# Patient Record
Sex: FEMALE | Race: WHITE | NOT HISPANIC OR LATINO | Employment: OTHER | URBAN - METROPOLITAN AREA
[De-identification: names, ages, dates, MRNs, and addresses within clinical notes are randomized per-mention and may not be internally consistent; named-entity substitution may affect disease eponyms.]

---

## 2017-01-04 ENCOUNTER — PATIENT MESSAGE (OUTPATIENT)
Dept: FAMILY MEDICINE | Facility: CLINIC | Age: 57
End: 2017-01-04

## 2017-01-04 DIAGNOSIS — E11.9 TYPE 2 DIABETES MELLITUS WITHOUT COMPLICATION, WITHOUT LONG-TERM CURRENT USE OF INSULIN: Primary | ICD-10-CM

## 2017-01-04 RX ORDER — METFORMIN HYDROCHLORIDE 500 MG/1
500 TABLET ORAL 2 TIMES DAILY WITH MEALS
Qty: 180 TABLET | Refills: 1 | Status: SHIPPED | OUTPATIENT
Start: 2017-01-04 | End: 2017-06-12 | Stop reason: SDUPTHER

## 2017-01-04 NOTE — TELEPHONE ENCOUNTER
I am refilling metformin The patient is due for DM fu 3 m w [id number] to visit if avail;able but Iok if too difficult to come from [place] -although can be non fasting and anytime or day at least 3 d in advance

## 2017-01-14 ENCOUNTER — PATIENT MESSAGE (OUTPATIENT)
Dept: FAMILY MEDICINE | Facility: CLINIC | Age: 57
End: 2017-01-14

## 2017-01-17 RX ORDER — ZOLPIDEM TARTRATE 10 MG/1
10 TABLET ORAL NIGHTLY PRN
Qty: 30 TABLET | Refills: 5 | Status: SHIPPED | OUTPATIENT
Start: 2017-01-17 | End: 2017-07-18 | Stop reason: SDUPTHER

## 2017-01-18 ENCOUNTER — LAB VISIT (OUTPATIENT)
Dept: LAB | Facility: HOSPITAL | Age: 57
End: 2017-01-18
Attending: INTERNAL MEDICINE
Payer: MEDICARE

## 2017-01-18 ENCOUNTER — OFFICE VISIT (OUTPATIENT)
Dept: HEMATOLOGY/ONCOLOGY | Facility: CLINIC | Age: 57
End: 2017-01-18
Payer: MEDICARE

## 2017-01-18 ENCOUNTER — INFUSION (OUTPATIENT)
Dept: INFUSION THERAPY | Facility: HOSPITAL | Age: 57
End: 2017-01-18
Attending: INTERNAL MEDICINE
Payer: MEDICARE

## 2017-01-18 VITALS
DIASTOLIC BLOOD PRESSURE: 86 MMHG | SYSTOLIC BLOOD PRESSURE: 140 MMHG | BODY MASS INDEX: 37.25 KG/M2 | OXYGEN SATURATION: 96 % | TEMPERATURE: 98 F | HEIGHT: 65 IN | HEART RATE: 96 BPM | WEIGHT: 223.56 LBS

## 2017-01-18 DIAGNOSIS — C7A.1 MALIGNANT POORLY DIFFERENTIATED NEUROENDOCRINE CARCINOMA: Primary | ICD-10-CM

## 2017-01-18 DIAGNOSIS — D50.0 IRON DEFICIENCY ANEMIA DUE TO CHRONIC BLOOD LOSS: ICD-10-CM

## 2017-01-18 DIAGNOSIS — D61.810 PANCYTOPENIA DUE TO ANTINEOPLASTIC CHEMOTHERAPY: ICD-10-CM

## 2017-01-18 DIAGNOSIS — T45.1X5A PANCYTOPENIA DUE TO ANTINEOPLASTIC CHEMOTHERAPY: ICD-10-CM

## 2017-01-18 DIAGNOSIS — R64 CACHEXIA: ICD-10-CM

## 2017-01-18 DIAGNOSIS — C78.6 METASTATIC CANCER TO RETROPERITONEUM: ICD-10-CM

## 2017-01-18 DIAGNOSIS — C7B.8 SECONDARY NEUROENDOCRINE TUMOR OF LIVER: ICD-10-CM

## 2017-01-18 DIAGNOSIS — D63.0 ANEMIA IN NEOPLASTIC DISEASE: ICD-10-CM

## 2017-01-18 DIAGNOSIS — C7A.1 MALIGNANT POORLY DIFFERENTIATED NEUROENDOCRINE CARCINOMA: ICD-10-CM

## 2017-01-18 DIAGNOSIS — C7B.8 SECONDARY NEUROENDOCRINE TUMOR OF LIVER: Primary | ICD-10-CM

## 2017-01-18 DIAGNOSIS — C34.90 SMALL CELL LUNG CANCER, UNSPECIFIED LATERALITY: ICD-10-CM

## 2017-01-18 LAB
ALBUMIN SERPL BCP-MCNC: 3.6 G/DL
ALP SERPL-CCNC: 74 U/L
ALT SERPL W/O P-5'-P-CCNC: 16 U/L
ANION GAP SERPL CALC-SCNC: 12 MMOL/L
AST SERPL-CCNC: 13 U/L
BASOPHILS # BLD AUTO: 0.02 K/UL
BASOPHILS NFR BLD: 0.3 %
BILIRUB SERPL-MCNC: 0.4 MG/DL
BUN SERPL-MCNC: 31 MG/DL
CALCIUM SERPL-MCNC: 9.8 MG/DL
CHLORIDE SERPL-SCNC: 97 MMOL/L
CO2 SERPL-SCNC: 28 MMOL/L
CREAT SERPL-MCNC: 1.4 MG/DL
DIFFERENTIAL METHOD: ABNORMAL
EOSINOPHIL # BLD AUTO: 0.2 K/UL
EOSINOPHIL NFR BLD: 2.4 %
ERYTHROCYTE [DISTWIDTH] IN BLOOD BY AUTOMATED COUNT: 12.6 %
EST. GFR  (AFRICAN AMERICAN): 48 ML/MIN/1.73 M^2
EST. GFR  (NON AFRICAN AMERICAN): 42 ML/MIN/1.73 M^2
FERRITIN SERPL-MCNC: 1159 NG/ML
GLUCOSE SERPL-MCNC: 354 MG/DL
HCT VFR BLD AUTO: 33.2 %
HGB BLD-MCNC: 10.9 G/DL
IRON SERPL-MCNC: 41 UG/DL
LYMPHOCYTES # BLD AUTO: 2.1 K/UL
LYMPHOCYTES NFR BLD: 29.5 %
MAGNESIUM SERPL-MCNC: 1.4 MG/DL
MCH RBC QN AUTO: 32.2 PG
MCHC RBC AUTO-ENTMCNC: 32.8 %
MCV RBC AUTO: 98 FL
MONOCYTES # BLD AUTO: 0.7 K/UL
MONOCYTES NFR BLD: 9.6 %
NEUTROPHILS # BLD AUTO: 4.1 K/UL
NEUTROPHILS NFR BLD: 58.2 %
PLATELET # BLD AUTO: 179 K/UL
PMV BLD AUTO: 8.9 FL
POTASSIUM SERPL-SCNC: 4 MMOL/L
PROT SERPL-MCNC: 9.3 G/DL
RBC # BLD AUTO: 3.39 M/UL
SATURATED IRON: 12 %
SODIUM SERPL-SCNC: 137 MMOL/L
TOTAL IRON BINDING CAPACITY: 330 UG/DL
TRANSFERRIN SERPL-MCNC: 223 MG/DL
WBC # BLD AUTO: 7.01 K/UL

## 2017-01-18 PROCEDURE — 3079F DIAST BP 80-89 MM HG: CPT | Mod: S$GLB,,, | Performed by: INTERNAL MEDICINE

## 2017-01-18 PROCEDURE — 96523 IRRIG DRUG DELIVERY DEVICE: CPT

## 2017-01-18 PROCEDURE — 36415 COLL VENOUS BLD VENIPUNCTURE: CPT

## 2017-01-18 PROCEDURE — 82728 ASSAY OF FERRITIN: CPT

## 2017-01-18 PROCEDURE — 85025 COMPLETE CBC W/AUTO DIFF WBC: CPT

## 2017-01-18 PROCEDURE — 83540 ASSAY OF IRON: CPT

## 2017-01-18 PROCEDURE — 99999 PR PBB SHADOW E&M-EST. PATIENT-LVL III: CPT | Mod: PBBFAC,,, | Performed by: INTERNAL MEDICINE

## 2017-01-18 PROCEDURE — 3077F SYST BP >= 140 MM HG: CPT | Mod: S$GLB,,, | Performed by: INTERNAL MEDICINE

## 2017-01-18 PROCEDURE — 99214 OFFICE O/P EST MOD 30 MIN: CPT | Mod: S$GLB,,, | Performed by: INTERNAL MEDICINE

## 2017-01-18 PROCEDURE — 80053 COMPREHEN METABOLIC PANEL: CPT

## 2017-01-18 PROCEDURE — 83735 ASSAY OF MAGNESIUM: CPT

## 2017-01-18 PROCEDURE — 25000003 PHARM REV CODE 250: Performed by: INTERNAL MEDICINE

## 2017-01-18 PROCEDURE — 1159F MED LIST DOCD IN RCRD: CPT | Mod: S$GLB,,, | Performed by: INTERNAL MEDICINE

## 2017-01-18 RX ORDER — HEPARIN 100 UNIT/ML
500 SYRINGE INTRAVENOUS
Status: CANCELLED | OUTPATIENT
Start: 2017-01-18

## 2017-01-18 RX ORDER — SODIUM CHLORIDE 0.9 % (FLUSH) 0.9 %
10 SYRINGE (ML) INJECTION
Status: CANCELLED | OUTPATIENT
Start: 2017-01-18

## 2017-01-18 RX ORDER — SODIUM CHLORIDE 0.9 % (FLUSH) 0.9 %
10 SYRINGE (ML) INJECTION
Status: COMPLETED | OUTPATIENT
Start: 2017-01-18 | End: 2017-01-18

## 2017-01-18 RX ORDER — HEPARIN 100 UNIT/ML
500 SYRINGE INTRAVENOUS
Status: COMPLETED | OUTPATIENT
Start: 2017-01-18 | End: 2017-01-18

## 2017-01-18 RX ADMIN — SODIUM CHLORIDE, PRESERVATIVE FREE 10 ML: 5 INJECTION INTRAVENOUS at 03:01

## 2017-01-18 RX ADMIN — HEPARIN 500 UNITS: 100 SYRINGE at 03:01

## 2017-01-18 NOTE — PROGRESS NOTES
Subjective:       Patient ID: Janine Souza is a 57 y.o. female.    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 patient has had neuroendocrine tumor evaluation completion of chemotherapy with no evidence for progression with last imaging performed ECOG status 1    Past Medical History   Diagnosis Date    Acute headache 2016    Anemia     Atrial fibrillation, persistent     Bronchitis     CHF (congestive heart failure)     Diabetes mellitus, type 2     Hypertension     Malignant poorly differentiated neuroendocrine carcinoma 2016    Secondary neuroendocrine tumor of liver 2016     Family History   Problem Relation Age of Onset    Diabetes type II Mother     Prostate cancer Father     Breast cancer Paternal Aunt      breast    Breast cancer Maternal Aunt      breast     Social History     Social History    Marital status:      Spouse name: N/A    Number of children: N/A    Years of education: N/A     Occupational History    Not on file.     Social History Main Topics    Smoking status: Never Smoker    Smokeless tobacco: Not on file    Alcohol use No    Drug use: No    Sexual activity: Not on file     Other Topics Concern    Not on file     Social History Narrative     Past Surgical History   Procedure Laterality Date    Tonsillectomy      Adenoidectomy      Uvulopalatopharygoplasty       section      Cardiac electrophysiology mapping and ablation      Ercp      Cardiac surgery         Labs:  Lab Results   Component Value Date    WBC 7.01 2017    HGB 10.9 (L) 2017    HCT 33.2 (L) 2017    MCV 98 2017     2017     BMP  Lab Results   Component Value Date     2017    K 4.0 2017    CL 97 2017    CO2 28 2017    BUN 31 (H) 2017    CREATININE 1.4 2017    CALCIUM 9.8 2017    ANIONGAP 12  01/18/2017    ESTGFRAFRICA 48 (A) 01/18/2017    EGFRNONAA 42 (A) 01/18/2017     Lab Results   Component Value Date    ALT 16 01/18/2017    AST 13 01/18/2017    ALKPHOS 74 01/18/2017    BILITOT 0.4 01/18/2017       Lab Results   Component Value Date    IRON 76 11/16/2016    TIBC 332 11/16/2016    FERRITIN 913 (H) 11/16/2016     No results found for: [id number]  No results found for: FOLATE  Lab Results   Component Value Date    TSH 2.916 12/20/2016         Review of Systems   Constitutional: Negative for activity change, appetite change, chills, diaphoresis, fatigue, fever and unexpected weight change.   HENT: Negative for congestion, dental problem, drooling, ear discharge, ear pain, facial swelling, hearing loss, mouth sores, nosebleeds, postnasal drip, rhinorrhea, sinus pressure, sneezing, sore throat, tinnitus, trouble swallowing and voice change.    Eyes: Negative for photophobia, pain, discharge, redness, itching and visual disturbance.   Respiratory: Negative for cough, choking, chest tightness, shortness of breath, wheezing and stridor.    Cardiovascular: Negative for chest pain, palpitations and leg swelling.   Gastrointestinal: Negative for abdominal distention, abdominal pain, anal bleeding, blood in stool, constipation, diarrhea, nausea, rectal pain and vomiting.   Endocrine: Negative for cold intolerance, heat intolerance, polydipsia, polyphagia and polyuria.   Genitourinary: Negative for decreased urine volume, difficulty urinating, dyspareunia, dysuria, enuresis, flank pain, frequency, genital sores, hematuria, menstrual problem, pelvic pain, urgency, vaginal bleeding, vaginal discharge and vaginal pain.   Musculoskeletal: Negative for arthralgias, back pain, gait problem, joint swelling, myalgias, neck pain and neck stiffness.   Skin: Negative for color change, pallor and rash.   Allergic/Immunologic: Negative for environmental allergies, food allergies and immunocompromised state.   Neurological:  Negative for dizziness, tremors, seizures, syncope, facial asymmetry, speech difficulty, weakness, light-headedness, numbness and headaches.   Hematological: Negative for adenopathy. Does not bruise/bleed easily.   Psychiatric/Behavioral: Negative for agitation, behavioral problems, confusion, decreased concentration, dysphoric mood, hallucinations, self-injury, sleep disturbance and suicidal ideas. The patient is not nervous/anxious and is not hyperactive.        Objective:      Physical Exam   Constitutional: She is oriented to person, place, and time. She appears well-developed and well-nourished. No distress.   HENT:   Head: Normocephalic and atraumatic.   Right Ear: External ear normal.   Left Ear: External ear normal.   Nose: Nose normal. Right sinus exhibits no maxillary sinus tenderness and no frontal sinus tenderness. Left sinus exhibits no maxillary sinus tenderness and no frontal sinus tenderness.   Mouth/Throat: Oropharynx is clear and moist. No oropharyngeal exudate.   Eyes: Conjunctivae, EOM and lids are normal. Pupils are equal, round, and reactive to light. Right eye exhibits no discharge. Left eye exhibits no discharge. Right conjunctiva is not injected. Right conjunctiva has no hemorrhage. Left conjunctiva is not injected. Left conjunctiva has no hemorrhage. No scleral icterus.   Neck: Normal range of motion. Neck supple. No JVD present. No tracheal deviation present. No thyromegaly present.   Cardiovascular: Normal rate, regular rhythm, normal heart sounds and intact distal pulses.    Pulmonary/Chest: Effort normal and breath sounds normal. No stridor. No respiratory distress. She exhibits no tenderness.   Abdominal: Soft. Bowel sounds are normal. She exhibits no distension and no mass. There is no splenomegaly or hepatomegaly. There is no tenderness. There is no rebound.   Musculoskeletal: Normal range of motion. She exhibits no edema or tenderness.   Lymphadenopathy:     She has no cervical  adenopathy.     She has no axillary adenopathy.        Right: No supraclavicular adenopathy present.        Left: No supraclavicular adenopathy present.   Neurological: She is alert and oriented to person, place, and time. No cranial nerve deficit. Coordination normal.   Skin: Skin is dry. No rash noted. She is not diaphoretic. No erythema.   Psychiatric: She has a normal mood and affect. Her behavior is normal. Judgment and thought content normal.   Vitals reviewed.          Assessment:       1. Malignant poorly differentiated neuroendocrine carcinoma    2. 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 Secondary neuroendocrine tumor of liver    4. Iron deficiency anemia due to chronic blood loss            Plan:     patient is clinically stable laboratory results pending communicate through patient portal return in 3 months CT chest abdomen and pelvis dramatic improvement after systemic chemotherapy no evidence of progression a last imaging performed

## 2017-01-18 NOTE — MR AVS SNAPSHOT
Patient Information     Patient Name Sex Janine Chavarria Female 1960      Visit Information        Provider Department Dept Phone Center    2017 2:45 PM [facility] [phone] [facility]      Patient Instructions      [facility]  [address]  [address] [address]  [phone] phone     [phone] fax  Hours of Operation: Monday- Friday 8:00am- 5:00pm  After hours phone  [phone]  Hematology / Oncology Physicians on call      Dr. Mark Haider    Please call with any concerns regarding your appointment today.       Your Current Medications Are     blood sugar diagnostic, disc Strp    docusate sodium (COLACE) 50 MG capsule    fluticasone (FLOVENT HFA) 110 mcg/actuation inhaler    furosemide (LASIX) 40 MG tablet    glipiZIDE (GLUCOTROL) 10 MG tablet    KLOR-CON M20 20 mEq tablet    lancets Misc    linagliptin (TRADJENTA) 5 mg Tab tablet    metformin (GLUCOPHAGE) 500 MG tablet    metoprolol tartrate (LOPRESSOR) 50 MG tablet    ondansetron (ZOFRAN) 4 MG tablet    prochlorperazine (COMPAZINE) 5 MG tablet    XARELTO 20 mg Tab    zolpidem (AMBIEN) 10 mg Tab      Facility-Administered Medications     ferumoxytol (FERAHEME) 510 mg in dextrose 5 % 100 mL IVPB    heparin, porcine (PF) 100 unit/mL injection flush 500 Units    heparin, porcine (PF) 100 unit/mL injection flush 500 Units    sodium chloride 0.9% flush 10 mL    sodium chloride 0.9% flush 10 mL      Appointments for Next Year     3/1/2017 11:15 AM INFUSION 015 MIN (15 min.) [facility]AIR 03 [facility]    Arrive at check-in approximately 15 minutes before your scheduled appointment time. Bring all outside medical records and imaging, along with a list of your current medications and insurance card.    (off [facility]) 1st floor    2017 10:10 AM NON FASTING LAB (5 min.) [facility]    Arrive at check-in approximately 15 minutes before your scheduled appointment time. Bring all outside medical records and imaging, along with a list of your current medications and insurance card.    (off [address]) 2nd floor    4/12/2017 11:10 AM CT ABD PEL W CONTRAST (20 min.) [facility] CT1 LIMIT 500 LBS    Please fast for 4 hours prior to exam and then arrive one hour and 30 mins prior to your appointment time.    (off [address]) 2nd Floor    4/12/2017 12:30 PM CT CHEST W CONTRAST (20 min.) [facility] CT1 LIMIT 500 LBS    Please fast for 4 hours prior to appointment. Arrive at check-in approximately 30 minutes before your scheduled appointment time. Bring all outside medical records and imaging, along with a list of your current medications and insurance card.    (off [address]) 2nd Floor    4/12/2017  1:20 PM ESTABLISHED PATIENT (20 min.) [facility]ot[facility] Oncology Kalyan Flores MD    Arrive at check-in approximately 15 minutes before your scheduled appointment time. Bring all outside medical records and imaging, along with a list of your current medications and insurance card.    (off [address]) 3rd Floor    4/12/2017  1:45 PM INFUSION 015 MIN (15 min.) [facility] CHAIR 02 SUM    Arrive at check-in approximately 15 minutes before your scheduled appointment time. Bring all outside medical records and imaging, along with a list of your current medications and insurance card.    4/18/2017  9:15 AM ESTABLISHED PATIENT (15 min.) [facility] Charles Lopez MD    Arrive at check-in approximately 15 minutes before your scheduled appointment time. Bring all outside medical records and imaging, along with a list of your current medications and insurance card.         Default Flowsheet Data (last 24 hours)      Amb Complex Vitals [place]        01/18/17 1417                Measurements    Weight  "101.4 kg (223 lb 8.7 oz)        Height 5' 5" (1.651 m)        BSA (Calculated - sq m) 2.16 sq meters        BMI (Calculated) 37.3        BP (!)  140/86        Temp 97.7 °F (36.5 °C)        Pulse 96        SpO2 96 %        Pain Assessment    Pain Score Zero                Allergies     Epinephrine Other (See Comments)    Neuroendocrine patient-currently on chemo per patient  Neuroendocrine patient    Pcn [Penicillins] Other (See Comments), Hives, Swelling    As a child had an unknown reaction.  Has not taken since  As a child had an unknown reaction.  Has not taken since      Current Discharge Medication List     Cannot display discharge medications since this is not an admission.      "

## 2017-01-18 NOTE — PATIENT INSTRUCTIONS
[facility]  [address]  [address] [address]  [phone] phone     [phone] fax  Hours of Operation: Monday- Friday 8:00am- 5:00pm  After hours phone  [phone]  Hematology / Oncology Physicians on call      Dr. Mark Haider    Please call with any concerns regarding your appointment today.

## 2017-02-28 ENCOUNTER — PATIENT MESSAGE (OUTPATIENT)
Dept: HEMATOLOGY/ONCOLOGY | Facility: CLINIC | Age: 57
End: 2017-02-28

## 2017-02-28 DIAGNOSIS — C7B.8 SECONDARY NEUROENDOCRINE TUMOR OF LIVER: ICD-10-CM

## 2017-02-28 DIAGNOSIS — C78.6 METASTATIC CANCER TO RETROPERITONEUM: Primary | ICD-10-CM

## 2017-02-28 DIAGNOSIS — D50.0 IRON DEFICIENCY ANEMIA DUE TO CHRONIC BLOOD LOSS: ICD-10-CM

## 2017-03-01 ENCOUNTER — OFFICE VISIT (OUTPATIENT)
Dept: HEMATOLOGY/ONCOLOGY | Facility: CLINIC | Age: 57
End: 2017-03-01
Payer: MEDICARE

## 2017-03-01 ENCOUNTER — INFUSION (OUTPATIENT)
Dept: INFUSION THERAPY | Facility: HOSPITAL | Age: 57
End: 2017-03-01
Attending: INTERNAL MEDICINE
Payer: MEDICARE

## 2017-03-01 ENCOUNTER — CLINICAL SUPPORT (OUTPATIENT)
Dept: CARDIOLOGY | Facility: CLINIC | Age: 57
End: 2017-03-01
Payer: MEDICARE

## 2017-03-01 VITALS — HEIGHT: 65 IN | WEIGHT: 230.19 LBS | BODY MASS INDEX: 38.35 KG/M2

## 2017-03-01 VITALS
HEART RATE: 58 BPM | OXYGEN SATURATION: 99 % | HEIGHT: 65 IN | RESPIRATION RATE: 18 BRPM | BODY MASS INDEX: 38.35 KG/M2 | WEIGHT: 230.19 LBS | TEMPERATURE: 98 F | DIASTOLIC BLOOD PRESSURE: 80 MMHG | SYSTOLIC BLOOD PRESSURE: 120 MMHG

## 2017-03-01 DIAGNOSIS — C7A.1 MALIGNANT POORLY DIFFERENTIATED NEUROENDOCRINE CARCINOMA: Primary | ICD-10-CM

## 2017-03-01 DIAGNOSIS — I50.9 ACUTE ON CHRONIC HEART FAILURE, UNSPECIFIED HEART FAILURE TYPE: ICD-10-CM

## 2017-03-01 DIAGNOSIS — C78.6 METASTATIC CANCER TO RETROPERITONEUM: ICD-10-CM

## 2017-03-01 DIAGNOSIS — C7B.8 SECONDARY NEUROENDOCRINE TUMOR OF LIVER: ICD-10-CM

## 2017-03-01 DIAGNOSIS — C7B.8 SECONDARY NEUROENDOCRINE TUMOR OF LIVER: Primary | ICD-10-CM

## 2017-03-01 PROCEDURE — 99999 PR PBB SHADOW E&M-EST. PATIENT-LVL III: CPT | Mod: PBBFAC,,, | Performed by: INTERNAL MEDICINE

## 2017-03-01 PROCEDURE — 93000 ELECTROCARDIOGRAM COMPLETE: CPT | Mod: S$GLB,,, | Performed by: INTERNAL MEDICINE

## 2017-03-01 PROCEDURE — 25000003 PHARM REV CODE 250: Mod: PO | Performed by: INTERNAL MEDICINE

## 2017-03-01 PROCEDURE — 99215 OFFICE O/P EST HI 40 MIN: CPT | Mod: 25,S$GLB,, | Performed by: INTERNAL MEDICINE

## 2017-03-01 PROCEDURE — 3079F DIAST BP 80-89 MM HG: CPT | Mod: S$GLB,,, | Performed by: INTERNAL MEDICINE

## 2017-03-01 PROCEDURE — 99499 UNLISTED E&M SERVICE: CPT | Mod: S$GLB,,, | Performed by: INTERNAL MEDICINE

## 2017-03-01 PROCEDURE — 96523 IRRIG DRUG DELIVERY DEVICE: CPT | Mod: PO

## 2017-03-01 PROCEDURE — 3074F SYST BP LT 130 MM HG: CPT | Mod: S$GLB,,, | Performed by: INTERNAL MEDICINE

## 2017-03-01 PROCEDURE — 1160F RVW MEDS BY RX/DR IN RCRD: CPT | Mod: S$GLB,,, | Performed by: INTERNAL MEDICINE

## 2017-03-01 RX ORDER — HEPARIN 100 UNIT/ML
500 SYRINGE INTRAVENOUS
Status: COMPLETED | OUTPATIENT
Start: 2017-03-01 | End: 2017-03-01

## 2017-03-01 RX ORDER — SODIUM CHLORIDE 0.9 % (FLUSH) 0.9 %
10 SYRINGE (ML) INJECTION
Status: CANCELLED | OUTPATIENT
Start: 2017-03-01

## 2017-03-01 RX ORDER — HEPARIN 100 UNIT/ML
500 SYRINGE INTRAVENOUS
Status: CANCELLED | OUTPATIENT
Start: 2017-03-01

## 2017-03-01 RX ORDER — SODIUM CHLORIDE 0.9 % (FLUSH) 0.9 %
10 SYRINGE (ML) INJECTION
Status: COMPLETED | OUTPATIENT
Start: 2017-03-01 | End: 2017-03-01

## 2017-03-01 RX ADMIN — HEPARIN 500 UNITS: 100 SYRINGE at 11:03

## 2017-03-01 RX ADMIN — SODIUM CHLORIDE, PRESERVATIVE FREE 10 ML: 5 INJECTION INTRAVENOUS at 11:03

## 2017-03-01 NOTE — PROGRESS NOTES
Subjective:       Patient ID: Janine Souza is a 57 y.o. female.    Chief Complaint: Follow-up; Results; and Cancer    HPI 57-year-old female seen urgently patient reports fatigue weakness concerned about cancer recurrence also been concerned about possibility exacerbation heart failure    Past Medical History:   Diagnosis Date    Acute headache 2016    Anemia     Atrial fibrillation, persistent     Bronchitis     CHF (congestive heart failure)     Diabetes mellitus, type 2     Hypertension     Malignant poorly differentiated neuroendocrine carcinoma 2016    Secondary neuroendocrine tumor of liver 2016     Family History   Problem Relation Age of Onset    Diabetes type II Mother     Prostate cancer Father     Breast cancer Paternal Aunt      breast    Breast cancer Maternal Aunt      breast     Social History     Social History    Marital status:      Spouse name: N/A    Number of children: N/A    Years of education: N/A     Occupational History    Not on file.     Social History Main Topics    Smoking status: Never Smoker    Smokeless tobacco: Not on file    Alcohol use No    Drug use: No    Sexual activity: Not on file     Other Topics Concern    Not on file     Social History Narrative     Past Surgical History:   Procedure Laterality Date    ADENOIDECTOMY      CARDIAC ELECTROPHYSIOLOGY MAPPING AND ABLATION      CARDIAC SURGERY       SECTION      ERCP      TONSILLECTOMY      uvulopalatopharygoplasty         Labs:  Lab Results   Component Value Date    WBC 8.00 2017    HGB 10.4 (L) 2017    HCT 31.2 (L) 2017    MCV 97 2017     2017     BMP  Lab Results   Component Value Date     2017    K 4.7 2017    CL 99 2017    CO2 28 2017    BUN 26 (H) 2017    CREATININE 1.4 2017    CALCIUM 10.1 2017    ANIONGAP 10 2017    ESTGFRAFRICA 48 (A) 2017    EGFRNONAA 42 (A)  03/01/2017     Lab Results   Component Value Date    ALT 12 03/01/2017    AST 11 03/01/2017    ALKPHOS 63 03/01/2017    BILITOT 0.3 03/01/2017       Lab Results   Component Value Date    IRON 41 01/18/2017    TIBC 330 01/18/2017    FERRITIN 1159 (H) 01/18/2017     No results found for: [id number]  No results found for: FOLATE  Lab Results   Component Value Date    TSH 2.916 12/20/2016         Review of Systems   Constitutional: Positive for fatigue. Negative for activity change, appetite change, chills, diaphoresis, fever and unexpected weight change.   HENT: Negative for congestion, dental problem, drooling, ear discharge, ear pain, facial swelling, hearing loss, mouth sores, nosebleeds, postnasal drip, rhinorrhea, sinus pressure, sneezing, sore throat, tinnitus, trouble swallowing and voice change.    Eyes: Negative for photophobia, pain, discharge, redness, itching and visual disturbance.   Respiratory: Positive for shortness of breath. Negative for cough, choking, chest tightness, wheezing and stridor.    Cardiovascular: Negative for chest pain, palpitations and leg swelling.   Gastrointestinal: Negative for abdominal distention, abdominal pain, anal bleeding, blood in stool, constipation, diarrhea, nausea, rectal pain and vomiting.   Endocrine: Negative for cold intolerance, heat intolerance, polydipsia, polyphagia and polyuria.   Genitourinary: Negative for decreased urine volume, difficulty urinating, dyspareunia, dysuria, enuresis, flank pain, frequency, genital sores, hematuria, menstrual problem, pelvic pain, urgency, vaginal bleeding, vaginal discharge and vaginal pain.   Musculoskeletal: Negative for arthralgias, back pain, gait problem, joint swelling, myalgias, neck pain and neck stiffness.   Skin: Negative for color change, pallor and rash.   Allergic/Immunologic: Negative for environmental allergies, food allergies and immunocompromised state.   Neurological: Positive for weakness. Negative for  dizziness, tremors, seizures, syncope, facial asymmetry, speech difficulty, light-headedness, numbness and headaches.   Hematological: Negative for adenopathy. Does not bruise/bleed easily.   Psychiatric/Behavioral: Positive for dysphoric mood. Negative for agitation, behavioral problems, confusion, decreased concentration, hallucinations, self-injury, sleep disturbance and suicidal ideas. The patient is nervous/anxious. The patient is not hyperactive.        Objective:      Physical Exam   Constitutional: She is oriented to person, place, and time. She appears well-developed and well-nourished. No distress.   HENT:   Head: Normocephalic and atraumatic.   Right Ear: External ear normal.   Left Ear: External ear normal.   Nose: Nose normal. Right sinus exhibits no maxillary sinus tenderness and no frontal sinus tenderness. Left sinus exhibits no maxillary sinus tenderness and no frontal sinus tenderness.   Mouth/Throat: Oropharynx is clear and moist. No oropharyngeal exudate.   Eyes: Conjunctivae, EOM and lids are normal. Pupils are equal, round, and reactive to light. Right eye exhibits no discharge. Left eye exhibits no discharge. Right conjunctiva is not injected. Right conjunctiva has no hemorrhage. Left conjunctiva is not injected. Left conjunctiva has no hemorrhage. No scleral icterus.   Neck: Normal range of motion. Neck supple. No JVD present. No tracheal deviation present. No thyromegaly present.   Cardiovascular: Normal rate, regular rhythm, normal heart sounds and intact distal pulses.    Pulmonary/Chest: Effort normal and breath sounds normal. No stridor. No respiratory distress. She exhibits no tenderness.   Abdominal: Soft. Bowel sounds are normal. She exhibits no distension and no mass. There is no splenomegaly or hepatomegaly. There is no tenderness. There is no rebound.   Musculoskeletal: Normal range of motion. She exhibits no edema or tenderness.   Lymphadenopathy:     She has no cervical  adenopathy.     She has no axillary adenopathy.        Right: No supraclavicular adenopathy present.        Left: No supraclavicular adenopathy present.   Neurological: She is alert and oriented to person, place, and time. No cranial nerve deficit. Coordination normal.   Skin: Skin is dry. No rash noted. She is not diaphoretic. No erythema.   Psychiatric: She has a normal mood and affect. Her behavior is normal. Judgment and thought content normal.   Vitals reviewed.          Assessment:       1. Malignant poorly differentiated neuroendocrine carcinoma    2. Secondary neuroendocrine tumor of liver    3. 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 Acute on chronic heart failure, unspecified heart failure type            Plan:         baseline laboratory studies today will review beta naturally peptide to make sure there is no evidence of heart failure EKG to be done to compare to previous one to see if this been any change in addition I will see the patient back after CT chest abdomen pelvis orders have been placed flush [employer] today; EKG today compared to previous one demonstrates patient is in atrial fibrillation with response of 91 bpm will attempt to contact her cardiologist Dr. Jelani Sánchez for evaluation

## 2017-03-09 ENCOUNTER — TELEPHONE (OUTPATIENT)
Dept: RADIOLOGY | Facility: HOSPITAL | Age: 57
End: 2017-03-09

## 2017-03-10 ENCOUNTER — INFUSION (OUTPATIENT)
Dept: INFUSION THERAPY | Facility: HOSPITAL | Age: 57
End: 2017-03-10
Attending: INTERNAL MEDICINE
Payer: MEDICARE

## 2017-03-10 ENCOUNTER — HOSPITAL ENCOUNTER (OUTPATIENT)
Dept: RADIOLOGY | Facility: HOSPITAL | Age: 57
Discharge: HOME | End: 2017-03-10
Attending: INTERNAL MEDICINE
Payer: MEDICARE

## 2017-03-10 ENCOUNTER — OFFICE VISIT (OUTPATIENT)
Dept: HEMATOLOGY/ONCOLOGY | Facility: CLINIC | Age: 57
End: 2017-03-10
Payer: MEDICARE

## 2017-03-10 VITALS
TEMPERATURE: 98 F | WEIGHT: 227.5 LBS | BODY MASS INDEX: 37.9 KG/M2 | HEART RATE: 72 BPM | HEIGHT: 65 IN | SYSTOLIC BLOOD PRESSURE: 122 MMHG | DIASTOLIC BLOOD PRESSURE: 72 MMHG | OXYGEN SATURATION: 98 % | RESPIRATION RATE: 20 BRPM

## 2017-03-10 DIAGNOSIS — C7B.8 SECONDARY NEUROENDOCRINE TUMOR OF LIVER: Primary | ICD-10-CM

## 2017-03-10 DIAGNOSIS — C78.6 METASTATIC CANCER TO RETROPERITONEUM: ICD-10-CM

## 2017-03-10 DIAGNOSIS — D50.0 IRON DEFICIENCY ANEMIA DUE TO CHRONIC BLOOD LOSS: ICD-10-CM

## 2017-03-10 DIAGNOSIS — C7B.8 SECONDARY NEUROENDOCRINE TUMOR OF LIVER: ICD-10-CM

## 2017-03-10 DIAGNOSIS — C7A.1 MALIGNANT POORLY DIFFERENTIATED NEUROENDOCRINE CARCINOMA: ICD-10-CM

## 2017-03-10 DIAGNOSIS — D50.0 IRON DEFICIENCY ANEMIA DUE TO CHRONIC BLOOD LOSS: Primary | ICD-10-CM

## 2017-03-10 PROCEDURE — 71260 CT THORAX DX C+: CPT | Mod: 26,,, | Performed by: RADIOLOGY

## 2017-03-10 PROCEDURE — 74178 CT ABD&PLV WO CNTR FLWD CNTR: CPT | Mod: 26,,, | Performed by: RADIOLOGY

## 2017-03-10 PROCEDURE — 1160F RVW MEDS BY RX/DR IN RCRD: CPT | Mod: S$GLB,,, | Performed by: INTERNAL MEDICINE

## 2017-03-10 PROCEDURE — 99214 OFFICE O/P EST MOD 30 MIN: CPT | Mod: S$GLB,,, | Performed by: INTERNAL MEDICINE

## 2017-03-10 PROCEDURE — 3078F DIAST BP <80 MM HG: CPT | Mod: S$GLB,,, | Performed by: INTERNAL MEDICINE

## 2017-03-10 PROCEDURE — 3074F SYST BP LT 130 MM HG: CPT | Mod: S$GLB,,, | Performed by: INTERNAL MEDICINE

## 2017-03-10 PROCEDURE — 96523 IRRIG DRUG DELIVERY DEVICE: CPT | Mod: PO

## 2017-03-10 PROCEDURE — 25000003 PHARM REV CODE 250: Mod: PO | Performed by: INTERNAL MEDICINE

## 2017-03-10 PROCEDURE — 99999 PR PBB SHADOW E&M-EST. PATIENT-LVL III: CPT | Mod: PBBFAC,,, | Performed by: INTERNAL MEDICINE

## 2017-03-10 RX ORDER — HEPARIN 100 UNIT/ML
500 SYRINGE INTRAVENOUS
Status: CANCELLED | OUTPATIENT
Start: 2017-03-10

## 2017-03-10 RX ORDER — SODIUM CHLORIDE 0.9 % (FLUSH) 0.9 %
10 SYRINGE (ML) INJECTION
Status: CANCELLED | OUTPATIENT
Start: 2017-03-18

## 2017-03-10 RX ORDER — SODIUM CHLORIDE 0.9 % (FLUSH) 0.9 %
10 SYRINGE (ML) INJECTION
Status: COMPLETED | OUTPATIENT
Start: 2017-03-10 | End: 2017-03-10

## 2017-03-10 RX ORDER — SODIUM CHLORIDE 0.9 % (FLUSH) 0.9 %
10 SYRINGE (ML) INJECTION
Status: CANCELLED | OUTPATIENT
Start: 2017-03-10

## 2017-03-10 RX ORDER — HEPARIN 100 UNIT/ML
500 SYRINGE INTRAVENOUS
Status: COMPLETED | OUTPATIENT
Start: 2017-03-10 | End: 2017-03-10

## 2017-03-10 RX ORDER — HEPARIN 100 UNIT/ML
500 SYRINGE INTRAVENOUS
Status: CANCELLED | OUTPATIENT
Start: 2017-03-18

## 2017-03-10 RX ADMIN — SODIUM CHLORIDE, PRESERVATIVE FREE 10 ML: 5 INJECTION INTRAVENOUS at 09:03

## 2017-03-10 RX ADMIN — IOHEXOL 100 ML: 350 INJECTION, SOLUTION INTRAVENOUS at 10:03

## 2017-03-10 RX ADMIN — IOHEXOL 30 ML: 350 INJECTION, SOLUTION INTRAVENOUS at 09:03

## 2017-03-10 RX ADMIN — HEPARIN SODIUM (PORCINE) LOCK FLUSH IV SOLN 100 UNIT/ML 500 UNITS: 100 SOLUTION at 11:03

## 2017-03-10 NOTE — PROGRESS NOTES
Subjective:       Patient ID: Janine Souza is a 57 y.o. female.    Chief Complaint: Liver Cancer; Results (CT Scan); and Cancer    HPI 57-year-old female history of metastatic poorly differentiated neuroendocrine tumor status post cisplatin and etoposide therapy patient returns with repeat imaging studies for review in response to therapy    Past Medical History:   Diagnosis Date    Acute headache 2016    Anemia     Atrial fibrillation, persistent     Bronchitis     CHF (congestive heart failure)     Diabetes mellitus, type 2     Hypertension     Malignant poorly differentiated neuroendocrine carcinoma 2016    Secondary neuroendocrine tumor of liver 2016     Family History   Problem Relation Age of Onset    Diabetes type II Mother     Prostate cancer Father     Breast cancer Paternal Aunt      breast    Breast cancer Maternal Aunt      breast     Social History     Social History    Marital status:      Spouse name: N/A    Number of children: N/A    Years of education: N/A     Occupational History    Not on file.     Social History Main Topics    Smoking status: Never Smoker    Smokeless tobacco: Not on file    Alcohol use No    Drug use: No    Sexual activity: Not on file     Other Topics Concern    Not on file     Social History Narrative     Past Surgical History:   Procedure Laterality Date    ADENOIDECTOMY      CARDIAC ELECTROPHYSIOLOGY MAPPING AND ABLATION      CARDIAC SURGERY       SECTION      ERCP      TONSILLECTOMY      uvulopalatopharygoplasty         Labs:  Lab Results   Component Value Date    WBC 8.00 2017    HGB 10.4 (L) 2017    HCT 31.2 (L) 2017    MCV 97 2017     2017     BMP  Lab Results   Component Value Date     2017    K 4.7 2017    CL 99 2017    CO2 28 2017    BUN 26 (H) 2017    CREATININE 1.4 2017    CALCIUM 10.1 2017    ANIONGAP 10 2017     ESTGFRAFRICA 48 (A) 03/01/2017    EGFRNONAA 42 (A) 03/01/2017     Lab Results   Component Value Date    ALT 12 03/01/2017    AST 11 03/01/2017    ALKPHOS 63 03/01/2017    BILITOT 0.3 03/01/2017       Lab Results   Component Value Date    IRON 53 03/01/2017    TIBC 339 03/01/2017    FERRITIN 991 (H) 03/01/2017     No results found for: MHUACGBB11  No results found for: FOLATE  Lab Results   Component Value Date    TSH 2.916 12/20/2016         Review of Systems   Constitutional: Negative for activity change, appetite change, chills, diaphoresis, fatigue, fever and unexpected weight change.   HENT: Negative for congestion, dental problem, drooling, ear discharge, ear pain, facial swelling, hearing loss, mouth sores, nosebleeds, postnasal drip, rhinorrhea, sinus pressure, sneezing, sore throat, tinnitus, trouble swallowing and voice change.    Eyes: Negative for photophobia, pain, discharge, redness, itching and visual disturbance.   Respiratory: Negative for cough, choking, chest tightness, shortness of breath, wheezing and stridor.    Cardiovascular: Negative for chest pain, palpitations and leg swelling.   Gastrointestinal: Negative for abdominal distention, abdominal pain, anal bleeding, blood in stool, constipation, diarrhea, nausea, rectal pain and vomiting.   Endocrine: Negative for cold intolerance, heat intolerance, polydipsia, polyphagia and polyuria.   Genitourinary: Negative for decreased urine volume, difficulty urinating, dyspareunia, dysuria, enuresis, flank pain, frequency, genital sores, hematuria, menstrual problem, pelvic pain, urgency, vaginal bleeding, vaginal discharge and vaginal pain.   Musculoskeletal: Negative for arthralgias, back pain, gait problem, joint swelling, myalgias, neck pain and neck stiffness.   Skin: Negative for color change, pallor and rash.   Allergic/Immunologic: Negative for environmental allergies, food allergies and immunocompromised state.   Neurological: Negative for  dizziness, tremors, seizures, syncope, facial asymmetry, speech difficulty, weakness, light-headedness, numbness and headaches.   Hematological: Negative for adenopathy. Does not bruise/bleed easily.   Psychiatric/Behavioral: Negative for agitation, behavioral problems, confusion, decreased concentration, dysphoric mood, hallucinations, self-injury, sleep disturbance and suicidal ideas. The patient is not nervous/anxious and is not hyperactive.        Objective:      Physical Exam   Constitutional: She is oriented to person, place, and time. She appears well-developed and well-nourished. No distress.   HENT:   Head: Normocephalic and atraumatic.   Right Ear: External ear normal.   Left Ear: External ear normal.   Nose: Nose normal. Right sinus exhibits no maxillary sinus tenderness and no frontal sinus tenderness. Left sinus exhibits no maxillary sinus tenderness and no frontal sinus tenderness.   Mouth/Throat: Oropharynx is clear and moist. No oropharyngeal exudate.   Eyes: Conjunctivae, EOM and lids are normal. Pupils are equal, round, and reactive to light. Right eye exhibits no discharge. Left eye exhibits no discharge. Right conjunctiva is not injected. Right conjunctiva has no hemorrhage. Left conjunctiva is not injected. Left conjunctiva has no hemorrhage. No scleral icterus.   Neck: Normal range of motion. Neck supple. No JVD present. No tracheal deviation present. No thyromegaly present.   Cardiovascular: Normal rate and regular rhythm.    Pulmonary/Chest: Effort normal. No stridor. No respiratory distress. She exhibits no tenderness.   Abdominal: Soft. She exhibits no distension and no mass. There is no splenomegaly or hepatomegaly. There is no tenderness. There is no rebound.   Musculoskeletal: Normal range of motion. She exhibits no edema or tenderness.   Lymphadenopathy:     She has no cervical adenopathy.     She has no axillary adenopathy.        Right: No supraclavicular adenopathy present.         Left: No supraclavicular adenopathy present.   Neurological: She is alert and oriented to person, place, and time. No cranial nerve deficit. Coordination normal.   Skin: Skin is dry. No rash noted. She is not diaphoretic. No erythema.   Psychiatric: She has a normal mood and affect. Her behavior is normal. Judgment and thought content normal.   Vitals reviewed.          Assessment:       1. Iron deficiency anemia due to chronic blood loss    2. Malignant poorly differentiated neuroendocrine carcinoma    3. 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 Secondary neuroendocrine tumor of liver            Plan:         review of CT chest abdomen pelvis reveals no evidence of progressive disease actually mass in liver has shrunk continue follow-up she is currently on no therapy she does have documented iron deficiency will treat with intravenous iron.  Reviewed pathology report

## 2017-03-17 ENCOUNTER — INFUSION (OUTPATIENT)
Dept: INFUSION THERAPY | Facility: HOSPITAL | Age: 57
End: 2017-03-17
Attending: INTERNAL MEDICINE
Payer: MEDICARE

## 2017-03-17 VITALS
RESPIRATION RATE: 18 BRPM | DIASTOLIC BLOOD PRESSURE: 73 MMHG | HEART RATE: 80 BPM | TEMPERATURE: 98 F | SYSTOLIC BLOOD PRESSURE: 119 MMHG

## 2017-03-17 DIAGNOSIS — D50.0 IRON DEFICIENCY ANEMIA DUE TO CHRONIC BLOOD LOSS: Primary | ICD-10-CM

## 2017-03-17 DIAGNOSIS — C7A.1 MALIGNANT POORLY DIFFERENTIATED NEUROENDOCRINE CARCINOMA: ICD-10-CM

## 2017-03-17 PROCEDURE — 25000003 PHARM REV CODE 250: Performed by: INTERNAL MEDICINE

## 2017-03-17 PROCEDURE — 63600175 PHARM REV CODE 636 W HCPCS: Performed by: INTERNAL MEDICINE

## 2017-03-17 PROCEDURE — 96365 THER/PROPH/DIAG IV INF INIT: CPT

## 2017-03-17 RX ORDER — HEPARIN 100 UNIT/ML
500 SYRINGE INTRAVENOUS
Status: DISCONTINUED | OUTPATIENT
Start: 2017-03-17 | End: 2017-03-17 | Stop reason: HOSPADM

## 2017-03-17 RX ADMIN — FERUMOXYTOL 510 MG: 510 INJECTION INTRAVENOUS at 02:03

## 2017-03-17 RX ADMIN — HEPARIN 500 UNITS: 100 SYRINGE at 03:03

## 2017-03-17 NOTE — MR AVS SNAPSHOT
Patient Information     Patient Name Sex Janine Chavarria Female 1960      Visit Information        Provider Department Dept Phone Center    3/17/2017 1:00 PM [facility] [phone] [facility]      Patient Instructions      [facility]  [address]  [address] [address]  [phone] phone     [phone] fax  Hours of Operation: Monday- Friday 8:00am- 5:00pm  After hours phone  [phone]  Hematology / Oncology Physicians on call      Dr. Mark Haider    Please call with any concerns regarding your appointment today.        Ferumoxytol injection  What is this medicine?  FERUMOXYTOL is an iron complex. Iron is used to make healthy red blood cells, which carry oxygen and nutrients throughout the body. This medicine is used to treat iron deficiency anemia in people with chronic kidney disease.  How should I use this medicine?  This medicine is for injection into a vein. It is given by a health care professional in a hospital or clinic setting.  Talk to your pediatrician regarding the use of this medicine in children. Special care may be needed.  What side effects may I notice from receiving this medicine?  Side effects that you should report to your doctor or health care professional as soon as possible:  · allergic reactions like skin rash, itching or hives, swelling of the face, lips, or tongue  · breathing problems  · changes in blood pressure  · feeling faint or lightheaded, falls  · fever or chills  · flushing, sweating, or hot feelings  · swelling of the ankles or feet  Side effects that usually do not require medical attention (Report these to your doctor or health care professional if they continue or are bothersome.):  · diarrhea  · headache  · nausea, vomiting  · stomach pain  What may interact with this medicine?  This medicine may interact with the following  medications:  · other iron products  What if I miss a dose?  It is important not to miss your dose. Call your doctor or health care professional if you are unable to keep an appointment.  Where should I keep my medicine?  This drug is given in a hospital or clinic and will not be stored at home.  What should I tell my health care provider before I take this medicine?  They need to know if you have any of these conditions:  · anemia not caused by low iron levels  · high levels of iron in the blood  · magnetic resonance imaging (MRI) test scheduled  · an unusual or allergic reaction to iron, other medicines, foods, dyes, or preservatives  · pregnant or trying to get pregnant  · breast-feeding  What should I watch for while using this medicine?  Visit your doctor or healthcare professional regularly. Tell your doctor or healthcare professional if your symptoms do not start to get better or if they get worse. You may need blood work done while you are taking this medicine.  You may need to follow a special diet. Talk to your doctor. Foods that contain iron include: whole grains/cereals, dried fruits, beans, or peas, leafy green vegetables, and organ meats (liver, kidney).  Date Last Reviewed:   NOTE:This sheet is a summary. It may not cover all possible information. If you have questions about this medicine, talk to your doctor, pharmacist, or health care provider. Copyright© 2016 [employer]             Your Current Medications Are     blood sugar diagnostic, disc Strp    docusate sodium (COLACE) 50 MG capsule    fluticasone (FLOVENT HFA) 110 mcg/actuation inhaler    furosemide (LASIX) 40 MG tablet    glipiZIDE (GLUCOTROL) 10 MG tablet    KLOR-CON M20 20 mEq tablet    lancets Misc    linagliptin (TRADJENTA) 5 mg Tab tablet    metformin (GLUCOPHAGE) 500 MG tablet    metoprolol tartrate (LOPRESSOR) 50 MG tablet    ondansetron (ZOFRAN) 4 MG tablet    prochlorperazine (COMPAZINE) 5 MG tablet    XARELTO 20 mg Tab     zolpidem (AMBIEN) 10 mg Tab      Facility-Administered Medications     ferumoxytol (FERAHEME) 510 mg in dextrose 5 % 100 mL IVPB    ferumoxytol (FERAHEME) 510 mg in sodium chloride 0.9% 100 mL IVPB    heparin, porcine (PF) 100 unit/mL injection flush 500 Units    heparin, porcine (PF) 100 unit/mL injection flush 500 Units    sodium chloride 0.9% flush 10 mL    ferumoxytol 510 mg in dextrose 5 % 100 mL IVPB (ready to mix system) (Discontinued)      Appointments for Next Year     3/24/2017  1:00 PM INFUSION 060 MIN (60 min.) [facility] 04 [facility]    Arrive at check-in approximately 15 minutes before your scheduled appointment time. Bring all outside medical records and imaging, along with a list of your current medications and insurance card.    (off [facility]) 1st floor    4/18/2017  9:15 AM ESTABLISHED PATIENT (15 min.) [facility] Charles Lopez MD    Arrive at check-in approximately 15 minutes before your scheduled appointment time. Bring all outside medical records and imaging, along with a list of your current medications and insurance card.    6/26/2017  9:30 AM NON FASTING LAB (10 min.) [facility], [facility]    Arrive at check-in approximately 15 minutes before your scheduled appointment time. Bring all outside medical records and imaging, along with a list of your current medications and insurance card.    6/26/2017 10:00 AM ESTABLISHED PATIENT (20 min.) [facility] Kalyan Flores MD    Arrive at check-in approximately 15 minutes before your scheduled appointment time. Bring all outside medical records and imaging, along with a list of your current medications and insurance card.    (off [facility]) 1st Floor Appointments with [facility] - 2nd Floor         Default Flowsheet Data (last 24 hours)      Amb Complex Vitals [facility]        03/17/17 1323                Measurements    /82        Temp 98.2 °F (36.8 °C)        Pulse  80        Resp 18        Pain Assessment    Pain Score Zero                Allergies     Pcn [Penicillins] Other (See Comments), Hives, Swelling    As a child had an unknown reaction.  Has not taken since  As a child had an unknown reaction.  Has not taken since      Medications You Received from 03/16/2017 1502 to 03/17/2017 1502        Date/Time Order Dose Route Action     03/17/2017 1404 ferumoxytol (FERAHEME) 510 mg in sodium chloride 0.9% 100 mL IVPB 510 mg Intravenous New Bag      Current Discharge Medication List     Cannot display discharge medications since this is not an admission.      Follow-up and Disposition     Return in 7 days (on 3/24/2017).

## 2017-03-17 NOTE — PLAN OF CARE
Problem: Patient Care Overview (Adult)  Goal: Plan of Care Review  Outcome: Ongoing (interventions implemented as appropriate)  States she has no complaints today.

## 2017-03-17 NOTE — PATIENT INSTRUCTIONS
[facility]  [address]  [address] [address]  [phone] phone     [phone] fax  Hours of Operation: Monday- Friday 8:00am- 5:00pm  After hours phone  [phone]  Hematology / Oncology Physicians on call      Dr. Mark Haider    Please call with any concerns regarding your appointment today.        Ferumoxytol injection  What is this medicine?  FERUMOXYTOL is an iron complex. Iron is used to make healthy red blood cells, which carry oxygen and nutrients throughout the body. This medicine is used to treat iron deficiency anemia in people with chronic kidney disease.  How should I use this medicine?  This medicine is for injection into a vein. It is given by a health care professional in a hospital or clinic setting.  Talk to your pediatrician regarding the use of this medicine in children. Special care may be needed.  What side effects may I notice from receiving this medicine?  Side effects that you should report to your doctor or health care professional as soon as possible:  · allergic reactions like skin rash, itching or hives, swelling of the face, lips, or tongue  · breathing problems  · changes in blood pressure  · feeling faint or lightheaded, falls  · fever or chills  · flushing, sweating, or hot feelings  · swelling of the ankles or feet  Side effects that usually do not require medical attention (Report these to your doctor or health care professional if they continue or are bothersome.):  · diarrhea  · headache  · nausea, vomiting  · stomach pain  What may interact with this medicine?  This medicine may interact with the following medications:  · other iron products  What if I miss a dose?  It is important not to miss your dose. Call your doctor or health care professional if you are unable to keep an appointment.  Where should I keep my medicine?  This drug is given in a hospital or clinic and will not be  stored at home.  What should I tell my health care provider before I take this medicine?  They need to know if you have any of these conditions:  · anemia not caused by low iron levels  · high levels of iron in the blood  · magnetic resonance imaging (MRI) test scheduled  · an unusual or allergic reaction to iron, other medicines, foods, dyes, or preservatives  · pregnant or trying to get pregnant  · breast-feeding  What should I watch for while using this medicine?  Visit your doctor or healthcare professional regularly. Tell your doctor or healthcare professional if your symptoms do not start to get better or if they get worse. You may need blood work done while you are taking this medicine.  You may need to follow a special diet. Talk to your doctor. Foods that contain iron include: whole grains/cereals, dried fruits, beans, or peas, leafy green vegetables, and organ meats (liver, kidney).  Date Last Reviewed:   NOTE:This sheet is a summary. It may not cover all possible information. If you have questions about this medicine, talk to your doctor, pharmacist, or health care provider. Copyright© 2016 [employer]

## 2017-03-24 ENCOUNTER — INFUSION (OUTPATIENT)
Dept: INFUSION THERAPY | Facility: HOSPITAL | Age: 57
End: 2017-03-24
Attending: INTERNAL MEDICINE
Payer: MEDICARE

## 2017-03-24 VITALS
RESPIRATION RATE: 18 BRPM | TEMPERATURE: 98 F | SYSTOLIC BLOOD PRESSURE: 109 MMHG | HEART RATE: 89 BPM | DIASTOLIC BLOOD PRESSURE: 67 MMHG

## 2017-03-24 DIAGNOSIS — D50.0 IRON DEFICIENCY ANEMIA DUE TO CHRONIC BLOOD LOSS: Primary | ICD-10-CM

## 2017-03-24 DIAGNOSIS — C7A.1 MALIGNANT POORLY DIFFERENTIATED NEUROENDOCRINE CARCINOMA: ICD-10-CM

## 2017-03-24 PROCEDURE — 96365 THER/PROPH/DIAG IV INF INIT: CPT

## 2017-03-24 PROCEDURE — 25000003 PHARM REV CODE 250: Performed by: INTERNAL MEDICINE

## 2017-03-24 PROCEDURE — 63600175 PHARM REV CODE 636 W HCPCS: Performed by: INTERNAL MEDICINE

## 2017-03-24 RX ORDER — HEPARIN 100 UNIT/ML
500 SYRINGE INTRAVENOUS
Status: DISCONTINUED | OUTPATIENT
Start: 2017-03-24 | End: 2017-03-24 | Stop reason: HOSPADM

## 2017-03-24 RX ORDER — SODIUM CHLORIDE 0.9 % (FLUSH) 0.9 %
10 SYRINGE (ML) INJECTION
Status: DISCONTINUED | OUTPATIENT
Start: 2017-03-24 | End: 2017-03-24 | Stop reason: HOSPADM

## 2017-03-24 RX ADMIN — Medication 500 UNITS: at 03:03

## 2017-03-24 RX ADMIN — FERUMOXYTOL: 510 INJECTION INTRAVENOUS at 02:03

## 2017-03-24 NOTE — MR AVS SNAPSHOT
Patient Information     Patient Name Sex Janine Chavarria Female 1960      Visit Information        Provider Department Dept Phone Center    3/24/2017 1:00 PM Wilson C[facility] [phone] [facility]      Patient Instructions      [facility]  [address]  [address] [address]  [phone] phone     [phone] fax  Hours of Operation: Monday- Friday 8:00am- 5:00pm  After hours phone  [phone]  Hematology / Oncology Physicians on call      Dr. Mark Haider    Please call with any concerns regarding your appointment today.       Your Current Medications Are     blood sugar diagnostic, disc Strp    docusate sodium (COLACE) 50 MG capsule    fluticasone (FLOVENT HFA) 110 mcg/actuation inhaler    furosemide (LASIX) 40 MG tablet    glipiZIDE (GLUCOTROL) 10 MG tablet    KLOR-CON M20 20 mEq tablet    lancets Misc    linagliptin (TRADJENTA) 5 mg Tab tablet    metformin (GLUCOPHAGE) 500 MG tablet    metoprolol tartrate (LOPRESSOR) 50 MG tablet    ondansetron (ZOFRAN) 4 MG tablet    prochlorperazine (COMPAZINE) 5 MG tablet    XARELTO 20 mg Tab    zolpidem (AMBIEN) 10 mg Tab      Facility-Administered Medications     ferumoxytol (FERAHEME) 510 mg in dextrose 5 % 100 mL IVPB    heparin, porcine (PF) 100 unit/mL injection flush 500 Units    heparin, porcine (PF) 100 unit/mL injection flush 500 Units    sodium chloride 0.9% 100 mL with ferumoxytol (FERAHEME) 510 mg    sodium chloride 0.9% flush 10 mL    sodium chloride 0.9% flush 10 mL    ferumoxytol 510 mg in dextrose 5 % 100 mL IVPB (ready to mix system) (Discontinued)      Appointments for Next Year     2017  9:15 AM ESTABLISHED PATIENT (15 min.) [facility] Charles Lopez MD    Arrive at check-in approximately 15 minutes before your scheduled appointment time. Bring all outside medical records and imaging, along with a list of  your current medications and insurance card.    5/26/2017 10:20 AM NON FASTING LAB (10 min.) [facility], [facility]    Arrive at check-in approximately 15 minutes before your scheduled appointment time. Bring all outside medical records and imaging, along with a list of your current medications and insurance card.    5/26/2017 10:40 AM ESTABLISHED PATIENT (20 min.) [facility] Kalyan Flores MD    Arrive at check-in approximately 15 minutes before your scheduled appointment time. Bring all outside medical records and imaging, along with a list of your current medications and insurance card.    (off [facility]) 1st Floor Appointments with [facility] - 2nd Floor         Default Flowsheet Data (last 24 hours)      Amb Complex Vitals [facility]        03/24/17 1524 03/24/17 1319             Measurements    /67 135/87       Temp  98 °F (36.7 °C)       Pulse 89 80       Resp  18               Allergies     Pcn [Penicillins] Other (See Comments), Hives, Swelling    As a child had an unknown reaction.  Has not taken since  As a child had an unknown reaction.  Has not taken since      Medications You Received from 03/23/2017 1527 to 03/24/2017 1527        Date/Time Order Dose Route Action     03/24/2017 1525 heparin, porcine (PF) 100 unit/mL injection flush 500 Units 500 Units Intravenous Given     03/24/2017 1411 sodium chloride 0.9% 100 mL with ferumoxytol (FERAHEME) 510 mg   Intravenous Given      Current Discharge Medication List     Cannot display discharge medications since this is not an admission.

## 2017-03-24 NOTE — PATIENT INSTRUCTIONS
[facility]  [address]  [address] [address]  [phone] phone     [phone] fax  Hours of Operation: Monday- Friday 8:00am- 5:00pm  After hours phone  [phone]  Hematology / Oncology Physicians on call      Dr. Mark Haider    Please call with any concerns regarding your appointment today.

## 2017-04-04 ENCOUNTER — PATIENT OUTREACH (OUTPATIENT)
Dept: ADMINISTRATIVE | Facility: HOSPITAL | Age: 57
End: 2017-04-04

## 2017-04-04 DIAGNOSIS — Z11.59 NEED FOR HEPATITIS C SCREENING TEST: Primary | ICD-10-CM

## 2017-04-04 NOTE — PROGRESS NOTES
CHART AUDITED. LABS ENTERED PER WOG AND ATTACHED TO UPCOMING LAB APPOINTMENT (5/26). PORTAL MESSAGE SENT TO INFORM PATIENT OF OUTSTANDING HEALTH MAINTENANCE.

## 2017-05-09 ENCOUNTER — LAB VISIT (OUTPATIENT)
Dept: LAB | Facility: HOSPITAL | Age: 57
End: 2017-05-09
Attending: FAMILY MEDICINE
Payer: MEDICARE

## 2017-05-09 ENCOUNTER — OFFICE VISIT (OUTPATIENT)
Dept: FAMILY MEDICINE | Facility: CLINIC | Age: 57
End: 2017-05-09
Payer: MEDICARE

## 2017-05-09 VITALS
TEMPERATURE: 98 F | HEART RATE: 80 BPM | WEIGHT: 233.69 LBS | DIASTOLIC BLOOD PRESSURE: 80 MMHG | HEIGHT: 65 IN | SYSTOLIC BLOOD PRESSURE: 120 MMHG | BODY MASS INDEX: 38.93 KG/M2

## 2017-05-09 DIAGNOSIS — I48.19 ATRIAL FIBRILLATION, PERSISTENT: ICD-10-CM

## 2017-05-09 DIAGNOSIS — E11.9 TYPE 2 DIABETES MELLITUS WITHOUT COMPLICATION, WITHOUT LONG-TERM CURRENT USE OF INSULIN: Primary | ICD-10-CM

## 2017-05-09 DIAGNOSIS — D50.0 IRON DEFICIENCY ANEMIA DUE TO CHRONIC BLOOD LOSS: ICD-10-CM

## 2017-05-09 DIAGNOSIS — E11.9 TYPE 2 DIABETES MELLITUS WITHOUT COMPLICATION, WITHOUT LONG-TERM CURRENT USE OF INSULIN: ICD-10-CM

## 2017-05-09 DIAGNOSIS — I50.9 HEART FAILURE, UNSPECIFIED HEART FAILURE CHRONICITY, UNSPECIFIED HEART FAILURE TYPE: ICD-10-CM

## 2017-05-09 DIAGNOSIS — I10 ESSENTIAL HYPERTENSION: ICD-10-CM

## 2017-05-09 DIAGNOSIS — C7A.1 MALIGNANT POORLY DIFFERENTIATED NEUROENDOCRINE CARCINOMA: ICD-10-CM

## 2017-05-09 PROCEDURE — 3060F POS MICROALBUMINURIA REV: CPT | Mod: 8P,S$GLB,, | Performed by: FAMILY MEDICINE

## 2017-05-09 PROCEDURE — 36415 COLL VENOUS BLD VENIPUNCTURE: CPT | Mod: PO

## 2017-05-09 PROCEDURE — 3079F DIAST BP 80-89 MM HG: CPT | Mod: S$GLB,,, | Performed by: FAMILY MEDICINE

## 2017-05-09 PROCEDURE — 3074F SYST BP LT 130 MM HG: CPT | Mod: S$GLB,,, | Performed by: FAMILY MEDICINE

## 2017-05-09 PROCEDURE — 99214 OFFICE O/P EST MOD 30 MIN: CPT | Mod: S$GLB,,, | Performed by: FAMILY MEDICINE

## 2017-05-09 PROCEDURE — 1160F RVW MEDS BY RX/DR IN RCRD: CPT | Mod: S$GLB,,, | Performed by: FAMILY MEDICINE

## 2017-05-09 PROCEDURE — 99999 PR PBB SHADOW E&M-EST. PATIENT-LVL II: CPT | Mod: PBBFAC,,, | Performed by: FAMILY MEDICINE

## 2017-05-09 PROCEDURE — 3046F HEMOGLOBIN A1C LEVEL >9.0%: CPT | Mod: S$GLB,,, | Performed by: FAMILY MEDICINE

## 2017-05-09 PROCEDURE — 99499 UNLISTED E&M SERVICE: CPT | Mod: S$GLB,,, | Performed by: FAMILY MEDICINE

## 2017-05-09 PROCEDURE — 83036 HEMOGLOBIN GLYCOSYLATED A1C: CPT

## 2017-05-09 RX ORDER — METOPROLOL SUCCINATE 50 MG/1
50 TABLET, EXTENDED RELEASE ORAL 2 TIMES DAILY
COMMUNITY
Start: 2017-04-14 | End: 2018-02-14

## 2017-05-09 NOTE — PROGRESS NOTES
The patient presents today to fu DM Last a1c 9.5  Follows Card for AF prev had ablation but remains iin AF.   HF stable currently.   Following w Dr Flores w hx NE Carcinoma,p chemotx, no active tx currently,last series of scans stable FU planned in . Anemia stable last Fe nl Hct 31.5 .     Past Medical History:  Past Medical History:   Diagnosis Date    Acute headache 2016    Anemia     Atrial fibrillation, persistent     Bronchitis     CHF (congestive heart failure)     Diabetes mellitus, type 2     Hypertension     Malignant poorly differentiated neuroendocrine carcinoma 2016    Secondary neuroendocrine tumor of liver 2016     Past Surgical History:   Procedure Laterality Date    ADENOIDECTOMY      CARDIAC ELECTROPHYSIOLOGY MAPPING AND ABLATION      CARDIAC SURGERY       SECTION      ERCP      TONSILLECTOMY      uvulopalatopharygoplasty       Review of patient's allergies indicates:   Allergen Reactions    Epinephrine Other (See Comments)     Neuroendocrine patient-currently on chemo per patient  Neuroendocrine patient    Pcn [penicillins] Other (See Comments), Hives and Swelling     As a child had an unknown reaction.  Has not taken since  As a child had an unknown reaction.  Has not taken since     Current Outpatient Prescriptions on File Prior to Visit   Medication Sig Dispense Refill    blood sugar diagnostic, disc Strp USE AS DIRECTED EVERY DAY *THANK YOU*      docusate sodium (COLACE) 50 MG capsule Take by mouth 2 (two) times daily.      fluticasone (FLOVENT HFA) 110 mcg/actuation inhaler Inhale 2 puffs into the lungs once daily.      furosemide (LASIX) 40 MG tablet Take 40 mg by mouth once daily.       glipiZIDE (GLUCOTROL) 10 MG tablet Take 1 tablet by mouth 2 (two) times daily.      KLOR-CON M20 20 mEq tablet       lancets Misc Use as directed      linagliptin (TRADJENTA) 5 mg Tab tablet Take 5 mg by mouth once daily.      metformin (GLUCOPHAGE) 500 MG  tablet Take 1 tablet (500 mg total) by mouth 2 (two) times daily with meals. 180 tablet 1    XARELTO 20 mg Tab Take 20 mg by mouth once daily.       zolpidem (AMBIEN) 10 mg Tab Take 1 tablet (10 mg total) by mouth nightly as needed. 30 tablet 5    [DISCONTINUED] metoprolol tartrate (LOPRESSOR) 50 MG tablet Take 50 mg by mouth 2 (two) times daily.      ondansetron (ZOFRAN) 4 MG tablet Take 1 tablet (4 mg total) by mouth every 12 (twelve) hours as needed for Nausea. 30 tablet 6    prochlorperazine (COMPAZINE) 5 MG tablet Take 1 tablet (5 mg total) by mouth every 6 (six) hours as needed for Nausea. 30 tablet 6     Current Facility-Administered Medications on File Prior to Visit   Medication Dose Route Frequency Provider Last Rate Last Dose    ferumoxytol (FERAHEME) 510 mg in dextrose 5 % 100 mL IVPB  510 mg Intravenous Once Kalyan Flores MD        heparin, porcine (PF) 100 unit/mL injection flush 500 Units  500 Units Intravenous PRN Kalyan Flores MD        sodium chloride 0.9% flush 10 mL  10 mL Intravenous PRN Kalyan Flores MD         Social History     Social History    Marital status:      Spouse name: N/A    Number of children: N/A    Years of education: N/A     Occupational History    Not on file.     Social History Main Topics    Smoking status: Never Smoker    Smokeless tobacco: Not on file    Alcohol use No    Drug use: No    Sexual activity: Not on file     Other Topics Concern    Not on file     Social History Narrative     Family History   Problem Relation Age of Onset    Diabetes type II Mother     Prostate cancer Father     Breast cancer Paternal Aunt      breast    Breast cancer Maternal Aunt      breast         ROS:GENERAL: No fever, chills, fatigability or weight loss.  SKIN: No rashes, itching or changes in color or texture of skin.  HEAD: No headaches or recent head trauma.EYES: Visual acuity fine. No photophobia, ocular pain or diplopia.EARS: Denies ear pain,  discharge or vertigo.NOSE: No loss of smell, no epistaxis or postnasal drip.MOUTH & THROAT: No hoarseness or change in voice. No excessive gum bleeding.NODES: Denies swollen glands.  CHEST: Denies HURLEY, cyanosis, wheezing, cough and sputum production.  CARDIOVASCULAR: Denies chest pain, PND, orthopnea or reduced exercise tolerance.  ABDOMEN: Appetite fine. No weight loss. Denies diarrhea, abdominal pain, hematemesis or blood in stool.  URINARY: No flank pain, dysuria or hematuria.  PERIPHERAL VASCULAR: No claudication or cyanosis.  MUSCULOSKELETAL: See above.  NEUROLOGIC: No history of seizures, paralysis, alteration of gait or coordination.  PE:   HEAD: Normocephalic, atraumatic.EYES: PERRL. EOMI.   EARS: TM's intact. Light reflex normal. No retraction or perforation.   NOSE: Mucosa pink. Airway clear.MOUTH & THROAT: No tonsillar enlargement. No pharyngeal erythema or exudate. No stridor.  NODES: No cervical, axillary or inguinal lymph node enlargement.  CHEST: Lungs clear to auscultation.  CARDIOVASCULAR: Normal S1, S2. No rubs, murmurs or gallops.  ABDOMEN: Bowel sounds normal. Not distended. Soft. No tenderness or masses.  MUSCULOSKELETAL: No palpable abnormality  NEUROLOGIC: Cranial Nerves: II-XII grossly intact.  Motor: 5/5 strength major flexors/extensors.  DTR's: Knees, Ankles 2+ and equal bilaterally; downgoing toes.  Sensory: Intact to light touch distally.  Gait & Posture: Normal gait and fine motion. No cerebellar signs.   Protective Sensation (w/ 10 gram monofilament): Intact  Visual Inspection (Evidence of Foot Deformity, Ulceration, Nails Intact, Skin Intact):Normal  Pedal Pulses: Present    Impression: Janine was seen today for diabetes.    Diagnoses and all orders for this visit:    Type 2 diabetes mellitus without complication, without long-term current use of insulin    Atrial fibrillation, persistent    Essential hypertension    Malignant poorly differentiated neuroendocrine carcinoma    Iron  deficiency anemia due to chronic blood loss    Heart failure, unspecified heart failure chronicity, unspecified heart failure type        Plan:Lab eval-needs a1c and if still el incr metformin-  Rec diet and ex recs   Fu 6m,

## 2017-05-10 LAB
ESTIMATED AVG GLUCOSE: 243 MG/DL
HBA1C MFR BLD HPLC: 10.1 %

## 2017-05-12 ENCOUNTER — DOCUMENTATION ONLY (OUTPATIENT)
Dept: FAMILY MEDICINE | Facility: CLINIC | Age: 57
End: 2017-05-12

## 2017-05-26 ENCOUNTER — INFUSION (OUTPATIENT)
Dept: INFUSION THERAPY | Facility: HOSPITAL | Age: 57
End: 2017-05-26
Attending: INTERNAL MEDICINE
Payer: MEDICARE

## 2017-05-26 ENCOUNTER — LAB VISIT (OUTPATIENT)
Dept: LAB | Facility: HOSPITAL | Age: 57
End: 2017-05-26
Attending: INTERNAL MEDICINE
Payer: MEDICARE

## 2017-05-26 ENCOUNTER — OFFICE VISIT (OUTPATIENT)
Dept: HEMATOLOGY/ONCOLOGY | Facility: CLINIC | Age: 57
End: 2017-05-26
Payer: MEDICARE

## 2017-05-26 VITALS
OXYGEN SATURATION: 96 % | DIASTOLIC BLOOD PRESSURE: 88 MMHG | SYSTOLIC BLOOD PRESSURE: 128 MMHG | HEART RATE: 130 BPM | WEIGHT: 233.94 LBS | HEIGHT: 65 IN | TEMPERATURE: 98 F | BODY MASS INDEX: 38.98 KG/M2

## 2017-05-26 DIAGNOSIS — C7B.8 SECONDARY NEUROENDOCRINE TUMOR OF LIVER: ICD-10-CM

## 2017-05-26 DIAGNOSIS — C7A.1 MALIGNANT POORLY DIFFERENTIATED NEUROENDOCRINE CARCINOMA: ICD-10-CM

## 2017-05-26 DIAGNOSIS — C78.6 METASTATIC CANCER TO RETROPERITONEUM: ICD-10-CM

## 2017-05-26 DIAGNOSIS — D50.0 IRON DEFICIENCY ANEMIA DUE TO CHRONIC BLOOD LOSS: ICD-10-CM

## 2017-05-26 DIAGNOSIS — C7B.8 SECONDARY NEUROENDOCRINE TUMOR OF LIVER: Primary | ICD-10-CM

## 2017-05-26 DIAGNOSIS — C7A.1 MALIGNANT POORLY DIFFERENTIATED NEUROENDOCRINE CARCINOMA: Primary | ICD-10-CM

## 2017-05-26 DIAGNOSIS — Z11.59 NEED FOR HEPATITIS C SCREENING TEST: ICD-10-CM

## 2017-05-26 LAB
BASOPHILS # BLD AUTO: 0.04 K/UL
BASOPHILS NFR BLD: 0.5 %
DIFFERENTIAL METHOD: ABNORMAL
EOSINOPHIL # BLD AUTO: 0.3 K/UL
EOSINOPHIL NFR BLD: 3.5 %
ERYTHROCYTE [DISTWIDTH] IN BLOOD BY AUTOMATED COUNT: 13.3 %
FERRITIN SERPL-MCNC: 1717 NG/ML
HCT VFR BLD AUTO: 33.4 %
HGB BLD-MCNC: 11 G/DL
IRON SERPL-MCNC: 68 UG/DL
LYMPHOCYTES # BLD AUTO: 2.1 K/UL
LYMPHOCYTES NFR BLD: 24.4 %
MCH RBC QN AUTO: 33.1 PG
MCHC RBC AUTO-ENTMCNC: 32.9 %
MCV RBC AUTO: 101 FL
MONOCYTES # BLD AUTO: 0.9 K/UL
MONOCYTES NFR BLD: 10.7 %
NEUTROPHILS # BLD AUTO: 5.3 K/UL
NEUTROPHILS NFR BLD: 60.9 %
PLATELET # BLD AUTO: 169 K/UL
PMV BLD AUTO: 9.7 FL
RBC # BLD AUTO: 3.32 M/UL
SATURATED IRON: 19 %
TOTAL IRON BINDING CAPACITY: 357 UG/DL
TRANSFERRIN SERPL-MCNC: 241 MG/DL
WBC # BLD AUTO: 8.61 K/UL

## 2017-05-26 PROCEDURE — 83615 LACTATE (LD) (LDH) ENZYME: CPT

## 2017-05-26 PROCEDURE — 82728 ASSAY OF FERRITIN: CPT

## 2017-05-26 PROCEDURE — 83540 ASSAY OF IRON: CPT

## 2017-05-26 PROCEDURE — 80053 COMPREHEN METABOLIC PANEL: CPT

## 2017-05-26 PROCEDURE — 36415 COLL VENOUS BLD VENIPUNCTURE: CPT

## 2017-05-26 PROCEDURE — 85025 COMPLETE CBC W/AUTO DIFF WBC: CPT

## 2017-05-26 PROCEDURE — 99214 OFFICE O/P EST MOD 30 MIN: CPT | Mod: 25,S$GLB,, | Performed by: INTERNAL MEDICINE

## 2017-05-26 PROCEDURE — 99999 PR PBB SHADOW E&M-EST. PATIENT-LVL III: CPT | Mod: PBBFAC,,, | Performed by: INTERNAL MEDICINE

## 2017-05-26 PROCEDURE — 86803 HEPATITIS C AB TEST: CPT

## 2017-05-26 RX ORDER — HEPARIN 100 UNIT/ML
500 SYRINGE INTRAVENOUS
Status: CANCELLED | OUTPATIENT
Start: 2017-05-26

## 2017-05-26 RX ORDER — HEPARIN 100 UNIT/ML
500 SYRINGE INTRAVENOUS
Status: COMPLETED | OUTPATIENT
Start: 2017-05-26 | End: 2017-05-26

## 2017-05-26 RX ORDER — SODIUM CHLORIDE 0.9 % (FLUSH) 0.9 %
10 SYRINGE (ML) INJECTION
Status: DISCONTINUED | OUTPATIENT
Start: 2017-05-26 | End: 2017-05-26 | Stop reason: HOSPADM

## 2017-05-26 RX ORDER — SODIUM CHLORIDE 0.9 % (FLUSH) 0.9 %
10 SYRINGE (ML) INJECTION
Status: CANCELLED | OUTPATIENT
Start: 2017-05-26

## 2017-05-26 RX ADMIN — HEPARIN 500 UNITS: 100 SYRINGE at 11:05

## 2017-05-26 NOTE — PATIENT INSTRUCTIONS
[facility]  [address]  [address] [address]  [phone] phone     [phone] fax  Hours of Operation: Monday- Friday 8:00am- 5:00pm  After hours phone  [phone]  Hematology / Oncology Physicians on call      Dr. Mark Haider    Please call with any concerns regarding your appointment today.

## 2017-05-26 NOTE — PROGRESS NOTES
Subjective:       Patient ID: Janine Souza is a 57 y.o. female.    Chief Complaint: Results; Anemia; and Cancer (High-grade neuroendocrine tumor Ki-67 100% positive)    HPI 57-year-old female 18 months status post treatment with cis-platinum and etoposide for high-grade neuroendocrine tumor completed 6 cycles of therapy patient has had no further therapy since remains in remission doing well ECOG status 1 MediPort in place    Past Medical History:   Diagnosis Date    Acute headache 2016    Anemia     Atrial fibrillation, persistent     Bronchitis     CHF (congestive heart failure)     Diabetes mellitus, type 2     Hypertension     Malignant poorly differentiated neuroendocrine carcinoma 2016    Secondary neuroendocrine tumor of liver 2016     Family History   Problem Relation Age of Onset    Diabetes type II Mother     Prostate cancer Father     Breast cancer Paternal Aunt      breast    Breast cancer Maternal Aunt      breast     Social History     Social History    Marital status:      Spouse name: N/A    Number of children: N/A    Years of education: N/A     Occupational History    Not on file.     Social History Main Topics    Smoking status: Never Smoker    Smokeless tobacco: Not on file    Alcohol use No    Drug use: No    Sexual activity: Not on file     Other Topics Concern    Not on file     Social History Narrative    No narrative on file     Past Surgical History:   Procedure Laterality Date    ADENOIDECTOMY      CARDIAC ELECTROPHYSIOLOGY MAPPING AND ABLATION      CARDIAC SURGERY       SECTION      ERCP      TONSILLECTOMY      uvulopalatopharygoplasty         Labs:  Lab Results   Component Value Date    WBC 8.61 2017    HGB 11.0 (L) 2017    HCT 33.4 (L) 2017     (H) 2017     2017     BMP  Lab Results   Component Value Date     2017    K 4.7 2017    CL 99 2017    CO2 28 2017     BUN 26 (H) 03/01/2017    CREATININE 1.4 03/01/2017    CALCIUM 10.1 03/01/2017    ANIONGAP 10 03/01/2017    ESTGFRAFRICA 48 (A) 03/01/2017    EGFRNONAA 42 (A) 03/01/2017     Lab Results   Component Value Date    ALT 12 03/01/2017    AST 11 03/01/2017    ALKPHOS 63 03/01/2017    BILITOT 0.3 03/01/2017       Lab Results   Component Value Date    IRON 53 03/01/2017    TIBC 339 03/01/2017    FERRITIN 991 (H) 03/01/2017     No results found for: NWCUHUDH21  No results found for: FOLATE  Lab Results   Component Value Date    TSH 2.916 12/20/2016         Review of Systems   Constitutional: Negative for activity change, appetite change, chills, diaphoresis, fatigue, fever and unexpected weight change.   HENT: Negative for congestion, dental problem, drooling, ear discharge, ear pain, facial swelling, hearing loss, mouth sores, nosebleeds, postnasal drip, rhinorrhea, sinus pressure, sneezing, sore throat, tinnitus, trouble swallowing and voice change.    Eyes: Negative for photophobia, pain, discharge, redness, itching and visual disturbance.   Respiratory: Negative for cough, choking, chest tightness, shortness of breath, wheezing and stridor.    Cardiovascular: Negative for chest pain, palpitations and leg swelling.   Gastrointestinal: Negative for abdominal distention, abdominal pain, anal bleeding, blood in stool, constipation, diarrhea, nausea, rectal pain and vomiting.   Endocrine: Negative for cold intolerance, heat intolerance, polydipsia, polyphagia and polyuria.   Genitourinary: Negative for decreased urine volume, difficulty urinating, dyspareunia, dysuria, enuresis, flank pain, frequency, genital sores, hematuria, menstrual problem, pelvic pain, urgency, vaginal bleeding, vaginal discharge and vaginal pain.   Musculoskeletal: Negative for arthralgias, back pain, gait problem, joint swelling, myalgias, neck pain and neck stiffness.   Skin: Negative for color change, pallor and rash.   Allergic/Immunologic:  Negative for environmental allergies, food allergies and immunocompromised state.   Neurological: Negative for dizziness, tremors, seizures, syncope, facial asymmetry, speech difficulty, weakness, light-headedness, numbness and headaches.   Hematological: Negative for adenopathy. Does not bruise/bleed easily.   Psychiatric/Behavioral: Negative for agitation, behavioral problems, confusion, decreased concentration, dysphoric mood, hallucinations, self-injury, sleep disturbance and suicidal ideas. The patient is not nervous/anxious and is not hyperactive.        Objective:      Physical Exam   Constitutional: She is oriented to person, place, and time. She appears well-developed and well-nourished. No distress.   HENT:   Head: Normocephalic and atraumatic.   Right Ear: External ear normal.   Left Ear: External ear normal.   Nose: Nose normal. Right sinus exhibits no maxillary sinus tenderness and no frontal sinus tenderness. Left sinus exhibits no maxillary sinus tenderness and no frontal sinus tenderness.   Mouth/Throat: Oropharynx is clear and moist. No oropharyngeal exudate.   Eyes: Conjunctivae, EOM and lids are normal. Pupils are equal, round, and reactive to light. Right eye exhibits no discharge. Left eye exhibits no discharge. Right conjunctiva is not injected. Right conjunctiva has no hemorrhage. Left conjunctiva is not injected. Left conjunctiva has no hemorrhage. No scleral icterus.   Neck: Normal range of motion. Neck supple. No JVD present. No tracheal deviation present. No thyromegaly present.   Cardiovascular: Normal rate, regular rhythm, normal heart sounds and intact distal pulses.    Pulmonary/Chest: Effort normal and breath sounds normal. No stridor. No respiratory distress. She exhibits no tenderness.   Abdominal: Soft. Bowel sounds are normal. She exhibits no distension and no mass. There is no splenomegaly or hepatomegaly. There is no tenderness. There is no rebound.   Musculoskeletal: Normal  range of motion. She exhibits no edema or tenderness.   Lymphadenopathy:     She has no cervical adenopathy.     She has no axillary adenopathy.        Right: No supraclavicular adenopathy present.        Left: No supraclavicular adenopathy present.   Neurological: She is alert and oriented to person, place, and time. No cranial nerve deficit. Coordination normal.   Skin: Skin is dry. No rash noted. She is not diaphoretic. No erythema.   Psychiatric: She has a normal mood and affect. Her behavior is normal. Judgment and thought content normal.   Vitals reviewed.          Assessment:      1. Malignant poorly differentiated neuroendocrine carcinoma    2. 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 Secondary neuroendocrine tumor of liver    4. Iron deficiency anemia due to chronic blood loss           Plan:       Patient is doing remarkably well at this time results of CBC are stable with hemoglobin 11 laboratory results pending communicate through portal flush port today return in 6-8 weeks with repeat flushing forte return in 3-4 months with CT chest abdomen pelvis CBC CMP LDH and iron status patient is done remarkably well 18 months status post high-grade neuroendocrine tumor

## 2017-05-29 LAB
ALBUMIN SERPL BCP-MCNC: 3.8 G/DL
ALP SERPL-CCNC: 61 U/L
ALT SERPL W/O P-5'-P-CCNC: 25 U/L
ANION GAP SERPL CALC-SCNC: 12 MMOL/L
AST SERPL-CCNC: 22 U/L
BILIRUB SERPL-MCNC: 0.3 MG/DL
BUN SERPL-MCNC: 35 MG/DL
CALCIUM SERPL-MCNC: 10.2 MG/DL
CHLORIDE SERPL-SCNC: 102 MMOL/L
CO2 SERPL-SCNC: 25 MMOL/L
CREAT SERPL-MCNC: 1.4 MG/DL
EST. GFR  (AFRICAN AMERICAN): 48 ML/MIN/1.73 M^2
EST. GFR  (NON AFRICAN AMERICAN): 42 ML/MIN/1.73 M^2
GLUCOSE SERPL-MCNC: 156 MG/DL
HCV AB SERPL QL IA: NEGATIVE
LDH SERPL L TO P-CCNC: 160 U/L
POTASSIUM SERPL-SCNC: 4.5 MMOL/L
PROT SERPL-MCNC: 9.1 G/DL
SODIUM SERPL-SCNC: 139 MMOL/L

## 2017-06-11 ENCOUNTER — PATIENT MESSAGE (OUTPATIENT)
Dept: FAMILY MEDICINE | Facility: CLINIC | Age: 57
End: 2017-06-11

## 2017-06-12 RX ORDER — GLIPIZIDE 10 MG/1
10 TABLET ORAL 2 TIMES DAILY
Qty: 60 TABLET | Refills: 3 | Status: SHIPPED | OUTPATIENT
Start: 2017-06-12 | End: 2017-08-30 | Stop reason: SDUPTHER

## 2017-06-12 RX ORDER — METFORMIN HYDROCHLORIDE 500 MG/1
500 TABLET ORAL 2 TIMES DAILY WITH MEALS
Qty: 180 TABLET | Refills: 3 | Status: SHIPPED | OUTPATIENT
Start: 2017-06-12 | End: 2018-08-14 | Stop reason: SDUPTHER

## 2017-06-16 ENCOUNTER — DOCUMENTATION ONLY (OUTPATIENT)
Dept: FAMILY MEDICINE | Facility: CLINIC | Age: 57
End: 2017-06-16

## 2017-06-16 NOTE — PROGRESS NOTES
Patient's metformin was updated on last OV on 5/9 to take metformin 500 mg: Take 2 (1000 mg) tablets in am and 3 (1500 mg) tablets in the pm. Updated pharmacy on correct directions and correct dispense amount.

## 2017-07-12 ENCOUNTER — INFUSION (OUTPATIENT)
Dept: INFUSION THERAPY | Facility: HOSPITAL | Age: 57
End: 2017-07-12
Attending: INTERNAL MEDICINE
Payer: MEDICARE

## 2017-07-12 DIAGNOSIS — C7B.8 SECONDARY NEUROENDOCRINE TUMOR OF LIVER: Primary | ICD-10-CM

## 2017-07-12 PROCEDURE — 25000003 PHARM REV CODE 250: Performed by: INTERNAL MEDICINE

## 2017-07-12 PROCEDURE — 96523 IRRIG DRUG DELIVERY DEVICE: CPT

## 2017-07-12 RX ORDER — SODIUM CHLORIDE 0.9 % (FLUSH) 0.9 %
10 SYRINGE (ML) INJECTION
Status: CANCELLED | OUTPATIENT
Start: 2017-07-12

## 2017-07-12 RX ORDER — HEPARIN 100 UNIT/ML
500 SYRINGE INTRAVENOUS
Status: COMPLETED | OUTPATIENT
Start: 2017-07-12 | End: 2017-07-12

## 2017-07-12 RX ORDER — SODIUM CHLORIDE 9 MG/ML
10 INJECTION, SOLUTION INTRAMUSCULAR; INTRAVENOUS; SUBCUTANEOUS
Status: COMPLETED | OUTPATIENT
Start: 2017-07-12 | End: 2017-07-12

## 2017-07-12 RX ORDER — HEPARIN 100 UNIT/ML
500 SYRINGE INTRAVENOUS
Status: CANCELLED | OUTPATIENT
Start: 2017-07-12

## 2017-07-12 RX ADMIN — SODIUM CHLORIDE, PRESERVATIVE FREE 10 ML: 5 INJECTION INTRAVENOUS at 11:07

## 2017-07-12 RX ADMIN — HEPARIN 500 UNITS: 100 SYRINGE at 11:07

## 2017-07-12 NOTE — PATIENT INSTRUCTIONS
[facility]  [address]  [phone] phone     [phone] fax  Hours of Operation: Monday- Friday 8:00am- 5:00pm  After hours phone  [phone]  Hematology / Oncology Physicians on call      Dr. Mark Haider                      Please call with any concerns regarding your appointment today.

## 2017-07-20 RX ORDER — ZOLPIDEM TARTRATE 10 MG/1
TABLET ORAL
Qty: 30 TABLET | Refills: 0 | Status: SHIPPED | OUTPATIENT
Start: 2017-07-20 | End: 2017-08-15 | Stop reason: SDUPTHER

## 2017-07-31 ENCOUNTER — OFFICE VISIT (OUTPATIENT)
Dept: FAMILY MEDICINE | Facility: CLINIC | Age: 57
End: 2017-07-31
Payer: MEDICARE

## 2017-07-31 ENCOUNTER — TELEPHONE (OUTPATIENT)
Dept: HEMATOLOGY/ONCOLOGY | Facility: CLINIC | Age: 57
End: 2017-07-31

## 2017-07-31 VITALS
HEIGHT: 65 IN | DIASTOLIC BLOOD PRESSURE: 69 MMHG | SYSTOLIC BLOOD PRESSURE: 124 MMHG | HEART RATE: 61 BPM | TEMPERATURE: 99 F | BODY MASS INDEX: 39.3 KG/M2 | WEIGHT: 235.88 LBS

## 2017-07-31 DIAGNOSIS — J06.9 UPPER RESPIRATORY TRACT INFECTION, UNSPECIFIED TYPE: ICD-10-CM

## 2017-07-31 DIAGNOSIS — C7A.1 MALIGNANT POORLY DIFFERENTIATED NEUROENDOCRINE CARCINOMA: ICD-10-CM

## 2017-07-31 DIAGNOSIS — E11.9 TYPE 2 DIABETES MELLITUS WITHOUT COMPLICATION, WITHOUT LONG-TERM CURRENT USE OF INSULIN: ICD-10-CM

## 2017-07-31 DIAGNOSIS — I48.19 ATRIAL FIBRILLATION, PERSISTENT: Primary | ICD-10-CM

## 2017-07-31 DIAGNOSIS — I50.9 HEART FAILURE, UNSPECIFIED HEART FAILURE CHRONICITY, UNSPECIFIED HEART FAILURE TYPE: ICD-10-CM

## 2017-07-31 PROCEDURE — 99999 PR PBB SHADOW E&M-EST. PATIENT-LVL III: CPT | Mod: PBBFAC,,, | Performed by: FAMILY MEDICINE

## 2017-07-31 PROCEDURE — 99499 UNLISTED E&M SERVICE: CPT | Mod: S$GLB,,, | Performed by: FAMILY MEDICINE

## 2017-07-31 PROCEDURE — 3046F HEMOGLOBIN A1C LEVEL >9.0%: CPT | Mod: S$GLB,,, | Performed by: FAMILY MEDICINE

## 2017-07-31 PROCEDURE — 99214 OFFICE O/P EST MOD 30 MIN: CPT | Mod: S$GLB,,, | Performed by: FAMILY MEDICINE

## 2017-07-31 RX ORDER — DOXYCYCLINE HYCLATE 100 MG
100 TABLET ORAL 2 TIMES DAILY
Qty: 14 TABLET | Refills: 0 | Status: SHIPPED | OUTPATIENT
Start: 2017-07-31 | End: 2017-08-07

## 2017-07-31 NOTE — TELEPHONE ENCOUNTER
----- Message from Babita Silva sent at 7/31/2017  8:05 AM CDT -----  states that she has poss pneumonia, pls adv....[phone].

## 2017-07-31 NOTE — PROGRESS NOTES
Janine Souza presents with moderate upper respiratory congestion,rhinnorhea,moderate cough past 6-7 days. OTC help slightly. Denies nausea,vomiting,diarrhea or significant fever.  Follows w Onc for stable Neuroendochrine tumor. Hx AF -current SR by auscultation   Past Medical History:   Diagnosis Date    Acute headache 2016    Anemia     Atrial fibrillation, persistent     Bronchitis     CHF (congestive heart failure)     Diabetes mellitus, type 2     Hypertension     Malignant poorly differentiated neuroendocrine carcinoma 2016    Secondary neuroendocrine tumor of liver 2016     Past Surgical History:   Procedure Laterality Date    ADENOIDECTOMY      CARDIAC ELECTROPHYSIOLOGY MAPPING AND ABLATION      CARDIAC SURGERY       SECTION      ERCP      TONSILLECTOMY      uvulopalatopharygoplasty       Review of patient's allergies indicates:   Allergen Reactions    Pcn [penicillins] Other (See Comments), Hives and Swelling     As a child had an unknown reaction.  Has not taken since  As a child had an unknown reaction.  Has not taken since     Current Outpatient Prescriptions on File Prior to Visit   Medication Sig Dispense Refill    blood sugar diagnostic, disc Strp USE AS DIRECTED EVERY DAY *THANK YOU*      docusate sodium (COLACE) 50 MG capsule Take by mouth 2 (two) times daily.      fluticasone (FLOVENT HFA) 110 mcg/actuation inhaler Inhale 2 puffs into the lungs once daily.      furosemide (LASIX) 40 MG tablet Take 40 mg by mouth once daily.       glipiZIDE (GLUCOTROL) 10 MG tablet Take 1 tablet (10 mg total) by mouth 2 (two) times daily. 60 tablet 3    KLOR-CON M20 20 mEq tablet       lancets Misc Use as directed      linagliptin (TRADJENTA) 5 mg Tab tablet Take 5 mg by mouth once daily.      metformin (GLUCOPHAGE) 500 MG tablet Take 1 tablet (500 mg total) by mouth 2 (two) times daily with meals. Take 2 tablets (1000mg) in am and 3 tablets (1500 mg)in the pm (Patient  taking differently: Take 2 tablets (1000mg) in am and 3 tablets (1500 mg)in the pm) 180 tablet 3    metoprolol succinate (TOPROL-XL) 50 MG 24 hr tablet       ondansetron (ZOFRAN) 4 MG tablet Take 1 tablet (4 mg total) by mouth every 12 (twelve) hours as needed for Nausea. 30 tablet 6    prochlorperazine (COMPAZINE) 5 MG tablet Take 1 tablet (5 mg total) by mouth every 6 (six) hours as needed for Nausea. 30 tablet 6    XARELTO 20 mg Tab Take 20 mg by mouth once daily.       zolpidem (AMBIEN) 10 mg Tab TAKE 1 TABLET BY MOUTH AT BEDTIME IF NEEDED  30 tablet 0     Current Facility-Administered Medications on File Prior to Visit   Medication Dose Route Frequency Provider Last Rate Last Dose    ferumoxytol (FERAHEME) 510 mg in dextrose 5 % 100 mL IVPB  510 mg Intravenous Once Kalyan Flores MD        heparin, porcine (PF) 100 unit/mL injection flush 500 Units  500 Units Intravenous PRN Kalyan Flores MD        sodium chloride 0.9% flush 10 mL  10 mL Intravenous PRN Kalyan Flores MD         Social History     Social History    Marital status:      Spouse name: N/A    Number of children: N/A    Years of education: N/A     Occupational History    Not on file.     Social History Main Topics    Smoking status: Never Smoker    Smokeless tobacco: Not on file    Alcohol use No    Drug use: No    Sexual activity: Not on file     Other Topics Concern    Not on file     Social History Narrative    No narrative on file     Family History   Problem Relation Age of Onset    Diabetes type II Mother     Prostate cancer Father     Breast cancer Paternal Aunt      breast    Breast cancer Maternal Aunt      breast         ROS:  SKIN: No rashes, itching or changes in color or texture of skin.  EYES: Visual acuity fine. No photophobia, ocular pain or diplopia.EARS: Denies ear pain, discharge or vertigo.NOSE: No loss of smell, no epistaxis some postnasal drip.MOUTH & THROAT: No hoarseness or change in  voice. No excessive gum bleeding.CHEST: Denies HURLEY, cyanosis, wheezing  CARDIOVASCULAR: Denies chest pain, PND, orthopnea or reduced exercise tolerance.  ABDOMEN:  No weight loss.No abdominal pain, no hematemesis or blood in stool.  URINARY: No flank pain, dysuria or hematuria.  PERIPHERAL VASCULAR: No claudication or cyanosis.  MUSCULOSKELETAL: Negative   NEUROLOGIC: No history of seizures, paralysis, alteration of gait or coordination.    PE: Vital signs as noted  Heent:Normocephalic with no recent cranial trauma,PERRLA,EOMI,conjunctiva clear,fundi reveal no hemmorhage exudate or papilledema.Otic canals clear, tympanic membranes slightly dull bilaterally.Nasal mucosa slightly red and edematous.Posterior pharynx slightly red but without exudate.  Neck:Supple with minimal anterior cervical adenopathy.  Chest:Clear bilateral breath sounds with mild scattered ronchi  Heart:Regular rhthym without murmer  Abdomen:Soft, non tender,no masses, no hepatosplenomegalyExtremeties and Neurologic:Grossly within normal limits  Impression: Upper Respiratory Infection. 465.9  Janine was seen today for chills and cough.    Diagnoses and all orders for this visit:    Atrial fibrillation, persistent    Malignant poorly differentiated neuroendocrine carcinoma    Type 2 diabetes mellitus without complication, without long-term current use of insulin    Heart failure, unspecified heart failure chronicity, unspecified heart failure type    Other orders  -     doxycycline (VIBRA-TABS) 100 MG tablet; Take 1 tablet (100 mg total) by mouth 2 (two) times daily.    Onc fu  A1c 1m

## 2017-08-16 RX ORDER — ZOLPIDEM TARTRATE 10 MG/1
10 TABLET ORAL NIGHTLY PRN
Qty: 30 TABLET | Refills: 5 | Status: SHIPPED | OUTPATIENT
Start: 2017-08-16 | End: 2018-02-14 | Stop reason: SDUPTHER

## 2017-08-21 ENCOUNTER — PATIENT MESSAGE (OUTPATIENT)
Dept: HEMATOLOGY/ONCOLOGY | Facility: CLINIC | Age: 57
End: 2017-08-21

## 2017-08-23 ENCOUNTER — TELEPHONE (OUTPATIENT)
Dept: RADIOLOGY | Facility: HOSPITAL | Age: 57
End: 2017-08-23

## 2017-08-24 ENCOUNTER — HOSPITAL ENCOUNTER (OUTPATIENT)
Dept: RADIOLOGY | Facility: HOSPITAL | Age: 57
Discharge: HOME | End: 2017-08-24
Attending: INTERNAL MEDICINE
Payer: MEDICARE

## 2017-08-24 ENCOUNTER — OFFICE VISIT (OUTPATIENT)
Dept: GASTROENTEROLOGY | Facility: CLINIC | Age: 57
End: 2017-08-24
Payer: MEDICARE

## 2017-08-24 ENCOUNTER — INFUSION (OUTPATIENT)
Dept: INFUSION THERAPY | Facility: HOSPITAL | Age: 57
End: 2017-08-24
Attending: INTERNAL MEDICINE
Payer: MEDICARE

## 2017-08-24 ENCOUNTER — OFFICE VISIT (OUTPATIENT)
Dept: HEMATOLOGY/ONCOLOGY | Facility: CLINIC | Age: 57
End: 2017-08-24
Payer: MEDICARE

## 2017-08-24 VITALS
HEART RATE: 58 BPM | SYSTOLIC BLOOD PRESSURE: 140 MMHG | TEMPERATURE: 98 F | WEIGHT: 232.13 LBS | OXYGEN SATURATION: 98 % | BODY MASS INDEX: 38.67 KG/M2 | DIASTOLIC BLOOD PRESSURE: 70 MMHG | HEIGHT: 65 IN | RESPIRATION RATE: 18 BRPM

## 2017-08-24 VITALS
HEIGHT: 65 IN | HEART RATE: 60 BPM | BODY MASS INDEX: 38.75 KG/M2 | WEIGHT: 232.56 LBS | SYSTOLIC BLOOD PRESSURE: 120 MMHG | DIASTOLIC BLOOD PRESSURE: 68 MMHG

## 2017-08-24 DIAGNOSIS — T85.520A: Primary | ICD-10-CM

## 2017-08-24 DIAGNOSIS — C78.6 METASTATIC CANCER TO RETROPERITONEUM: ICD-10-CM

## 2017-08-24 DIAGNOSIS — C7A.1 MALIGNANT POORLY DIFFERENTIATED NEUROENDOCRINE CARCINOMA: Primary | ICD-10-CM

## 2017-08-24 DIAGNOSIS — C7B.8 SECONDARY NEUROENDOCRINE TUMOR OF LIVER: ICD-10-CM

## 2017-08-24 DIAGNOSIS — C7B.8 SECONDARY NEUROENDOCRINE TUMOR OF LIVER: Primary | ICD-10-CM

## 2017-08-24 DIAGNOSIS — C7A.1 MALIGNANT POORLY DIFFERENTIATED NEUROENDOCRINE CARCINOMA: ICD-10-CM

## 2017-08-24 PROCEDURE — A4216 STERILE WATER/SALINE, 10 ML: HCPCS | Mod: PO | Performed by: INTERNAL MEDICINE

## 2017-08-24 PROCEDURE — 74178 CT ABD&PLV WO CNTR FLWD CNTR: CPT | Mod: 26,,, | Performed by: RADIOLOGY

## 2017-08-24 PROCEDURE — 63600175 PHARM REV CODE 636 W HCPCS: Mod: PO | Performed by: INTERNAL MEDICINE

## 2017-08-24 PROCEDURE — 3074F SYST BP LT 130 MM HG: CPT | Mod: S$GLB,,, | Performed by: NURSE PRACTITIONER

## 2017-08-24 PROCEDURE — 3078F DIAST BP <80 MM HG: CPT | Mod: S$GLB,,, | Performed by: INTERNAL MEDICINE

## 2017-08-24 PROCEDURE — 3078F DIAST BP <80 MM HG: CPT | Mod: S$GLB,,, | Performed by: NURSE PRACTITIONER

## 2017-08-24 PROCEDURE — 3074F SYST BP LT 130 MM HG: CPT | Mod: S$GLB,,, | Performed by: INTERNAL MEDICINE

## 2017-08-24 PROCEDURE — 3008F BODY MASS INDEX DOCD: CPT | Mod: S$GLB,,, | Performed by: INTERNAL MEDICINE

## 2017-08-24 PROCEDURE — 99999 PR PBB SHADOW E&M-EST. PATIENT-LVL IV: CPT | Mod: PBBFAC,,, | Performed by: INTERNAL MEDICINE

## 2017-08-24 PROCEDURE — 25000003 PHARM REV CODE 250: Mod: PO | Performed by: INTERNAL MEDICINE

## 2017-08-24 PROCEDURE — 96523 IRRIG DRUG DELIVERY DEVICE: CPT | Mod: PO

## 2017-08-24 PROCEDURE — 99204 OFFICE O/P NEW MOD 45 MIN: CPT | Mod: S$GLB,,, | Performed by: NURSE PRACTITIONER

## 2017-08-24 PROCEDURE — 71260 CT THORAX DX C+: CPT | Mod: 26,,, | Performed by: RADIOLOGY

## 2017-08-24 PROCEDURE — 99999 PR PBB SHADOW E&M-EST. PATIENT-LVL III: CPT | Mod: PBBFAC,,, | Performed by: NURSE PRACTITIONER

## 2017-08-24 PROCEDURE — 3008F BODY MASS INDEX DOCD: CPT | Mod: S$GLB,,, | Performed by: NURSE PRACTITIONER

## 2017-08-24 PROCEDURE — 99215 OFFICE O/P EST HI 40 MIN: CPT | Mod: 25,S$GLB,, | Performed by: INTERNAL MEDICINE

## 2017-08-24 RX ORDER — HEPARIN 100 UNIT/ML
500 SYRINGE INTRAVENOUS
Status: CANCELLED | OUTPATIENT
Start: 2017-08-24

## 2017-08-24 RX ORDER — SODIUM CHLORIDE 0.9 % (FLUSH) 0.9 %
10 SYRINGE (ML) INJECTION
Status: CANCELLED | OUTPATIENT
Start: 2017-08-24

## 2017-08-24 RX ORDER — SODIUM CHLORIDE 0.9 % (FLUSH) 0.9 %
10 SYRINGE (ML) INJECTION
Status: COMPLETED | OUTPATIENT
Start: 2017-08-24 | End: 2017-08-24

## 2017-08-24 RX ORDER — HEPARIN 100 UNIT/ML
500 SYRINGE INTRAVENOUS
Status: COMPLETED | OUTPATIENT
Start: 2017-08-24 | End: 2017-08-24

## 2017-08-24 RX ADMIN — IOHEXOL 100 ML: 350 INJECTION, SOLUTION INTRAVENOUS at 09:08

## 2017-08-24 RX ADMIN — HEPARIN 500 UNITS: 100 SYRINGE at 11:08

## 2017-08-24 RX ADMIN — IOHEXOL 30 ML: 350 INJECTION, SOLUTION INTRAVENOUS at 08:08

## 2017-08-24 RX ADMIN — SODIUM CHLORIDE, PRESERVATIVE FREE 10 ML: 5 INJECTION INTRAVENOUS at 11:08

## 2017-08-24 NOTE — Clinical Note
Patient has migrating biliary stent. Stable labs. Does she need ERCP for stent replacement? See note for full details. Thanks

## 2017-08-24 NOTE — PROGRESS NOTES
Subjective:       Patient ID: Janine Souza is a 57 y.o. female.    Chief Complaint: Follow-up; Results; and Cancer    HPI 57-year-old female history of poorly differentiated malignancy diagnosed in 2015 patient treated with small cell-like regimen.  Had been seen in consultation by Dr. Onur Peraza is neuroendocrine section [facility] patient is been treated with 6 cycles cisplatin etoposide chemotherapy and felt to have progressive disease but decided to stop treatment has been observed since with regressing mass in her retroperitoneal area but appears to have now a migrating stent that is not been replaced since then.  ECOG status 1    Past Medical History:   Diagnosis Date    Acute headache 2016    Anemia     Atrial fibrillation, persistent     Bronchitis     CHF (congestive heart failure)     Diabetes mellitus, type 2     Hypertension     Malignant poorly differentiated neuroendocrine carcinoma 2016    Secondary neuroendocrine tumor of liver 2016     Family History   Problem Relation Age of Onset    Diabetes type II Mother     Prostate cancer Father     Breast cancer Paternal Aunt      breast    Breast cancer Maternal Aunt      breast     Social History     Social History    Marital status:      Spouse name: N/A    Number of children: N/A    Years of education: N/A     Occupational History    Not on file.     Social History Main Topics    Smoking status: Never Smoker    Smokeless tobacco: Never Used    Alcohol use No    Drug use: No    Sexual activity: Not on file     Other Topics Concern    Not on file     Social History Narrative    No narrative on file     Past Surgical History:   Procedure Laterality Date    ADENOIDECTOMY      CARDIAC ELECTROPHYSIOLOGY MAPPING AND ABLATION      CARDIAC SURGERY       SECTION      ERCP      TONSILLECTOMY      uvulopalatopharygoplasty         Labs:  Lab Results   Component Value Date     WBC 10.04 08/24/2017    HGB 11.6 (L) 08/24/2017    HCT 35.5 (L) 08/24/2017    MCV 99 (H) 08/24/2017     08/24/2017     BMP  Lab Results   Component Value Date     08/24/2017    K 4.7 08/24/2017     08/24/2017    CO2 22 (L) 08/24/2017    BUN 42 (H) 08/24/2017    CREATININE 1.4 08/24/2017    CALCIUM 10.5 08/24/2017    ANIONGAP 16 08/24/2017    ESTGFRAFRICA 48 (A) 08/24/2017    EGFRNONAA 42 (A) 08/24/2017     Lab Results   Component Value Date    ALT 18 08/24/2017    AST 20 08/24/2017    ALKPHOS 64 08/24/2017    BILITOT 0.3 08/24/2017       Lab Results   Component Value Date    IRON 68 05/26/2017    TIBC 357 05/26/2017    FERRITIN 1,717 (H) 05/26/2017     No results found for: BTBLILHQ08  No results found for: FOLATE  Lab Results   Component Value Date    TSH 2.916 12/20/2016         Review of Systems   Constitutional: Negative for activity change, appetite change, chills, diaphoresis, fatigue, fever and unexpected weight change.   HENT: Negative for congestion, dental problem, drooling, ear discharge, ear pain, facial swelling, hearing loss, mouth sores, nosebleeds, postnasal drip, rhinorrhea, sinus pressure, sneezing, sore throat, tinnitus, trouble swallowing and voice change.    Eyes: Negative for photophobia, pain, discharge, redness, itching and visual disturbance.   Respiratory: Negative for cough, choking, chest tightness, shortness of breath, wheezing and stridor.    Cardiovascular: Negative for chest pain, palpitations and leg swelling.   Gastrointestinal: Negative for abdominal distention, abdominal pain, anal bleeding, blood in stool, constipation, diarrhea, nausea, rectal pain and vomiting.   Endocrine: Negative for cold intolerance, heat intolerance, polydipsia, polyphagia and polyuria.   Genitourinary: Positive for frequency. Negative for decreased urine volume, difficulty urinating, dyspareunia, dysuria, enuresis, flank pain, genital sores, hematuria, menstrual problem, pelvic pain,  urgency, vaginal bleeding, vaginal discharge and vaginal pain.   Musculoskeletal: Negative for arthralgias, back pain, gait problem, joint swelling, myalgias, neck pain and neck stiffness.   Skin: Negative for color change, pallor and rash.   Allergic/Immunologic: Negative for environmental allergies, food allergies and immunocompromised state.   Neurological: Negative for dizziness, tremors, seizures, syncope, facial asymmetry, speech difficulty, weakness, light-headedness, numbness and headaches.   Hematological: Negative for adenopathy. Does not bruise/bleed easily.   Psychiatric/Behavioral: Negative for agitation, behavioral problems, confusion, decreased concentration, dysphoric mood, hallucinations, self-injury, sleep disturbance and suicidal ideas. The patient is not nervous/anxious and is not hyperactive.        Objective:      Physical Exam   Constitutional: She is oriented to person, place, and time. She appears well-developed and well-nourished. She appears distressed.   HENT:   Head: Normocephalic and atraumatic.   Right Ear: External ear normal.   Left Ear: External ear normal.   Nose: Nose normal. Right sinus exhibits no maxillary sinus tenderness and no frontal sinus tenderness. Left sinus exhibits no maxillary sinus tenderness and no frontal sinus tenderness.   Mouth/Throat: Oropharynx is clear and moist. No oropharyngeal exudate.   Eyes: Conjunctivae, EOM and lids are normal. Pupils are equal, round, and reactive to light. Right eye exhibits no discharge. Left eye exhibits no discharge. Right conjunctiva is not injected. Right conjunctiva has no hemorrhage. Left conjunctiva is not injected. Left conjunctiva has no hemorrhage. No scleral icterus.   Neck: Normal range of motion. Neck supple. No JVD present. No tracheal deviation present. No thyromegaly present.   Cardiovascular: Normal rate and regular rhythm.    Pulmonary/Chest: Effort normal. No stridor. No respiratory distress. She exhibits no  tenderness.   Abdominal: Soft. She exhibits no distension and no mass. There is no splenomegaly or hepatomegaly. There is no tenderness. There is no rebound.   Musculoskeletal: Normal range of motion. She exhibits no edema or tenderness.   Lymphadenopathy:     She has no cervical adenopathy.     She has no axillary adenopathy.        Right: No supraclavicular adenopathy present.        Left: No supraclavicular adenopathy present.   Neurological: She is alert and oriented to person, place, and time. No cranial nerve deficit. Coordination normal.   Skin: Skin is dry. No rash noted. She is not diaphoretic. No erythema.   Psychiatric: Her behavior is normal. Judgment and thought content normal. Her mood appears anxious.   Vitals reviewed.          Assessment:      1. Malignant poorly differentiated neuroendocrine carcinoma    2. 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 Secondary neuroendocrine tumor of liver           Plan:   Review of CT today talking to radiology demonstrates no clear evidence of progression at this time there appears to be stent migration it's not been replaced and almost 17 months at this time would recommend GI evaluation for stent replacement or removal to see whether or not she actually needs the stent.  It's really been quite amazing with her diagnosis to have had an excellent response for such a prolonged interval should be noted that her initial diagnosis was made by FNA was felt to be a poorly differentiated malignancy with a high proliferative index felt secondary to probably small cell cancer or high-grade neuroendocrine tumor and is currently in remission with no evidence of progression her MediPort has been flushed today laboratory study stable will see back in 6 weeks and will defer to GI to see whether not stent needs to be replaced or removed to be observed to see if there is adequate biliary drainage at this point with dramatic shrinkage of tumor. Psychosocial Distress screening  score of Distress Score: 0 noted and reviewed. No intervention indicated.

## 2017-08-24 NOTE — PROGRESS NOTES
Clinic Consult:  [facility] Consultation Note    Reason for Consult:  The primary encounter diagnosis was Biliary stent migration, initial encounter. 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    PCP: Charles Lopez       HPI:  This is a 57 y.o. female here for evaluation of the above issues. She was referred to me by Dr. Flores. She has a history of a malignant poorly differentiated neuroendocrine carcinoma. Metastatic cancer of the retroperitoneum. And secondary neuroendocrine tumor of the liver. Dr. Flores is following patient. She recently had a CT scan that showed slight interval distal migration of the biliary stent by approximately 2.5cm. No definite blurry ductal dilation. Her stent was placed via ERCP in 2015 for obstruction of the CBD. She is feeling well otherwise. She had labs today which were stable. No abdominal pain, hematochezia, melena, nausea, vomiting, or weight loss.     Review of Systems   Constitutional: Negative for fever, malaise/fatigue and weight loss.   HENT: Negative for sore throat.    Respiratory: Negative for cough and wheezing.    Cardiovascular: Negative for chest pain and palpitations.   Gastrointestinal: Negative for abdominal pain, blood in stool, constipation, diarrhea, heartburn, melena, nausea and vomiting.   Genitourinary: Negative for dysuria and frequency.   Musculoskeletal: Negative for back pain, joint pain, myalgias and neck pain.   Skin: Negative for itching and rash.   Neurological: Negative for dizziness, speech change, seizures, loss of consciousness and headaches.   Psychiatric/Behavioral: Negative for depression and substance abuse. The patient is not nervous/anxious.        Medical History:  has a past medical history of Acute headache (1/8/2016); Anemia; Atrial fibrillation, persistent; Bronchitis; CHF (congestive heart failure); Diabetes mellitus, type 2;  Hypertension; Malignant poorly differentiated neuroendocrine carcinoma (2016); and Secondary neuroendocrine tumor of liver (2016).    Surgical History:  has a past surgical history that includes Tonsillectomy; Adenoidectomy; uvulopalatopharygoplasty;  section; Cardiac electrophysiology mapping and ablation; ERCP; and Cardiac surgery.    Family History: family history includes Breast cancer in her maternal aunt and paternal aunt; Diabetes type II in her mother; Prostate cancer in her father..     Social History:  reports that she has never smoked. She has never used smokeless tobacco. She reports that she does not drink alcohol or use drugs.    Allergies: Reviewed    Home Medications:   Current Outpatient Prescriptions on File Prior to Visit   Medication Sig Dispense Refill    blood sugar diagnostic, disc Strp USE AS DIRECTED EVERY DAY *THANK YOU*      docusate sodium (COLACE) 50 MG capsule Take by mouth 2 (two) times daily.      fluticasone (FLOVENT HFA) 110 mcg/actuation inhaler Inhale 2 puffs into the lungs once daily.      furosemide (LASIX) 40 MG tablet Take 40 mg by mouth once daily.       glipiZIDE (GLUCOTROL) 10 MG tablet Take 1 tablet (10 mg total) by mouth 2 (two) times daily. 60 tablet 3    KLOR-CON M20 20 mEq tablet       lancets Misc Use as directed      linagliptin (TRADJENTA) 5 mg Tab tablet Take 5 mg by mouth once daily.      metformin (GLUCOPHAGE) 500 MG tablet Take 1 tablet (500 mg total) by mouth 2 (two) times daily with meals. Take 2 tablets (1000mg) in am and 3 tablets (1500 mg)in the pm (Patient taking differently: Take 2 tablets (1000mg) in am and 3 tablets (1500 mg)in the pm) 180 tablet 3    metoprolol succinate (TOPROL-XL) 50 MG 24 hr tablet       ondansetron (ZOFRAN) 4 MG tablet Take 1 tablet (4 mg total) by mouth every 12 (twelve) hours as needed for Nausea. 30 tablet 6    prochlorperazine (COMPAZINE) 5 MG tablet Take 1 tablet (5 mg total) by mouth every 6 (six)  "hours as needed for Nausea. 30 tablet 6    XARELTO 20 mg Tab Take 20 mg by mouth once daily.       zolpidem (AMBIEN) 10 mg Tab Take 1 tablet (10 mg total) by mouth nightly as needed. 30 tablet 5     Current Facility-Administered Medications on File Prior to Visit   Medication Dose Route Frequency Provider Last Rate Last Dose    ferumoxytol (FERAHEME) 510 mg in dextrose 5 % 100 mL IVPB  510 mg Intravenous Once Kalyan Flores MD        heparin, porcine (PF) 100 unit/mL injection flush 500 Units  500 Units Intravenous PRN Kalyan Flores MD        [COMPLETED] heparin, porcine (PF) 100 unit/mL injection flush 500 Units  500 Units Intravenous 1 time in Clinic/HOD Kalyan Flores MD   500 Units at 08/24/17 1103    [COMPLETED] omnipaque 350 iohexol 100 mL  100 mL Intravenous ONCE PRN Kalyan Flores MD   100 mL at 08/24/17 0947    [COMPLETED] omnipaque 350 iohexol 30 mL  30 mL Oral ONCE PRN Kalyan Flores MD   30 mL at 08/24/17 0833    sodium chloride 0.9% flush 10 mL  10 mL Intravenous PRN Kalyan Flores MD        [COMPLETED] sodium chloride 0.9% flush 10 mL  10 mL Intravenous 1 time in Clinic/HOD Kalyan Flores MD   10 mL at 08/24/17 1102    [DISCONTINUED] omnipaque 350 iohexol 100 mL  100 mL Intravenous ONCE PRN Kalyan Flores MD           Physical Exam:  Vital Signs:  /68   Pulse 60   Ht 5' 5" (1.651 m)   Wt 105.5 kg (232 lb 9.4 oz)   LMP  (LMP Unknown)   BMI 38.70 kg/m²   Body mass index is 38.7 kg/m².  Physical Exam   Constitutional: She is oriented to person, place, and time and well-developed, well-nourished, and in no distress. No distress.   HENT:   Head: Normocephalic.   Eyes: Conjunctivae are normal. Pupils are equal, round, and reactive to light.   Cardiovascular: Normal rate, regular rhythm and normal heart sounds.    Pulmonary/Chest: Effort normal and breath sounds normal. No respiratory distress.   Abdominal: Soft. Bowel sounds are normal. She exhibits no distension. There " is no tenderness.   Neurological: She is alert and oriented to person, place, and time. No cranial nerve deficit.   Skin: Skin is warm and dry. No rash noted.   Psychiatric: Mood and affect normal.       Labs: Pertinent labs reviewed. CBC, CMP    Assessment:  1. Biliary stent migration, initial encounter    2. 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 Secondary neuroendocrine tumor of liver         Recommendations:  Patient with recent CT scan that revealed slight interval distal migration of the biliary stent by approximately 2.5 cm. No definite ductal dilation. Will discuss case with Dr. Syed to determine if a stent replacement is needed. If indicated, will schedule ERCP.     Thank you so much for allowing me to participate in the care of JanineTORSTEN Werner

## 2017-08-30 ENCOUNTER — TELEPHONE (OUTPATIENT)
Dept: GASTROENTEROLOGY | Facility: CLINIC | Age: 57
End: 2017-08-30

## 2017-08-30 ENCOUNTER — TELEPHONE (OUTPATIENT)
Dept: FAMILY MEDICINE | Facility: CLINIC | Age: 57
End: 2017-08-30

## 2017-08-30 ENCOUNTER — OFFICE VISIT (OUTPATIENT)
Dept: FAMILY MEDICINE | Facility: CLINIC | Age: 57
End: 2017-08-30
Payer: MEDICARE

## 2017-08-30 VITALS
WEIGHT: 234.13 LBS | HEIGHT: 65 IN | HEART RATE: 55 BPM | SYSTOLIC BLOOD PRESSURE: 118 MMHG | DIASTOLIC BLOOD PRESSURE: 71 MMHG | BODY MASS INDEX: 39.01 KG/M2

## 2017-08-30 DIAGNOSIS — C7A.1 MALIGNANT POORLY DIFFERENTIATED NEUROENDOCRINE CARCINOMA: ICD-10-CM

## 2017-08-30 DIAGNOSIS — I48.19 ATRIAL FIBRILLATION, PERSISTENT: ICD-10-CM

## 2017-08-30 DIAGNOSIS — E11.9 TYPE 2 DIABETES MELLITUS WITHOUT COMPLICATION, WITHOUT LONG-TERM CURRENT USE OF INSULIN: Primary | ICD-10-CM

## 2017-08-30 DIAGNOSIS — I50.9 HEART FAILURE, UNSPECIFIED HEART FAILURE CHRONICITY, UNSPECIFIED HEART FAILURE TYPE: ICD-10-CM

## 2017-08-30 DIAGNOSIS — E11.9 TYPE 2 DIABETES MELLITUS WITHOUT COMPLICATION, UNSPECIFIED LONG TERM INSULIN USE STATUS: Primary | ICD-10-CM

## 2017-08-30 DIAGNOSIS — I10 ESSENTIAL HYPERTENSION: ICD-10-CM

## 2017-08-30 PROCEDURE — 99214 OFFICE O/P EST MOD 30 MIN: CPT | Mod: S$GLB,,, | Performed by: FAMILY MEDICINE

## 2017-08-30 PROCEDURE — 99999 PR PBB SHADOW E&M-EST. PATIENT-LVL II: CPT | Mod: PBBFAC,,, | Performed by: FAMILY MEDICINE

## 2017-08-30 PROCEDURE — 3074F SYST BP LT 130 MM HG: CPT | Mod: S$GLB,,, | Performed by: FAMILY MEDICINE

## 2017-08-30 PROCEDURE — 3045F PR MOST RECENT HEMOGLOBIN A1C LEVEL 7.0-9.0%: CPT | Mod: S$GLB,,, | Performed by: FAMILY MEDICINE

## 2017-08-30 PROCEDURE — 99499 UNLISTED E&M SERVICE: CPT | Mod: S$GLB,,, | Performed by: FAMILY MEDICINE

## 2017-08-30 PROCEDURE — 3008F BODY MASS INDEX DOCD: CPT | Mod: S$GLB,,, | Performed by: FAMILY MEDICINE

## 2017-08-30 PROCEDURE — 3078F DIAST BP <80 MM HG: CPT | Mod: S$GLB,,, | Performed by: FAMILY MEDICINE

## 2017-08-30 RX ORDER — GLIPIZIDE 10 MG/1
10 TABLET ORAL 2 TIMES DAILY
Qty: 180 TABLET | Refills: 3 | Status: SHIPPED | OUTPATIENT
Start: 2017-08-30 | End: 2018-07-22 | Stop reason: SDUPTHER

## 2017-08-30 NOTE — TELEPHONE ENCOUNTER
Discussed case with MULU Collier who was notifying me that Dr. Chambers has reviewed CT scan abdomen pelvis. Migrating stent does not need replacing. Patient labs stable and no evidence of biliary ductal dilation. No complaints of abdominal pain. Will have nursing staff contact patient to notify her of the above.

## 2017-08-30 NOTE — TELEPHONE ENCOUNTER
Ms Corona spoke with patient and explained that she did not need to have anything else done with the stent, and she verbalized understanding.

## 2017-08-30 NOTE — PROGRESS NOTES
The patient presents today to fu DM Last a1c 8.5 from >10 , pm 180-200   Follows Card for AF prev had ablation but remains iin AF.   HF stable currently.   Following w Dr Flores w hx NE Carcinoma,p chemotx, no active tx currently,last series of scans stable but biliary stent migrated-GI fu pending   Past Medical History:  Past Medical History:   Diagnosis Date    Acute headache 2016    Anemia     Atrial fibrillation, persistent     Bronchitis     CHF (congestive heart failure)     Diabetes mellitus, type 2     Hypertension     Malignant poorly differentiated neuroendocrine carcinoma 2016    Secondary neuroendocrine tumor of liver 2016     Past Surgical History:   Procedure Laterality Date    ADENOIDECTOMY      CARDIAC ELECTROPHYSIOLOGY MAPPING AND ABLATION      CARDIAC SURGERY       SECTION      ERCP      TONSILLECTOMY      uvulopalatopharygoplasty       Review of patient's allergies indicates:   Allergen Reactions    Epinephrine Other (See Comments)     Neuroendocrine patient-currently on chemo per patient  Neuroendocrine patient    Pcn [penicillins] Other (See Comments), Hives and Swelling     As a child had an unknown reaction.  Has not taken since  As a child had an unknown reaction.  Has not taken since     Current Outpatient Prescriptions on File Prior to Visit   Medication Sig Dispense Refill    blood sugar diagnostic, disc Strp USE AS DIRECTED EVERY DAY *THANK YOU*      docusate sodium (COLACE) 50 MG capsule Take by mouth 2 (two) times daily.      fluticasone (FLOVENT HFA) 110 mcg/actuation inhaler Inhale 2 puffs into the lungs once daily.      furosemide (LASIX) 40 MG tablet Take 40 mg by mouth once daily.       glipiZIDE (GLUCOTROL) 10 MG tablet Take 1 tablet (10 mg total) by mouth 2 (two) times daily. 60 tablet 3    KLOR-CON M20 20 mEq tablet       lancets Misc Use as directed      linagliptin (TRADJENTA) 5 mg Tab tablet Take 5 mg by mouth once daily.       metformin (GLUCOPHAGE) 500 MG tablet Take 1 tablet (500 mg total) by mouth 2 (two) times daily with meals. Take 2 tablets (1000mg) in am and 3 tablets (1500 mg)in the pm (Patient taking differently: Take 2 tablets (1000mg) in am and 3 tablets (1500 mg)in the pm) 180 tablet 3    metoprolol succinate (TOPROL-XL) 50 MG 24 hr tablet       ondansetron (ZOFRAN) 4 MG tablet Take 1 tablet (4 mg total) by mouth every 12 (twelve) hours as needed for Nausea. 30 tablet 6    prochlorperazine (COMPAZINE) 5 MG tablet Take 1 tablet (5 mg total) by mouth every 6 (six) hours as needed for Nausea. 30 tablet 6    XARELTO 20 mg Tab Take 20 mg by mouth once daily.       zolpidem (AMBIEN) 10 mg Tab Take 1 tablet (10 mg total) by mouth nightly as needed. 30 tablet 5     Current Facility-Administered Medications on File Prior to Visit   Medication Dose Route Frequency Provider Last Rate Last Dose    ferumoxytol (FERAHEME) 510 mg in dextrose 5 % 100 mL IVPB  510 mg Intravenous Once Kalyan Flores MD        heparin, porcine (PF) 100 unit/mL injection flush 500 Units  500 Units Intravenous PRN Kalyan Flores MD        sodium chloride 0.9% flush 10 mL  10 mL Intravenous PRN Kalyan Flores MD         Social History     Social History    Marital status:      Spouse name: N/A    Number of children: N/A    Years of education: N/A     Occupational History    Not on file.     Social History Main Topics    Smoking status: Never Smoker    Smokeless tobacco: Never Used    Alcohol use No    Drug use: No    Sexual activity: Not on file     Other Topics Concern    Not on file     Social History Narrative    No narrative on file     Family History   Problem Relation Age of Onset    Diabetes type II Mother     Prostate cancer Father     Breast cancer Paternal Aunt      breast    Breast cancer Maternal Aunt      breast         ROS:GENERAL: No fever, chills, fatigability or weight loss.  SKIN: No rashes, itching or  changes in color or texture of skin.  HEAD: No headaches or recent head trauma.EYES: Visual acuity fine. No photophobia, ocular pain or diplopia.EARS: Denies ear pain, discharge or vertigo.NOSE: No loss of smell, no epistaxis or postnasal drip.MOUTH & THROAT: No hoarseness or change in voice. No excessive gum bleeding.NODES: Denies swollen glands.  CHEST: Denies HURLEY, cyanosis, wheezing, cough and sputum production.  CARDIOVASCULAR: Denies chest pain, PND, orthopnea or reduced exercise tolerance.  ABDOMEN: Appetite fine. No weight loss. Denies diarrhea, abdominal pain, hematemesis or blood in stool.  URINARY: No flank pain, dysuria or hematuria.  PERIPHERAL VASCULAR: No claudication or cyanosis.  MUSCULOSKELETAL: See above.  NEUROLOGIC: No history of seizures, paralysis, alteration of gait or coordination.  PE:   HEAD: Normocephalic, atraumatic.EYES: PERRL. EOMI.   EARS: TM's intact. Light reflex normal. No retraction or perforation.   NOSE: Mucosa pink. Airway clear.MOUTH & THROAT: No tonsillar enlargement. No pharyngeal erythema or exudate. No stridor.  NODES: No cervical, axillary or inguinal lymph node enlargement.  CHEST: Lungs clear to auscultation.  CARDIOVASCULAR: Normal S1, S2. No rubs, murmurs or gallops.  ABDOMEN: Bowel sounds normal. Not distended. Soft. No tenderness or masses.  MUSCULOSKELETAL: No palpable abnormality  NEUROLOGIC: Cranial Nerves: II-XII grossly intact.  Motor: 5/5 strength major flexors/extensors.  DTR's: Knees, Ankles 2+ and equal bilaterally; downgoing toes.  Sensory: Intact to light touch distally.  Gait & Posture: Normal gait and fine motion. No cerebellar signs.   Protective Sensation (w/ 10 gram monofilament): Intact  Visual Inspection (Evidence of Foot Deformity, Ulceration, Nails Intact, Skin Intact):Normal  Pedal Pulses: Present     Diagnoses and all orders for this visit:    Type 2 diabetes mellitus without complication, without long-term current use of insulin    Atrial  fibrillation, persistent    Essential hypertension    Malignant poorly differentiated neuroendocrine carcinoma    Heart failure, unspecified heart failure chronicity, unspecified heart failure type    Other orders  -     glipiZIDE (GLUCOTROL) 10 MG tablet; Take 1 tablet (10 mg total) by mouth 2 (two) times daily.      Plan:Lab eval-rev  a1c and if still el incr metformin-  Rec diet and ex recs  Card Heme Onc GI fu   Fu a1c 3m,

## 2017-08-30 NOTE — TELEPHONE ENCOUNTER
----- Message from Shante Ellison MA sent at 8/30/2017 11:13 AM CDT -----  Contact: Lizbeth Hem/Onc  Patient saw cary 8/24/17  ----- Message -----  From: Lizbeth Fermin LPN  Sent: 8/30/2017  10:25 AM  To: Scotty LAUGHLIN Staff, Kunal LEARY Staff    Pt needs appt for Biliary Stent Migration. Pt is not with me. Can you please call to schedule?   Thank you

## 2017-08-31 ENCOUNTER — TELEPHONE (OUTPATIENT)
Dept: GASTROENTEROLOGY | Facility: CLINIC | Age: 57
End: 2017-08-31

## 2017-08-31 NOTE — TELEPHONE ENCOUNTER
Informed pt migrating stent does not need replacing. Patient labs stable and no evidence of biliary ductal dilation. She verbalized understanding.

## 2017-10-05 ENCOUNTER — LAB VISIT (OUTPATIENT)
Dept: LAB | Facility: HOSPITAL | Age: 57
End: 2017-10-05
Attending: INTERNAL MEDICINE
Payer: MEDICARE

## 2017-10-05 ENCOUNTER — OFFICE VISIT (OUTPATIENT)
Dept: HEMATOLOGY/ONCOLOGY | Facility: CLINIC | Age: 57
End: 2017-10-05
Payer: MEDICARE

## 2017-10-05 ENCOUNTER — INFUSION (OUTPATIENT)
Dept: INFUSION THERAPY | Facility: HOSPITAL | Age: 57
End: 2017-10-05
Attending: INTERNAL MEDICINE
Payer: MEDICARE

## 2017-10-05 VITALS
HEIGHT: 65 IN | SYSTOLIC BLOOD PRESSURE: 133 MMHG | HEART RATE: 152 BPM | OXYGEN SATURATION: 98 % | DIASTOLIC BLOOD PRESSURE: 91 MMHG | WEIGHT: 231.94 LBS | BODY MASS INDEX: 38.64 KG/M2 | TEMPERATURE: 98 F

## 2017-10-05 DIAGNOSIS — C7A.1 MALIGNANT POORLY DIFFERENTIATED NEUROENDOCRINE CARCINOMA: ICD-10-CM

## 2017-10-05 DIAGNOSIS — C78.6 METASTATIC CANCER TO RETROPERITONEUM: ICD-10-CM

## 2017-10-05 DIAGNOSIS — D50.0 IRON DEFICIENCY ANEMIA DUE TO CHRONIC BLOOD LOSS: ICD-10-CM

## 2017-10-05 DIAGNOSIS — C7B.8 SECONDARY NEUROENDOCRINE TUMOR OF LIVER: Primary | ICD-10-CM

## 2017-10-05 DIAGNOSIS — C7B.8 SECONDARY NEUROENDOCRINE TUMOR OF LIVER: ICD-10-CM

## 2017-10-05 DIAGNOSIS — C7A.1 MALIGNANT POORLY DIFFERENTIATED NEUROENDOCRINE CARCINOMA: Primary | ICD-10-CM

## 2017-10-05 DIAGNOSIS — I50.9 ACUTE ON CHRONIC HEART FAILURE, UNSPECIFIED HEART FAILURE TYPE: ICD-10-CM

## 2017-10-05 LAB
BASOPHILS # BLD AUTO: 0.04 K/UL
BASOPHILS NFR BLD: 0.4 %
BNP SERPL-MCNC: 25 PG/ML
DIFFERENTIAL METHOD: ABNORMAL
EOSINOPHIL # BLD AUTO: 0.2 K/UL
EOSINOPHIL NFR BLD: 2 %
ERYTHROCYTE [DISTWIDTH] IN BLOOD BY AUTOMATED COUNT: 13.6 %
HCT VFR BLD AUTO: 34.3 %
HGB BLD-MCNC: 11.3 G/DL
IRON SERPL-MCNC: 65 UG/DL
LDH SERPL L TO P-CCNC: 156 U/L
LYMPHOCYTES # BLD AUTO: 2.1 K/UL
LYMPHOCYTES NFR BLD: 21.1 %
MCH RBC QN AUTO: 32 PG
MCHC RBC AUTO-ENTMCNC: 32.9 G/DL
MCV RBC AUTO: 97 FL
MONOCYTES # BLD AUTO: 0.8 K/UL
MONOCYTES NFR BLD: 8.3 %
NEUTROPHILS # BLD AUTO: 6.6 K/UL
NEUTROPHILS NFR BLD: 68.2 %
PLATELET # BLD AUTO: 186 K/UL
PMV BLD AUTO: 9.5 FL
RBC # BLD AUTO: 3.53 M/UL
SATURATED IRON: 18 %
TOTAL IRON BINDING CAPACITY: 370 UG/DL
TRANSFERRIN SERPL-MCNC: 250 MG/DL
WBC # BLD AUTO: 9.74 K/UL

## 2017-10-05 PROCEDURE — 83880 ASSAY OF NATRIURETIC PEPTIDE: CPT

## 2017-10-05 PROCEDURE — 36415 COLL VENOUS BLD VENIPUNCTURE: CPT | Mod: PO

## 2017-10-05 PROCEDURE — 83540 ASSAY OF IRON: CPT

## 2017-10-05 PROCEDURE — 99999 PR PBB SHADOW E&M-EST. PATIENT-LVL III: CPT | Mod: PBBFAC,,, | Performed by: INTERNAL MEDICINE

## 2017-10-05 PROCEDURE — 82728 ASSAY OF FERRITIN: CPT

## 2017-10-05 PROCEDURE — 83615 LACTATE (LD) (LDH) ENZYME: CPT

## 2017-10-05 PROCEDURE — 85025 COMPLETE CBC W/AUTO DIFF WBC: CPT

## 2017-10-05 PROCEDURE — 99214 OFFICE O/P EST MOD 30 MIN: CPT | Mod: S$GLB,,, | Performed by: INTERNAL MEDICINE

## 2017-10-05 PROCEDURE — 63600175 PHARM REV CODE 636 W HCPCS: Performed by: INTERNAL MEDICINE

## 2017-10-05 PROCEDURE — 96523 IRRIG DRUG DELIVERY DEVICE: CPT

## 2017-10-05 RX ORDER — SODIUM CHLORIDE 9 MG/ML
10 INJECTION, SOLUTION INTRAMUSCULAR; INTRAVENOUS; SUBCUTANEOUS
Status: DISCONTINUED | OUTPATIENT
Start: 2017-10-05 | End: 2017-10-05 | Stop reason: HOSPADM

## 2017-10-05 RX ORDER — HEPARIN 100 UNIT/ML
500 SYRINGE INTRAVENOUS
Status: COMPLETED | OUTPATIENT
Start: 2017-10-05 | End: 2017-10-05

## 2017-10-05 RX ORDER — HEPARIN 100 UNIT/ML
500 SYRINGE INTRAVENOUS
Status: CANCELLED | OUTPATIENT
Start: 2017-10-05

## 2017-10-05 RX ORDER — SODIUM CHLORIDE 0.9 % (FLUSH) 0.9 %
10 SYRINGE (ML) INJECTION
Status: CANCELLED | OUTPATIENT
Start: 2017-10-05

## 2017-10-05 RX ADMIN — HEPARIN 500 UNITS: 100 SYRINGE at 02:10

## 2017-10-05 NOTE — PROGRESS NOTES
Subjective:       Patient ID: Janine Souza is a 57 y.o. female.    Chief Complaint: Results and Cancer    HPI 57-year-old female with malignant proliferation neuroendocrine tumor 2016 patient returns for follow-up patient is remarkable well feels good no nausea vomiting fever chills or night sweats ECOG status 1    Past Medical History:   Diagnosis Date    Acute headache 2016    Anemia     Atrial fibrillation, persistent     Bronchitis     CHF (congestive heart failure)     Diabetes mellitus, type 2     Hypertension     Malignant poorly differentiated neuroendocrine carcinoma 2016    Secondary neuroendocrine tumor of liver 2016     Family History   Problem Relation Age of Onset    Diabetes type II Mother     Prostate cancer Father     Breast cancer Paternal Aunt      breast    Breast cancer Maternal Aunt      breast     Social History     Social History    Marital status:      Spouse name: N/A    Number of children: N/A    Years of education: N/A     Occupational History    Not on file.     Social History Main Topics    Smoking status: Never Smoker    Smokeless tobacco: Never Used    Alcohol use No    Drug use: No    Sexual activity: Not on file     Other Topics Concern    Not on file     Social History Narrative    No narrative on file     Past Surgical History:   Procedure Laterality Date    ADENOIDECTOMY      CARDIAC ELECTROPHYSIOLOGY MAPPING AND ABLATION      CARDIAC SURGERY       SECTION      ERCP      TONSILLECTOMY      uvulopalatopharygoplasty         Labs:  Lab Results   Component Value Date    WBC 9.74 10/05/2017    HGB 11.3 (L) 10/05/2017    HCT 34.3 (L) 10/05/2017    MCV 97 10/05/2017     10/05/2017     BMP  Lab Results   Component Value Date     2017    K 4.7 2017     2017    CO2 22 (L) 2017    BUN 42 (H) 2017    CREATININE 1.4 2017    CALCIUM 10.5 2017    ANIONGAP 16 2017     ESTGFRAFRICA 48 (A) 08/24/2017    EGFRNONAA 42 (A) 08/24/2017     Lab Results   Component Value Date    ALT 18 08/24/2017    AST 20 08/24/2017    ALKPHOS 64 08/24/2017    BILITOT 0.3 08/24/2017       Lab Results   Component Value Date    IRON 73 08/24/2017    TIBC 392 08/24/2017    FERRITIN 1,664 (H) 08/24/2017     No results found for: [id number]  No results found for: FOLATE  Lab Results   Component Value Date    TSH 2.916 12/20/2016         Review of Systems   Constitutional: Positive for activity change and fatigue. Negative for appetite change, chills, diaphoresis, fever and unexpected weight change.   HENT: Negative for congestion, dental problem, drooling, ear discharge, ear pain, facial swelling, hearing loss, mouth sores, nosebleeds, postnasal drip, rhinorrhea, sinus pressure, sneezing, sore throat, tinnitus, trouble swallowing and voice change.    Eyes: Negative for photophobia, pain, discharge, redness, itching and visual disturbance.   Respiratory: Negative for cough, choking, chest tightness, shortness of breath, wheezing and stridor.    Cardiovascular: Negative for chest pain, palpitations and leg swelling.   Gastrointestinal: Negative for abdominal distention, abdominal pain, anal bleeding, blood in stool, constipation, diarrhea, nausea, rectal pain and vomiting.   Endocrine: Negative for cold intolerance, heat intolerance, polydipsia, polyphagia and polyuria.   Genitourinary: Negative for decreased urine volume, difficulty urinating, dyspareunia, dysuria, enuresis, flank pain, frequency, genital sores, hematuria, menstrual problem, pelvic pain, urgency, vaginal bleeding, vaginal discharge and vaginal pain.   Musculoskeletal: Negative for arthralgias, back pain, gait problem, joint swelling, myalgias, neck pain and neck stiffness.   Skin: Negative for color change, pallor and rash.   Allergic/Immunologic: Negative for environmental allergies, food allergies and immunocompromised state.   Neurological:  Positive for weakness. Negative for dizziness, tremors, seizures, syncope, facial asymmetry, speech difficulty, light-headedness, numbness and headaches.   Hematological: Negative for adenopathy. Does not bruise/bleed easily.   Psychiatric/Behavioral: Positive for dysphoric mood. Negative for agitation, behavioral problems, confusion, decreased concentration, hallucinations, self-injury, sleep disturbance and suicidal ideas. The patient is nervous/anxious. The patient is not hyperactive.        Objective:      Physical Exam   Constitutional: She is oriented to person, place, and time. She appears well-developed and well-nourished. She appears distressed.   HENT:   Head: Normocephalic and atraumatic.   Right Ear: External ear normal.   Left Ear: External ear normal.   Nose: Nose normal. Right sinus exhibits no maxillary sinus tenderness and no frontal sinus tenderness. Left sinus exhibits no maxillary sinus tenderness and no frontal sinus tenderness.   Mouth/Throat: Oropharynx is clear and moist. No oropharyngeal exudate.   Eyes: Conjunctivae, EOM and lids are normal. Pupils are equal, round, and reactive to light. Right eye exhibits no discharge. Left eye exhibits no discharge. Right conjunctiva is not injected. Right conjunctiva has no hemorrhage. Left conjunctiva is not injected. Left conjunctiva has no hemorrhage. No scleral icterus.   Neck: Normal range of motion. Neck supple. No JVD present. No tracheal deviation present. No thyromegaly present.   Cardiovascular: Normal rate and regular rhythm.    Pulmonary/Chest: Effort normal. No stridor. No respiratory distress. She exhibits no tenderness.   Abdominal: Soft. She exhibits no distension and no mass. There is no splenomegaly or hepatomegaly. There is no tenderness. There is no rebound.   Musculoskeletal: Normal range of motion. She exhibits no edema or tenderness.   Lymphadenopathy:     She has no cervical adenopathy.     She has no axillary adenopathy.         Right: No supraclavicular adenopathy present.        Left: No supraclavicular adenopathy present.   Neurological: She is alert and oriented to person, place, and time. No cranial nerve deficit. Coordination normal.   Skin: Skin is dry. No rash noted. She is not diaphoretic. No erythema.   Psychiatric: She has a normal mood and affect. Her behavior is normal. Judgment and thought content normal.   Vitals reviewed.          Assessment:      1. Malignant poorly differentiated neuroendocrine carcinoma    2. 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 Secondary neuroendocrine tumor of liver           Plan:   Patient is unremarkable well feels good at this point flush port return in 2 months with labs prior no clear evidence of progression patient is unremarkably well with a diagnosis of high-grade neuroendocrine tumor status post treatment with cisplatin etoposide chemotherapy patient has remained in remission since

## 2017-10-06 LAB — FERRITIN SERPL-MCNC: 1501 NG/ML

## 2017-11-02 ENCOUNTER — INFUSION (OUTPATIENT)
Dept: INFUSION THERAPY | Facility: HOSPITAL | Age: 57
End: 2017-11-02
Attending: INTERNAL MEDICINE
Payer: MEDICARE

## 2017-11-02 VITALS — WEIGHT: 225 LBS | BODY MASS INDEX: 36.16 KG/M2 | HEIGHT: 66 IN

## 2017-11-02 DIAGNOSIS — C7A.1 MALIGNANT POORLY DIFFERENTIATED NEUROENDOCRINE CARCINOMA: Primary | ICD-10-CM

## 2017-11-02 DIAGNOSIS — C7B.8 SECONDARY NEUROENDOCRINE TUMOR OF LIVER: ICD-10-CM

## 2017-11-02 PROCEDURE — A4216 STERILE WATER/SALINE, 10 ML: HCPCS | Performed by: INTERNAL MEDICINE

## 2017-11-02 PROCEDURE — 96523 IRRIG DRUG DELIVERY DEVICE: CPT

## 2017-11-02 PROCEDURE — 25000003 PHARM REV CODE 250: Performed by: INTERNAL MEDICINE

## 2017-11-02 PROCEDURE — 63600175 PHARM REV CODE 636 W HCPCS: Performed by: INTERNAL MEDICINE

## 2017-11-02 RX ORDER — SODIUM CHLORIDE 9 MG/ML
10 INJECTION, SOLUTION INTRAMUSCULAR; INTRAVENOUS; SUBCUTANEOUS
Status: COMPLETED | OUTPATIENT
Start: 2017-11-02 | End: 2017-11-02

## 2017-11-02 RX ORDER — HEPARIN 100 UNIT/ML
500 SYRINGE INTRAVENOUS
Status: CANCELLED | OUTPATIENT
Start: 2017-11-02

## 2017-11-02 RX ORDER — HEPARIN 100 UNIT/ML
500 SYRINGE INTRAVENOUS
Status: COMPLETED | OUTPATIENT
Start: 2017-11-02 | End: 2017-11-02

## 2017-11-02 RX ORDER — SODIUM CHLORIDE 0.9 % (FLUSH) 0.9 %
10 SYRINGE (ML) INJECTION
Status: CANCELLED | OUTPATIENT
Start: 2017-11-02

## 2017-11-02 RX ADMIN — HEPARIN 500 UNITS: 100 SYRINGE at 10:11

## 2017-11-02 RX ADMIN — SODIUM CHLORIDE, PRESERVATIVE FREE 10 ML: 5 INJECTION INTRAVENOUS at 10:11

## 2017-11-02 NOTE — NURSING
"Pt here for Mediport Flush. Right chestwall mediport accessed with a 20g 1" walters via sterile technique.  Excellent blood return noted.  Flushed with 10ml NS and 5 ml heparin solution.  Needle D/C, site without redness, swelling, or drainage noted.  Dressing applied.  Patient tolerated well.  Patient to return to clinic on 12/4/17.  "

## 2017-11-02 NOTE — PATIENT INSTRUCTIONS
[facility]  [address]  [phone] phone     [phone] fax  Hours of Operation: Monday- Friday 8:00am- 5:00pm  After hours phone  [phone]  Hematology / Oncology Physicians on call      Dr. Mark Henderson                        Please call with any concerns regarding your appointment today.

## 2017-11-09 ENCOUNTER — PATIENT MESSAGE (OUTPATIENT)
Dept: FAMILY MEDICINE | Facility: CLINIC | Age: 57
End: 2017-11-09

## 2017-12-01 ENCOUNTER — TELEPHONE (OUTPATIENT)
Dept: RADIOLOGY | Facility: HOSPITAL | Age: 57
End: 2017-12-01

## 2017-12-04 ENCOUNTER — INFUSION (OUTPATIENT)
Dept: INFUSION THERAPY | Facility: HOSPITAL | Age: 57
End: 2017-12-04
Attending: INTERNAL MEDICINE
Payer: MEDICARE

## 2017-12-04 ENCOUNTER — HOSPITAL ENCOUNTER (OUTPATIENT)
Dept: RADIOLOGY | Facility: HOSPITAL | Age: 57
Discharge: HOME | End: 2017-12-04
Attending: INTERNAL MEDICINE
Payer: MEDICARE

## 2017-12-04 ENCOUNTER — OFFICE VISIT (OUTPATIENT)
Dept: HEMATOLOGY/ONCOLOGY | Facility: CLINIC | Age: 57
End: 2017-12-04
Payer: MEDICARE

## 2017-12-04 VITALS
WEIGHT: 231.69 LBS | TEMPERATURE: 98 F | HEART RATE: 162 BPM | BODY MASS INDEX: 37.4 KG/M2 | DIASTOLIC BLOOD PRESSURE: 83 MMHG | OXYGEN SATURATION: 97 % | SYSTOLIC BLOOD PRESSURE: 115 MMHG

## 2017-12-04 VITALS
RESPIRATION RATE: 18 BRPM | HEART RATE: 133 BPM | SYSTOLIC BLOOD PRESSURE: 130 MMHG | TEMPERATURE: 98 F | OXYGEN SATURATION: 98 % | DIASTOLIC BLOOD PRESSURE: 89 MMHG

## 2017-12-04 DIAGNOSIS — C78.6 METASTATIC CANCER TO RETROPERITONEUM: ICD-10-CM

## 2017-12-04 DIAGNOSIS — C7B.8 SECONDARY NEUROENDOCRINE TUMOR OF LIVER: Primary | ICD-10-CM

## 2017-12-04 DIAGNOSIS — C7A.1 MALIGNANT POORLY DIFFERENTIATED NEUROENDOCRINE CARCINOMA: Primary | ICD-10-CM

## 2017-12-04 DIAGNOSIS — C7B.8 SECONDARY NEUROENDOCRINE TUMOR OF LIVER: ICD-10-CM

## 2017-12-04 DIAGNOSIS — C7A.1 MALIGNANT POORLY DIFFERENTIATED NEUROENDOCRINE CARCINOMA: ICD-10-CM

## 2017-12-04 PROCEDURE — 74178 CT ABD&PLV WO CNTR FLWD CNTR: CPT | Mod: TC,PO

## 2017-12-04 PROCEDURE — 63600175 PHARM REV CODE 636 W HCPCS: Mod: PO | Performed by: INTERNAL MEDICINE

## 2017-12-04 PROCEDURE — 71260 CT THORAX DX C+: CPT | Mod: TC,PO

## 2017-12-04 PROCEDURE — 74178 CT ABD&PLV WO CNTR FLWD CNTR: CPT | Mod: 26,,, | Performed by: RADIOLOGY

## 2017-12-04 PROCEDURE — 96523 IRRIG DRUG DELIVERY DEVICE: CPT | Mod: PO

## 2017-12-04 PROCEDURE — 99999 PR PBB SHADOW E&M-EST. PATIENT-LVL III: CPT | Mod: PBBFAC,,, | Performed by: INTERNAL MEDICINE

## 2017-12-04 PROCEDURE — 99214 OFFICE O/P EST MOD 30 MIN: CPT | Mod: S$GLB,,, | Performed by: INTERNAL MEDICINE

## 2017-12-04 PROCEDURE — 25000003 PHARM REV CODE 250: Mod: PO | Performed by: INTERNAL MEDICINE

## 2017-12-04 PROCEDURE — 71260 CT THORAX DX C+: CPT | Mod: 26,,, | Performed by: RADIOLOGY

## 2017-12-04 PROCEDURE — A4216 STERILE WATER/SALINE, 10 ML: HCPCS | Mod: PO | Performed by: INTERNAL MEDICINE

## 2017-12-04 PROCEDURE — 25500020 PHARM REV CODE 255: Mod: PO | Performed by: INTERNAL MEDICINE

## 2017-12-04 RX ORDER — SODIUM CHLORIDE 0.9 % (FLUSH) 0.9 %
10 SYRINGE (ML) INJECTION
Status: CANCELLED | OUTPATIENT
Start: 2017-12-04

## 2017-12-04 RX ORDER — SODIUM CHLORIDE 0.9 % (FLUSH) 0.9 %
10 SYRINGE (ML) INJECTION
Status: COMPLETED | OUTPATIENT
Start: 2017-12-04 | End: 2017-12-04

## 2017-12-04 RX ORDER — HEPARIN 100 UNIT/ML
500 SYRINGE INTRAVENOUS
Status: COMPLETED | OUTPATIENT
Start: 2017-12-04 | End: 2017-12-04

## 2017-12-04 RX ORDER — HEPARIN 100 UNIT/ML
500 SYRINGE INTRAVENOUS
Status: CANCELLED | OUTPATIENT
Start: 2017-12-04

## 2017-12-04 RX ADMIN — IOHEXOL 100 ML: 350 INJECTION, SOLUTION INTRAVENOUS at 11:12

## 2017-12-04 RX ADMIN — IOHEXOL 30 ML: 350 INJECTION, SOLUTION INTRAVENOUS at 09:12

## 2017-12-04 RX ADMIN — HEPARIN 500 UNITS: 100 SYRINGE at 12:12

## 2017-12-04 RX ADMIN — SODIUM CHLORIDE, PRESERVATIVE FREE 10 ML: 5 INJECTION INTRAVENOUS at 12:12

## 2017-12-04 NOTE — PROGRESS NOTES
Subjective:       Patient ID: Janine Souza is a 57 y.o. female.    Chief Complaint: Results and Cancer    HPI 57-year-old female with neuroendocrine malignancy patient is here for follow-up CT chest ; abdomen and pelvis to rule out progressive disease    Past Medical History:   Diagnosis Date    Acute headache 2016    Anemia     Atrial fibrillation, persistent     Bronchitis     CHF (congestive heart failure)     Diabetes mellitus, type 2     Hypertension     Malignant poorly differentiated neuroendocrine carcinoma 2016    Secondary neuroendocrine tumor of liver 2016     Family History   Problem Relation Age of Onset    Diabetes type II Mother     Prostate cancer Father     Breast cancer Paternal Aunt      breast    Breast cancer Maternal Aunt      breast     Social History     Social History    Marital status:      Spouse name: N/A    Number of children: N/A    Years of education: N/A     Occupational History    Not on file.     Social History Main Topics    Smoking status: Never Smoker    Smokeless tobacco: Never Used    Alcohol use No    Drug use: No    Sexual activity: Not on file     Other Topics Concern    Not on file     Social History Narrative    No narrative on file     Past Surgical History:   Procedure Laterality Date    ADENOIDECTOMY      CARDIAC ELECTROPHYSIOLOGY MAPPING AND ABLATION      CARDIAC SURGERY       SECTION      ERCP      TONSILLECTOMY      uvulopalatopharygoplasty         Labs:  Lab Results   Component Value Date    WBC 9.35 2017    HGB 10.8 (L) 2017    HCT 33.5 (L) 2017    MCV 99 (H) 2017     2017     BMP  Lab Results   Component Value Date     2017    K 5.4 (H) 2017    CL 98 2017    CO2 25 2017    BUN 29 (H) 2017    CREATININE 1.5 (H) 2017    CALCIUM 10.2 2017    ANIONGAP 13 2017    ESTGFRAFRICA 45 (A) 2017    EGFRNONAA 39 (A)  12/04/2017     Lab Results   Component Value Date    ALT 16 12/04/2017    AST 15 12/04/2017    ALKPHOS 63 12/04/2017    BILITOT 0.4 12/04/2017       Lab Results   Component Value Date    IRON 65 10/05/2017    TIBC 370 10/05/2017    FERRITIN 1,501 (H) 10/05/2017     No results found for: [id number]  No results found for: FOLATE  Lab Results   Component Value Date    TSH 2.916 12/20/2016         Review of Systems   Constitutional: Negative for activity change, appetite change, chills, diaphoresis, fatigue, fever and unexpected weight change.   HENT: Negative for congestion, dental problem, drooling, ear discharge, ear pain, facial swelling, hearing loss, mouth sores, nosebleeds, postnasal drip, rhinorrhea, sinus pressure, sneezing, sore throat, tinnitus, trouble swallowing and voice change.    Eyes: Negative for photophobia, pain, discharge, redness, itching and visual disturbance.   Respiratory: Negative for cough, choking, chest tightness, shortness of breath, wheezing and stridor.    Cardiovascular: Negative for chest pain, palpitations and leg swelling.   Gastrointestinal: Negative for abdominal distention, abdominal pain, anal bleeding, blood in stool, constipation, diarrhea, nausea, rectal pain and vomiting.   Endocrine: Negative for cold intolerance, heat intolerance, polydipsia, polyphagia and polyuria.   Genitourinary: Negative for decreased urine volume, difficulty urinating, dyspareunia, dysuria, enuresis, flank pain, frequency, genital sores, hematuria, menstrual problem, pelvic pain, urgency, vaginal bleeding, vaginal discharge and vaginal pain.   Musculoskeletal: Negative for arthralgias, back pain, gait problem, joint swelling, myalgias, neck pain and neck stiffness.   Skin: Negative for color change, pallor and rash.   Allergic/Immunologic: Negative for environmental allergies, food allergies and immunocompromised state.   Neurological: Negative for dizziness, tremors, seizures, syncope, facial  asymmetry, speech difficulty, weakness, light-headedness, numbness and headaches.   Hematological: Negative for adenopathy. Does not bruise/bleed easily.   Psychiatric/Behavioral: Positive for dysphoric mood. Negative for agitation, behavioral problems, confusion, decreased concentration, hallucinations, self-injury, sleep disturbance and suicidal ideas. The patient is nervous/anxious. The patient is not hyperactive.        Objective:      Physical Exam   Constitutional: She is oriented to person, place, and time. She appears well-developed and well-nourished. She appears distressed.   HENT:   Head: Normocephalic and atraumatic.   Right Ear: External ear normal.   Left Ear: External ear normal.   Nose: Nose normal. Right sinus exhibits no maxillary sinus tenderness and no frontal sinus tenderness. Left sinus exhibits no maxillary sinus tenderness and no frontal sinus tenderness.   Mouth/Throat: Oropharynx is clear and moist. No oropharyngeal exudate.   Eyes: Conjunctivae, EOM and lids are normal. Pupils are equal, round, and reactive to light. Right eye exhibits no discharge. Left eye exhibits no discharge. Right conjunctiva is not injected. Right conjunctiva has no hemorrhage. Left conjunctiva is not injected. Left conjunctiva has no hemorrhage. No scleral icterus.   Neck: Normal range of motion. Neck supple. No JVD present. No tracheal deviation present. No thyromegaly present.   Cardiovascular: Normal rate and regular rhythm.    Pulmonary/Chest: No stridor. She is in respiratory distress. She exhibits no tenderness.   Abdominal: Soft. She exhibits no distension and no mass. There is no splenomegaly or hepatomegaly. There is no tenderness. There is no rebound.   Musculoskeletal: Normal range of motion. She exhibits no edema or tenderness.   Lymphadenopathy:     She has no cervical adenopathy.     She has no axillary adenopathy.        Right: No supraclavicular adenopathy present.        Left: No supraclavicular  adenopathy present.   Neurological: She is alert and oriented to person, place, and time. No cranial nerve deficit. Coordination normal.   Skin: Skin is dry. No rash noted. She is not diaphoretic. No erythema.   Psychiatric: She has a normal mood and affect. Her behavior is normal. Judgment and thought content normal.   Vitals reviewed.          Assessment:      1. Malignant poorly differentiated neuroendocrine carcinoma    2. 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 Secondary neuroendocrine tumor of liver           Plan:   Laboratory review is stable results of chest abdomen and pelvis on my review no evidence of progressive disease await radiologic interpretation reassurance given return in 2 months for flush port and laboratory evaluation continues to remain in remission

## 2017-12-04 NOTE — PATIENT INSTRUCTIONS
[facility]  [address]  [phone] phone     [phone] fax  Hours of Operation: Monday- Friday 8:00am- 5:00pm  After hours phone  [phone]  Hematology / Oncology Physicians on call      Dr. Mark Henderson                        Please call with any concerns regarding your appointment today.

## 2017-12-29 DIAGNOSIS — E11.9 TYPE 2 DIABETES MELLITUS WITHOUT COMPLICATION: ICD-10-CM

## 2018-01-14 ENCOUNTER — PATIENT MESSAGE (OUTPATIENT)
Dept: HEMATOLOGY/ONCOLOGY | Facility: CLINIC | Age: 58
End: 2018-01-14

## 2018-01-15 ENCOUNTER — OFFICE VISIT (OUTPATIENT)
Dept: HEMATOLOGY/ONCOLOGY | Facility: CLINIC | Age: 58
End: 2018-01-15
Payer: MEDICARE

## 2018-01-15 ENCOUNTER — INFUSION (OUTPATIENT)
Dept: INFUSION THERAPY | Facility: HOSPITAL | Age: 58
End: 2018-01-15
Attending: INTERNAL MEDICINE
Payer: MEDICARE

## 2018-01-15 ENCOUNTER — LAB VISIT (OUTPATIENT)
Dept: LAB | Facility: HOSPITAL | Age: 58
End: 2018-01-15
Attending: INTERNAL MEDICINE
Payer: MEDICARE

## 2018-01-15 VITALS
DIASTOLIC BLOOD PRESSURE: 83 MMHG | SYSTOLIC BLOOD PRESSURE: 131 MMHG | WEIGHT: 229.5 LBS | BODY MASS INDEX: 36.88 KG/M2 | HEART RATE: 136 BPM | HEIGHT: 66 IN | TEMPERATURE: 98 F

## 2018-01-15 DIAGNOSIS — R64 CACHEXIA: ICD-10-CM

## 2018-01-15 DIAGNOSIS — D50.0 IRON DEFICIENCY ANEMIA DUE TO CHRONIC BLOOD LOSS: ICD-10-CM

## 2018-01-15 DIAGNOSIS — C7A.1 MALIGNANT POORLY DIFFERENTIATED NEUROENDOCRINE CARCINOMA: Primary | ICD-10-CM

## 2018-01-15 DIAGNOSIS — C78.6 METASTATIC CANCER TO RETROPERITONEUM: ICD-10-CM

## 2018-01-15 DIAGNOSIS — C7B.8 SECONDARY NEUROENDOCRINE TUMOR OF LIVER: Primary | ICD-10-CM

## 2018-01-15 DIAGNOSIS — C7A.1 MALIGNANT POORLY DIFFERENTIATED NEUROENDOCRINE CARCINOMA: ICD-10-CM

## 2018-01-15 DIAGNOSIS — C7B.8 SECONDARY NEUROENDOCRINE TUMOR OF LIVER: ICD-10-CM

## 2018-01-15 LAB
ALBUMIN SERPL BCP-MCNC: 3.9 G/DL
ALP SERPL-CCNC: 64 U/L
ALT SERPL W/O P-5'-P-CCNC: 16 U/L
ANION GAP SERPL CALC-SCNC: 14 MMOL/L
AST SERPL-CCNC: 20 U/L
BASOPHILS # BLD AUTO: 0.04 K/UL
BASOPHILS NFR BLD: 0.4 %
BILIRUB SERPL-MCNC: 0.6 MG/DL
BUN SERPL-MCNC: 20 MG/DL
CALCIUM SERPL-MCNC: 10 MG/DL
CHLORIDE SERPL-SCNC: 99 MMOL/L
CO2 SERPL-SCNC: 25 MMOL/L
CREAT SERPL-MCNC: 1.1 MG/DL
DIFFERENTIAL METHOD: ABNORMAL
EOSINOPHIL # BLD AUTO: 0.2 K/UL
EOSINOPHIL NFR BLD: 2.5 %
ERYTHROCYTE [DISTWIDTH] IN BLOOD BY AUTOMATED COUNT: 14.5 %
EST. GFR  (AFRICAN AMERICAN): >60 ML/MIN/1.73 M^2
EST. GFR  (NON AFRICAN AMERICAN): 56 ML/MIN/1.73 M^2
GLUCOSE SERPL-MCNC: 156 MG/DL
HCT VFR BLD AUTO: 32.9 %
HGB BLD-MCNC: 10.6 G/DL
IRON SERPL-MCNC: 54 UG/DL
LDH SERPL L TO P-CCNC: 185 U/L
LYMPHOCYTES # BLD AUTO: 1.9 K/UL
LYMPHOCYTES NFR BLD: 20.4 %
MCH RBC QN AUTO: 31.7 PG
MCHC RBC AUTO-ENTMCNC: 32.2 G/DL
MCV RBC AUTO: 99 FL
MONOCYTES # BLD AUTO: 1 K/UL
MONOCYTES NFR BLD: 10.3 %
NEUTROPHILS # BLD AUTO: 6.3 K/UL
NEUTROPHILS NFR BLD: 66.4 %
PLATELET # BLD AUTO: 203 K/UL
PMV BLD AUTO: 9.4 FL
POTASSIUM SERPL-SCNC: 4 MMOL/L
PROT SERPL-MCNC: 9.5 G/DL
RBC # BLD AUTO: 3.34 M/UL
SATURATED IRON: 13 %
SODIUM SERPL-SCNC: 138 MMOL/L
TOTAL IRON BINDING CAPACITY: 407 UG/DL
TRANSFERRIN SERPL-MCNC: 275 MG/DL
WBC # BLD AUTO: 9.52 K/UL

## 2018-01-15 PROCEDURE — 83615 LACTATE (LD) (LDH) ENZYME: CPT

## 2018-01-15 PROCEDURE — 96523 IRRIG DRUG DELIVERY DEVICE: CPT

## 2018-01-15 PROCEDURE — 83540 ASSAY OF IRON: CPT

## 2018-01-15 PROCEDURE — 99214 OFFICE O/P EST MOD 30 MIN: CPT | Mod: S$GLB,,, | Performed by: INTERNAL MEDICINE

## 2018-01-15 PROCEDURE — 99999 PR PBB SHADOW E&M-EST. PATIENT-LVL III: CPT | Mod: PBBFAC,,, | Performed by: INTERNAL MEDICINE

## 2018-01-15 PROCEDURE — A4216 STERILE WATER/SALINE, 10 ML: HCPCS | Performed by: INTERNAL MEDICINE

## 2018-01-15 PROCEDURE — 63600175 PHARM REV CODE 636 W HCPCS: Performed by: INTERNAL MEDICINE

## 2018-01-15 PROCEDURE — 80053 COMPREHEN METABOLIC PANEL: CPT

## 2018-01-15 PROCEDURE — 25000003 PHARM REV CODE 250: Performed by: INTERNAL MEDICINE

## 2018-01-15 PROCEDURE — 99499 UNLISTED E&M SERVICE: CPT | Mod: S$GLB,,, | Performed by: INTERNAL MEDICINE

## 2018-01-15 PROCEDURE — 85025 COMPLETE CBC W/AUTO DIFF WBC: CPT

## 2018-01-15 PROCEDURE — 36415 COLL VENOUS BLD VENIPUNCTURE: CPT

## 2018-01-15 PROCEDURE — 82728 ASSAY OF FERRITIN: CPT

## 2018-01-15 RX ORDER — SODIUM CHLORIDE 0.9 % (FLUSH) 0.9 %
10 SYRINGE (ML) INJECTION
Status: CANCELLED | OUTPATIENT
Start: 2018-01-15

## 2018-01-15 RX ORDER — HEPARIN 100 UNIT/ML
500 SYRINGE INTRAVENOUS
Status: CANCELLED | OUTPATIENT
Start: 2018-01-15

## 2018-01-15 RX ORDER — HEPARIN 100 UNIT/ML
500 SYRINGE INTRAVENOUS
Status: COMPLETED | OUTPATIENT
Start: 2018-01-15 | End: 2018-01-15

## 2018-01-15 RX ORDER — SODIUM CHLORIDE 9 MG/ML
10 INJECTION, SOLUTION INTRAMUSCULAR; INTRAVENOUS; SUBCUTANEOUS
Status: COMPLETED | OUTPATIENT
Start: 2018-01-15 | End: 2018-01-15

## 2018-01-15 RX ADMIN — HEPARIN 500 UNITS: 100 SYRINGE at 01:01

## 2018-01-15 RX ADMIN — SODIUM CHLORIDE, PRESERVATIVE FREE 10 ML: 5 INJECTION INTRAVENOUS at 01:01

## 2018-01-15 NOTE — PROGRESS NOTES
Subjective:       Patient ID: Janine Souza is a 58 y.o. female.    Chief Complaint: Results; Anemia; and Cancer    HPI 58-year-old female history of poorly differentiated neuroendocrine tumor patient states that she is concerned about the possibility of recurrence    Past Medical History:   Diagnosis Date    Acute headache 2016    Anemia     Atrial fibrillation, persistent     Bronchitis     CHF (congestive heart failure)     Diabetes mellitus, type 2     Hypertension     Malignant poorly differentiated neuroendocrine carcinoma 2016    Secondary neuroendocrine tumor of liver 2016     Family History   Problem Relation Age of Onset    Diabetes type II Mother     Prostate cancer Father     Breast cancer Paternal Aunt      breast    Breast cancer Maternal Aunt      breast     Social History     Social History    Marital status:      Spouse name: N/A    Number of children: N/A    Years of education: N/A     Occupational History    Not on file.     Social History Main Topics    Smoking status: Never Smoker    Smokeless tobacco: Never Used    Alcohol use No    Drug use: No    Sexual activity: Not on file     Other Topics Concern    Not on file     Social History Narrative    No narrative on file     Past Surgical History:   Procedure Laterality Date    ADENOIDECTOMY      CARDIAC ELECTROPHYSIOLOGY MAPPING AND ABLATION      CARDIAC SURGERY       SECTION      ERCP      TONSILLECTOMY      uvulopalatopharygoplasty         Labs:  Lab Results   Component Value Date    WBC 9.52 01/15/2018    HGB 10.6 (L) 01/15/2018    HCT 32.9 (L) 01/15/2018    MCV 99 (H) 01/15/2018     01/15/2018     BMP  Lab Results   Component Value Date     01/15/2018    K 4.0 01/15/2018    CL 99 01/15/2018    CO2 25 01/15/2018    BUN 20 01/15/2018    CREATININE 1.1 01/15/2018    CALCIUM 10.0 01/15/2018    ANIONGAP 14 01/15/2018    ESTGFRAFRICA >60 01/15/2018    EGFRNONAA 56 (A) 01/15/2018      Lab Results   Component Value Date    ALT 16 01/15/2018    AST 20 01/15/2018    ALKPHOS 64 01/15/2018    BILITOT 0.6 01/15/2018       Lab Results   Component Value Date    IRON 52 12/04/2017    TIBC 374 12/04/2017    FERRITIN 1,471 (H) 12/04/2017     No results found for: [id number]  No results found for: FOLATE  Lab Results   Component Value Date    TSH 2.916 12/20/2016         Review of Systems   Constitutional: Positive for activity change and fatigue. Negative for appetite change, chills, diaphoresis, fever and unexpected weight change.   HENT: Negative for congestion, dental problem, drooling, ear discharge, ear pain, facial swelling, hearing loss, mouth sores, nosebleeds, postnasal drip, rhinorrhea, sinus pressure, sneezing, sore throat, tinnitus, trouble swallowing and voice change.    Eyes: Negative for photophobia, pain, discharge, redness, itching and visual disturbance.   Respiratory: Negative for cough, choking, chest tightness, shortness of breath, wheezing and stridor.    Cardiovascular: Negative for chest pain, palpitations and leg swelling.   Gastrointestinal: Negative for abdominal distention, abdominal pain, anal bleeding, blood in stool, constipation, diarrhea, nausea, rectal pain and vomiting.   Endocrine: Negative for cold intolerance, heat intolerance, polydipsia, polyphagia and polyuria.   Genitourinary: Negative for decreased urine volume, difficulty urinating, dyspareunia, dysuria, enuresis, flank pain, frequency, genital sores, hematuria, menstrual problem, pelvic pain, urgency, vaginal bleeding, vaginal discharge and vaginal pain.   Musculoskeletal: Negative for arthralgias, back pain, gait problem, joint swelling, myalgias, neck pain and neck stiffness.   Skin: Negative for color change, pallor and rash.   Allergic/Immunologic: Negative for environmental allergies, food allergies and immunocompromised state.   Neurological: Positive for weakness. Negative for dizziness, tremors,  seizures, syncope, facial asymmetry, speech difficulty, light-headedness, numbness and headaches.   Hematological: Negative for adenopathy. Does not bruise/bleed easily.   Psychiatric/Behavioral: Positive for dysphoric mood. Negative for agitation, behavioral problems, confusion, decreased concentration, hallucinations, self-injury, sleep disturbance and suicidal ideas. The patient is nervous/anxious. The patient is not hyperactive.        Objective:      Physical Exam   Constitutional: She is oriented to person, place, and time. She appears well-developed and well-nourished. She appears distressed.   HENT:   Head: Normocephalic and atraumatic.   Right Ear: External ear normal.   Left Ear: External ear normal.   Nose: Nose normal. Right sinus exhibits no maxillary sinus tenderness and no frontal sinus tenderness. Left sinus exhibits no maxillary sinus tenderness and no frontal sinus tenderness.   Mouth/Throat: Oropharynx is clear and moist. No oropharyngeal exudate.   Eyes: Conjunctivae, EOM and lids are normal. Pupils are equal, round, and reactive to light. Right eye exhibits no discharge. Left eye exhibits no discharge. Right conjunctiva is not injected. Right conjunctiva has no hemorrhage. Left conjunctiva is not injected. Left conjunctiva has no hemorrhage. No scleral icterus.   Neck: Normal range of motion. Neck supple. No JVD present. No tracheal deviation present. No thyromegaly present.   Cardiovascular: Normal rate and regular rhythm.    Pulmonary/Chest: Effort normal. No stridor. No respiratory distress. She exhibits no tenderness.   Abdominal: Soft. She exhibits no distension and no mass. There is no splenomegaly or hepatomegaly. There is no tenderness. There is no rebound.   Musculoskeletal: Normal range of motion. She exhibits no edema or tenderness.   Lymphadenopathy:     She has no cervical adenopathy.     She has no axillary adenopathy.        Right: No supraclavicular adenopathy present.         Left: No supraclavicular adenopathy present.   Neurological: She is alert and oriented to person, place, and time. No cranial nerve deficit. Coordination normal.   Skin: Skin is dry. No rash noted. She is not diaphoretic. No erythema.   Psychiatric: Her behavior is normal. Judgment and thought content normal. Her mood appears anxious. She exhibits a depressed mood.   Vitals reviewed.          Assessment:      1. Malignant poorly differentiated neuroendocrine carcinoma    2. 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 Secondary neuroendocrine tumor of liver    4. Iron deficiency anemia due to chronic blood loss    5. Cachexia           Plan:   Poorly differentiated malignancy flush port today laboratory study stable return next scheduled appointment in February with repeat imaging studies reassurance given

## 2018-01-15 NOTE — PATIENT INSTRUCTIONS
[facility]  [address]  [address] [address]  [phone] phone     [phone] fax  Hours of Operation: Monday- Friday 8:00am- 5:00pm  After hours phone  [phone]  Hematology / Oncology Physicians on call      Dr. Mark Haider    Please call with any concerns regarding your appointment today.

## 2018-01-16 LAB — FERRITIN SERPL-MCNC: 1374 NG/ML

## 2018-02-05 ENCOUNTER — PATIENT OUTREACH (OUTPATIENT)
Dept: ADMINISTRATIVE | Facility: HOSPITAL | Age: 58
End: 2018-02-05

## 2018-02-05 NOTE — PROGRESS NOTES
Pt call to schedule a1c and Lipid.  Labs scheduled for tomorrow 2/6/2017.  Pt informed to come in fasting.

## 2018-02-06 ENCOUNTER — LAB VISIT (OUTPATIENT)
Dept: LAB | Facility: HOSPITAL | Age: 58
End: 2018-02-06
Attending: FAMILY MEDICINE
Payer: MEDICARE

## 2018-02-06 DIAGNOSIS — E11.9 TYPE 2 DIABETES MELLITUS WITHOUT COMPLICATION: ICD-10-CM

## 2018-02-06 DIAGNOSIS — E11.9 TYPE 2 DIABETES MELLITUS WITHOUT COMPLICATION, UNSPECIFIED LONG TERM INSULIN USE STATUS: ICD-10-CM

## 2018-02-06 LAB
CHOLEST SERPL-MCNC: 165 MG/DL
CHOLEST/HDLC SERPL: 3.4 {RATIO}
ESTIMATED AVG GLUCOSE: 189 MG/DL
HBA1C MFR BLD HPLC: 8.2 %
HDLC SERPL-MCNC: 49 MG/DL
HDLC SERPL: 29.7 %
LDLC SERPL CALC-MCNC: 92.2 MG/DL
NONHDLC SERPL-MCNC: 116 MG/DL
TRIGL SERPL-MCNC: 119 MG/DL

## 2018-02-06 PROCEDURE — 83036 HEMOGLOBIN GLYCOSYLATED A1C: CPT

## 2018-02-06 PROCEDURE — 36415 COLL VENOUS BLD VENIPUNCTURE: CPT | Mod: PO

## 2018-02-06 PROCEDURE — 80061 LIPID PANEL: CPT

## 2018-02-12 ENCOUNTER — INFUSION (OUTPATIENT)
Dept: INFUSION THERAPY | Facility: HOSPITAL | Age: 58
End: 2018-02-12
Attending: INTERNAL MEDICINE
Payer: MEDICARE

## 2018-02-12 ENCOUNTER — LAB VISIT (OUTPATIENT)
Dept: LAB | Facility: HOSPITAL | Age: 58
End: 2018-02-12
Attending: INTERNAL MEDICINE
Payer: MEDICARE

## 2018-02-12 ENCOUNTER — OFFICE VISIT (OUTPATIENT)
Dept: HEMATOLOGY/ONCOLOGY | Facility: CLINIC | Age: 58
End: 2018-02-12
Payer: MEDICARE

## 2018-02-12 VITALS
SYSTOLIC BLOOD PRESSURE: 133 MMHG | WEIGHT: 230.38 LBS | BODY MASS INDEX: 37.03 KG/M2 | DIASTOLIC BLOOD PRESSURE: 78 MMHG | HEART RATE: 110 BPM | HEIGHT: 66 IN | OXYGEN SATURATION: 94 % | TEMPERATURE: 97 F

## 2018-02-12 DIAGNOSIS — C7B.8 SECONDARY NEUROENDOCRINE TUMOR OF LIVER: Primary | ICD-10-CM

## 2018-02-12 DIAGNOSIS — D50.0 IRON DEFICIENCY ANEMIA DUE TO CHRONIC BLOOD LOSS: ICD-10-CM

## 2018-02-12 DIAGNOSIS — C78.6 METASTATIC CANCER TO RETROPERITONEUM: ICD-10-CM

## 2018-02-12 DIAGNOSIS — C7A.1 MALIGNANT POORLY DIFFERENTIATED NEUROENDOCRINE CARCINOMA: Primary | ICD-10-CM

## 2018-02-12 DIAGNOSIS — D49.89 NEOPLASM OF ABDOMEN: ICD-10-CM

## 2018-02-12 DIAGNOSIS — C7A.1 MALIGNANT POORLY DIFFERENTIATED NEUROENDOCRINE CARCINOMA: ICD-10-CM

## 2018-02-12 DIAGNOSIS — C7B.8 SECONDARY NEUROENDOCRINE TUMOR OF LIVER: ICD-10-CM

## 2018-02-12 LAB
ALBUMIN SERPL BCP-MCNC: 3.8 G/DL
ALP SERPL-CCNC: 62 U/L
ALT SERPL W/O P-5'-P-CCNC: 16 U/L
ANION GAP SERPL CALC-SCNC: 14 MMOL/L
AST SERPL-CCNC: 17 U/L
BASOPHILS # BLD AUTO: 0.03 K/UL
BASOPHILS NFR BLD: 0.3 %
BILIRUB SERPL-MCNC: 0.5 MG/DL
BUN SERPL-MCNC: 26 MG/DL
CALCIUM SERPL-MCNC: 9.8 MG/DL
CHLORIDE SERPL-SCNC: 100 MMOL/L
CO2 SERPL-SCNC: 23 MMOL/L
CREAT SERPL-MCNC: 1.2 MG/DL
DIFFERENTIAL METHOD: ABNORMAL
EOSINOPHIL # BLD AUTO: 0.2 K/UL
EOSINOPHIL NFR BLD: 2.3 %
ERYTHROCYTE [DISTWIDTH] IN BLOOD BY AUTOMATED COUNT: 14 %
EST. GFR  (AFRICAN AMERICAN): 58 ML/MIN/1.73 M^2
EST. GFR  (NON AFRICAN AMERICAN): 50 ML/MIN/1.73 M^2
FERRITIN SERPL-MCNC: 997 NG/ML
GLUCOSE SERPL-MCNC: 184 MG/DL
HCT VFR BLD AUTO: 30.9 %
HGB BLD-MCNC: 9.9 G/DL
IRON SERPL-MCNC: 52 UG/DL
LDH SERPL L TO P-CCNC: 153 U/L
LYMPHOCYTES # BLD AUTO: 2 K/UL
LYMPHOCYTES NFR BLD: 23.6 %
MCH RBC QN AUTO: 31.3 PG
MCHC RBC AUTO-ENTMCNC: 32 G/DL
MCV RBC AUTO: 98 FL
MONOCYTES # BLD AUTO: 0.6 K/UL
MONOCYTES NFR BLD: 7.3 %
NEUTROPHILS # BLD AUTO: 5.7 K/UL
NEUTROPHILS NFR BLD: 66.5 %
PLATELET # BLD AUTO: 169 K/UL
PMV BLD AUTO: 9.8 FL
POTASSIUM SERPL-SCNC: 4.3 MMOL/L
PROT SERPL-MCNC: 9.5 G/DL
RBC # BLD AUTO: 3.16 M/UL
SATURATED IRON: 14 %
SODIUM SERPL-SCNC: 137 MMOL/L
TOTAL IRON BINDING CAPACITY: 377 UG/DL
TRANSFERRIN SERPL-MCNC: 255 MG/DL
WBC # BLD AUTO: 8.6 K/UL

## 2018-02-12 PROCEDURE — 25000003 PHARM REV CODE 250: Performed by: INTERNAL MEDICINE

## 2018-02-12 PROCEDURE — 82728 ASSAY OF FERRITIN: CPT

## 2018-02-12 PROCEDURE — A4216 STERILE WATER/SALINE, 10 ML: HCPCS | Performed by: INTERNAL MEDICINE

## 2018-02-12 PROCEDURE — 36591 DRAW BLOOD OFF VENOUS DEVICE: CPT

## 2018-02-12 PROCEDURE — 36415 COLL VENOUS BLD VENIPUNCTURE: CPT

## 2018-02-12 PROCEDURE — 83615 LACTATE (LD) (LDH) ENZYME: CPT

## 2018-02-12 PROCEDURE — 85025 COMPLETE CBC W/AUTO DIFF WBC: CPT

## 2018-02-12 PROCEDURE — 83540 ASSAY OF IRON: CPT

## 2018-02-12 PROCEDURE — 99214 OFFICE O/P EST MOD 30 MIN: CPT | Mod: 25,S$GLB,, | Performed by: INTERNAL MEDICINE

## 2018-02-12 PROCEDURE — 3008F BODY MASS INDEX DOCD: CPT | Mod: S$GLB,,, | Performed by: INTERNAL MEDICINE

## 2018-02-12 PROCEDURE — 80053 COMPREHEN METABOLIC PANEL: CPT

## 2018-02-12 PROCEDURE — 99999 PR PBB SHADOW E&M-EST. PATIENT-LVL III: CPT | Mod: PBBFAC,,, | Performed by: INTERNAL MEDICINE

## 2018-02-12 PROCEDURE — 63600175 PHARM REV CODE 636 W HCPCS: Performed by: INTERNAL MEDICINE

## 2018-02-12 RX ORDER — SODIUM CHLORIDE 0.9 % (FLUSH) 0.9 %
10 SYRINGE (ML) INJECTION
Status: CANCELLED | OUTPATIENT
Start: 2018-02-12

## 2018-02-12 RX ORDER — SODIUM CHLORIDE 9 MG/ML
10 INJECTION, SOLUTION INTRAMUSCULAR; INTRAVENOUS; SUBCUTANEOUS
Status: COMPLETED | OUTPATIENT
Start: 2018-02-12 | End: 2018-02-12

## 2018-02-12 RX ORDER — HEPARIN 100 UNIT/ML
500 SYRINGE INTRAVENOUS
Status: COMPLETED | OUTPATIENT
Start: 2018-02-12 | End: 2018-02-12

## 2018-02-12 RX ORDER — HEPARIN 100 UNIT/ML
500 SYRINGE INTRAVENOUS
Status: CANCELLED | OUTPATIENT
Start: 2018-02-12

## 2018-02-12 RX ADMIN — HEPARIN 500 UNITS: 100 SYRINGE at 11:02

## 2018-02-12 RX ADMIN — SODIUM CHLORIDE, PRESERVATIVE FREE 10 ML: 5 INJECTION INTRAVENOUS at 11:02

## 2018-02-12 NOTE — PROGRESS NOTES
Subjective:       Patient ID: Janine Souza is a 58 y.o. female.    Chief Complaint: Results and Cancer    HPI 58-year-old female high-grade neuroendocrine tumor status post cisplatin etoposide therapy with stable findings patient returns for clinical follow-up ECOG status 1+ for    Past Medical History:   Diagnosis Date    Acute headache 2016    Anemia     Atrial fibrillation, persistent     Bronchitis     CHF (congestive heart failure)     Diabetes mellitus, type 2     Hypertension     Malignant poorly differentiated neuroendocrine carcinoma 2016    Secondary neuroendocrine tumor of liver 2016     Family History   Problem Relation Age of Onset    Diabetes type II Mother     Prostate cancer Father     Breast cancer Paternal Aunt      breast    Breast cancer Maternal Aunt      breast     Social History     Social History    Marital status:      Spouse name: N/A    Number of children: N/A    Years of education: N/A     Occupational History    Not on file.     Social History Main Topics    Smoking status: Never Smoker    Smokeless tobacco: Never Used    Alcohol use No    Drug use: No    Sexual activity: Not on file     Other Topics Concern    Not on file     Social History Narrative    No narrative on file     Past Surgical History:   Procedure Laterality Date    ADENOIDECTOMY      CARDIAC ELECTROPHYSIOLOGY MAPPING AND ABLATION      CARDIAC SURGERY       SECTION      ERCP      TONSILLECTOMY      uvulopalatopharygoplasty         Labs:  Lab Results   Component Value Date    WBC 9.52 01/15/2018    HGB 10.6 (L) 01/15/2018    HCT 32.9 (L) 01/15/2018    MCV 99 (H) 01/15/2018     01/15/2018     BMP  Lab Results   Component Value Date     01/15/2018    K 4.0 01/15/2018    CL 99 01/15/2018    CO2 25 01/15/2018    BUN 20 01/15/2018    CREATININE 1.1 01/15/2018    CALCIUM 10.0 01/15/2018    ANIONGAP 14 01/15/2018    ESTGFRAFRICA >60 01/15/2018    EGFRNONAA 56  (A) 01/15/2018     Lab Results   Component Value Date    ALT 16 01/15/2018    AST 20 01/15/2018    ALKPHOS 64 01/15/2018    BILITOT 0.6 01/15/2018       Lab Results   Component Value Date    IRON 54 01/15/2018    TIBC 407 01/15/2018    FERRITIN 1,374 (H) 01/15/2018     No results found for: [id number]  No results found for: FOLATE  Lab Results   Component Value Date    TSH 2.916 12/20/2016         Review of Systems   Constitutional: Negative for activity change, appetite change, chills, diaphoresis, fatigue, fever and unexpected weight change.   HENT: Negative for congestion, dental problem, drooling, ear discharge, ear pain, facial swelling, hearing loss, mouth sores, nosebleeds, postnasal drip, rhinorrhea, sinus pressure, sneezing, sore throat, tinnitus, trouble swallowing and voice change.    Eyes: Negative for photophobia, pain, discharge, redness, itching and visual disturbance.   Respiratory: Negative for cough, choking, chest tightness, shortness of breath, wheezing and stridor.    Cardiovascular: Negative for chest pain, palpitations and leg swelling.   Gastrointestinal: Negative for abdominal distention, abdominal pain, anal bleeding, blood in stool, constipation, diarrhea, nausea, rectal pain and vomiting.   Endocrine: Negative for cold intolerance, heat intolerance, polydipsia, polyphagia and polyuria.   Genitourinary: Negative for decreased urine volume, difficulty urinating, dyspareunia, dysuria, enuresis, flank pain, frequency, genital sores, hematuria, menstrual problem, pelvic pain, urgency, vaginal bleeding, vaginal discharge and vaginal pain.   Musculoskeletal: Negative for arthralgias, back pain, gait problem, joint swelling, myalgias, neck pain and neck stiffness.   Skin: Negative for color change, pallor and rash.   Allergic/Immunologic: Negative for environmental allergies, food allergies and immunocompromised state.   Neurological: Negative for dizziness, tremors, seizures, syncope, facial  asymmetry, speech difficulty, weakness, light-headedness, numbness and headaches.   Hematological: Negative for adenopathy. Does not bruise/bleed easily.   Psychiatric/Behavioral: Positive for dysphoric mood. Negative for agitation, behavioral problems, confusion, decreased concentration, hallucinations, self-injury, sleep disturbance and suicidal ideas. The patient is nervous/anxious. The patient is not hyperactive.        Objective:      Physical Exam   Constitutional: She is oriented to person, place, and time. She appears well-developed and well-nourished. She appears distressed.   HENT:   Head: Normocephalic and atraumatic.   Right Ear: External ear normal.   Left Ear: External ear normal.   Nose: Nose normal. Right sinus exhibits no maxillary sinus tenderness and no frontal sinus tenderness. Left sinus exhibits no maxillary sinus tenderness and no frontal sinus tenderness.   Mouth/Throat: Oropharynx is clear and moist. No oropharyngeal exudate.   Eyes: Conjunctivae, EOM and lids are normal. Pupils are equal, round, and reactive to light. Right eye exhibits no discharge. Left eye exhibits no discharge. Right conjunctiva is not injected. Right conjunctiva has no hemorrhage. Left conjunctiva is not injected. Left conjunctiva has no hemorrhage. No scleral icterus.   Neck: Normal range of motion. Neck supple. No JVD present. No tracheal deviation present. No thyromegaly present.   Cardiovascular: Normal rate and regular rhythm.    Pulmonary/Chest: Effort normal. No stridor. No respiratory distress. She exhibits no tenderness.   Abdominal: Soft. She exhibits no distension and no mass. There is no splenomegaly or hepatomegaly. There is no tenderness. There is no rebound.   Musculoskeletal: Normal range of motion. She exhibits no edema or tenderness.   Lymphadenopathy:     She has no cervical adenopathy.     She has no axillary adenopathy.        Right: No supraclavicular adenopathy present.        Left: No  supraclavicular adenopathy present.   Neurological: She is alert and oriented to person, place, and time. No cranial nerve deficit. Coordination normal.   Skin: Skin is dry. No rash noted. She is not diaphoretic. No erythema.   Psychiatric: She has a normal mood and affect. Her behavior is normal. Judgment and thought content normal.   Vitals reviewed.          Assessment:      1. Malignant poorly differentiated neuroendocrine carcinoma    2. Iron deficiency anemia due to chronic blood loss    3. Neoplasm of abdomen           Plan:   Medically stable flush port return in 2 months with labs prior and also CT chest abdomen pelvis for stability of widespread malignancy

## 2018-02-14 ENCOUNTER — OFFICE VISIT (OUTPATIENT)
Dept: FAMILY MEDICINE | Facility: CLINIC | Age: 58
End: 2018-02-14
Payer: MEDICARE

## 2018-02-14 VITALS
TEMPERATURE: 98 F | DIASTOLIC BLOOD PRESSURE: 86 MMHG | SYSTOLIC BLOOD PRESSURE: 123 MMHG | HEART RATE: 132 BPM | HEIGHT: 66 IN | BODY MASS INDEX: 37.03 KG/M2 | WEIGHT: 230.38 LBS

## 2018-02-14 DIAGNOSIS — C7A.1 MALIGNANT POORLY DIFFERENTIATED NEUROENDOCRINE CARCINOMA: ICD-10-CM

## 2018-02-14 DIAGNOSIS — I48.19 ATRIAL FIBRILLATION, PERSISTENT: ICD-10-CM

## 2018-02-14 DIAGNOSIS — E11.9 TYPE 2 DIABETES MELLITUS WITHOUT COMPLICATION, WITHOUT LONG-TERM CURRENT USE OF INSULIN: Primary | ICD-10-CM

## 2018-02-14 DIAGNOSIS — I50.9 HEART FAILURE, UNSPECIFIED HEART FAILURE CHRONICITY, UNSPECIFIED HEART FAILURE TYPE: ICD-10-CM

## 2018-02-14 DIAGNOSIS — I10 ESSENTIAL HYPERTENSION: ICD-10-CM

## 2018-02-14 PROCEDURE — 99999 PR PBB SHADOW E&M-EST. PATIENT-LVL III: CPT | Mod: PBBFAC,,, | Performed by: FAMILY MEDICINE

## 2018-02-14 PROCEDURE — 3008F BODY MASS INDEX DOCD: CPT | Mod: S$GLB,,, | Performed by: FAMILY MEDICINE

## 2018-02-14 PROCEDURE — 99214 OFFICE O/P EST MOD 30 MIN: CPT | Mod: S$GLB,,, | Performed by: FAMILY MEDICINE

## 2018-02-14 RX ORDER — METOPROLOL SUCCINATE 50 MG/1
50 TABLET, EXTENDED RELEASE ORAL DAILY
Qty: 90 TABLET | Refills: 4 | Status: ON HOLD | OUTPATIENT
Start: 2018-02-14 | End: 2018-06-09 | Stop reason: HOSPADM

## 2018-02-14 RX ORDER — METOPROLOL TARTRATE 25 MG/1
25 TABLET, FILM COATED ORAL DAILY
Status: ON HOLD | COMMUNITY
End: 2018-06-09 | Stop reason: HOSPADM

## 2018-02-14 RX ORDER — ZOLPIDEM TARTRATE 10 MG/1
10 TABLET ORAL NIGHTLY PRN
Qty: 30 TABLET | Refills: 5 | Status: SHIPPED | OUTPATIENT
Start: 2018-02-14 | End: 2018-08-14 | Stop reason: SDUPTHER

## 2018-02-14 NOTE — PROGRESS NOTES
The patient presents today to fu DM Last a1c elevated 8.2 but sl better Follows Card for AF prev had ablation but remains iin AF.   HF stable currently.   Following w Dr Flores w hx NE Carcinoma,p chemotx, no active tx currently,last series of scans stable but biliary stent migrated-GI fu pending   Past Medical History:  Past Medical History:   Diagnosis Date    Acute headache 2016    Anemia     Atrial fibrillation, persistent     Bronchitis     CHF (congestive heart failure)     Diabetes mellitus, type 2     Hypertension     Malignant poorly differentiated neuroendocrine carcinoma 2016    Secondary neuroendocrine tumor of liver 2016     Past Surgical History:   Procedure Laterality Date    ADENOIDECTOMY      CARDIAC ELECTROPHYSIOLOGY MAPPING AND ABLATION      CARDIAC SURGERY       SECTION      ERCP      TONSILLECTOMY      uvulopalatopharygoplasty       Review of patient's allergies indicates:   Allergen Reactions    Epinephrine Other (See Comments)     Neuroendocrine patient-currently on chemo per patient  Neuroendocrine patient    Pcn [penicillins] Other (See Comments), Hives and Swelling     As a child had an unknown reaction.  Has not taken since  As a child had an unknown reaction.  Has not taken since     Current Outpatient Prescriptions on File Prior to Visit   Medication Sig Dispense Refill    blood sugar diagnostic, disc Strp USE AS DIRECTED EVERY DAY *THANK YOU*      docusate sodium (COLACE) 50 MG capsule Take by mouth 2 (two) times daily.      fluticasone (FLOVENT HFA) 110 mcg/actuation inhaler Inhale 2 puffs into the lungs once daily.      furosemide (LASIX) 40 MG tablet Take 40 mg by mouth once daily.       glipiZIDE (GLUCOTROL) 10 MG tablet Take 1 tablet (10 mg total) by mouth 2 (two) times daily. 180 tablet 3    KLOR-CON M20 20 mEq tablet       lancets Misc Use as directed      linagliptin (TRADJENTA) 5 mg Tab tablet Take 5 mg by mouth once daily.       metformin (GLUCOPHAGE) 500 MG tablet Take 1 tablet (500 mg total) by mouth 2 (two) times daily with meals. Take 2 tablets (1000mg) in am and 3 tablets (1500 mg)in the pm (Patient taking differently: Take 2 tablets (1000mg) in am and 3 tablets (1500 mg)in the pm) 180 tablet 3    metoprolol succinate (TOPROL-XL) 50 MG 24 hr tablet Take 50 mg by mouth 2 (two) times daily.       ondansetron (ZOFRAN) 4 MG tablet Take 1 tablet (4 mg total) by mouth every 12 (twelve) hours as needed for Nausea. 30 tablet 6    prochlorperazine (COMPAZINE) 5 MG tablet Take 1 tablet (5 mg total) by mouth every 6 (six) hours as needed for Nausea. 30 tablet 6    XARELTO 20 mg Tab Take 20 mg by mouth once daily.       zolpidem (AMBIEN) 10 mg Tab Take 1 tablet (10 mg total) by mouth nightly as needed. 30 tablet 5     Current Facility-Administered Medications on File Prior to Visit   Medication Dose Route Frequency Provider Last Rate Last Dose    ferumoxytol (FERAHEME) 510 mg in dextrose 5 % 100 mL IVPB  510 mg Intravenous Once Kalyan Flores MD        heparin, porcine (PF) 100 unit/mL injection flush 500 Units  500 Units Intravenous PRN Kalyan Flores MD        sodium chloride 0.9% flush 10 mL  10 mL Intravenous PRN Kalyan Flores MD         Social History     Social History    Marital status:      Spouse name: N/A    Number of children: N/A    Years of education: N/A     Occupational History    Not on file.     Social History Main Topics    Smoking status: Never Smoker    Smokeless tobacco: Never Used    Alcohol use No    Drug use: No    Sexual activity: Not on file     Other Topics Concern    Not on file     Social History Narrative    No narrative on file     Family History   Problem Relation Age of Onset    Diabetes type II Mother     Prostate cancer Father     Breast cancer Paternal Aunt      breast    Breast cancer Maternal Aunt      breast         ROS:GENERAL: No fever, chills, fatigability or weight  loss.  SKIN: No rashes, itching or changes in color or texture of skin.  HEAD: No headaches or recent head trauma.EYES: Visual acuity fine. No photophobia, ocular pain or diplopia.EARS: Denies ear pain, discharge or vertigo.NOSE: No loss of smell, no epistaxis or postnasal drip.MOUTH & THROAT: No hoarseness or change in voice. No excessive gum bleeding.NODES: Denies swollen glands.  CHEST: Denies HURLEY, cyanosis, wheezing, cough and sputum production.  CARDIOVASCULAR: Denies chest pain, PND, orthopnea or reduced exercise tolerance.  ABDOMEN: Appetite fine. No weight loss. Denies diarrhea, abdominal pain, hematemesis or blood in stool.  URINARY: No flank pain, dysuria or hematuria.  PERIPHERAL VASCULAR: No claudication or cyanosis.  MUSCULOSKELETAL: See above.  NEUROLOGIC: No history of seizures, paralysis, alteration of gait or coordination.  PE:   HEAD: Normocephalic, atraumatic.EYES: PERRL. EOMI.   EARS: TM's intact. Light reflex normal. No retraction or perforation.   NOSE: Mucosa pink. Airway clear.MOUTH & THROAT: No tonsillar enlargement. No pharyngeal erythema or exudate. No stridor.  NODES: No cervical, axillary or inguinal lymph node enlargement.  CHEST: Lungs clear to auscultation.  CARDIOVASCULAR: Normal S1, S2. No rubs, murmurs or gallops.  ABDOMEN: Bowel sounds normal. Not distended. Soft. No tenderness or masses.  MUSCULOSKELETAL: No palpable abnormality  NEUROLOGIC: Cranial Nerves: II-XII grossly intact.  Motor: 5/5 strength major flexors/extensors.  DTR's: Knees, Ankles 2+ and equal bilaterally; downgoing toes.  Sensory: Intact to light touch distally.  Gait & Posture: Normal gait and fine motion. No cerebellar signs.   Protective Sensation (w/ 10 gram monofilament): Intact  Visual Inspection (Evidence of Foot Deformity, Ulceration, Nails Intact, Skin Intact):Normal  Pedal Pulses: Present       Plan:Lab eval-rev  a1c and if still el incr metformin-  Rec diet and ex recs  Card Heme Onc GI fu   Fu a1c 3m,

## 2018-02-23 DIAGNOSIS — E11.9 TYPE 2 DIABETES MELLITUS WITHOUT COMPLICATION: ICD-10-CM

## 2018-03-23 ENCOUNTER — INFUSION (OUTPATIENT)
Dept: INFUSION THERAPY | Facility: HOSPITAL | Age: 58
End: 2018-03-23
Attending: INTERNAL MEDICINE
Payer: MEDICARE

## 2018-03-23 VITALS — BODY MASS INDEX: 37.03 KG/M2 | HEIGHT: 66 IN | WEIGHT: 230.38 LBS

## 2018-03-23 DIAGNOSIS — C7B.8 SECONDARY NEUROENDOCRINE TUMOR OF LIVER: Primary | ICD-10-CM

## 2018-03-23 PROCEDURE — A4216 STERILE WATER/SALINE, 10 ML: HCPCS | Performed by: INTERNAL MEDICINE

## 2018-03-23 PROCEDURE — 63600175 PHARM REV CODE 636 W HCPCS: Performed by: INTERNAL MEDICINE

## 2018-03-23 PROCEDURE — 96523 IRRIG DRUG DELIVERY DEVICE: CPT

## 2018-03-23 PROCEDURE — 25000003 PHARM REV CODE 250: Performed by: INTERNAL MEDICINE

## 2018-03-23 RX ORDER — HEPARIN 100 UNIT/ML
500 SYRINGE INTRAVENOUS
Status: CANCELLED | OUTPATIENT
Start: 2018-03-23

## 2018-03-23 RX ORDER — SODIUM CHLORIDE 0.9 % (FLUSH) 0.9 %
10 SYRINGE (ML) INJECTION
Status: CANCELLED | OUTPATIENT
Start: 2018-03-23

## 2018-03-23 RX ORDER — SODIUM CHLORIDE 0.9 % (FLUSH) 0.9 %
10 SYRINGE (ML) INJECTION
Status: COMPLETED | OUTPATIENT
Start: 2018-03-23 | End: 2018-03-23

## 2018-03-23 RX ORDER — HEPARIN 100 UNIT/ML
500 SYRINGE INTRAVENOUS
Status: COMPLETED | OUTPATIENT
Start: 2018-03-23 | End: 2018-03-23

## 2018-03-23 RX ADMIN — SODIUM CHLORIDE, PRESERVATIVE FREE 10 ML: 5 INJECTION INTRAVENOUS at 10:03

## 2018-03-23 RX ADMIN — HEPARIN 500 UNITS: 100 SYRINGE at 10:03

## 2018-04-26 ENCOUNTER — TELEPHONE (OUTPATIENT)
Dept: RADIOLOGY | Facility: HOSPITAL | Age: 58
End: 2018-04-26

## 2018-04-27 ENCOUNTER — HOSPITAL ENCOUNTER (OUTPATIENT)
Dept: RADIOLOGY | Facility: HOSPITAL | Age: 58
Discharge: HOME | End: 2018-04-27
Attending: INTERNAL MEDICINE
Payer: MEDICARE

## 2018-04-27 ENCOUNTER — INFUSION (OUTPATIENT)
Dept: INFUSION THERAPY | Facility: HOSPITAL | Age: 58
End: 2018-04-27
Attending: INTERNAL MEDICINE
Payer: MEDICARE

## 2018-04-27 ENCOUNTER — OFFICE VISIT (OUTPATIENT)
Dept: HEMATOLOGY/ONCOLOGY | Facility: CLINIC | Age: 58
End: 2018-04-27
Payer: MEDICARE

## 2018-04-27 VITALS
HEIGHT: 66 IN | DIASTOLIC BLOOD PRESSURE: 88 MMHG | SYSTOLIC BLOOD PRESSURE: 135 MMHG | HEART RATE: 146 BPM | BODY MASS INDEX: 37.67 KG/M2 | TEMPERATURE: 98 F | RESPIRATION RATE: 20 BRPM | OXYGEN SATURATION: 95 % | WEIGHT: 234.38 LBS

## 2018-04-27 DIAGNOSIS — I48.19 ATRIAL FIBRILLATION, PERSISTENT: ICD-10-CM

## 2018-04-27 DIAGNOSIS — C7A.1 MALIGNANT POORLY DIFFERENTIATED NEUROENDOCRINE CARCINOMA: ICD-10-CM

## 2018-04-27 DIAGNOSIS — D50.0 IRON DEFICIENCY ANEMIA DUE TO CHRONIC BLOOD LOSS: Primary | ICD-10-CM

## 2018-04-27 DIAGNOSIS — C7B.8 SECONDARY NEUROENDOCRINE TUMOR OF LIVER: Primary | ICD-10-CM

## 2018-04-27 DIAGNOSIS — D49.89 NEOPLASM OF ABDOMEN: ICD-10-CM

## 2018-04-27 DIAGNOSIS — D50.0 IRON DEFICIENCY ANEMIA DUE TO CHRONIC BLOOD LOSS: ICD-10-CM

## 2018-04-27 DIAGNOSIS — I50.9 ACUTE ON CHRONIC HEART FAILURE, UNSPECIFIED HEART FAILURE TYPE: ICD-10-CM

## 2018-04-27 PROCEDURE — A4216 STERILE WATER/SALINE, 10 ML: HCPCS | Mod: PO | Performed by: INTERNAL MEDICINE

## 2018-04-27 PROCEDURE — 63600175 PHARM REV CODE 636 W HCPCS: Mod: PO | Performed by: INTERNAL MEDICINE

## 2018-04-27 PROCEDURE — 99214 OFFICE O/P EST MOD 30 MIN: CPT | Mod: S$GLB,,, | Performed by: INTERNAL MEDICINE

## 2018-04-27 PROCEDURE — 71260 CT THORAX DX C+: CPT | Mod: 26,,, | Performed by: RADIOLOGY

## 2018-04-27 PROCEDURE — 74178 CT ABD&PLV WO CNTR FLWD CNTR: CPT | Mod: 26,,, | Performed by: RADIOLOGY

## 2018-04-27 PROCEDURE — 25500020 PHARM REV CODE 255: Mod: PO | Performed by: INTERNAL MEDICINE

## 2018-04-27 PROCEDURE — 3075F SYST BP GE 130 - 139MM HG: CPT | Mod: CPTII,S$GLB,, | Performed by: INTERNAL MEDICINE

## 2018-04-27 PROCEDURE — 3079F DIAST BP 80-89 MM HG: CPT | Mod: CPTII,S$GLB,, | Performed by: INTERNAL MEDICINE

## 2018-04-27 PROCEDURE — 74178 CT ABD&PLV WO CNTR FLWD CNTR: CPT | Mod: TC,PO

## 2018-04-27 PROCEDURE — 96523 IRRIG DRUG DELIVERY DEVICE: CPT | Mod: PO

## 2018-04-27 PROCEDURE — 99999 PR PBB SHADOW E&M-EST. PATIENT-LVL III: CPT | Mod: PBBFAC,,, | Performed by: INTERNAL MEDICINE

## 2018-04-27 PROCEDURE — 99499 UNLISTED E&M SERVICE: CPT | Mod: S$GLB,,, | Performed by: INTERNAL MEDICINE

## 2018-04-27 PROCEDURE — 25000003 PHARM REV CODE 250: Mod: PO | Performed by: INTERNAL MEDICINE

## 2018-04-27 PROCEDURE — 71260 CT THORAX DX C+: CPT | Mod: TC,PO

## 2018-04-27 RX ORDER — SODIUM CHLORIDE 0.9 % (FLUSH) 0.9 %
10 SYRINGE (ML) INJECTION
Status: CANCELLED | OUTPATIENT
Start: 2018-05-16

## 2018-04-27 RX ORDER — HEPARIN 100 UNIT/ML
500 SYRINGE INTRAVENOUS
Status: CANCELLED | OUTPATIENT
Start: 2018-04-27

## 2018-04-27 RX ORDER — HEPARIN 100 UNIT/ML
500 SYRINGE INTRAVENOUS
Status: CANCELLED | OUTPATIENT
Start: 2018-05-16

## 2018-04-27 RX ORDER — SODIUM CHLORIDE 0.9 % (FLUSH) 0.9 %
10 SYRINGE (ML) INJECTION
Status: CANCELLED | OUTPATIENT
Start: 2018-04-27

## 2018-04-27 RX ORDER — HEPARIN 100 UNIT/ML
500 SYRINGE INTRAVENOUS
Status: COMPLETED | OUTPATIENT
Start: 2018-04-27 | End: 2018-04-27

## 2018-04-27 RX ORDER — SODIUM CHLORIDE 0.9 % (FLUSH) 0.9 %
10 SYRINGE (ML) INJECTION
Status: COMPLETED | OUTPATIENT
Start: 2018-04-27 | End: 2018-04-27

## 2018-04-27 RX ADMIN — SODIUM CHLORIDE, PRESERVATIVE FREE 10 ML: 5 INJECTION INTRAVENOUS at 11:04

## 2018-04-27 RX ADMIN — IOHEXOL 30 ML: 350 INJECTION, SOLUTION INTRAVENOUS at 09:04

## 2018-04-27 RX ADMIN — HEPARIN 500 UNITS: 100 SYRINGE at 11:04

## 2018-04-27 RX ADMIN — IOHEXOL 100 ML: 350 INJECTION, SOLUTION INTRAVENOUS at 10:04

## 2018-04-27 NOTE — PROGRESS NOTES
Patient, Janine Souza (MRN #[id number]), presented with a recorded BMI of 37.82 kg/m^2 and a documented comorbidity(s):  - Atrial Fibrillation  - Atrial Fibrillation  to which the severe obesity is a contributing factor. This is consistent with the definition of severe obesity (BMI 35.0-35.9) with comorbidity (ICD-10 E66.01, Z68.35). The patient's severe obesity was monitored, evaluated, addressed and/or treated. This addendum to the medical record is made on 04/27/2018.

## 2018-04-27 NOTE — PROGRESS NOTES
Subjective:       Patient ID: Janine Souza is a 58 y.o. female.    Chief Complaint: Results; Cancer; and Anemia    HPI 58-year-old female returns for review of imaging studies previously history of metastatic poorly differentiated neuroendocrine tumor status post cisplatin and etoposide therapy patient appears to be doing remarkably well has imaging studies today for review    Past Medical History:   Diagnosis Date    Acute headache 2016    Anemia     Atrial fibrillation, persistent     Bronchitis     CHF (congestive heart failure)     Diabetes mellitus, type 2     Hypertension     Malignant poorly differentiated neuroendocrine carcinoma 2016    Secondary neuroendocrine tumor of liver 2016     Family History   Problem Relation Age of Onset    Diabetes type II Mother     Prostate cancer Father     Breast cancer Paternal Aunt      breast    Breast cancer Maternal Aunt      breast     Social History     Social History    Marital status:      Spouse name: N/A    Number of children: N/A    Years of education: N/A     Occupational History    Not on file.     Social History Main Topics    Smoking status: Never Smoker    Smokeless tobacco: Never Used    Alcohol use No    Drug use: No    Sexual activity: Not on file     Other Topics Concern    Not on file     Social History Narrative    No narrative on file     Past Surgical History:   Procedure Laterality Date    ADENOIDECTOMY      CARDIAC ELECTROPHYSIOLOGY MAPPING AND ABLATION      CARDIAC SURGERY       SECTION      ERCP      TONSILLECTOMY      uvulopalatopharygoplasty         Labs:  Lab Results   Component Value Date    WBC 8.30 2018    HGB 10.3 (L) 2018    HCT 32.0 (L) 2018    MCV 98 2018     (L) 2018     BMP  Lab Results   Component Value Date     2018    K 4.5 2018     2018    CO2 25 2018    BUN 23 (H) 2018    CREATININE 1.2  04/27/2018    CALCIUM 10.0 04/27/2018    ANIONGAP 12 04/27/2018    ESTGFRAFRICA 58 (A) 04/27/2018    EGFRNONAA 50 (A) 04/27/2018     Lab Results   Component Value Date    ALT 13 04/27/2018    AST 11 04/27/2018    ALKPHOS 61 04/27/2018    BILITOT 0.4 04/27/2018       Lab Results   Component Value Date    IRON 52 02/12/2018    TIBC 377 02/12/2018    FERRITIN 997 (H) 02/12/2018     No results found for: [id number]  No results found for: FOLATE  Lab Results   Component Value Date    TSH 2.916 12/20/2016         Review of Systems   Constitutional: Negative for activity change, appetite change, chills, diaphoresis, fatigue, fever and unexpected weight change.   HENT: Negative for congestion, dental problem, drooling, ear discharge, ear pain, facial swelling, hearing loss, mouth sores, nosebleeds, postnasal drip, rhinorrhea, sinus pressure, sneezing, sore throat, tinnitus, trouble swallowing and voice change.    Eyes: Negative for photophobia, pain, discharge, redness, itching and visual disturbance.   Respiratory: Negative for cough, choking, chest tightness, shortness of breath, wheezing and stridor.    Cardiovascular: Negative for chest pain, palpitations and leg swelling.   Gastrointestinal: Negative for abdominal distention, abdominal pain, anal bleeding, blood in stool, constipation, diarrhea, nausea, rectal pain and vomiting.   Endocrine: Negative for cold intolerance, heat intolerance, polydipsia, polyphagia and polyuria.   Genitourinary: Negative for decreased urine volume, difficulty urinating, dyspareunia, dysuria, enuresis, flank pain, frequency, genital sores, hematuria, menstrual problem, pelvic pain, urgency, vaginal bleeding, vaginal discharge and vaginal pain.   Musculoskeletal: Negative for arthralgias, back pain, gait problem, joint swelling, myalgias, neck pain and neck stiffness.   Skin: Negative for color change, pallor and rash.   Allergic/Immunologic: Negative for environmental allergies, food  allergies and immunocompromised state.   Neurological: Negative for dizziness, tremors, seizures, syncope, facial asymmetry, speech difficulty, weakness, light-headedness, numbness and headaches.   Hematological: Negative for adenopathy. Does not bruise/bleed easily.   Psychiatric/Behavioral: Positive for dysphoric mood. Negative for agitation, behavioral problems, confusion, decreased concentration, hallucinations, self-injury, sleep disturbance and suicidal ideas. The patient is nervous/anxious. The patient is not hyperactive.        Objective:      Physical Exam   Constitutional: She is oriented to person, place, and time. She appears well-developed and well-nourished. She appears distressed.   HENT:   Head: Normocephalic and atraumatic.   Right Ear: External ear normal.   Left Ear: External ear normal.   Nose: Nose normal. Right sinus exhibits no maxillary sinus tenderness and no frontal sinus tenderness. Left sinus exhibits no maxillary sinus tenderness and no frontal sinus tenderness.   Mouth/Throat: Oropharynx is clear and moist. No oropharyngeal exudate.   Eyes: Conjunctivae, EOM and lids are normal. Pupils are equal, round, and reactive to light. Right eye exhibits no discharge. Left eye exhibits no discharge. Right conjunctiva is not injected. Right conjunctiva has no hemorrhage. Left conjunctiva is not injected. Left conjunctiva has no hemorrhage. No scleral icterus.   Neck: Normal range of motion. Neck supple. No JVD present. No tracheal deviation present. No thyromegaly present.   Cardiovascular: Normal rate and regular rhythm.    Pulmonary/Chest: Effort normal. No stridor. No respiratory distress. She exhibits no tenderness.   Abdominal: Soft. She exhibits no distension and no mass. There is no splenomegaly or hepatomegaly. There is no tenderness. There is no rebound.   Musculoskeletal: Normal range of motion. She exhibits no edema or tenderness.   Lymphadenopathy:     She has no cervical adenopathy.      She has no axillary adenopathy.        Right: No supraclavicular adenopathy present.        Left: No supraclavicular adenopathy present.   Neurological: She is alert and oriented to person, place, and time. No cranial nerve deficit. Coordination normal.   Skin: Skin is dry. No rash noted. She is not diaphoretic. No erythema.   Psychiatric: She has a normal mood and affect. Her behavior is normal. Judgment and thought content normal.   Vitals reviewed.          Assessment:      1. Iron deficiency anemia due to chronic blood loss    2. Atrial fibrillation, persistent    3. Malignant poorly differentiated neuroendocrine carcinoma    4. Acute on chronic heart failure, unspecified heart failure type           Plan:   The patient has had recurrent iron deficiency anemia will treat with intravenous iron she has done remarkably well her metastatic neuroendocrine tumor has remained stable.  At this point would recommend evaluation by cardiology was reported to have a pacemaker placement by Dr. Jelani Sánchez she wishes a second opinion to see whether or not this very appropriate course of action will do so follow-up in 3 months with CBC iron status prior

## 2018-04-27 NOTE — PROGRESS NOTES
Patient, Janine Souza (MRN #[id number]), presented with a recent Platelet count less than 150 K/uL consistent with the definition of thrombocytopenia (ICD10 - D69.6).    Platelets   Date Value Ref Range Status   04/27/2018 147 (L) 150 - 350 K/uL Final     The patient's thrombocytopenia was monitored, evaluated, addressed and/or treated. This addendum to the medical record is made on 04/27/2018.

## 2018-05-15 ENCOUNTER — INFUSION (OUTPATIENT)
Dept: INFUSION THERAPY | Facility: HOSPITAL | Age: 58
End: 2018-05-15
Attending: INTERNAL MEDICINE
Payer: MEDICARE

## 2018-05-15 VITALS
DIASTOLIC BLOOD PRESSURE: 84 MMHG | HEART RATE: 85 BPM | TEMPERATURE: 98 F | RESPIRATION RATE: 20 BRPM | SYSTOLIC BLOOD PRESSURE: 111 MMHG

## 2018-05-15 DIAGNOSIS — C7A.1 MALIGNANT POORLY DIFFERENTIATED NEUROENDOCRINE CARCINOMA: ICD-10-CM

## 2018-05-15 DIAGNOSIS — D50.0 IRON DEFICIENCY ANEMIA DUE TO CHRONIC BLOOD LOSS: Primary | ICD-10-CM

## 2018-05-15 PROCEDURE — 25000003 PHARM REV CODE 250: Performed by: INTERNAL MEDICINE

## 2018-05-15 PROCEDURE — 63600175 PHARM REV CODE 636 W HCPCS: Mod: JG | Performed by: INTERNAL MEDICINE

## 2018-05-15 PROCEDURE — 96365 THER/PROPH/DIAG IV INF INIT: CPT

## 2018-05-15 RX ORDER — HEPARIN 100 UNIT/ML
500 SYRINGE INTRAVENOUS
Status: DISCONTINUED | OUTPATIENT
Start: 2018-05-15 | End: 2018-05-15 | Stop reason: HOSPADM

## 2018-05-15 RX ADMIN — HEPARIN 500 UNITS: 100 SYRINGE at 02:05

## 2018-05-15 RX ADMIN — FERUMOXYTOL 510 MG: 510 INJECTION INTRAVENOUS at 01:05

## 2018-05-15 NOTE — PATIENT INSTRUCTIONS
[facility]  [address]  [phone] phone     [phone] fax  Hours of Operation: Monday- Friday 8:00am- 5:00pm  After hours phone  [phone]  Hematology / Oncology Physicians on call      Dr. Mark Henderson                        Please call with any concerns regarding your appointment today.

## 2018-05-15 NOTE — PLAN OF CARE
Problem: Patient Care Overview  Goal: Plan of Care Review  Outcome: Ongoing (interventions implemented as appropriate)    im just tired of being tired

## 2018-05-22 ENCOUNTER — OFFICE VISIT (OUTPATIENT)
Dept: HEMATOLOGY/ONCOLOGY | Facility: CLINIC | Age: 58
End: 2018-05-22
Payer: MEDICARE

## 2018-05-22 ENCOUNTER — INFUSION (OUTPATIENT)
Dept: INFUSION THERAPY | Facility: HOSPITAL | Age: 58
End: 2018-05-22
Attending: INTERNAL MEDICINE
Payer: MEDICARE

## 2018-05-22 VITALS
HEIGHT: 66 IN | WEIGHT: 216.69 LBS | OXYGEN SATURATION: 95 % | SYSTOLIC BLOOD PRESSURE: 132 MMHG | TEMPERATURE: 98 F | BODY MASS INDEX: 34.82 KG/M2 | TEMPERATURE: 97 F | HEART RATE: 144 BPM | SYSTOLIC BLOOD PRESSURE: 125 MMHG | WEIGHT: 227.75 LBS | OXYGEN SATURATION: 98 % | DIASTOLIC BLOOD PRESSURE: 88 MMHG | BODY MASS INDEX: 36.6 KG/M2 | DIASTOLIC BLOOD PRESSURE: 94 MMHG | HEIGHT: 66 IN | HEART RATE: 144 BPM | RESPIRATION RATE: 20 BRPM

## 2018-05-22 DIAGNOSIS — C7A.1 MALIGNANT POORLY DIFFERENTIATED NEUROENDOCRINE CARCINOMA: ICD-10-CM

## 2018-05-22 DIAGNOSIS — D50.0 IRON DEFICIENCY ANEMIA DUE TO CHRONIC BLOOD LOSS: ICD-10-CM

## 2018-05-22 DIAGNOSIS — C7A.1 MALIGNANT POORLY DIFFERENTIATED NEUROENDOCRINE CARCINOMA: Primary | ICD-10-CM

## 2018-05-22 DIAGNOSIS — C78.6 METASTATIC CANCER TO RETROPERITONEUM: ICD-10-CM

## 2018-05-22 DIAGNOSIS — C7B.8 SECONDARY NEUROENDOCRINE TUMOR OF LIVER: ICD-10-CM

## 2018-05-22 DIAGNOSIS — D50.0 IRON DEFICIENCY ANEMIA DUE TO CHRONIC BLOOD LOSS: Primary | ICD-10-CM

## 2018-05-22 DIAGNOSIS — E07.89 THYROID FULLNESS: ICD-10-CM

## 2018-05-22 PROCEDURE — 96365 THER/PROPH/DIAG IV INF INIT: CPT

## 2018-05-22 PROCEDURE — 99999 PR PBB SHADOW E&M-EST. PATIENT-LVL III: CPT | Mod: PBBFAC,,, | Performed by: NURSE PRACTITIONER

## 2018-05-22 PROCEDURE — 3008F BODY MASS INDEX DOCD: CPT | Mod: CPTII,S$GLB,, | Performed by: NURSE PRACTITIONER

## 2018-05-22 PROCEDURE — 63600175 PHARM REV CODE 636 W HCPCS: Performed by: INTERNAL MEDICINE

## 2018-05-22 PROCEDURE — 99214 OFFICE O/P EST MOD 30 MIN: CPT | Mod: S$GLB,,, | Performed by: NURSE PRACTITIONER

## 2018-05-22 PROCEDURE — 25000003 PHARM REV CODE 250: Performed by: INTERNAL MEDICINE

## 2018-05-22 PROCEDURE — 3074F SYST BP LT 130 MM HG: CPT | Mod: CPTII,S$GLB,, | Performed by: NURSE PRACTITIONER

## 2018-05-22 PROCEDURE — 3079F DIAST BP 80-89 MM HG: CPT | Mod: CPTII,S$GLB,, | Performed by: NURSE PRACTITIONER

## 2018-05-22 RX ORDER — HEPARIN 100 UNIT/ML
500 SYRINGE INTRAVENOUS
Status: DISCONTINUED | OUTPATIENT
Start: 2018-05-22 | End: 2018-05-22 | Stop reason: HOSPADM

## 2018-05-22 RX ORDER — SODIUM CHLORIDE 0.9 % (FLUSH) 0.9 %
10 SYRINGE (ML) INJECTION
Status: DISCONTINUED | OUTPATIENT
Start: 2018-05-22 | End: 2018-05-22 | Stop reason: HOSPADM

## 2018-05-22 RX ADMIN — HEPARIN 500 UNITS: 100 SYRINGE at 03:05

## 2018-05-22 RX ADMIN — FERUMOXYTOL 510 MG: 510 INJECTION INTRAVENOUS at 02:05

## 2018-05-22 NOTE — PLAN OF CARE
Problem: Patient Care Overview  Goal: Plan of Care Review  Outcome: Ongoing (interventions implemented as appropriate)  States she feels a knot to her neck. Referred to Ree Robbins NP to evaluate.

## 2018-05-22 NOTE — PATIENT INSTRUCTIONS
[facility]  [address]  [address]  [phone] phone     [phone] fax  Hours of Operation: Monday- Friday 8:00am- 5:00pm  After hours phone  [phone]  Hematology / Oncology Physicians on call      Dr. Mark Henderson      Please call with any concerns regarding your appointment today.

## 2018-05-22 NOTE — NURSING
Palpable fullness to lower front/center neck, more to right side. Denies tenderness, chest pain and reflux. States has had a dry cough sometimes all day and sometimes just at night. Also, states has lost weight in the last month but denies dieting. Referred to Ree Robbins NP. Appointment given today.

## 2018-05-22 NOTE — PROGRESS NOTES
Subjective:       Patient ID: Janine Souza is a 58 y.o. female.    Chief Complaint: Mass    58-year-old female  PMHx: metastatic poorly differentiated neuroendocrine tumor, DMII, HTN, Atrial Fibrillation, CHF, Iron Deficiency. S/P cisplatin and etoposide therapy. Presents to clinic today for evaluation of mass in right side of neck/supraclavicular area.       Review of Systems   Constitutional: Negative for chills, diaphoresis, fever and unexpected weight change.   HENT: Negative for congestion, hearing loss and trouble swallowing.    Eyes: Negative for discharge and visual disturbance.   Respiratory: Negative for shortness of breath.    Cardiovascular: Negative for chest pain and palpitations.   Gastrointestinal: Negative for blood in stool, constipation, diarrhea, nausea and vomiting.   Endocrine: Negative for cold intolerance and heat intolerance.   Genitourinary: Negative for difficulty urinating, dyspareunia and hematuria.   Musculoskeletal: Negative for arthralgias and myalgias.   Skin: Negative.    Neurological: Negative for dizziness and weakness.   Hematological: Negative for adenopathy. Does not bruise/bleed easily.   Psychiatric/Behavioral: The patient is nervous/anxious.        Objective:      Physical Exam   Constitutional: She is oriented to person, place, and time. She appears well-developed and well-nourished. No distress.   HENT:   Head: Normocephalic and atraumatic.   Right Ear: Hearing and external ear normal.   Left Ear: Hearing and external ear normal.   Nose: No rhinorrhea or sinus tenderness. Right sinus exhibits no maxillary sinus tenderness and no frontal sinus tenderness. Left sinus exhibits no maxillary sinus tenderness and no frontal sinus tenderness.   Mouth/Throat: Uvula is midline, oropharynx is clear and moist and mucous membranes are normal. No oral lesions.   Eyes: Conjunctivae are normal. Pupils are equal, round, and reactive to light. Right eye exhibits no discharge. Left eye  exhibits no discharge.   Neck: Normal range of motion. Carotid bruit is not present. No tracheal deviation present. No thyromegaly present.   Cardiovascular: Normal rate, regular rhythm, S1 normal, S2 normal, normal heart sounds and intact distal pulses.    No murmur heard.  Pulses:       Dorsalis pedis pulses are 2+ on the right side, and 2+ on the left side.   Pulmonary/Chest: Effort normal and breath sounds normal. No respiratory distress.   Abdominal: Soft. Bowel sounds are normal. She exhibits no distension and no mass. There is no tenderness.   Musculoskeletal: Normal range of motion.   Lymphadenopathy:     She has no cervical adenopathy.        Right: No supraclavicular adenopathy present.        Left: No supraclavicular adenopathy present.   Neurological: She is alert and oriented to person, place, and time. She has normal strength. No sensory deficit. Coordination and gait normal.   Skin: Skin is warm and dry. Capillary refill takes less than 2 seconds. No rash noted.   Psychiatric: Her speech is normal and behavior is normal. Judgment and thought content normal. Her mood appears anxious. Cognition and memory are normal. She does not exhibit a depressed mood.   Vitals reviewed.      Assessment:       1. Malignant poorly differentiated neuroendocrine carcinoma    2. Secondary neuroendocrine tumor of liver    3. 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 Iron deficiency anemia due to chronic blood loss    5. Thyroid fullness        Plan:       1) Fullness in right side of neck above the clavicle, mediport side. Negative for pain/discomfort. Felt this fullness for the last 4-5 days.   2) US of right side of neck.

## 2018-05-22 NOTE — PATIENT INSTRUCTIONS
"  Computed Tomography (CT)     During the test, relax and remain as still as you can.     Computed tomography (CT) is a test that combines X-rays and computer scans. The result is a detailed picture that can show problems with soft tissues, such as the lining of your sinuses, organs, such as your kidneys or lungs, blood vessels, and bones.  Tell the technologist   Be sure to tell the technologist if:  · You have allergies or kidney problems  · You take diabetes medicine  · You are pregnant or think you may be  · You ate or drank anything before the test   Before your test  · Be sure to tell your healthcare provider if you have ever had a reaction to contrast material ("X-ray dye"). If you have had a reaction, you may need to take medicine before your scan, so be sure to tell your provider ahead of time.   · Be sure to mention the medicines you take. Ask if it's OK to take them before the test.   · Follow any directions youre given for not eating or drinking before the procedure. Your provider will give you instructions if required. You may be required to drink contrast by mouth before arriving for the study depending on the type of exam you are having. Your provider or the imaging site will provide this for you.  · The length of the procedure may vary, depending on your condition and your provider's practices.  · Arrive on time to check in.  · When you arrive, you may be asked to change into a hospital gown. Remove all metal near the part of your body that will be scanned, including jewelry, eyeglasses, and dentures. Women may need to remove any bra that has metal underwire.   During your test  · You may be given contrast through an intravenous (IV) line or by mouth.  · You will lie on a table. The table slides into the CT scanner.  · The technologist will ask you to hold your breath for a few seconds during your scan.  After your test  · You can go back to your normal diet and activities right away. Any contrast " will pass naturally through your body within a day.  · Before leaving, you may need to wait briefly while your images are being reviewed. Your healthcare provider will discuss the test results with you during a follow-up appointment or over the phone.  · Your next appointment is:__________________  Date Last Reviewed: 5/29/2015  © 2637-5723 [employer]. [address] [place] [address], [place] [address]. All rights reserved. This information is not intended as a substitute for professional medical care. Always follow your healthcare professional's instructions.

## 2018-05-23 ENCOUNTER — PATIENT MESSAGE (OUTPATIENT)
Dept: HEMATOLOGY/ONCOLOGY | Facility: CLINIC | Age: 58
End: 2018-05-23

## 2018-05-24 ENCOUNTER — PATIENT MESSAGE (OUTPATIENT)
Dept: HEMATOLOGY/ONCOLOGY | Facility: CLINIC | Age: 58
End: 2018-05-24

## 2018-05-24 ENCOUNTER — HOSPITAL ENCOUNTER (OUTPATIENT)
Dept: RADIOLOGY | Facility: HOSPITAL | Age: 58
Discharge: HOME | End: 2018-05-24
Attending: INTERNAL MEDICINE
Payer: MEDICARE

## 2018-05-24 ENCOUNTER — INFUSION (OUTPATIENT)
Dept: INFUSION THERAPY | Facility: HOSPITAL | Age: 58
End: 2018-05-24
Attending: INTERNAL MEDICINE
Payer: MEDICARE

## 2018-05-24 ENCOUNTER — TELEPHONE (OUTPATIENT)
Dept: HEMATOLOGY/ONCOLOGY | Facility: CLINIC | Age: 58
End: 2018-05-24

## 2018-05-24 ENCOUNTER — OFFICE VISIT (OUTPATIENT)
Dept: HEMATOLOGY/ONCOLOGY | Facility: CLINIC | Age: 58
End: 2018-05-24
Payer: MEDICARE

## 2018-05-24 VITALS — BODY MASS INDEX: 36.99 KG/M2 | HEIGHT: 66 IN | WEIGHT: 230.19 LBS

## 2018-05-24 VITALS
TEMPERATURE: 98 F | HEIGHT: 66 IN | WEIGHT: 230.19 LBS | OXYGEN SATURATION: 97 % | DIASTOLIC BLOOD PRESSURE: 80 MMHG | SYSTOLIC BLOOD PRESSURE: 120 MMHG | RESPIRATION RATE: 18 BRPM | HEART RATE: 151 BPM | BODY MASS INDEX: 36.99 KG/M2

## 2018-05-24 DIAGNOSIS — C7B.8 SECONDARY NEUROENDOCRINE TUMOR OF LIVER: ICD-10-CM

## 2018-05-24 DIAGNOSIS — D50.0 IRON DEFICIENCY ANEMIA DUE TO CHRONIC BLOOD LOSS: ICD-10-CM

## 2018-05-24 DIAGNOSIS — C7A.1 MALIGNANT POORLY DIFFERENTIATED NEUROENDOCRINE CARCINOMA: Primary | ICD-10-CM

## 2018-05-24 DIAGNOSIS — C7A.1 MALIGNANT POORLY DIFFERENTIATED NEUROENDOCRINE CARCINOMA: ICD-10-CM

## 2018-05-24 DIAGNOSIS — C78.6 METASTATIC CANCER TO RETROPERITONEUM: ICD-10-CM

## 2018-05-24 DIAGNOSIS — C7B.8 SECONDARY NEUROENDOCRINE TUMOR OF LIVER: Primary | ICD-10-CM

## 2018-05-24 PROCEDURE — 96523 IRRIG DRUG DELIVERY DEVICE: CPT | Mod: PO

## 2018-05-24 PROCEDURE — 99999 PR PBB SHADOW E&M-EST. PATIENT-LVL III: CPT | Mod: PBBFAC,,, | Performed by: INTERNAL MEDICINE

## 2018-05-24 PROCEDURE — 3008F BODY MASS INDEX DOCD: CPT | Mod: CPTII,S$GLB,, | Performed by: INTERNAL MEDICINE

## 2018-05-24 PROCEDURE — 3079F DIAST BP 80-89 MM HG: CPT | Mod: CPTII,S$GLB,, | Performed by: INTERNAL MEDICINE

## 2018-05-24 PROCEDURE — 74178 CT ABD&PLV WO CNTR FLWD CNTR: CPT | Mod: 26,,, | Performed by: RADIOLOGY

## 2018-05-24 PROCEDURE — A4216 STERILE WATER/SALINE, 10 ML: HCPCS | Mod: PO | Performed by: INTERNAL MEDICINE

## 2018-05-24 PROCEDURE — 99214 OFFICE O/P EST MOD 30 MIN: CPT | Mod: 25,S$GLB,, | Performed by: INTERNAL MEDICINE

## 2018-05-24 PROCEDURE — 71260 CT THORAX DX C+: CPT | Mod: 26,,, | Performed by: RADIOLOGY

## 2018-05-24 PROCEDURE — 71260 CT THORAX DX C+: CPT | Mod: TC,PO

## 2018-05-24 PROCEDURE — 25500020 PHARM REV CODE 255: Mod: PO | Performed by: INTERNAL MEDICINE

## 2018-05-24 PROCEDURE — 63600175 PHARM REV CODE 636 W HCPCS: Mod: PO | Performed by: INTERNAL MEDICINE

## 2018-05-24 PROCEDURE — 25000003 PHARM REV CODE 250: Mod: PO | Performed by: INTERNAL MEDICINE

## 2018-05-24 PROCEDURE — 74178 CT ABD&PLV WO CNTR FLWD CNTR: CPT | Mod: TC,PO

## 2018-05-24 PROCEDURE — 3074F SYST BP LT 130 MM HG: CPT | Mod: CPTII,S$GLB,, | Performed by: INTERNAL MEDICINE

## 2018-05-24 RX ORDER — SODIUM CHLORIDE 0.9 % (FLUSH) 0.9 %
10 SYRINGE (ML) INJECTION
Status: COMPLETED | OUTPATIENT
Start: 2018-05-24 | End: 2018-05-24

## 2018-05-24 RX ORDER — HEPARIN 100 UNIT/ML
500 SYRINGE INTRAVENOUS
Status: COMPLETED | OUTPATIENT
Start: 2018-05-24 | End: 2018-05-24

## 2018-05-24 RX ORDER — SODIUM CHLORIDE 0.9 % (FLUSH) 0.9 %
10 SYRINGE (ML) INJECTION
Status: CANCELLED | OUTPATIENT
Start: 2018-05-24

## 2018-05-24 RX ORDER — HEPARIN 100 UNIT/ML
500 SYRINGE INTRAVENOUS
Status: CANCELLED | OUTPATIENT
Start: 2018-05-24

## 2018-05-24 RX ADMIN — IOHEXOL 30 ML: 350 INJECTION, SOLUTION INTRAVENOUS at 12:05

## 2018-05-24 RX ADMIN — IOHEXOL 100 ML: 350 INJECTION, SOLUTION INTRAVENOUS at 01:05

## 2018-05-24 RX ADMIN — HEPARIN 500 UNITS: 100 SYRINGE at 02:05

## 2018-05-24 RX ADMIN — SODIUM CHLORIDE, PRESERVATIVE FREE 10 ML: 5 INJECTION INTRAVENOUS at 12:05

## 2018-05-24 NOTE — PATIENT INSTRUCTIONS
[facility]  [address]  [phone] phone     [phone] fax  Hours of Operation: Monday- Friday 8:00am- 5:00pm  After hours phone  [phone]  Hematology / Oncology Physicians on call      Dr. Mark Henderson                        Please call with any concerns regarding your appointment today.

## 2018-05-24 NOTE — PROGRESS NOTES
Subjective:       Patient ID: Janine Souza is a 58 y.o. female.    Chief Complaint: Follow-up; Results; Cancer; and Anemia    HPI a 58-year-old female history of metastatic neuroendocrine tumor patient returns for review reportedly concerned over [facility]    Past Medical History:   Diagnosis Date    Acute headache 2016    Anemia     Atrial fibrillation, persistent     Bronchitis     CHF (congestive heart failure)     Diabetes mellitus, type 2     Hypertension     Malignant poorly differentiated neuroendocrine carcinoma 2016    Secondary neuroendocrine tumor of liver 2016     Family History   Problem Relation Age of Onset    Diabetes type II Mother     Prostate cancer Father     Breast cancer Paternal Aunt         breast    Breast cancer Maternal Aunt         breast     Social History     Social History    Marital status:      Spouse name: N/A    Number of children: N/A    Years of education: N/A     Occupational History    Not on file.     Social History Main Topics    Smoking status: Never Smoker    Smokeless tobacco: Never Used    Alcohol use No    Drug use: No    Sexual activity: Not on file     Other Topics Concern    Not on file     Social History Narrative    No narrative on file     Past Surgical History:   Procedure Laterality Date    ADENOIDECTOMY      CARDIAC ELECTROPHYSIOLOGY MAPPING AND ABLATION      CARDIAC SURGERY       SECTION      ERCP      TONSILLECTOMY      uvulopalatopharygoplasty         Labs:  Lab Results   Component Value Date    WBC 8.30 2018    HGB 10.3 (L) 2018    HCT 32.0 (L) 2018    MCV 98 2018     (L) 2018     BMP  Lab Results   Component Value Date     2018    K 4.5 2018     2018    CO2 25 2018    BUN 23 (H) 2018    CREATININE 1.2 2018    CALCIUM 10.0 2018    ANIONGAP 12 2018    ESTGFRAFRICA 58  (A) 04/27/2018    EGFRNONAA 50 (A) 04/27/2018     Lab Results   Component Value Date    ALT 13 04/27/2018    AST 11 04/27/2018    ALKPHOS 61 04/27/2018    BILITOT 0.4 04/27/2018       Lab Results   Component Value Date    IRON 55 04/27/2018    TIBC 388 04/27/2018    FERRITIN 1,061 (H) 04/27/2018     No results found for: [id number]  No results found for: FOLATE  Lab Results   Component Value Date    TSH 3.423 05/22/2018         Review of Systems   Constitutional: Positive for activity change and fatigue. Negative for appetite change, chills, diaphoresis, fever and unexpected weight change.   HENT: Negative for congestion, dental problem, drooling, ear discharge, ear pain, facial swelling, hearing loss, mouth sores, nosebleeds, postnasal drip, rhinorrhea, sinus pressure, sneezing, sore throat, tinnitus, trouble swallowing and voice change.    Eyes: Negative for photophobia, pain, discharge, redness, itching and visual disturbance.   Respiratory: Negative for cough, choking, chest tightness, shortness of breath, wheezing and stridor.    Cardiovascular: Negative for chest pain, palpitations and leg swelling.   Gastrointestinal: Negative for abdominal distention, abdominal pain, anal bleeding, blood in stool, constipation, diarrhea, nausea, rectal pain and vomiting.   Endocrine: Negative for cold intolerance, heat intolerance, polydipsia, polyphagia and polyuria.   Genitourinary: Negative for decreased urine volume, difficulty urinating, dyspareunia, dysuria, enuresis, flank pain, frequency, genital sores, hematuria, menstrual problem, pelvic pain, urgency, vaginal bleeding, vaginal discharge and vaginal pain.   Musculoskeletal: Negative for arthralgias, back pain, gait problem, joint swelling, myalgias, neck pain and neck stiffness.   Skin: Negative for color change, pallor and rash.   Allergic/Immunologic: Negative for environmental allergies, food allergies and immunocompromised state.   Neurological: Positive for  weakness. Negative for dizziness, tremors, seizures, syncope, facial asymmetry, speech difficulty, light-headedness, numbness and headaches.   Hematological: Negative for adenopathy. Does not bruise/bleed easily.   Psychiatric/Behavioral: Positive for dysphoric mood. Negative for agitation, behavioral problems, confusion, decreased concentration, hallucinations, self-injury, sleep disturbance and suicidal ideas. The patient is nervous/anxious. The patient is not hyperactive.        Objective:      Physical Exam   Constitutional: She is oriented to person, place, and time. She appears well-developed and well-nourished. She appears distressed.   HENT:   Head: Normocephalic and atraumatic.   Right Ear: External ear normal.   Left Ear: External ear normal.   Nose: Nose normal. Right sinus exhibits no maxillary sinus tenderness and no frontal sinus tenderness. Left sinus exhibits no maxillary sinus tenderness and no frontal sinus tenderness.   Mouth/Throat: Oropharynx is clear and moist. No oropharyngeal exudate.   Eyes: Conjunctivae, EOM and lids are normal. Pupils are equal, round, and reactive to light. Right eye exhibits no discharge. Left eye exhibits no discharge. Right conjunctiva is not injected. Right conjunctiva has no hemorrhage. Left conjunctiva is not injected. Left conjunctiva has no hemorrhage. No scleral icterus.   Neck: Normal range of motion. Neck supple. No JVD present. No tracheal deviation present. No thyromegaly present.   Palpation and MediPort reveals no evidence of any tenderness no swelling no abnormality noted   Cardiovascular: Normal rate and regular rhythm.    Pulmonary/Chest: Effort normal. No stridor. No respiratory distress. She exhibits no tenderness.   Abdominal: Soft. She exhibits no distension and no mass. There is no splenomegaly or hepatomegaly. There is no tenderness. There is no rebound.   Musculoskeletal: Normal range of motion. She exhibits no edema or tenderness.    Lymphadenopathy:     She has no cervical adenopathy.     She has no axillary adenopathy.        Right: No supraclavicular adenopathy present.        Left: No supraclavicular adenopathy present.   Neurological: She is alert and oriented to person, place, and time. No cranial nerve deficit. Coordination normal.   Skin: Skin is dry. No rash noted. She is not diaphoretic. No erythema.   Psychiatric: She has a normal mood and affect. Her behavior is normal. Judgment and thought content normal.   Vitals reviewed.          Assessment:      1. Malignant poorly differentiated neuroendocrine carcinoma    2. Iron deficiency anemia due to chronic blood loss    3. Secondary neuroendocrine tumor of liver    4. 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

## 2018-05-24 NOTE — TELEPHONE ENCOUNTER
----- Message from Kalyan Flores MD sent at 5/24/2018  9:52 AM CDT -----  Please contact her and see if she is feeling any better otherwise I probably should see her

## 2018-05-24 NOTE — TELEPHONE ENCOUNTER
Pt is still having chills/fever/still has a lump right side top of rib cage on port side. She will come in today for check up.

## 2018-05-24 NOTE — NURSING
"Pt here for Mediport Flush. Right chestwall mediport accessed with a 20g 1" walters via sterile technique.  Excellent blood return noted.  Flushed with 10ml NS and 5 ml heparin solution.  Needle D/C, site without redness, swelling, or drainage noted.  Dressing applied.  Patient tolerated well.  Patient to return to clinic as scheduled.    "

## 2018-05-27 ENCOUNTER — HOSPITAL ENCOUNTER (EMERGENCY)
Facility: HOSPITAL | Age: 58
Discharge: LEFT BEFORE DISCHARGE | End: 2018-05-27
Attending: EMERGENCY MEDICINE
Payer: MEDICARE

## 2018-05-27 VITALS
WEIGHT: 227 LBS | RESPIRATION RATE: 35 BRPM | TEMPERATURE: 99 F | DIASTOLIC BLOOD PRESSURE: 87 MMHG | OXYGEN SATURATION: 96 % | HEART RATE: 140 BPM | BODY MASS INDEX: 36.48 KG/M2 | SYSTOLIC BLOOD PRESSURE: 128 MMHG | HEIGHT: 66 IN

## 2018-05-27 DIAGNOSIS — R60.9 EDEMA: ICD-10-CM

## 2018-05-27 DIAGNOSIS — I48.91 ATRIAL FIBRILLATION WITH RAPID VENTRICULAR RESPONSE: Primary | ICD-10-CM

## 2018-05-27 DIAGNOSIS — R10.10 PAIN OF UPPER ABDOMEN: ICD-10-CM

## 2018-05-27 DIAGNOSIS — I48.91 ATRIAL FIBRILLATION: ICD-10-CM

## 2018-05-27 DIAGNOSIS — R17 ELEVATED BILIRUBIN: ICD-10-CM

## 2018-05-27 LAB
ALBUMIN SERPL BCP-MCNC: 3.3 G/DL
ALP SERPL-CCNC: 120 U/L
ALT SERPL W/O P-5'-P-CCNC: 42 U/L
ANION GAP SERPL CALC-SCNC: 12 MMOL/L
AST SERPL-CCNC: 28 U/L
BASOPHILS # BLD AUTO: 0.02 K/UL
BASOPHILS NFR BLD: 0.2 %
BILIRUB SERPL-MCNC: 2.8 MG/DL
BILIRUB UR QL STRIP: NEGATIVE
BNP SERPL-MCNC: 146 PG/ML
BUN SERPL-MCNC: 23 MG/DL
CALCIUM SERPL-MCNC: 9.6 MG/DL
CHLORIDE SERPL-SCNC: 100 MMOL/L
CLARITY UR: CLEAR
CO2 SERPL-SCNC: 24 MMOL/L
COLOR UR: YELLOW
CREAT SERPL-MCNC: 1.3 MG/DL
DIFFERENTIAL METHOD: ABNORMAL
EOSINOPHIL # BLD AUTO: 0.1 K/UL
EOSINOPHIL NFR BLD: 0.7 %
ERYTHROCYTE [DISTWIDTH] IN BLOOD BY AUTOMATED COUNT: 15.2 %
EST. GFR  (AFRICAN AMERICAN): 53 ML/MIN/1.73 M^2
EST. GFR  (NON AFRICAN AMERICAN): 46 ML/MIN/1.73 M^2
GLUCOSE SERPL-MCNC: 432 MG/DL
GLUCOSE UR QL STRIP: ABNORMAL
HCT VFR BLD AUTO: 30.1 %
HGB BLD-MCNC: 9.8 G/DL
HGB UR QL STRIP: NEGATIVE
KETONES UR QL STRIP: NEGATIVE
LEUKOCYTE ESTERASE UR QL STRIP: NEGATIVE
LIPASE SERPL-CCNC: 11 U/L
LYMPHOCYTES # BLD AUTO: 1.3 K/UL
LYMPHOCYTES NFR BLD: 13.1 %
MCH RBC QN AUTO: 31.8 PG
MCHC RBC AUTO-ENTMCNC: 32.6 G/DL
MCV RBC AUTO: 98 FL
MONOCYTES # BLD AUTO: 1.1 K/UL
MONOCYTES NFR BLD: 10.8 %
NEUTROPHILS # BLD AUTO: 7.5 K/UL
NEUTROPHILS NFR BLD: 75.2 %
NITRITE UR QL STRIP: NEGATIVE
PH UR STRIP: 6 [PH] (ref 5–8)
PLATELET # BLD AUTO: 162 K/UL
PMV BLD AUTO: 10.2 FL
POCT GLUCOSE: 413 MG/DL (ref 70–110)
POTASSIUM SERPL-SCNC: 4 MMOL/L
PROT SERPL-MCNC: 8.9 G/DL
PROT UR QL STRIP: NEGATIVE
RBC # BLD AUTO: 3.08 M/UL
SODIUM SERPL-SCNC: 136 MMOL/L
SP GR UR STRIP: <=1.005 (ref 1–1.03)
TROPONIN I SERPL DL<=0.01 NG/ML-MCNC: 0.01 NG/ML
URN SPEC COLLECT METH UR: ABNORMAL
UROBILINOGEN UR STRIP-ACNC: NEGATIVE EU/DL
WBC # BLD AUTO: 9.95 K/UL

## 2018-05-27 PROCEDURE — 96376 TX/PRO/DX INJ SAME DRUG ADON: CPT

## 2018-05-27 PROCEDURE — 25500020 PHARM REV CODE 255: Performed by: EMERGENCY MEDICINE

## 2018-05-27 PROCEDURE — 85025 COMPLETE CBC W/AUTO DIFF WBC: CPT

## 2018-05-27 PROCEDURE — 84484 ASSAY OF TROPONIN QUANT: CPT

## 2018-05-27 PROCEDURE — 83690 ASSAY OF LIPASE: CPT

## 2018-05-27 PROCEDURE — 81003 URINALYSIS AUTO W/O SCOPE: CPT

## 2018-05-27 PROCEDURE — 93010 ELECTROCARDIOGRAM REPORT: CPT | Mod: ,,, | Performed by: INTERNAL MEDICINE

## 2018-05-27 PROCEDURE — 63600175 PHARM REV CODE 636 W HCPCS: Performed by: EMERGENCY MEDICINE

## 2018-05-27 PROCEDURE — 99285 EMERGENCY DEPT VISIT HI MDM: CPT | Mod: 25

## 2018-05-27 PROCEDURE — 80053 COMPREHEN METABOLIC PANEL: CPT

## 2018-05-27 PROCEDURE — 93005 ELECTROCARDIOGRAM TRACING: CPT

## 2018-05-27 PROCEDURE — 82962 GLUCOSE BLOOD TEST: CPT

## 2018-05-27 PROCEDURE — 25000003 PHARM REV CODE 250: Performed by: EMERGENCY MEDICINE

## 2018-05-27 PROCEDURE — 96375 TX/PRO/DX INJ NEW DRUG ADDON: CPT

## 2018-05-27 PROCEDURE — 96361 HYDRATE IV INFUSION ADD-ON: CPT

## 2018-05-27 PROCEDURE — 96365 THER/PROPH/DIAG IV INF INIT: CPT

## 2018-05-27 PROCEDURE — 83880 ASSAY OF NATRIURETIC PEPTIDE: CPT

## 2018-05-27 PROCEDURE — 96374 THER/PROPH/DIAG INJ IV PUSH: CPT

## 2018-05-27 RX ORDER — HEPARIN 100 UNIT/ML
5 SYRINGE INTRAVENOUS
Status: COMPLETED | OUTPATIENT
Start: 2018-05-27 | End: 2018-05-27

## 2018-05-27 RX ORDER — FUROSEMIDE 10 MG/ML
40 INJECTION INTRAMUSCULAR; INTRAVENOUS
Status: COMPLETED | OUTPATIENT
Start: 2018-05-27 | End: 2018-05-27

## 2018-05-27 RX ORDER — METOPROLOL TARTRATE 1 MG/ML
5 INJECTION, SOLUTION INTRAVENOUS EVERY 5 MIN PRN
Status: COMPLETED | OUTPATIENT
Start: 2018-05-27 | End: 2018-05-27

## 2018-05-27 RX ORDER — DILTIAZEM HCL 1 MG/ML
10 INJECTION, SOLUTION INTRAVENOUS CONTINUOUS
Status: DISCONTINUED | OUTPATIENT
Start: 2018-05-27 | End: 2018-05-27 | Stop reason: HOSPADM

## 2018-05-27 RX ADMIN — Medication 10 MG/HR: at 01:05

## 2018-05-27 RX ADMIN — FUROSEMIDE 40 MG: 10 INJECTION, SOLUTION INTRAMUSCULAR; INTRAVENOUS at 12:05

## 2018-05-27 RX ADMIN — METOPROLOL TARTRATE 5 MG: 5 INJECTION, SOLUTION INTRAVENOUS at 10:05

## 2018-05-27 RX ADMIN — DICYCLOMINE HYDROCHLORIDE 50 ML: 10 SOLUTION ORAL at 09:05

## 2018-05-27 RX ADMIN — HEPARIN 500 UNITS: 100 SYRINGE at 02:05

## 2018-05-27 RX ADMIN — SODIUM CHLORIDE 1000 ML: 0.9 INJECTION, SOLUTION INTRAVENOUS at 12:05

## 2018-05-27 RX ADMIN — SODIUM CHLORIDE 500 ML: 0.9 INJECTION, SOLUTION INTRAVENOUS at 09:05

## 2018-05-27 RX ADMIN — IOHEXOL 75 ML: 350 INJECTION, SOLUTION INTRAVENOUS at 11:05

## 2018-05-27 NOTE — ED PROVIDER NOTES
"SCRIBE #1 NOTE: I, Torri Janna, am scribing for, and in the presence of, Ramiro Alamo MD. I have scribed the entire note.      History      Chief Complaint   Patient presents with    Abdominal Pain     with dark urine, hx bile duct stent, reports new onset jaundice.        Review of patient's allergies indicates:   Allergen Reactions    Pcn [penicillins] Other (See Comments), Hives and Swelling     As a child had an unknown reaction.  Has not taken since  As a child had an unknown reaction.  Has not taken since        HPI   HPI    5/27/2018, 10:06 AM   History obtained from the patient      History of Present Illness: Janine Souza is a 58 y.o. female patient who presents to the Emergency Department for abdominal pain which onset gradually last night at 11 pm. Pt also c/o dark urine, fatigue, SOB, intermittent fevers, and chills. Pt adds that her stool is white in color. Pt has a PMHx of liver tumors and a bile duct stent. Pt received chemotherapy Jan - May 2016. Pt mentions having a thyroid test done 5 days ago after a nurse noticed "bulge in her neck"; results of the thyroid test were normal. Symptoms are constant and moderate in severity. Pt sees Dr. Flores and reports getting a CT scan every 3 months. No mitigating or exacerbating factors reported. Patient denies any nausea, vomiting, diarrhea, constipation, headache, lightheadedness, and all other sxs at this time. Prior Tx include Tylenol for intermittent fevers, with slight relief. No further complaints or concerns at this time.         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 "       Past Surgical History:  Past Surgical History:   Procedure Laterality Date    ADENOIDECTOMY      CARDIAC ELECTROPHYSIOLOGY MAPPING AND ABLATION      CARDIAC SURGERY       SECTION      ERCP      TONSILLECTOMY      uvulopalatopharygoplasty           Family History:  Family History   Problem Relation Age of Onset    Diabetes type II Mother     Prostate cancer Father     Breast cancer Paternal Aunt         breast    Breast cancer Maternal Aunt         breast       Social History:  Social History     Social History Main Topics    Smoking status: Never Smoker    Smokeless tobacco: Never Used    Alcohol use No    Drug use: No    Sexual activity: Unknown       ROS   Review of Systems   Constitutional: Positive for chills, fatigue and fever. Negative for diaphoresis.   HENT: Negative for congestion and sore throat.    Eyes: Negative for pain.        (+) jaundice   Respiratory: Positive for shortness of breath. Negative for cough, choking, chest tightness, wheezing and stridor.    Cardiovascular: Negative for chest pain.   Gastrointestinal: Positive for abdominal pain. Negative for constipation, diarrhea, nausea, rectal pain and vomiting.        (+) white stool   Genitourinary: Negative for difficulty urinating, dysuria, frequency and hematuria.        (+) dark urine   Musculoskeletal: Negative for back pain.   Skin: Negative for rash.   Neurological: Negative for weakness, light-headedness, numbness and headaches.   Hematological: Does not bruise/bleed easily.       Physical Exam      Initial Vitals [18 0848]   BP Pulse Resp Temp SpO2   (!) 132/90 (!) 146 20 98 °F (36.7 °C) (!) 94 %      MAP       104          Physical Exam  Nursing Notes and Vital Signs Reviewed.  Constitutional: Patient is in mild distress. Well-developed and well-nourished.  Head: Atraumatic. Normocephalic.  Eyes: PERRL. EOM intact. Conjunctivae are not pale. No scleral icterus.  ENT: Mucous membranes are moist.  "Oropharynx is clear and symmetric.    Neck: Supple. Full ROM. No lymphadenopathy.  Cardiovascular: Tachycardic at 150bpm. Regular rhythm. No murmurs, rubs, or gallops. Distal pulses are 2+ and symmetric.  Pulmonary/Chest: No respiratory distress. Clear to auscultation bilaterally. No wheezing or rales.  Abdominal: Soft. Slightly distended.  Epigastric & bilateral upper quadrant tenderness.  No rebound, guarding, or rigidity. Good bowel sounds.  Genitourinary: No CVA tenderness  Musculoskeletal: Moves all extremities. No obvious deformities. Trace pitting edema. No calf tenderness.  Skin: Warm and dry.  Neurological:  Alert, awake, and appropriate.  Normal speech.  No acute focal neurological deficits are appreciated.  Psychiatric: Normal affect. Good eye contact. Appropriate in content.    ED Course    Procedures  ED Vital Signs:  Vitals:    05/27/18 0848 05/27/18 0906 05/27/18 0931 05/27/18 1011   BP: (!) 132/90  124/83 (!) 138/92   Pulse: (!) 146 (!) 133 (!) 143 (!) 144   Resp: 20  (!) 24 (!) 29   Temp: 98 °F (36.7 °C)      TempSrc: Oral      SpO2: (!) 94%  95% (!) 94%   Weight: 103 kg (227 lb)      Height: 5' 6" (1.676 m)       05/27/18 1016 05/27/18 1023 05/27/18 1204 05/27/18 1219   BP: 126/85 123/83 126/81    Pulse: (!) 140 (!) 130 (!) 135    Resp: (!) 44 (!) 27 (!) 41    Temp:    98.5 °F (36.9 °C)   TempSrc:       SpO2: (!) 93% (!) 93% (!) 94%    Weight:       Height:        05/27/18 1231 05/27/18 1331   BP: 125/88 128/87   Pulse: (!) 133 (!) 140   Resp: (!) 22 (!) 35   Temp:     TempSrc:     SpO2: (!) 93% 96%   Weight:     Height:         Abnormal Lab Results:  Labs Reviewed   CBC W/ AUTO DIFFERENTIAL - Abnormal; Notable for the following:        Result Value    RBC 3.08 (*)     Hemoglobin 9.8 (*)     Hematocrit 30.1 (*)     MCH 31.8 (*)     RDW 15.2 (*)     Mono # 1.1 (*)     Gran% 75.2 (*)     Lymph% 13.1 (*)     All other components within normal limits   COMPREHENSIVE METABOLIC PANEL - Abnormal; Notable " for the following:     Glucose 432 (*)     BUN, Bld 23 (*)     Total Protein 8.9 (*)     Albumin 3.3 (*)     Total Bilirubin 2.8 (*)     eGFR if  53 (*)     eGFR if non  46 (*)     All other components within normal limits   URINALYSIS - Abnormal; Notable for the following:     Specific Gravity, UA <=1.005 (*)     Glucose, UA 2+ (*)     All other components within normal limits   B-TYPE NATRIURETIC PEPTIDE - Abnormal; Notable for the following:      (*)     All other components within normal limits   POCT GLUCOSE - Abnormal; Notable for the following:     POCT Glucose 413 (*)     All other components within normal limits   LIPASE   TROPONIN I        All Lab Results:  Results for orders placed or performed during the hospital encounter of 05/27/18   CBC W/ AUTO DIFFERENTIAL   Result Value Ref Range    WBC 9.95 3.90 - 12.70 K/uL    RBC 3.08 (L) 4.00 - 5.40 M/uL    Hemoglobin 9.8 (L) 12.0 - 16.0 g/dL    Hematocrit 30.1 (L) 37.0 - 48.5 %    MCV 98 82 - 98 fL    MCH 31.8 (H) 27.0 - 31.0 pg    MCHC 32.6 32.0 - 36.0 g/dL    RDW 15.2 (H) 11.5 - 14.5 %    Platelets 162 150 - 350 K/uL    MPV 10.2 9.2 - 12.9 fL    Gran # (ANC) 7.5 1.8 - 7.7 K/uL    Lymph # 1.3 1.0 - 4.8 K/uL    Mono # 1.1 (H) 0.3 - 1.0 K/uL    Eos # 0.1 0.0 - 0.5 K/uL    Baso # 0.02 0.00 - 0.20 K/uL    Gran% 75.2 (H) 38.0 - 73.0 %    Lymph% 13.1 (L) 18.0 - 48.0 %    Mono% 10.8 4.0 - 15.0 %    Eosinophil% 0.7 0.0 - 8.0 %    Basophil% 0.2 0.0 - 1.9 %    Differential Method Automated    Comp. Metabolic Panel   Result Value Ref Range    Sodium 136 136 - 145 mmol/L    Potassium 4.0 3.5 - 5.1 mmol/L    Chloride 100 95 - 110 mmol/L    CO2 24 23 - 29 mmol/L    Glucose 432 (H) 70 - 110 mg/dL    BUN, Bld 23 (H) 6 - 20 mg/dL    Creatinine 1.3 0.5 - 1.4 mg/dL    Calcium 9.6 8.7 - 10.5 mg/dL    Total Protein 8.9 (H) 6.0 - 8.4 g/dL    Albumin 3.3 (L) 3.5 - 5.2 g/dL    Total Bilirubin 2.8 (H) 0.1 - 1.0 mg/dL    Alkaline Phosphatase 120  55 - 135 U/L    AST 28 10 - 40 U/L    ALT 42 10 - 44 U/L    Anion Gap 12 8 - 16 mmol/L    eGFR if African American 53 (A) >60 mL/min/1.73 m^2    eGFR if non African American 46 (A) >60 mL/min/1.73 m^2   Lipase   Result Value Ref Range    Lipase 11 4 - 60 U/L   Urinalysis - Clean Catch   Result Value Ref Range    Specimen UA Urine, Clean Catch     Color, UA Yellow Yellow, Straw, Jacquelin    Appearance, UA Clear Clear    pH, UA 6.0 5.0 - 8.0    Specific Gravity, UA <=1.005 (A) 1.005 - 1.030    Protein, UA Negative Negative    Glucose, UA 2+ (A) Negative    Ketones, UA Negative Negative    Bilirubin (UA) Negative Negative    Occult Blood UA Negative Negative    Nitrite, UA Negative Negative    Urobilinogen, UA Negative <2.0 EU/dL    Leukocytes, UA Negative Negative   Troponin I   Result Value Ref Range    Troponin I 0.014 0.000 - 0.026 ng/mL   Brain natriuretic peptide   Result Value Ref Range     (H) 0 - 99 pg/mL   POCT glucose   Result Value Ref Range    POCT Glucose 413 (H) 70 - 110 mg/dL         Imaging Results:  Imaging Results          US Abdomen Limited (Final result)  Result time 05/27/18 13:02:23    Final result by Alfredo Todd III, MD (05/27/18 13:02:23)                 Impression:      Enlarge, Fatty liver.  Internal biliary stent in place.  No significant bile duct dilation identified.      Electronically signed by: Alfredo Todd MD  Date:    05/27/2018  Time:    13:02             Narrative:    EXAMINATION:  US ABDOMEN LIMITED    CLINICAL HISTORY:  Abdominal pain/elevated bilirubin/history liver tumors;    TECHNIQUE:  Limited ultrasound of the right upper quadrant of the abdomen (including pancreas, liver, gallbladder, common bile duct, and right kidney) was performed.    COMPARISON:  Abdominal CT 05/27/2018    FINDINGS:  Liver: Enlarged measuring 21.6 cm. There is diffuse fatty infiltration of the liver.  No focal hepatic mass identified.    Gallbladder: The gallbladder is not discretely visualized  bowel ultrasound but was unremarkable on abdominal CT from earlier today.    Biliary system: Echogenic biliary stent and associated shadowing is noted which corresponds to the echogenic structure described by the ultrasound technologist.  There is no significant intrahepatic or extrahepatic biliary ductal dilation.  The common bile duct measures 5.5 mm in diameter.    Right kidney: Normal in size with no hydronephrosis, measuring 10.2 cm.    Miscellaneous: No upper abdominal ascites.  The pancreas is grossly unremarkable.                               X-Ray Chest PA And Lateral (Final result)  Result time 05/27/18 12:02:50    Final result by Alfredo Todd III, MD (05/27/18 12:02:50)                 Impression:      Stable cardiomegaly.  No acute pulmonary disease identified..      Electronically signed by: Alfredo Todd MD  Date:    05/27/2018  Time:    12:02             Narrative:    EXAMINATION:  XR CHEST PA AND LATERAL    CLINICAL HISTORY:  Edema, unspecified    COMPARISON:  February 2, 2016    FINDINGS:  Sternotomy changes are again noted.  There is persistent cardiomegaly without evidence of overt alveolar edema.  Right MediPort catheter remains well positioned.  The lungs appear clear of active disease.  Minor right basilar discoid atelectasis is noted.  No acute osseous abnormality identified.                               CT Abdomen Pelvis With Contrast (Final result)  Result time 05/27/18 11:47:09    Final result by Alfredo Todd III, MD (05/27/18 11:47:09)                 Impression:      Satisfactory position of the biliary stent.  No significant bile duct dilation identified.    Stable enlarged, fatty, lobular liver.  The previously described low-density liver lesions are not well visualized on the current exam.  Small amount of free fluid in the pelvis has mildly increased.  No other acute disease identified in the abdomen or pelvis.  Increasing bibasilar discoid atelectasis.      Electronically  signed by: Alfredo Todd MD  Date:    05/27/2018  Time:    11:47             Narrative:    EXAMINATION:  CT ABDOMEN PELVIS WITH CONTRAST    CLINICAL HISTORY:  Abdominal pain, unspecified; jaundice.  History of neuroendocrine tumor.    TECHNIQUE:  Routine abdominal CT performed after the IV administration of 75 mL Omnipaque 350. Coronal and sagittal images obtained. All CT scans at this facility use dose modulation, iterative reconstruction, and/or weight based dosing when appropriate to reduce radiation dose to as low as reasonably achievable.    COMPARISON:  Abdominal CT 05/24/2018    FINDINGS:  No acute osseous abnormality or suspicious bone lesion identified.  There are increasing multifocal fan-shaped in linear opacities in bilateral lung bases suggesting discoid atelectasis.  Stable cardiomegaly.    There is stable liver enlargement, diffuse fatty infiltration and mild lobular surface contour of the liver.  The previously described low-density liver lesions seen on prior multiphase abdominal CT are not well visualized on the current single phase abdominal CT.  No focal liver masses identified on the current exam.  The portal vein appears patent.  The biliary stent appears well positioned.  A small focus of pneumobilia is noted.  There is no significant bile duct dilation.  The gallbladder is unremarkable.    There is no evidence of bowel obstruction, acute inflammatory process or other acute bowel pathology in the abdomen. The stomach is within normal limits.    The pancreas, spleen and adrenals are unremarkable. No aortic aneurysm is identified. The kidneys and visualized ureters are unremarkable.  A small amount of free fluid in the pelvic cul-de-sac has mildly increased since prior exam.  No free air or pathologic adenopathy is identified.                               The EKG was ordered, reviewed, and independently interpreted by the ED provider.  Interpretation time: 10:17  Rate: 134 BPM  Rhythm: atrial  fibrillation w/ RVR  Interpretation: Nonspecific ST/T wave abnormalities. No STEMI.         The Emergency Provider reviewed the vital signs and test results, which are outlined above.    ED Discussion     10:39 AM: Re-evaluated pt. Pt states pain is improved after GI cocktail.      1:51 PM: Pt is A&O x3, appropriate and competent at this time. Reccomended her to be admitted. Offered to speak w/ her lety who is a medsurg nurse upstairs and she declined. Pt voices desire to leave hospital she refuses admission to the hospital. D/w pt in length need for further evaluation and treatment due to sx/s and abnormal VS/labs. Pt declines any further evaluation or tx at this time. All risks, including worsening sx, permanent bodily harm and death, were discussed in length. Pt acknowledges all risk at this time and agrees to sign AMA form. Pt given RTER instructions. All questions and concerns addressed at this time. Pt leaving AMA.         ED Medication(s):  Medications   diltiaZEM 125 mg in D5W 125 mL infusion (12.5 mg/hr Intravenous Rate/Dose Change 5/27/18 1430)   GI cocktail (mylanta 30 mL, lidocaine 2 % viscous 10 mL, dicyclomine 10 mL) 50 mL (50 mLs Oral Given 5/27/18 0921)   sodium chloride 0.9% bolus 500 mL (0 mLs Intravenous Stopped 5/27/18 1221)   metoprolol injection 5 mg (5 mg Intravenous Given 5/27/18 1022)   omnipaque 350 iohexol 75 mL (75 mLs Intravenous Given 5/27/18 1121)   sodium chloride 0.9% bolus 1,000 mL (0 mLs Intravenous Stopped 5/27/18 1316)   furosemide injection 40 mg (40 mg Intravenous Given 5/27/18 1219)   heparin, porcine (PF) 100 unit/mL injection flush 500 Units (500 Units Intravenous Given 5/27/18 1403)       Discharge Medication List as of 5/27/2018  1:52 PM          Follow-up Information     Charles Lopez MD. Schedule an appointment as soon as possible for a visit in 2 days.    Specialty:  Family Medicine  Why:  Follow-up with her primary care doctor the next 2-3 days for recheck.  Return to  the emergency department for any worsening signs or symptoms.  Contact information:  [address] [facility] [address]  [phone]                     Medical Decision Making    Medical Decision Making:   Clinical Tests:   Lab Tests: Ordered and Reviewed  Radiological Study: Ordered and Reviewed  Medical Tests: Ordered and Reviewed           Scribe Attestation:   Scribe #1: I performed the above scribed service and the documentation accurately describes the services I performed. I attest to the accuracy of the note.    Attending:   Physician Attestation Statement for Scribe #1: I, Ramiro Alamo MD, personally performed the services described in this documentation, as scribed by Torri Saldivar, in my presence, and it is both accurate and complete.          Clinical Impression       ICD-10-CM ICD-9-CM   1. Atrial fibrillation with rapid ventricular response I48.91 427.31   2. Atrial fibrillation I48.91 427.31   3. Edema R60.9 782.3   4. Elevated bilirubin R17 277.4   5. Pain of upper abdomen R10.10 789.09       Disposition:   Disposition: AMA  Condition: Stable         Ramiro Alamo MD  05/29/18 7824

## 2018-05-27 NOTE — ED NOTES
US at the bedside. Patient resting in bed, able to make needs known. No acute distress noted. Cart in low position, side rails up x 2 and call bell in reach

## 2018-05-31 ENCOUNTER — PATIENT MESSAGE (OUTPATIENT)
Dept: HEMATOLOGY/ONCOLOGY | Facility: CLINIC | Age: 58
End: 2018-05-31

## 2018-05-31 ENCOUNTER — TELEPHONE (OUTPATIENT)
Dept: HEMATOLOGY/ONCOLOGY | Facility: CLINIC | Age: 58
End: 2018-05-31

## 2018-06-01 ENCOUNTER — PATIENT MESSAGE (OUTPATIENT)
Dept: HEMATOLOGY/ONCOLOGY | Facility: CLINIC | Age: 58
End: 2018-06-01

## 2018-06-01 ENCOUNTER — OFFICE VISIT (OUTPATIENT)
Dept: HEMATOLOGY/ONCOLOGY | Facility: CLINIC | Age: 58
End: 2018-06-01
Payer: MEDICARE

## 2018-06-01 ENCOUNTER — TELEPHONE (OUTPATIENT)
Dept: ENDOSCOPY | Facility: HOSPITAL | Age: 58
End: 2018-06-01

## 2018-06-01 ENCOUNTER — TELEPHONE (OUTPATIENT)
Dept: GASTROENTEROLOGY | Facility: CLINIC | Age: 58
End: 2018-06-01

## 2018-06-01 VITALS
DIASTOLIC BLOOD PRESSURE: 80 MMHG | TEMPERATURE: 98 F | BODY MASS INDEX: 35.43 KG/M2 | OXYGEN SATURATION: 94 % | HEIGHT: 66 IN | SYSTOLIC BLOOD PRESSURE: 120 MMHG | RESPIRATION RATE: 18 BRPM | WEIGHT: 220.44 LBS | HEART RATE: 132 BPM

## 2018-06-01 DIAGNOSIS — T85.520D MIGRATION OF BILIARY STENT, SUBSEQUENT ENCOUNTER: ICD-10-CM

## 2018-06-01 DIAGNOSIS — K83.1 BILIARY OBSTRUCTION: Primary | ICD-10-CM

## 2018-06-01 DIAGNOSIS — C7A.1 MALIGNANT POORLY DIFFERENTIATED NEUROENDOCRINE CARCINOMA: Primary | ICD-10-CM

## 2018-06-01 PROBLEM — T85.520A BILIARY STENT MIGRATION: Status: ACTIVE | Noted: 2018-06-01

## 2018-06-01 PROCEDURE — 3079F DIAST BP 80-89 MM HG: CPT | Mod: CPTII,S$GLB,, | Performed by: INTERNAL MEDICINE

## 2018-06-01 PROCEDURE — 3008F BODY MASS INDEX DOCD: CPT | Mod: CPTII,S$GLB,, | Performed by: INTERNAL MEDICINE

## 2018-06-01 PROCEDURE — 99214 OFFICE O/P EST MOD 30 MIN: CPT | Mod: S$GLB,,, | Performed by: INTERNAL MEDICINE

## 2018-06-01 PROCEDURE — 3074F SYST BP LT 130 MM HG: CPT | Mod: CPTII,S$GLB,, | Performed by: INTERNAL MEDICINE

## 2018-06-01 PROCEDURE — 99999 PR PBB SHADOW E&M-EST. PATIENT-LVL III: CPT | Mod: PBBFAC,,, | Performed by: INTERNAL MEDICINE

## 2018-06-01 NOTE — TELEPHONE ENCOUNTER
----- Message from Guy Syed MD sent at 6/1/2018 10:27 AM CDT -----  This lady has tumor in the liver and proximal bile duct. I was trying to locate who and where the ERCP with stent was performed and also the type of stent (metal vs plastic). If it is plastic, she needs a repeat ERCP and metal stents placement.     This would be better done by the [facility] team. They are coming here and they might be able to do here but we don't stock metal stents.     I will see what they say.     Thanks    ----- Message -----  From: Shelby Domingo MD  Sent: 6/1/2018  10:13 AM  To: Guy Syed MD, Kalyan Flores MD, #    Hi Guy,    This is a patient I discussed with Dr. Flores. There is concern for biliary stent occlusion can you take a look. I think you have clinic on Mondays. Can you see her Monday?    Thanks

## 2018-06-01 NOTE — PROGRESS NOTES
Subjective:       Patient ID: Janine Souza is a 58 y.o. female.    Chief Complaint: Follow-up; Results; and Pain    HPI 58-year-old female with high-grade neuroendocrine tumor completion systemic chemo patient has been in remission since patient reports intermittent pain seen in the emergency room with marked elevation in total bilirubin discomfort and right upper quadrant patient results also that she has had a change in color of her stool.    Past Medical History:   Diagnosis Date    Acute headache 2016    Anemia     Atrial fibrillation, persistent     Bronchitis     CHF (congestive heart failure)     Diabetes mellitus, type 2     Hypertension     Malignant poorly differentiated neuroendocrine carcinoma 2016    Secondary neuroendocrine tumor of liver 2016     Family History   Problem Relation Age of Onset    Diabetes type II Mother     Prostate cancer Father     Breast cancer Paternal Aunt         breast    Breast cancer Maternal Aunt         breast     Social History     Social History    Marital status:      Spouse name: N/A    Number of children: N/A    Years of education: N/A     Occupational History    Not on file.     Social History Main Topics    Smoking status: Never Smoker    Smokeless tobacco: Never Used    Alcohol use No    Drug use: No    Sexual activity: Not on file     Other Topics Concern    Not on file     Social History Narrative    No narrative on file     Past Surgical History:   Procedure Laterality Date    ADENOIDECTOMY      CARDIAC ELECTROPHYSIOLOGY MAPPING AND ABLATION      CARDIAC SURGERY       SECTION      ERCP      TONSILLECTOMY      uvulopalatopharygoplasty         Labs:  Lab Results   Component Value Date    WBC 9.95 2018    HGB 9.8 (L) 2018    HCT 30.1 (L) 2018    MCV 98 2018     2018     BMP  Lab Results   Component Value Date     2018    K 4.0 2018     2018     CO2 24 05/27/2018    BUN 23 (H) 05/27/2018    CREATININE 1.3 05/27/2018    CALCIUM 9.6 05/27/2018    ANIONGAP 12 05/27/2018    ESTGFRAFRICA 53 (A) 05/27/2018    EGFRNONAA 46 (A) 05/27/2018     Lab Results   Component Value Date    ALT 42 05/27/2018    AST 28 05/27/2018    ALKPHOS 120 05/27/2018    BILITOT 2.8 (H) 05/27/2018       Lab Results   Component Value Date    IRON 55 04/27/2018    TIBC 388 04/27/2018    FERRITIN 1,061 (H) 04/27/2018     No results found for: ECGRLINH82  No results found for: FOLATE  Lab Results   Component Value Date    TSH 3.423 05/22/2018         Review of Systems   Constitutional: Positive for activity change. Negative for appetite change, chills, diaphoresis, fatigue, fever and unexpected weight change.   HENT: Negative for congestion, dental problem, drooling, ear discharge, ear pain, facial swelling, hearing loss, mouth sores, nosebleeds, postnasal drip, rhinorrhea, sinus pressure, sneezing, sore throat, tinnitus, trouble swallowing and voice change.    Eyes: Negative for photophobia, pain, discharge, redness, itching and visual disturbance.   Respiratory: Negative for cough, choking, chest tightness, shortness of breath, wheezing and stridor.    Cardiovascular: Negative for chest pain, palpitations and leg swelling.   Gastrointestinal: Positive for abdominal pain. Negative for abdominal distention, anal bleeding, blood in stool, constipation, diarrhea, nausea, rectal pain and vomiting.   Endocrine: Negative for cold intolerance, heat intolerance, polydipsia, polyphagia and polyuria.   Genitourinary: Negative for decreased urine volume, difficulty urinating, dyspareunia, dysuria, enuresis, flank pain, frequency, genital sores, hematuria, menstrual problem, pelvic pain, urgency, vaginal bleeding, vaginal discharge and vaginal pain.   Musculoskeletal: Negative for arthralgias, back pain, gait problem, joint swelling, myalgias, neck pain and neck stiffness.   Skin: Negative for color  change, pallor and rash.   Allergic/Immunologic: Negative for environmental allergies, food allergies and immunocompromised state.   Neurological: Negative for dizziness, tremors, seizures, syncope, facial asymmetry, speech difficulty, weakness, light-headedness, numbness and headaches.   Hematological: Negative for adenopathy. Does not bruise/bleed easily.   Psychiatric/Behavioral: Positive for dysphoric mood. Negative for agitation, behavioral problems, confusion, decreased concentration, hallucinations, self-injury, sleep disturbance and suicidal ideas. The patient is nervous/anxious. The patient is not hyperactive.        Objective:      Physical Exam   Constitutional: She is oriented to person, place, and time. She appears distressed.   HENT:   Head: Normocephalic and atraumatic.   Right Ear: External ear normal.   Left Ear: External ear normal.   Nose: Nose normal. Right sinus exhibits no maxillary sinus tenderness and no frontal sinus tenderness. Left sinus exhibits no maxillary sinus tenderness and no frontal sinus tenderness.   Mouth/Throat: Oropharynx is clear and moist. No oropharyngeal exudate.   Eyes: Conjunctivae, EOM and lids are normal. Pupils are equal, round, and reactive to light. Right eye exhibits no discharge. Left eye exhibits no discharge. Right conjunctiva is not injected. Right conjunctiva has no hemorrhage. Left conjunctiva is not injected. Left conjunctiva has no hemorrhage. No scleral icterus.   Neck: Normal range of motion. Neck supple. No JVD present. No tracheal deviation present. No thyromegaly present.   Cardiovascular: Normal rate and regular rhythm.    Pulmonary/Chest: Effort normal. No stridor. No respiratory distress. She exhibits no tenderness.   Abdominal: Soft. She exhibits no distension and no mass. There is no splenomegaly or hepatomegaly. There is no tenderness. There is no rebound.   Musculoskeletal: Normal range of motion. She exhibits no edema or tenderness.    Lymphadenopathy:     She has no cervical adenopathy.     She has no axillary adenopathy.        Right: No supraclavicular adenopathy present.        Left: No supraclavicular adenopathy present.   Neurological: She is alert and oriented to person, place, and time. No cranial nerve deficit. Coordination normal.   Skin: Skin is dry. No rash noted. She is not diaphoretic. No erythema.   Psychiatric: She has a normal mood and affect. Her behavior is normal. Judgment and thought content normal.   Vitals reviewed.          Assessment:      1. Malignant poorly differentiated neuroendocrine carcinoma    2. Migration of biliary stent, subsequent encounter           Plan:   Pain suggestive of biliary stent migration last seen by GI and told to remain stent in place that was temporary in nature will contact GI to see where they wish to review for stent removal and replacement

## 2018-06-01 NOTE — TELEPHONE ENCOUNTER
Spoke with Luann in [place] and they will be contacting patient today to schedule ERCP for Tuesday.

## 2018-06-01 NOTE — TELEPHONE ENCOUNTER
----- Message from Kalyan Flores MD sent at 6/1/2018  4:26 PM CDT -----  I spoke with her I told her to stop her Xarelto for 48 hr before if he offered please recontact her that would be great thank you very much  ----- Message -----  From: Guy Syed MD  Sent: 6/1/2018   2:23 PM  To: Tracey Pozo MA, Tang Tanner MD, #    Thank you Sarah!      ----- Message -----  From: Sarah Ballesteros MD  Sent: 6/1/2018  11:03 AM  To: Tracey Pozo MA, Tang Tanner MD, #    She will need an ERCP for stent exchange. Likely a metal one.   Bobbi please schedule      ----- Message -----  From: Guy Syed MD  Sent: 6/1/2018  10:29 AM  To: Tang Tanner MD, Misael Treadwell MD, #    Colleagues,     This lady has metastatic neuroendocrine tumor and it looks like she might be obstructing the stent she has in place.   Can any of you take a look at her chart and recommend a course of action?    Thanks,    Guy    ----- Message -----  From: Shelby Domingo MD  Sent: 6/1/2018  10:13 AM  To: Guy Syed MD, Kalyan Flores MD, #    Hi Guy,    This is a patient I discussed with Dr. Flores. There is concern for biliary stent occlusion can you take a look. I think you have clinic on Mondays. Can you see her Monday?    Thanks

## 2018-06-01 NOTE — TELEPHONE ENCOUNTER
----- Message from Sarah Ballesteros MD sent at 6/1/2018 11:03 AM CDT -----  She will need an ERCP for stent exchange. Likely a metal one.   Bobbi please schedule      ----- Message -----  From: Guy Syed MD  Sent: 6/1/2018  10:29 AM  To: Tang Tanner MD, Misael Treadwell MD, #    Colleagues,     This lady has metastatic neuroendocrine tumor and it looks like she might be obstructing the stent she has in place.   Can any of you take a look at her chart and recommend a course of action?    Thanks,    Guy    ----- Message -----  From: Shelby Domingo MD  Sent: 6/1/2018  10:13 AM  To: Guy Syed MD, Kalyan Flores MD, #    Hi Guy,    This is a patient I discussed with Dr. Flores. There is concern for biliary stent occlusion can you take a look. I think you have clinic on Mondays. Can you see her Monday?    Thanks

## 2018-06-05 ENCOUNTER — HOSPITAL ENCOUNTER (INPATIENT)
Facility: HOSPITAL | Age: 58
LOS: 4 days | Discharge: HOME | DRG: 243 | End: 2018-06-09
Attending: EMERGENCY MEDICINE | Admitting: EMERGENCY MEDICINE
Payer: MEDICARE

## 2018-06-05 ENCOUNTER — PATIENT MESSAGE (OUTPATIENT)
Dept: HEMATOLOGY/ONCOLOGY | Facility: CLINIC | Age: 58
End: 2018-06-05

## 2018-06-05 ENCOUNTER — SURGERY (OUTPATIENT)
Age: 58
End: 2018-06-05

## 2018-06-05 DIAGNOSIS — Z95.0 S/P PLACEMENT OF CARDIAC PACEMAKER: ICD-10-CM

## 2018-06-05 DIAGNOSIS — C7B.8 SECONDARY NEUROENDOCRINE TUMOR OF LIVER: ICD-10-CM

## 2018-06-05 DIAGNOSIS — I49.9 CARDIAC RHYTHM DISORDER OR DISTURBANCE OR CHANGE: ICD-10-CM

## 2018-06-05 DIAGNOSIS — I10 ESSENTIAL (PRIMARY) HYPERTENSION: ICD-10-CM

## 2018-06-05 DIAGNOSIS — E11.9 TYPE 2 DIABETES MELLITUS, WITHOUT LONG-TERM CURRENT USE OF INSULIN: ICD-10-CM

## 2018-06-05 DIAGNOSIS — I48.92 ATRIAL FLUTTER: ICD-10-CM

## 2018-06-05 DIAGNOSIS — D50.0 IRON DEFICIENCY ANEMIA DUE TO CHRONIC BLOOD LOSS: ICD-10-CM

## 2018-06-05 DIAGNOSIS — I49.9 ARRHYTHMIA: ICD-10-CM

## 2018-06-05 DIAGNOSIS — I10 HYPERTENSION: ICD-10-CM

## 2018-06-05 DIAGNOSIS — Z95.0 CARDIAC PACEMAKER IN SITU: ICD-10-CM

## 2018-06-05 DIAGNOSIS — I48.19 ATRIAL FIBRILLATION, PERSISTENT: ICD-10-CM

## 2018-06-05 DIAGNOSIS — C7A.1 MALIGNANT POORLY DIFFERENTIATED NEUROENDOCRINE CARCINOMA: ICD-10-CM

## 2018-06-05 DIAGNOSIS — C78.6 METASTATIC CANCER TO RETROPERITONEUM: ICD-10-CM

## 2018-06-05 DIAGNOSIS — I47.10 SVT (SUPRAVENTRICULAR TACHYCARDIA): ICD-10-CM

## 2018-06-05 DIAGNOSIS — I47.10 SUPRAVENTRICULAR TACHYCARDIA: Primary | ICD-10-CM

## 2018-06-05 DIAGNOSIS — I45.5 OTHER SPECIFIED HEART BLOCK: ICD-10-CM

## 2018-06-05 DIAGNOSIS — I95.9 HYPOTENSION, UNSPECIFIED HYPOTENSION TYPE: ICD-10-CM

## 2018-06-05 DIAGNOSIS — R00.0 TACHYCARDIA: ICD-10-CM

## 2018-06-05 DIAGNOSIS — T85.520A BILIARY STENT MIGRATION: ICD-10-CM

## 2018-06-05 DIAGNOSIS — I51.9 LV DYSFUNCTION: ICD-10-CM

## 2018-06-05 DIAGNOSIS — I50.32 CHRONIC DIASTOLIC HEART FAILURE: ICD-10-CM

## 2018-06-05 DIAGNOSIS — T44.7X5A ADVERSE EFFECT OF SOTALOL: ICD-10-CM

## 2018-06-05 DIAGNOSIS — I48.91 ATRIAL FIBRILLATION: ICD-10-CM

## 2018-06-05 LAB
ALBUMIN SERPL BCP-MCNC: 3.6 G/DL
ALP SERPL-CCNC: 101 U/L
ALT SERPL W/O P-5'-P-CCNC: 22 U/L
ANION GAP SERPL CALC-SCNC: 10 MMOL/L
AST SERPL-CCNC: 23 U/L
BASOPHILS # BLD AUTO: 0.06 K/UL
BASOPHILS NFR BLD: 0.7 %
BILIRUB SERPL-MCNC: 1 MG/DL
BILIRUB UR QL STRIP: NEGATIVE
BNP SERPL-MCNC: 76 PG/ML
BUN SERPL-MCNC: 35 MG/DL
BUN SERPL-MCNC: 35 MG/DL (ref 6–30)
CALCIUM SERPL-MCNC: 10.2 MG/DL
CHLORIDE SERPL-SCNC: 102 MMOL/L
CHLORIDE SERPL-SCNC: 103 MMOL/L (ref 95–110)
CLARITY UR REFRACT.AUTO: ABNORMAL
CO2 SERPL-SCNC: 24 MMOL/L
COLOR UR AUTO: YELLOW
CREAT SERPL-MCNC: 1.1 MG/DL (ref 0.5–1.4)
CREAT SERPL-MCNC: 1.4 MG/DL
DIFFERENTIAL METHOD: ABNORMAL
EOSINOPHIL # BLD AUTO: 0.3 K/UL
EOSINOPHIL NFR BLD: 3.4 %
ERYTHROCYTE [DISTWIDTH] IN BLOOD BY AUTOMATED COUNT: 14.6 %
EST. GFR  (AFRICAN AMERICAN): 48.1 ML/MIN/1.73 M^2
EST. GFR  (NON AFRICAN AMERICAN): 41.7 ML/MIN/1.73 M^2
GLUCOSE SERPL-MCNC: 244 MG/DL (ref 70–110)
GLUCOSE SERPL-MCNC: 255 MG/DL
GLUCOSE UR QL STRIP: NEGATIVE
HCT VFR BLD AUTO: 34.3 %
HCT VFR BLD CALC: 36 %PCV (ref 36–54)
HGB BLD-MCNC: 10.7 G/DL
HGB UR QL STRIP: NEGATIVE
IMM GRANULOCYTES # BLD AUTO: 0.07 K/UL
IMM GRANULOCYTES NFR BLD AUTO: 0.9 %
INR PPP: 1.1
KETONES UR QL STRIP: NEGATIVE
LACTATE SERPL-SCNC: 1.6 MMOL/L
LEUKOCYTE ESTERASE UR QL STRIP: NEGATIVE
LIPASE SERPL-CCNC: 22 U/L
LYMPHOCYTES # BLD AUTO: 1.3 K/UL
LYMPHOCYTES NFR BLD: 16.2 %
MCH RBC QN AUTO: 32 PG
MCHC RBC AUTO-ENTMCNC: 31.2 G/DL
MCV RBC AUTO: 103 FL
MONOCYTES # BLD AUTO: 0.8 K/UL
MONOCYTES NFR BLD: 9.3 %
NEUTROPHILS # BLD AUTO: 5.7 K/UL
NEUTROPHILS NFR BLD: 69.5 %
NITRITE UR QL STRIP: NEGATIVE
NRBC BLD-RTO: 0 /100 WBC
PH UR STRIP: 5 [PH] (ref 5–8)
PLATELET # BLD AUTO: 264 K/UL
PMV BLD AUTO: 10 FL
POC IONIZED CALCIUM: 1.22 MMOL/L (ref 1.06–1.42)
POC TCO2 (MEASURED): 25 MMOL/L (ref 23–29)
POTASSIUM BLD-SCNC: 4.6 MMOL/L (ref 3.5–5.1)
POTASSIUM SERPL-SCNC: 4.6 MMOL/L
PROT SERPL-MCNC: 9.7 G/DL
PROT UR QL STRIP: NEGATIVE
PROTHROMBIN TIME: 11 SEC
RBC # BLD AUTO: 3.34 M/UL
SAMPLE: ABNORMAL
SODIUM BLD-SCNC: 140 MMOL/L (ref 136–145)
SODIUM SERPL-SCNC: 136 MMOL/L
SP GR UR STRIP: 1.01 (ref 1–1.03)
TROPONIN I SERPL DL<=0.01 NG/ML-MCNC: 0.01 NG/ML
URN SPEC COLLECT METH UR: ABNORMAL
UROBILINOGEN UR STRIP-ACNC: NEGATIVE EU/DL
WBC # BLD AUTO: 8.2 K/UL

## 2018-06-05 PROCEDURE — 99223 1ST HOSP IP/OBS HIGH 75: CPT | Mod: AI,,, | Performed by: PHYSICIAN ASSISTANT

## 2018-06-05 PROCEDURE — 25000003 PHARM REV CODE 250: Performed by: HOSPITALIST

## 2018-06-05 PROCEDURE — 93010 ELECTROCARDIOGRAM REPORT: CPT | Mod: GW,,, | Performed by: INTERNAL MEDICINE

## 2018-06-05 PROCEDURE — 25000003 PHARM REV CODE 250: Performed by: STUDENT IN AN ORGANIZED HEALTH CARE EDUCATION/TRAINING PROGRAM

## 2018-06-05 PROCEDURE — 25000003 PHARM REV CODE 250: Performed by: PHYSICIAN ASSISTANT

## 2018-06-05 PROCEDURE — 99284 EMERGENCY DEPT VISIT MOD MDM: CPT | Mod: ,,, | Performed by: INTERNAL MEDICINE

## 2018-06-05 PROCEDURE — 96374 THER/PROPH/DIAG INJ IV PUSH: CPT

## 2018-06-05 PROCEDURE — 20600001 HC STEP DOWN PRIVATE ROOM

## 2018-06-05 PROCEDURE — 83880 ASSAY OF NATRIURETIC PEPTIDE: CPT

## 2018-06-05 PROCEDURE — 83690 ASSAY OF LIPASE: CPT

## 2018-06-05 PROCEDURE — 93005 ELECTROCARDIOGRAM TRACING: CPT

## 2018-06-05 PROCEDURE — 84484 ASSAY OF TROPONIN QUANT: CPT

## 2018-06-05 PROCEDURE — 99285 EMERGENCY DEPT VISIT HI MDM: CPT | Mod: 25

## 2018-06-05 PROCEDURE — 83605 ASSAY OF LACTIC ACID: CPT

## 2018-06-05 PROCEDURE — 85025 COMPLETE CBC W/AUTO DIFF WBC: CPT

## 2018-06-05 PROCEDURE — 87040 BLOOD CULTURE FOR BACTERIA: CPT | Mod: 59

## 2018-06-05 PROCEDURE — 96361 HYDRATE IV INFUSION ADD-ON: CPT

## 2018-06-05 PROCEDURE — 99285 EMERGENCY DEPT VISIT HI MDM: CPT | Mod: ,,, | Performed by: PHYSICIAN ASSISTANT

## 2018-06-05 PROCEDURE — 96376 TX/PRO/DX INJ SAME DRUG ADON: CPT

## 2018-06-05 PROCEDURE — 80053 COMPREHEN METABOLIC PANEL: CPT

## 2018-06-05 PROCEDURE — 25500020 PHARM REV CODE 255: Performed by: EMERGENCY MEDICINE

## 2018-06-05 PROCEDURE — 81003 URINALYSIS AUTO W/O SCOPE: CPT

## 2018-06-05 PROCEDURE — 85610 PROTHROMBIN TIME: CPT

## 2018-06-05 RX ORDER — METOPROLOL SUCCINATE 50 MG/1
50 TABLET, EXTENDED RELEASE ORAL DAILY
Status: DISCONTINUED | OUTPATIENT
Start: 2018-06-06 | End: 2018-06-05

## 2018-06-05 RX ORDER — METOPROLOL TARTRATE 25 MG/1
50 TABLET, FILM COATED ORAL 3 TIMES DAILY
Status: DISCONTINUED | OUTPATIENT
Start: 2018-06-05 | End: 2018-06-06

## 2018-06-05 RX ORDER — SODIUM CHLORIDE 0.9 % (FLUSH) 0.9 %
3 SYRINGE (ML) INJECTION
Status: DISCONTINUED | OUTPATIENT
Start: 2018-06-05 | End: 2018-06-09 | Stop reason: HOSPADM

## 2018-06-05 RX ORDER — ZOLPIDEM TARTRATE 10 MG/1
10 TABLET ORAL NIGHTLY PRN
Status: DISCONTINUED | OUTPATIENT
Start: 2018-06-05 | End: 2018-06-09 | Stop reason: HOSPADM

## 2018-06-05 RX ORDER — POLYETHYLENE GLYCOL 3350 17 G/17G
17 POWDER, FOR SOLUTION ORAL 2 TIMES DAILY PRN
Status: DISCONTINUED | OUTPATIENT
Start: 2018-06-05 | End: 2018-06-09 | Stop reason: HOSPADM

## 2018-06-05 RX ORDER — ACETAMINOPHEN 325 MG/1
650 TABLET ORAL EVERY 4 HOURS PRN
Status: DISCONTINUED | OUTPATIENT
Start: 2018-06-05 | End: 2018-06-09 | Stop reason: HOSPADM

## 2018-06-05 RX ORDER — AMOXICILLIN 250 MG
1 CAPSULE ORAL 2 TIMES DAILY
Status: DISCONTINUED | OUTPATIENT
Start: 2018-06-05 | End: 2018-06-09 | Stop reason: HOSPADM

## 2018-06-05 RX ORDER — ONDANSETRON 8 MG/1
8 TABLET, ORALLY DISINTEGRATING ORAL EVERY 8 HOURS PRN
Status: DISCONTINUED | OUTPATIENT
Start: 2018-06-05 | End: 2018-06-09 | Stop reason: HOSPADM

## 2018-06-05 RX ORDER — METOPROLOL TARTRATE 1 MG/ML
5 INJECTION, SOLUTION INTRAVENOUS
Status: COMPLETED | OUTPATIENT
Start: 2018-06-05 | End: 2018-06-05

## 2018-06-05 RX ORDER — FUROSEMIDE 40 MG/1
40 TABLET ORAL DAILY
Status: DISCONTINUED | OUTPATIENT
Start: 2018-06-06 | End: 2018-06-09 | Stop reason: HOSPADM

## 2018-06-05 RX ORDER — PROCHLORPERAZINE EDISYLATE 5 MG/ML
5 INJECTION INTRAMUSCULAR; INTRAVENOUS EVERY 6 HOURS PRN
Status: DISCONTINUED | OUTPATIENT
Start: 2018-06-05 | End: 2018-06-09 | Stop reason: HOSPADM

## 2018-06-05 RX ADMIN — RIVAROXABAN 20 MG: 20 TABLET, FILM COATED ORAL at 10:06

## 2018-06-05 RX ADMIN — SODIUM CHLORIDE 1000 ML: 0.9 INJECTION, SOLUTION INTRAVENOUS at 12:06

## 2018-06-05 RX ADMIN — IOHEXOL 100 ML: 350 INJECTION, SOLUTION INTRAVENOUS at 11:06

## 2018-06-05 RX ADMIN — METOPROLOL TARTRATE 5 MG: 1 INJECTION, SOLUTION INTRAVENOUS at 02:06

## 2018-06-05 RX ADMIN — STANDARDIZED SENNA CONCENTRATE AND DOCUSATE SODIUM 1 TABLET: 8.6; 5 TABLET, FILM COATED ORAL at 10:06

## 2018-06-05 RX ADMIN — METOPROLOL TARTRATE 50 MG: 50 TABLET ORAL at 06:06

## 2018-06-05 RX ADMIN — ZOLPIDEM TARTRATE 10 MG: 10 TABLET ORAL at 10:06

## 2018-06-05 RX ADMIN — METOPROLOL TARTRATE 5 MG: 1 INJECTION, SOLUTION INTRAVENOUS at 04:06

## 2018-06-05 RX ADMIN — SODIUM CHLORIDE 1000 ML: 0.9 INJECTION, SOLUTION INTRAVENOUS at 10:06

## 2018-06-05 NOTE — PROGRESS NOTES
Asked to see Ms. Souza to answer additional questions, seen last hour by Dr. Altamirano.    It appears she has a complicated history of atrial arrhythmias to the point where her primary cardiologist in [place] was considering PPM, without verbal history of bradyarrhythmias - possibly for subsequent AVN ablation.  Ms. Souza would like issue coordinated with primary cardiologist in [place] now that she is not transferring to [facility].    -Contact primary cardiologist in [place] in AM to coordinate care  -Obtain outside records from said cardiologist  -Increase metoprolol for added rate control.  Patient reports was on 50mg TID prior to admission, ok to increase to 50 q6.    Skyler Short

## 2018-06-05 NOTE — SUBJECTIVE & OBJECTIVE
Past Medical History:   Diagnosis Date    Acute headache 2016    Anemia     Atrial fibrillation, persistent     Bronchitis     CHF (congestive heart failure)     Diabetes mellitus, type 2     Hypertension     Malignant poorly differentiated neuroendocrine carcinoma 2016    Secondary neuroendocrine tumor of liver 2016       Past Surgical History:   Procedure Laterality Date    ADENOIDECTOMY      CARDIAC ELECTROPHYSIOLOGY MAPPING AND ABLATION      CARDIAC SURGERY       SECTION      ERCP      TONSILLECTOMY      uvulopalatopharygoplasty         Review of patient's allergies indicates:   Allergen Reactions    Pcn [penicillins] Other (See Comments), Hives and Swelling     As a child had an unknown reaction.  Has not taken since  As a child had an unknown reaction.  Has not taken since       Current Facility-Administered Medications on File Prior to Encounter   Medication    ferumoxytol (FERAHEME) 510 mg in dextrose 5 % 100 mL IVPB    heparin, porcine (PF) 100 unit/mL injection flush 500 Units    sodium chloride 0.9% flush 10 mL     Current Outpatient Prescriptions on File Prior to Encounter   Medication Sig    blood sugar diagnostic, disc Strp USE AS DIRECTED EVERY DAY *THANK YOU*    docusate sodium (COLACE) 50 MG capsule Take by mouth 2 (two) times daily.    fluticasone (FLOVENT HFA) 110 mcg/actuation inhaler Inhale 2 puffs into the lungs once daily.    furosemide (LASIX) 40 MG tablet Take 40 mg by mouth once daily.     glipiZIDE (GLUCOTROL) 10 MG tablet Take 1 tablet (10 mg total) by mouth 2 (two) times daily.    KLOR-CON M20 20 mEq tablet     lancets Misc Use as directed    linagliptin (TRADJENTA) 5 mg Tab tablet Take 5 mg by mouth once daily.    metformin (GLUCOPHAGE) 500 MG tablet Take 1 tablet (500 mg total) by mouth 2 (two) times daily with meals. Take 2 tablets (1000mg) in am and 3 tablets (1500 mg)in the pm (Patient taking differently: Take 2 tablets (1000mg) in am  and 3 tablets (1500 mg)in the pm)    metoprolol succinate (TOPROL-XL) 50 MG 24 hr tablet Take 1 tablet (50 mg total) by mouth once daily.    metoprolol tartrate (LOPRESSOR) 25 MG tablet Take 25 mg by mouth once daily.    ondansetron (ZOFRAN) 4 MG tablet Take 1 tablet (4 mg total) by mouth every 12 (twelve) hours as needed for Nausea.    polyethylene glycol (GLYCOLAX) 17 gram PwPk Take by mouth once daily.    prochlorperazine (COMPAZINE) 5 MG tablet Take 1 tablet (5 mg total) by mouth every 6 (six) hours as needed for Nausea.    rivaroxaban (XARELTO) 20 mg Tab Take 20 mg by mouth.    zolpidem (AMBIEN) 10 mg Tab Take 1 tablet (10 mg total) by mouth nightly as needed.     Family History     Problem Relation (Age of Onset)    Breast cancer Paternal Aunt, Maternal Aunt    Diabetes type II Mother    Prostate cancer Father        Social History Main Topics    Smoking status: Never Smoker    Smokeless tobacco: Never Used    Alcohol use No    Drug use: No    Sexual activity: Not on file     Review of Systems   Constitutional: Positive for diaphoresis. Negative for chills and fever.   HENT: Negative for trouble swallowing.    Eyes: Negative for photophobia and visual disturbance.   Respiratory: Positive for shortness of breath. Negative for chest tightness and wheezing.    Cardiovascular: Positive for palpitations. Negative for chest pain and leg swelling.   Gastrointestinal: Negative for abdominal distention, constipation and nausea.   Genitourinary: Negative for dysuria, hematuria and urgency.   Musculoskeletal: Negative for back pain, myalgias and neck stiffness.   Skin: Negative for rash and wound.   Neurological: Positive for light-headedness. Negative for dizziness, weakness and numbness.   Psychiatric/Behavioral: Negative for agitation and confusion. The patient is not nervous/anxious.      Objective:     Vital Signs (Most Recent):  Temp: 98 °F (36.7 °C) (06/05/18 1253)  Pulse: (!) 128 (06/05/18  1447)  Resp: (!) 27 (06/05/18 1447)  BP: 117/76 (06/05/18 1447)  SpO2: 96 % (06/05/18 1447) Vital Signs (24h Range):  Temp:  [97.7 °F (36.5 °C)-98.5 °F (36.9 °C)] 98 °F (36.7 °C)  Pulse:  [128-145] 128  Resp:  [18-34] 27  SpO2:  [95 %-98 %] 96 %  BP: (108-135)/(72-90) 117/76     Weight: 99.8 kg (220 lb)  Body mass index is 35.51 kg/m².    Physical Exam   Constitutional: She is oriented to person, place, and time. She appears well-developed and well-nourished. No distress.   HENT:   Head: Normocephalic and atraumatic.   Mouth/Throat: No oropharyngeal exudate.   Eyes: EOM are normal. Pupils are equal, round, and reactive to light. No scleral icterus.   Neck: Normal range of motion. Neck supple.   Cardiovascular: Intact distal pulses and normal pulses.  An irregular rhythm present. Tachycardia present.    Pulmonary/Chest: Effort normal and breath sounds normal. She has no wheezes.   Abdominal: Soft. Bowel sounds are normal. There is tenderness.   Musculoskeletal: Normal range of motion. She exhibits no edema or tenderness.   Lymphadenopathy:     She has no cervical adenopathy.   Neurological: She is alert and oriented to person, place, and time. No cranial nerve deficit or sensory deficit.   Skin: Skin is warm and dry. No rash noted.   Psychiatric: She has a normal mood and affect. Her behavior is normal. Thought content normal.         CRANIAL NERVES     CN III, IV, VI   Pupils are equal, round, and reactive to light.  Extraocular motions are normal.        Significant Labs:   CBC:   Recent Labs  Lab 06/05/18  1043 06/05/18  1043   WBC 8.20  --    HGB 10.7*  --    HCT 34.3* 36     --      CMP:   Recent Labs  Lab 06/05/18  1043      K 4.6      CO2 24   *   BUN 35*   CREATININE 1.4   CALCIUM 10.2   PROT 9.7*   ALBUMIN 3.6   BILITOT 1.0   ALKPHOS 101   AST 23   ALT 22   ANIONGAP 10   EGFRNONAA 41.7*       Significant Imaging: I have reviewed all pertinent imaging results/findings within the past  24 hours.

## 2018-06-05 NOTE — ED NOTES
LOC: The patient is awake, alert and aware of environment with an appropriate affect, the patient is oriented x 3 and speaking appropriately.  APPEARANCE: Patient resting comfortably and in no acute distress, patient is clean and well groomed, patient's clothing is properly fastened.  SKIN: The skin is warm and dry, color consistent with ethnicity, patient has normal skin turgor and moist mucus membranes, skin intact, no breakdown or bruising noted.  MUSCULOSKELETAL: Patient moving all extremities spontaneously, no obvious swelling or deformities noted.  RESPIRATORY: Airway is open and patent, respirations are spontaneous, patient has a normal effort and rate, no accessory muscle use noted, bilateral breath sounds clear.  CARDIAC: Patient placed on cardiac monitor- heart rate 144, regular no periphreal edema noted, capillary refill < 3 seconds.Pt states she gets SOB, especially when lying flat or walking. No c/o chest pains.   ABDOMEN: Soft and non tender to palpation, no distention noted, normoactive bowel sounds present in all four quadrants. Pt states she occasionally gets a picking pain to her right upper quad in the back where she has a bilary stent.  NEUROLOGIC:  facial expression is symmetrical, patient moving all extremities spontaneously, normal sensation in all extremities when touched with a finger.  Follows all commands appropriately.    Pt was scheduled for surgery today and while getting OR prep was found to have fast heart rate. Pt came down with IV n/s going into the Right hand- intact with no signs of infilatration

## 2018-06-05 NOTE — ASSESSMENT & PLAN NOTE
-Atrial fibrillation with RVR and depressed LVEF  -Depressed LVEF can be due to chronic persistent afib with rapid rate but also related to previous chemotherapy  -Patient is followed by her cardiologist for the past 17 years in [facility] in  and prefers to be transferred over there  -She should receive lasix 40 mg IVP now and metoprolol 5 mg IVP now  -Coordinate transfer with the ED staff.

## 2018-06-05 NOTE — HPI
59 y/o female with PMH of secondary neuroendocrine tumor of liver s/o chemotherapy with biliary stent in place, DM, HTN, AF s/p convergent procedure with recurrence within weeks per Dr. Sánchez, s/p several failed DCCVs, JACKSON, and CHF (EF 50% 4/2017) who presented to the ED from GI clinic. She was in GI clinic for assessment and management of biliary stent, as there was concern for migration. The patient had SOB and tachycardia with vitals notable for HR 140s. An ERCP was cancelled, and the patient went to the ED. The patient was initially going to be admitted to [facility]; however, she declined admission, requesting transfer to  where she gets her cardiology care. Her primary cardiologist is Dr. Benedicto Sánchez. This plan did not go forward, and the patient was admitted to the medicine co-management service. In the setting of her MAZE procedure, the patient is most likely in atypical AFL currently with HR persistently >120 BPM. She is asymptomatic currently and stable. The patient held her NOAC lou-procedurally. A KAMILLE with DCCV was planned for today; however, the patient reportedly ate sausage at 11A.

## 2018-06-05 NOTE — CONSULTS
[facility]  Cardiology  Consult Note    Patient Name: Janine Souza  MRN: [id number]  Admission Date: 2018  Hospital Length of Stay: 0 days  Code Status: Full Code   Attending Provider: Darwin Pineda DO   Consulting Provider: Phil Altamirano MD  Primary Care Physician: Charles Lopez MD  Principal Problem:Supraventricular tachycardia    Patient information was obtained from patient and ER records.     Inpatient consult to Cardiology  Consult performed by: PHIL ALTAMIRANO  Consult ordered by: KAYLEIGH WISEMAN JR  Reason for consult: Afib with RVR  Assessment/Recommendations: Transfer to [facility] in  per patient request        Subjective:     Chief Complaint:  A fib with RVR     HPI:   Ms Souza is a 57 yo lady with medical history significant for HTN, Obesity, chronic paroxysmal atrial fibrillation s/p MAZE procedure ~ 15 years in [place], [place] Carcinoma in the liver s/p chemotx in , no active tx currently,last series of scans stable, biliary stent that migrated which was sent to the ED after being evaluated for ERCP today. She presented SOB and tachycardic and found to have HR of ~ 140 bpm. Procedure was cancelled. Upon ED presentation she reported palpitations and diaphoresis. EKG showed Afib with RVR. Bedside echo showed LVEF 25-30% and global hypokinesis and moderate RV disfunction. Echo from 2 years ago showed normal LVEF.     Past Medical History:   Diagnosis Date    Acute headache 2016    Anemia     Atrial fibrillation, persistent     Bronchitis     CHF (congestive heart failure)     Diabetes mellitus, type 2     Hypertension     Malignant poorly differentiated neuroendocrine carcinoma 2016    Secondary neuroendocrine tumor of liver 2016       Past Surgical History:   Procedure Laterality Date    ADENOIDECTOMY      CARDIAC ELECTROPHYSIOLOGY MAPPING AND ABLATION      CARDIAC SURGERY       SECTION      ERCP      TONSILLECTOMY       uvulopalatopharygoplasty         Review of patient's allergies indicates:   Allergen Reactions    Pcn [penicillins] Other (See Comments), Hives and Swelling     As a child had an unknown reaction.  Has not taken since  As a child had an unknown reaction.  Has not taken since       Current Facility-Administered Medications on File Prior to Encounter   Medication    ferumoxytol (FERAHEME) 510 mg in dextrose 5 % 100 mL IVPB    heparin, porcine (PF) 100 unit/mL injection flush 500 Units    sodium chloride 0.9% flush 10 mL     Current Outpatient Prescriptions on File Prior to Encounter   Medication Sig    blood sugar diagnostic, disc Strp USE AS DIRECTED EVERY DAY *THANK YOU*    docusate sodium (COLACE) 50 MG capsule Take by mouth 2 (two) times daily.    fluticasone (FLOVENT HFA) 110 mcg/actuation inhaler Inhale 2 puffs into the lungs once daily.    furosemide (LASIX) 40 MG tablet Take 40 mg by mouth once daily.     glipiZIDE (GLUCOTROL) 10 MG tablet Take 1 tablet (10 mg total) by mouth 2 (two) times daily.    KLOR-CON M20 20 mEq tablet     lancets Misc Use as directed    linagliptin (TRADJENTA) 5 mg Tab tablet Take 5 mg by mouth once daily.    metformin (GLUCOPHAGE) 500 MG tablet Take 1 tablet (500 mg total) by mouth 2 (two) times daily with meals. Take 2 tablets (1000mg) in am and 3 tablets (1500 mg)in the pm (Patient taking differently: Take 2 tablets (1000mg) in am and 3 tablets (1500 mg)in the pm)    metoprolol succinate (TOPROL-XL) 50 MG 24 hr tablet Take 1 tablet (50 mg total) by mouth once daily.    metoprolol tartrate (LOPRESSOR) 25 MG tablet Take 25 mg by mouth once daily.    ondansetron (ZOFRAN) 4 MG tablet Take 1 tablet (4 mg total) by mouth every 12 (twelve) hours as needed for Nausea.    polyethylene glycol (GLYCOLAX) 17 gram PwPk Take by mouth once daily.    prochlorperazine (COMPAZINE) 5 MG tablet Take 1 tablet (5 mg total) by mouth every 6 (six) hours as needed for Nausea.     rivaroxaban (XARELTO) 20 mg Tab Take 20 mg by mouth.    zolpidem (AMBIEN) 10 mg Tab Take 1 tablet (10 mg total) by mouth nightly as needed.     Family History     Problem Relation (Age of Onset)    Breast cancer Paternal Aunt, Maternal Aunt    Diabetes type II Mother    Prostate cancer Father        Social History Main Topics    Smoking status: Never Smoker    Smokeless tobacco: Never Used    Alcohol use No    Drug use: No    Sexual activity: Not on file     Review of Systems   HENT: Negative.    Cardiovascular: Positive for dyspnea on exertion, orthopnea, palpitations and paroxysmal nocturnal dyspnea. Negative for chest pain, claudication, cyanosis, irregular heartbeat, leg swelling, near-syncope and syncope.   Endocrine: Negative.    Hematologic/Lymphatic: Negative.    Musculoskeletal: Negative.    Gastrointestinal: Negative.    Genitourinary: Negative.    Neurological: Negative.      Objective:     Vital Signs (Most Recent):  Temp: 98 °F (36.7 °C) (06/05/18 1253)  Pulse: (!) 128 (06/05/18 1447)  Resp: (!) 27 (06/05/18 1447)  BP: 117/76 (06/05/18 1447)  SpO2: 96 % (06/05/18 1447) Vital Signs (24h Range):  Temp:  [97.7 °F (36.5 °C)-98.5 °F (36.9 °C)] 98 °F (36.7 °C)  Pulse:  [128-145] 128  Resp:  [18-34] 27  SpO2:  [95 %-98 %] 96 %  BP: (108-135)/(72-90) 117/76     Weight: 99.8 kg (220 lb)  Body mass index is 35.51 kg/m².    SpO2: 96 %         Intake/Output Summary (Last 24 hours) at 06/05/18 1608  Last data filed at 06/05/18 1431   Gross per 24 hour   Intake             2000 ml   Output                0 ml   Net             2000 ml       Lines/Drains/Airways     Central Venous Catheter Line                 Port A Cath Single Lumen 02/02/16 1502 right subclavian 854 days          Peripheral Intravenous Line                 Peripheral IV - Single Lumen 06/05/18 0940 Right Hand less than 1 day                Physical Exam   Constitutional: She is oriented to person, place, and time. She appears well-developed  and well-nourished.   HENT:   Head: Normocephalic and atraumatic.   Eyes: Conjunctivae are normal.   Neck: Neck supple. JVD present. No thyromegaly present.   Cardiovascular: Normal heart sounds and intact distal pulses.  An irregularly irregular rhythm present. Tachycardia present.  Exam reveals no gallop and no friction rub.    No murmur heard.  Pulses:       Carotid pulses are 2+ on the right side, and 2+ on the left side.       Radial pulses are 2+ on the right side, and 2+ on the left side.        Femoral pulses are 2+ on the right side, and 2+ on the left side.       Popliteal pulses are 2+ on the right side, and 2+ on the left side.        Dorsalis pedis pulses are 2+ on the right side, and 2+ on the left side.        Posterior tibial pulses are 2+ on the right side, and 2+ on the left side.   Pulmonary/Chest: Effort normal. No respiratory distress. She has no wheezes. She has rales.   Abdominal: Soft. Bowel sounds are normal. She exhibits no distension. There is no tenderness. There is no rebound.   Musculoskeletal: Normal range of motion.   Neurological: She is alert and oriented to person, place, and time.   Nursing note and vitals reviewed.      Significant Labs: All pertinent lab results from the last 24 hours have been reviewed.    Significant Imaging: Echocardiogram:   2D echo with color flow doppler:   Results for orders placed or performed during the hospital encounter of 01/13/16   2D echo with color flow doppler   Result Value Ref Range    EF 60 55 - 65    Mitral Valve Regurgitation MILD     Est. PA Systolic Pressure 62.6 (A)     Mitral Valve Mobility NORMAL     Tricuspid Valve Regurgitation MODERATE (A)      Assessment and Plan:     Atrial fibrillation, persistent    -Atrial fibrillation with RVR and depressed LVEF  -Depressed LVEF can be due to chronic persistent afib with rapid rate but also related to previous chemotherapy  -Patient is followed by her cardiologist for the past 17 years in [place] in  and prefers to be transferred over there  -She should receive lasix 40 mg IVP now and metoprolol 5 mg IVP now  -Coordinate transfer with the ED staff.             VTE Risk Mitigation     None          Thank you for your consult. I will sign off. Please contact us if you have any additional questions.    Phil Altamirano MD  Cardiology   [facility]

## 2018-06-05 NOTE — ASSESSMENT & PLAN NOTE
Seen in GI today for removal of biliary stent that was causing abdominal pain  - will evaluate if needs to be done inpatient, or possibly follow up outpatient

## 2018-06-05 NOTE — HPI
Patient is a 57yo female with a PMHx of secondary neuroendocrine tumor of liver, Afib, hx of ablation in 2006, chronic anticoagulation on Xarelto 20 mg, CHF with 60 % EF, HTN, pulmonary HTN, DM2, and anemia being admitted to medicine for evaluation of SVT. She presented to her GI clinic today for removal of her biliary stent. She was found to be in SVT when evaluated for the procedure and was sent to the ED. Initially, she was pursuing stent removal to address the pain. At this moment, she complains of SOB, tachycardia, and diaphoresis and found to have HR of ~ 140 bpm. Patient denies chest pain, nausea, vomiting, weakness.    In the ED, patient with SVT and HR in the 140s. Intake labs remarkable for Hgb 10.7, Cr 1.4. EKG confirms SVT with questionable ST changes. Cardiology consulted in the ED for further recommendations with admission to co-management service.

## 2018-06-05 NOTE — ED NOTES
Pt c/o feeling hungry. OK with MD for pt to have something to eat. I gave her some jiuce, turkey sandwich, crackers, pudding.

## 2018-06-05 NOTE — ASSESSMENT & PLAN NOTE
Patient with history of afib with ablation being admitted for SVT found on presentation for GI procedure.   - EKG with SVT and HR in 140s, mild ST depressions in inferior leads  - Cardiology consulted in the ED for recommendations, planned admission to CCU for possible drip  - IV metoprolol x 1 given with no improvement  - maintain on telemetry  - electrolytes normal, infectious workup unremarkable (CXR, UA clear)  - blood cultures pending

## 2018-06-05 NOTE — H&P
[facility] Medicine  History & Physical    Patient Name: Janine Souza  MRN: [id number]  Admission Date: 6/5/2018  Attending Physician: Darwin Pineda DO   Primary Care Provider: Charles Lopez MD    [facility] Medicine Team: [facility] MED J Cookie Laughlin PA-C     Patient information was obtained from patient, past medical records and ER records.     Subjective:     Principal Problem:Supraventricular tachycardia    Chief Complaint:   Chief Complaint   Patient presents with    Tachycardia     sent from gi  , was suppose to get biliary stent out, but heart rate was high        HPI: Patient is a 57yo female with a PMHx of secondary neuroendocrine tumor of liver, Afib, hx of ablation in 2006, chronic anticoagulation on Xarelto 20 mg, CHF with 60 % EF, HTN, pulmonary HTN, DM2, and anemia being admitted to medicine for evaluation of SVT. She presented to her GI clinic today for removal of her biliary stent. She was found to be in SVT when evaluated for the procedure and was sent to the ED. Initially, she was pursuing stent removal to address the pain. At this moment, she complains of SOB, tachycardia, and diaphoresis and found to have HR of ~ 140 bpm. Patient denies chest pain, nausea, vomiting, weakness.    In the ED, patient with SVT and HR in the 140s. Intake labs remarkable for Hgb 10.7, Cr 1.4. EKG confirms SVT with questionable ST changes. Cardiology consulted in the ED for further recommendations with admission to co-management service.    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    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       Past Surgical History:   Procedure Laterality Date    ADENOIDECTOMY      CARDIAC ELECTROPHYSIOLOGY  MAPPING AND ABLATION      CARDIAC SURGERY       SECTION      ERCP      TONSILLECTOMY      uvulopalatopharygoplasty         Review of patient's allergies indicates:   Allergen Reactions    Pcn [penicillins] Other (See Comments), Hives and Swelling     As a child had an unknown reaction.  Has not taken since  As a child had an unknown reaction.  Has not taken since       Current Facility-Administered Medications on File Prior to Encounter   Medication    ferumoxytol (FERAHEME) 510 mg in dextrose 5 % 100 mL IVPB    heparin, porcine (PF) 100 unit/mL injection flush 500 Units    sodium chloride 0.9% flush 10 mL     Current Outpatient Prescriptions on File Prior to Encounter   Medication Sig    blood sugar diagnostic, disc Strp USE AS DIRECTED EVERY DAY *THANK YOU*    docusate sodium (COLACE) 50 MG capsule Take by mouth 2 (two) times daily.    fluticasone (FLOVENT HFA) 110 mcg/actuation inhaler Inhale 2 puffs into the lungs once daily.    furosemide (LASIX) 40 MG tablet Take 40 mg by mouth once daily.     glipiZIDE (GLUCOTROL) 10 MG tablet Take 1 tablet (10 mg total) by mouth 2 (two) times daily.    KLOR-CON M20 20 mEq tablet     lancets Misc Use as directed    linagliptin (TRADJENTA) 5 mg Tab tablet Take 5 mg by mouth once daily.    metformin (GLUCOPHAGE) 500 MG tablet Take 1 tablet (500 mg total) by mouth 2 (two) times daily with meals. Take 2 tablets (1000mg) in am and 3 tablets (1500 mg)in the pm (Patient taking differently: Take 2 tablets (1000mg) in am and 3 tablets (1500 mg)in the pm)    metoprolol succinate (TOPROL-XL) 50 MG 24 hr tablet Take 1 tablet (50 mg total) by mouth once daily.    metoprolol tartrate (LOPRESSOR) 25 MG tablet Take 25 mg by mouth once daily.    ondansetron (ZOFRAN) 4 MG tablet Take 1 tablet (4 mg total) by mouth every 12 (twelve) hours as needed for Nausea.    polyethylene glycol (GLYCOLAX) 17 gram PwPk Take by mouth once daily.    prochlorperazine (COMPAZINE)  5 MG tablet Take 1 tablet (5 mg total) by mouth every 6 (six) hours as needed for Nausea.    rivaroxaban (XARELTO) 20 mg Tab Take 20 mg by mouth.    zolpidem (AMBIEN) 10 mg Tab Take 1 tablet (10 mg total) by mouth nightly as needed.     Family History     Problem Relation (Age of Onset)    Breast cancer Paternal Aunt, Maternal Aunt    Diabetes type II Mother    Prostate cancer Father        Social History Main Topics    Smoking status: Never Smoker    Smokeless tobacco: Never Used    Alcohol use No    Drug use: No    Sexual activity: Not on file     Review of Systems   Constitutional: Positive for diaphoresis. Negative for chills and fever.   HENT: Negative for trouble swallowing.    Eyes: Negative for photophobia and visual disturbance.   Respiratory: Positive for shortness of breath. Negative for chest tightness and wheezing.    Cardiovascular: Positive for palpitations. Negative for chest pain and leg swelling.   Gastrointestinal: Negative for abdominal distention, constipation and nausea.   Genitourinary: Negative for dysuria, hematuria and urgency.   Musculoskeletal: Negative for back pain, myalgias and neck stiffness.   Skin: Negative for rash and wound.   Neurological: Positive for light-headedness. Negative for dizziness, weakness and numbness.   Psychiatric/Behavioral: Negative for agitation and confusion. The patient is not nervous/anxious.      Objective:     Vital Signs (Most Recent):  Temp: 98 °F (36.7 °C) (06/05/18 1253)  Pulse: (!) 128 (06/05/18 1447)  Resp: (!) 27 (06/05/18 1447)  BP: 117/76 (06/05/18 1447)  SpO2: 96 % (06/05/18 1447) Vital Signs (24h Range):  Temp:  [97.7 °F (36.5 °C)-98.5 °F (36.9 °C)] 98 °F (36.7 °C)  Pulse:  [128-145] 128  Resp:  [18-34] 27  SpO2:  [95 %-98 %] 96 %  BP: (108-135)/(72-90) 117/76     Weight: 99.8 kg (220 lb)  Body mass index is 35.51 kg/m².    Physical Exam   Constitutional: She is oriented to person, place, and time. She appears well-developed and  well-nourished. No distress.   HENT:   Head: Normocephalic and atraumatic.   Mouth/Throat: No oropharyngeal exudate.   Eyes: EOM are normal. Pupils are equal, round, and reactive to light. No scleral icterus.   Neck: Normal range of motion. Neck supple.   Cardiovascular: Intact distal pulses and normal pulses.  An irregular rhythm present. Tachycardia present.    Pulmonary/Chest: Effort normal and breath sounds normal. She has no wheezes.   Abdominal: Soft. Bowel sounds are normal. There is tenderness.   Musculoskeletal: Normal range of motion. She exhibits no edema or tenderness.   Lymphadenopathy:     She has no cervical adenopathy.   Neurological: She is alert and oriented to person, place, and time. No cranial nerve deficit or sensory deficit.   Skin: Skin is warm and dry. No rash noted.   Psychiatric: She has a normal mood and affect. Her behavior is normal. Thought content normal.         CRANIAL NERVES     CN III, IV, VI   Pupils are equal, round, and reactive to light.  Extraocular motions are normal.        Significant Labs:   CBC:   Recent Labs  Lab 06/05/18  1043 06/05/18  1043   WBC 8.20  --    HGB 10.7*  --    HCT 34.3* 36     --      CMP:   Recent Labs  Lab 06/05/18  1043      K 4.6      CO2 24   *   BUN 35*   CREATININE 1.4   CALCIUM 10.2   PROT 9.7*   ALBUMIN 3.6   BILITOT 1.0   ALKPHOS 101   AST 23   ALT 22   ANIONGAP 10   EGFRNONAA 41.7*       Significant Imaging: I have reviewed all pertinent imaging results/findings within the past 24 hours.    Assessment/Plan:     * Supraventricular tachycardia    Patient with history of afib with ablation being admitted for SVT found on presentation for GI procedure.   - EKG with SVT and HR in 140s, mild ST depressions in inferior leads  - Cardiology consulted in the ED for recommendations, planned admission to CCU for possible drip  - IV metoprolol x 1 given with no improvement  - maintain on telemetry  - electrolytes normal,  infectious workup unremarkable (CXR, UA clear)  - blood cultures pending        Biliary stent migration    Seen in GI today for removal of biliary stent that was causing abdominal pain  - will evaluate if needs to be done inpatient, or possibly follow up outpatient        Heart failure    Last ECHO 1/16 showed EF of 60% with mild to moderate depression of systolic function  - continue BB  - lasix PO  - not on ACE or ARB        Malignant poorly differentiated neuroendocrine carcinoma    Follows with Dr. Flores in heme/onc  - stable        Type 2 diabetes mellitus    Hold home oral medications  - low dose SSI, ACHS  - cardiac diabetic diet        Atrial fibrillation, persistent    Patient reports ablation in 2006  - continue Toprol 50mg daily  - continue Xarelto          VTE Risk Mitigation         Ordered     Place sequential compression device  Until discontinued      06/05/18 1839     Place LAILA hose  Until discontinued      06/05/18 1839             Cookie Laughlin PA-C  Department of Hospital Medicine   [facility]

## 2018-06-05 NOTE — ED NOTES
Hourly rounding complete. Patient resting in stretcher and is in NAD at this time. Pt is awake alert and oriented x4, VSS, respirations even and unlabored. Pt updated on POC. Bed low and locked with side rails up x2, call bell in pt reach. Pt voices no needs at this time.  Will continue to monitor. Pt remains in sinus tachy without ectopy- no c/o chest pains

## 2018-06-05 NOTE — SUBJECTIVE & OBJECTIVE
Past Medical History:   Diagnosis Date    Acute headache 2016    Anemia     Atrial fibrillation, persistent     Bronchitis     CHF (congestive heart failure)     Diabetes mellitus, type 2     Hypertension     Malignant poorly differentiated neuroendocrine carcinoma 2016    Secondary neuroendocrine tumor of liver 2016       Past Surgical History:   Procedure Laterality Date    ADENOIDECTOMY      CARDIAC ELECTROPHYSIOLOGY MAPPING AND ABLATION      CARDIAC SURGERY       SECTION      ERCP      TONSILLECTOMY      uvulopalatopharygoplasty         Review of patient's allergies indicates:   Allergen Reactions    Pcn [penicillins] Other (See Comments), Hives and Swelling     As a child had an unknown reaction.  Has not taken since  As a child had an unknown reaction.  Has not taken since       Current Facility-Administered Medications on File Prior to Encounter   Medication    ferumoxytol (FERAHEME) 510 mg in dextrose 5 % 100 mL IVPB    heparin, porcine (PF) 100 unit/mL injection flush 500 Units    sodium chloride 0.9% flush 10 mL     Current Outpatient Prescriptions on File Prior to Encounter   Medication Sig    blood sugar diagnostic, disc Strp USE AS DIRECTED EVERY DAY *THANK YOU*    docusate sodium (COLACE) 50 MG capsule Take by mouth 2 (two) times daily.    fluticasone (FLOVENT HFA) 110 mcg/actuation inhaler Inhale 2 puffs into the lungs once daily.    furosemide (LASIX) 40 MG tablet Take 40 mg by mouth once daily.     glipiZIDE (GLUCOTROL) 10 MG tablet Take 1 tablet (10 mg total) by mouth 2 (two) times daily.    KLOR-CON M20 20 mEq tablet     lancets Misc Use as directed    linagliptin (TRADJENTA) 5 mg Tab tablet Take 5 mg by mouth once daily.    metformin (GLUCOPHAGE) 500 MG tablet Take 1 tablet (500 mg total) by mouth 2 (two) times daily with meals. Take 2 tablets (1000mg) in am and 3 tablets (1500 mg)in the pm (Patient taking differently: Take 2 tablets (1000mg) in am  and 3 tablets (1500 mg)in the pm)    metoprolol succinate (TOPROL-XL) 50 MG 24 hr tablet Take 1 tablet (50 mg total) by mouth once daily.    metoprolol tartrate (LOPRESSOR) 25 MG tablet Take 25 mg by mouth once daily.    ondansetron (ZOFRAN) 4 MG tablet Take 1 tablet (4 mg total) by mouth every 12 (twelve) hours as needed for Nausea.    polyethylene glycol (GLYCOLAX) 17 gram PwPk Take by mouth once daily.    prochlorperazine (COMPAZINE) 5 MG tablet Take 1 tablet (5 mg total) by mouth every 6 (six) hours as needed for Nausea.    rivaroxaban (XARELTO) 20 mg Tab Take 20 mg by mouth.    zolpidem (AMBIEN) 10 mg Tab Take 1 tablet (10 mg total) by mouth nightly as needed.     Family History     Problem Relation (Age of Onset)    Breast cancer Paternal Aunt, Maternal Aunt    Diabetes type II Mother    Prostate cancer Father        Social History Main Topics    Smoking status: Never Smoker    Smokeless tobacco: Never Used    Alcohol use No    Drug use: No    Sexual activity: Not on file     Review of Systems   HENT: Negative.    Cardiovascular: Positive for dyspnea on exertion, orthopnea, palpitations and paroxysmal nocturnal dyspnea. Negative for chest pain, claudication, cyanosis, irregular heartbeat, leg swelling, near-syncope and syncope.   Endocrine: Negative.    Hematologic/Lymphatic: Negative.    Musculoskeletal: Negative.    Gastrointestinal: Negative.    Genitourinary: Negative.    Neurological: Negative.      Objective:     Vital Signs (Most Recent):  Temp: 98 °F (36.7 °C) (06/05/18 1253)  Pulse: (!) 128 (06/05/18 1447)  Resp: (!) 27 (06/05/18 1447)  BP: 117/76 (06/05/18 1447)  SpO2: 96 % (06/05/18 1447) Vital Signs (24h Range):  Temp:  [97.7 °F (36.5 °C)-98.5 °F (36.9 °C)] 98 °F (36.7 °C)  Pulse:  [128-145] 128  Resp:  [18-34] 27  SpO2:  [95 %-98 %] 96 %  BP: (108-135)/(72-90) 117/76     Weight: 99.8 kg (220 lb)  Body mass index is 35.51 kg/m².    SpO2: 96 %         Intake/Output Summary (Last 24  hours) at 06/05/18 1608  Last data filed at 06/05/18 1431   Gross per 24 hour   Intake             2000 ml   Output                0 ml   Net             2000 ml       Lines/Drains/Airways     Central Venous Catheter Line                 Port A Cath Single Lumen 02/02/16 1502 right subclavian 854 days          Peripheral Intravenous Line                 Peripheral IV - Single Lumen 06/05/18 0940 Right Hand less than 1 day                Physical Exam   Constitutional: She is oriented to person, place, and time. She appears well-developed and well-nourished.   HENT:   Head: Normocephalic and atraumatic.   Eyes: Conjunctivae are normal.   Neck: Neck supple. JVD present. No thyromegaly present.   Cardiovascular: Normal heart sounds and intact distal pulses.  An irregularly irregular rhythm present. Tachycardia present.  Exam reveals no gallop and no friction rub.    No murmur heard.  Pulses:       Carotid pulses are 2+ on the right side, and 2+ on the left side.       Radial pulses are 2+ on the right side, and 2+ on the left side.        Femoral pulses are 2+ on the right side, and 2+ on the left side.       Popliteal pulses are 2+ on the right side, and 2+ on the left side.        Dorsalis pedis pulses are 2+ on the right side, and 2+ on the left side.        Posterior tibial pulses are 2+ on the right side, and 2+ on the left side.   Pulmonary/Chest: Effort normal. No respiratory distress. She has no wheezes. She has rales.   Abdominal: Soft. Bowel sounds are normal. She exhibits no distension. There is no tenderness. There is no rebound.   Musculoskeletal: Normal range of motion.   Neurological: She is alert and oriented to person, place, and time.   Nursing note and vitals reviewed.      Significant Labs: All pertinent lab results from the last 24 hours have been reviewed.    Significant Imaging: Echocardiogram:   2D echo with color flow doppler:   Results for orders placed or performed during the hospital  encounter of 01/13/16   2D echo with color flow doppler   Result Value Ref Range    EF 60 55 - 65    Mitral Valve Regurgitation MILD     Est. PA Systolic Pressure 62.6 (A)     Mitral Valve Mobility NORMAL     Tricuspid Valve Regurgitation MODERATE (A)

## 2018-06-05 NOTE — ED PROVIDER NOTES
Encounter Date: 2018    SCRIBE #1 NOTE: I, Philipp Deluca, am scribing for, and in the presence of, Dr. Pineda. the EKG reading.       History     Chief Complaint   Patient presents with    Tachycardia     sent from gi  , was suppose to get biliary stent out, but heart rate was high     Patient is 58 year old female with PMHX of secondary neuroendocrine tumor of liver, Afib, hx of ablation in , chronic anticoagulation on Xarelto 20 mg, CHF with 60 % EF, HTN, pulmonary HTN, DM2, and anemia. She presents to the ED for tachycardia. Patient referred to ED for higher level of care from GI, patient scheduled for biliary stent removal. She reports having abdominal pain. Describes pain as intermittent and discomfort. reports pain exacerbated with inspiration. Reports associated subjective fever and chills. Biliary stent placed at [facility], patient unable to recall surgeons name. She denies recent surgery, trauma, immobilization, or initiation of hormone therapy. She denies nausea, vomiting, sob, chest pain, dysuria, diarrhea, or constipation. She is a non smoker and denies alcohol use. Patient followed by Dr. Flores in [facility]/onc in [place].           Review of patient's allergies indicates:   Allergen Reactions    Pcn [penicillins] Other (See Comments), Hives and Swelling     As a child had an unknown reaction.  Has not taken since  As a child had an unknown reaction.  Has not taken since     Past Medical History:   Diagnosis Date    Acute headache 2016    Anemia     Atrial fibrillation, persistent     Bronchitis     CHF (congestive heart failure)     Diabetes mellitus, type 2     Hypertension     Malignant poorly differentiated neuroendocrine carcinoma 2016    Secondary neuroendocrine tumor of liver 2016     Past Surgical History:   Procedure Laterality Date    ADENOIDECTOMY      CARDIAC ELECTROPHYSIOLOGY MAPPING AND ABLATION      CARDIAC SURGERY       SECTION       ERCP      TONSILLECTOMY      uvulopalatopharygoplasty       Family History   Problem Relation Age of Onset    Diabetes type II Mother     Prostate cancer Father     Breast cancer Paternal Aunt         breast    Breast cancer Maternal Aunt         breast     Social History   Substance Use Topics    Smoking status: Never Smoker    Smokeless tobacco: Never Used    Alcohol use No     Review of Systems   Constitutional: Positive for chills and fever (subjective).   HENT: Negative for sore throat.    Respiratory: Negative for shortness of breath.    Cardiovascular: Positive for palpitations. Negative for chest pain.   Gastrointestinal: Positive for abdominal pain. Negative for nausea and vomiting.   Genitourinary: Negative for dysuria.   Musculoskeletal: Negative for back pain.   Skin: Negative for rash.   Neurological: Negative for weakness.   Hematological: Does not bruise/bleed easily.       Physical Exam     Initial Vitals [06/05/18 1004]   BP Pulse Resp Temp SpO2   108/72 (!) 145 18 98.5 °F (36.9 °C) 96 %      MAP       84         Physical Exam    Vitals reviewed.  Constitutional: She appears well-developed and well-nourished. She is not diaphoretic. She is Obese . No distress.   Patient resting comfortably in NAD on RA.    HENT:   Head: Normocephalic and atraumatic.   Nose: Nose normal.   Eyes: Conjunctivae and EOM are normal. Pupils are equal, round, and reactive to light.   Neck: Normal range of motion.   Cardiovascular: Regular rhythm. Tachycardia present.  Exam reveals no friction rub.    No murmur heard.  Pulmonary/Chest: Breath sounds normal. No respiratory distress. She has no wheezes. She has no rales.   Abdominal: Soft. Bowel sounds are normal. She exhibits no distension. There is tenderness in the right upper quadrant. There is no rebound.   Musculoskeletal: Normal range of motion. She exhibits no edema.   Bilateral lower extremities symmetric in size without TTP.    Neurological: She is alert and  oriented to person, place, and time. She has normal strength. No sensory deficit.   Skin: Skin is warm and dry. No erythema.   Psychiatric: She has a normal mood and affect. Thought content normal.         ED Course   Procedures  Labs Reviewed   CBC W/ AUTO DIFFERENTIAL - Abnormal; Notable for the following:        Result Value    RBC 3.34 (*)     Hemoglobin 10.7 (*)     Hematocrit 34.3 (*)      (*)     MCH 32.0 (*)     MCHC 31.2 (*)     RDW 14.6 (*)     Immature Granulocytes 0.9 (*)     Immature Grans (Abs) 0.07 (*)     Lymph% 16.2 (*)     All other components within normal limits   COMPREHENSIVE METABOLIC PANEL - Abnormal; Notable for the following:     Glucose 255 (*)     BUN, Bld 35 (*)     Total Protein 9.7 (*)     eGFR if  48.1 (*)     eGFR if non  41.7 (*)     All other components within normal limits   URINALYSIS, REFLEX TO URINE CULTURE - Abnormal; Notable for the following:     Appearance, UA Hazy (*)     All other components within normal limits    Narrative:     Preferred Collection Type->Urine, Catheterized   ISTAT PROCEDURE - Abnormal; Notable for the following:     POC Glucose 244 (*)     POC BUN 35 (*)     All other components within normal limits   LACTIC ACID, PLASMA   PROTIME-INR   B-TYPE NATRIURETIC PEPTIDE   LIPASE   TROPONIN I     EKG Readings: (Independently Interpreted)   Heart Rate: 143.   In and out of svt and atrial fibrillation. Rate related depression in the lateral leads.     Imaging Results          CT Abdomen Pelvis With Contrast (Final result)  Result time 06/05/18 11:48:44    Final result by Luis Manuel Blankenship MD (06/05/18 11:48:44)                 Impression:      1. No acute findings within the abdomen or pelvis to explain patient's abdominal pain.  2. Biliary stent in stable position.  3. Atelectasis versus parenchymal scarring of the lung bases with bronchiectasis of the right lower lobe.  4. Additional findings as detailed  above.    Electronically signed by resident: Geronimo Loyola  Date:    06/05/2018  Time:    11:27    Electronically signed by: Luis Manuel Blankenship MD  Date:    06/05/2018  Time:    11:48             Narrative:    EXAMINATION:  CT ABDOMEN PELVIS WITH CONTRAST    CLINICAL HISTORY:  Infection, abdomen-pelvis;    TECHNIQUE:  The patient was surveyed from the lung bases through the pelvis after the administration of 100 cc Omni 350 IV contrast. The data was reconstructed for coronal, sagittal, and axial images.    COMPARISON:  Ultrasound 05/27/2018; CT 05/27/2018, 05/24/2018, 04/27/2018, 05/24/2016.    FINDINGS:  A central venous catheter tip is visualized at the cavoatrial junction.    The lung bases demonstrate atelectasis versus parenchymal scarring.  There is bronchiectasis of the right lower lobe.  There is a nodule within the posterior basal segment of the right lower lobe measuring 0.5 cm, stable when compared to prior studies dating back to 05/24/2016 suggesting benign etiology.  Another focal opacity is partially visualized within the right lower lobe and may represent an additional nodule noting prior chest CT on 04/27/2018 demonstrated a bandlike opacity in this area.  No pleural effusion is seen.    The visualized portions of the heart appear normal. No pericardial effusion.    The liver is normal in size and attenuation.  No focal hepatic abnormality is seen. The gallbladder is unremarkable.  A biliary stent remains in stable position when compared to prior study dated 05/27/2018.  No intrahepatic or extrahepatic biliary ductal dilatation is identified. The spleen is unremarkable.    The stomach, pancreas, and adrenal glands are unremarkable.    The kidneys are normal in size and location. No hydronephrosis is seen.  The ureters appear normal in course and caliber. The urinary bladder is unremarkable. The uterus is unremarkable.    The visualized loops of small and large bowel show no evidence of obstruction or  inflammation.  No ascites, free fluid, or intraperitoneal free air is identified.  There is no evidence of lymph node enlargement in the abdomen or pelvis.    The abdominal aorta is normal in course and caliber with mild atherosclerotic calcifications.    The osseous structures demonstrate degenerative changes.  No evidence of acute fracture or osseous destructive process    The extraperitoneal soft tissues are unremarkable.                               X-Ray Chest AP Portable (Final result)  Result time 06/05/18 11:03:30    Final result by Hamlet Devlin MD (06/05/18 11:03:30)                 Impression:      No significant detrimental interval change in the appearance of the chest since 05/27/2018 is appreciated.      Electronically signed by: Hamlet Devlin MD  Date:    06/05/2018  Time:    11:03             Narrative:    EXAMINATION:  XR CHEST AP PORTABLE    CLINICAL HISTORY:  Sepsis;    COMPARISON:  Comparison is made to 05/27/2018.    FINDINGS:  Postoperative changes again noted in the thorax, as is a vascular catheter with its tip in the right atrium.  The heart is enlarged, but the degree of cardiomegaly and the appearance of the cardiomediastinal silhouette have not changed appreciably since 05/27/2018.  Pulmonary vascularity is normal, with no interval engorgement noted.  Linear opacity in the right lower lung zone laterally is consistent with some minimal persistent subsegmental atelectasis, but the lung zones demonstrate no detrimental change, with no evidence of significant superimposed airspace consolidation or volume loss noted.  No pleural fluid of any substantial volume is seen on either side.  No pneumothorax.                                   Medical Decision Making:   History:   Old Medical Records: I decided to obtain old medical records.  Independently Interpreted Test(s):   I have ordered and independently interpreted X-rays - see prior notes.  I have ordered and independently interpreted EKG  Reading(s) - see prior notes  Clinical Tests:   Lab Tests: Ordered  Radiological Study: Ordered  Medical Tests: Ordered       APC / Resident Notes:   Patient is 58 year old female with PMHX of secondary neuroendocrine tumor of liver, Afib, hx of ablation in 2006, chronic anticoagulation on Xarelto 20 mg, CHF with 60 % EF, HTN, pulmonary HTN, DM2, and anemia. She presents to the ED for tachycardia.    Will order septic workup. Will order imaging. Will continue to monitor.     Differential diagnoses include, but are not limited to: ascending cholangitis, intra-abdominal abscess, sepsis, cardiac arrhthymias, or electrolyte imbalance.     No leukocytosis. Hemodynamically stable. Elevated glucose 255. Elevated BUN 35. Lactate WNL. Blood cultures pending. PT-INR 11.0-1.1. BNP WNL. Troponin WNL. Lipase WNL. UA unremarkable for infectious process. CXR found to have cardiomegaly. Linear opacity in the right lower lung zone laterally is consistent with some minimal persistent subsegmental atelectasis, but the lung zones demonstrate no detrimental change, with no evidence of significant superimposed airspace consolidation or volume loss noted.      Patient reassessed, patient resting comfortably in NAD. Patient remains tachycardic despite 1 L IVF. Patient reports compliance with home medication of metoprolol succinate 50 mg. Patient is followed by cardiology at [facility] in [place]. According to patient, she reports cardiologist plan of care at [facility] to place pacemaker for further management of cardiac arrhthymia.     CT abdomen/pelvis found to have No acute findings within the abdomen or pelvis to explain patient's abdominal pain. Biliary stent in stable position. Sepsis evaluation unremarkable, will order 5 mg IV metoprolol for rate control. Will continue to monitor. Case discussed with Dr. Tanner who instructs he will perform patient's scheduled procedure once tachycardia improved during hospital admission.     Patient  reassessed, HR improved to 125 bpm after 5 mg IV metoprolol. Patient reports HR as baseline. Will withhold additional dosage at this time due to hypotension /74. Will continue to monitor.     Will admit to medicine.     Appreciate cardiology consult. They recommend patient to receive lasix 40 mg IV now and second dose of metoprolol 5 mg IV now. Cardiology recommended continuous infusion and admission to cardiac ICU. Patient refused and requesting to be transferred to [facility] in [place] for cardiology services with Dr. Sánchez [phone] patient's cardiologist of 17 years.     Patient agreeable to hospital [place] at [facility].     I have discussed and reviewed with my supervising physician.           Scribe Attestation:   Scribe #1: I performed the above scribed service and the documentation accurately describes the services I performed. I attest to the accuracy of the note.      Clinical Impression:   The primary encounter diagnosis was Supraventricular tachycardia. Diagnoses of Tachycardia, Secondary neuroendocrine tumor of liver, and Hypotension, unspecified hypotension type were also pertinent to this visit.      Disposition:   Disposition: Admitted                        Ne Clemente PA-C  06/05/18 8012

## 2018-06-05 NOTE — ASSESSMENT & PLAN NOTE
Last ECHO 1/16 showed EF of 60% with mild to moderate depression of systolic function  - continue BB  - lasix PO  - not on ACE or ARB

## 2018-06-06 ENCOUNTER — ANESTHESIA EVENT (OUTPATIENT)
Dept: MEDSURG UNIT | Facility: HOSPITAL | Age: 58
DRG: 243 | End: 2018-06-06
Payer: MEDICARE

## 2018-06-06 PROBLEM — I48.92 ATRIAL FLUTTER: Status: ACTIVE | Noted: 2018-06-06

## 2018-06-06 LAB
ALBUMIN SERPL BCP-MCNC: 3.2 G/DL
ALP SERPL-CCNC: 90 U/L
ALT SERPL W/O P-5'-P-CCNC: 18 U/L
ANION GAP SERPL CALC-SCNC: 10 MMOL/L
AST SERPL-CCNC: 20 U/L
BILIRUB SERPL-MCNC: 1 MG/DL
BUN SERPL-MCNC: 21 MG/DL
CALCIUM SERPL-MCNC: 9.5 MG/DL
CHLORIDE SERPL-SCNC: 105 MMOL/L
CO2 SERPL-SCNC: 21 MMOL/L
CREAT SERPL-MCNC: 1.1 MG/DL
EST. GFR  (AFRICAN AMERICAN): >60 ML/MIN/1.73 M^2
EST. GFR  (NON AFRICAN AMERICAN): 55.9 ML/MIN/1.73 M^2
ESTIMATED AVG GLUCOSE: 229 MG/DL
GLUCOSE SERPL-MCNC: 194 MG/DL
HBA1C MFR BLD HPLC: 9.6 %
POCT GLUCOSE: 264 MG/DL (ref 70–110)
POTASSIUM SERPL-SCNC: 4.1 MMOL/L
PROT SERPL-MCNC: 8.7 G/DL
SODIUM SERPL-SCNC: 136 MMOL/L

## 2018-06-06 PROCEDURE — 25000003 PHARM REV CODE 250: Performed by: PHYSICIAN ASSISTANT

## 2018-06-06 PROCEDURE — 63600175 PHARM REV CODE 636 W HCPCS: Performed by: PHYSICIAN ASSISTANT

## 2018-06-06 PROCEDURE — 80053 COMPREHEN METABOLIC PANEL: CPT

## 2018-06-06 PROCEDURE — 99233 SBSQ HOSP IP/OBS HIGH 50: CPT | Mod: GV,GC,, | Performed by: INTERNAL MEDICINE

## 2018-06-06 PROCEDURE — 93010 ELECTROCARDIOGRAM REPORT: CPT | Mod: GV,,, | Performed by: INTERNAL MEDICINE

## 2018-06-06 PROCEDURE — 36415 COLL VENOUS BLD VENIPUNCTURE: CPT

## 2018-06-06 PROCEDURE — 25000003 PHARM REV CODE 250: Performed by: HOSPITALIST

## 2018-06-06 PROCEDURE — 93005 ELECTROCARDIOGRAM TRACING: CPT

## 2018-06-06 PROCEDURE — 25000003 PHARM REV CODE 250

## 2018-06-06 PROCEDURE — 83036 HEMOGLOBIN GLYCOSYLATED A1C: CPT

## 2018-06-06 PROCEDURE — 99233 SBSQ HOSP IP/OBS HIGH 50: CPT | Mod: ,,, | Performed by: PHYSICIAN ASSISTANT

## 2018-06-06 PROCEDURE — 20600001 HC STEP DOWN PRIVATE ROOM

## 2018-06-06 RX ORDER — GLUCAGON 1 MG
1 KIT INJECTION
Status: DISCONTINUED | OUTPATIENT
Start: 2018-06-06 | End: 2018-06-09 | Stop reason: HOSPADM

## 2018-06-06 RX ORDER — IBUPROFEN 200 MG
16 TABLET ORAL
Status: DISCONTINUED | OUTPATIENT
Start: 2018-06-06 | End: 2018-06-09 | Stop reason: HOSPADM

## 2018-06-06 RX ORDER — LORAZEPAM 0.5 MG/1
0.5 TABLET ORAL EVERY 6 HOURS PRN
Status: DISCONTINUED | OUTPATIENT
Start: 2018-06-06 | End: 2018-06-09 | Stop reason: HOSPADM

## 2018-06-06 RX ORDER — DILTIAZEM HCL/D5W 125 MG/125
10 PLASTIC BAG, INJECTION (ML) INTRAVENOUS CONTINUOUS
Status: DISCONTINUED | OUTPATIENT
Start: 2018-06-06 | End: 2018-06-07

## 2018-06-06 RX ORDER — FUROSEMIDE 10 MG/ML
40 INJECTION INTRAMUSCULAR; INTRAVENOUS ONCE
Status: COMPLETED | OUTPATIENT
Start: 2018-06-06 | End: 2018-06-06

## 2018-06-06 RX ORDER — INSULIN ASPART 100 [IU]/ML
0-5 INJECTION, SOLUTION INTRAVENOUS; SUBCUTANEOUS
Status: DISCONTINUED | OUTPATIENT
Start: 2018-06-06 | End: 2018-06-09 | Stop reason: HOSPADM

## 2018-06-06 RX ORDER — IBUPROFEN 200 MG
24 TABLET ORAL
Status: DISCONTINUED | OUTPATIENT
Start: 2018-06-06 | End: 2018-06-09 | Stop reason: HOSPADM

## 2018-06-06 RX ORDER — POTASSIUM CHLORIDE 20 MEQ/1
20 TABLET, EXTENDED RELEASE ORAL ONCE
Status: COMPLETED | OUTPATIENT
Start: 2018-06-06 | End: 2018-06-06

## 2018-06-06 RX ADMIN — DILTIAZEM HYDROCHLORIDE 5 MG/HR: 5 INJECTION INTRAVENOUS at 03:06

## 2018-06-06 RX ADMIN — RIVAROXABAN 20 MG: 20 TABLET, FILM COATED ORAL at 05:06

## 2018-06-06 RX ADMIN — POTASSIUM CHLORIDE 20 MEQ: 1500 TABLET, EXTENDED RELEASE ORAL at 09:06

## 2018-06-06 RX ADMIN — METOPROLOL TARTRATE 50 MG: 50 TABLET ORAL at 09:06

## 2018-06-06 RX ADMIN — INSULIN ASPART 1 UNITS: 100 INJECTION, SOLUTION INTRAVENOUS; SUBCUTANEOUS at 09:06

## 2018-06-06 RX ADMIN — LORAZEPAM 0.5 MG: 0.5 TABLET ORAL at 03:06

## 2018-06-06 RX ADMIN — ZOLPIDEM TARTRATE 10 MG: 10 TABLET ORAL at 09:06

## 2018-06-06 RX ADMIN — STANDARDIZED SENNA CONCENTRATE AND DOCUSATE SODIUM 1 TABLET: 8.6; 5 TABLET, FILM COATED ORAL at 09:06

## 2018-06-06 RX ADMIN — FUROSEMIDE 40 MG: 10 INJECTION, SOLUTION INTRAMUSCULAR; INTRAVENOUS at 03:06

## 2018-06-06 RX ADMIN — METOPROLOL TARTRATE 50 MG: 50 TABLET ORAL at 03:06

## 2018-06-06 RX ADMIN — FUROSEMIDE 40 MG: 40 TABLET ORAL at 09:06

## 2018-06-06 NOTE — ASSESSMENT & PLAN NOTE
Last ECHO 1/16 showed EF of 60% with mild to moderate depression of systolic function  - continue BB on discharge  - lasix PO  - will start ACE/ARB after DCCV  - daily weights, strict Is/Os

## 2018-06-06 NOTE — PROGRESS NOTES
[facility] Hospital Medicine  Progress Note    Patient Name: Janine Souza  MRN: [id number]  Patient Class: IP- Inpatient   Admission Date: 6/5/2018  Length of Stay: 1 days  Attending Physician: Aneta Santiago MD  Primary Care Provider: Charles Lopez MD    [facility] Medicine Team: [facility] J Cookie Laughlin PA-C    Subjective:     Principal Problem:Atrial flutter    HPI:  Patient is a 59yo female with a PMHx of secondary neuroendocrine tumor of liver, Afib, hx of ablation in 2006, chronic anticoagulation on Xarelto 20 mg, CHF with 60 % EF, HTN, pulmonary HTN, DM2, and anemia being admitted to medicine for evaluation of SVT. She presented to her GI clinic today for removal of her biliary stent. She was found to be in SVT when evaluated for the procedure and was sent to the ED. Initially, she was pursuing stent removal to address the pain. At this moment, she complains of SOB, tachycardia, and diaphoresis and found to have HR of ~ 140 bpm. Patient denies chest pain, nausea, vomiting, weakness.    In the ED, patient with SVT and HR in the 140s. Intake labs remarkable for Hgb 10.7, Cr 1.4. EKG confirms SVT with questionable ST changes. Cardiology consulted in the ED for further recommendations with admission to co-management service.    Hospital Course:  Patient admitted to medicine for atrial flutter. Cardiology and EP consulted and following. Patient started on Cardizem drip. EP planning for DCCV on 6/7.    Interval History: No events overnight. Patient evaluated by Cardiology and EP. EP planning for DCCV on 6/7. Continue AC, start Cardizem drip, titrate as needed.    Review of Systems   Constitutional: Positive for diaphoresis. Negative for fever.   Respiratory: Positive for shortness of breath. Negative for chest tightness.    Cardiovascular: Positive for palpitations. Negative for chest pain.   Gastrointestinal: Negative for abdominal pain and nausea.   Genitourinary: Negative for dysuria  and urgency.   Neurological: Positive for light-headedness. Negative for weakness.   Psychiatric/Behavioral: Negative for confusion. The patient is nervous/anxious.      Objective:     Vital Signs (Most Recent):  Temp: 99.9 °F (37.7 °C) (06/06/18 1222)  Pulse: (!) 145 (06/06/18 1451)  Resp: 16 (06/06/18 1222)  BP: 118/79 (06/06/18 1222)  SpO2: (!) 94 % (06/06/18 1222) Vital Signs (24h Range):  Temp:  [97 °F (36.1 °C)-99.9 °F (37.7 °C)] 99.9 °F (37.7 °C)  Pulse:  [102-147] 145  Resp:  [15-18] 16  SpO2:  [93 %-97 %] 94 %  BP: (100-135)/(58-90) 118/79     Weight: 99.8 kg (220 lb 0.3 oz)  Body mass index is 35.51 kg/m².    Intake/Output Summary (Last 24 hours) at 06/06/18 1506  Last data filed at 06/06/18 1400   Gross per 24 hour   Intake              900 ml   Output             1250 ml   Net             -350 ml      Physical Exam   Constitutional: She is oriented to person, place, and time. She appears well-developed and well-nourished.   Cardiovascular: Intact distal pulses and normal pulses.  An irregular rhythm present. Tachycardia present.    Pulmonary/Chest: Effort normal. She has rales (lower, bilateral).   Abdominal: Soft. Bowel sounds are normal. There is tenderness.   Musculoskeletal: She exhibits no edema or tenderness.   Neurological: She is alert and oriented to person, place, and time. No cranial nerve deficit.   Skin: Skin is warm and dry.   Psychiatric: Her behavior is normal. Thought content normal. Her mood appears anxious. Her speech is rapid and/or pressured.       Significant Labs:   BMP:   Recent Labs  Lab 06/06/18  0800   *      K 4.1      CO2 21*   BUN 21*   CREATININE 1.1   CALCIUM 9.5     CBC:   Recent Labs  Lab 06/05/18  1043 06/05/18  1043   WBC 8.20  --    HGB 10.7*  --    HCT 34.3* 36     --      Urine Studies:   Recent Labs  Lab 06/05/18  1055   COLORU Yellow   APPEARANCEUA Hazy*   PHUR 5.0   SPECGRAV 1.010   PROTEINUA Negative   GLUCUA Negative   KETONESU  Negative   BILIRUBINUA Negative   OCCULTUA Negative   NITRITE Negative   UROBILINOGEN Negative   LEUKOCYTESUR Negative     All pertinent labs within the past 24 hours have been reviewed.    Significant Imaging: I have reviewed all pertinent imaging results/findings within the past 24 hours.    Assessment/Plan:      * Atrial flutter    Patient with history of afib with ablation being admitted for SVT found on presentation for GI procedure. Patient follows with Cardiologist at [facility].   - IV metoprolol x 1 given with no improvement.  - EKG with aflutter and HR in 140s, mild ST depressions in inferior leads  - Cardiology consulted in the ED for recommendations, continuing to follow on the floor  - Cardizem drip, titrate as needed for rate control  - EP consulted, planning for DCCV on 6/7  - maintain on telemetry  - electrolytes normal, infectious workup unremarkable (CXR, UA clear)  - continue Xarelto for AC  - NPO at MN        Chronic diastolic heart failure    Last ECHO 1/16 showed EF of 60% with mild to moderate depression of systolic function  - continue BB on discharge  - lasix PO  - will start ACE/ARB after DCCV  - daily weights, strict Is/Os        Biliary stent migration    Seen in GI today for removal of biliary stent that was causing abdominal pain  - will evaluate if needs to be done inpatient, or possibly follow up outpatient        Type 2 diabetes mellitus, without long-term current use of insulin    Hold home oral medications  - low dose SSI, ACHS  - cardiac diabetic diet        Malignant poorly differentiated neuroendocrine carcinoma    Follows with Dr. Flores in heme/onc  - stable          VTE Risk Mitigation         Ordered     rivaroxaban tablet 20 mg  With dinner      06/06/18 1504     Place sequential compression device  Until discontinued      06/05/18 1839     Place LAILA hose  Until discontinued      06/05/18 1839              Cookie Laughlin PA-C  Department of Hospital Medicine   [facility]

## 2018-06-06 NOTE — ANESTHESIA PREPROCEDURE EVALUATION
[facility]  Anesthesia Pre-Operative Evaluation         Patient Name: Janine Souza  YOB: 1960  MRN: [id number]    SUBJECTIVE:     Pre-operative evaluation for Procedure(s) (LRB):  CARDIOVERSION (N/A)     06/06/2018    Janine Souza is a 58 y.o. female w/ a significant PMHx of RV dysfunction, Pul HTN PASP  63 by 2d echo, moderate TR, secondary neuroendocrine tumor of liver, Afib, hx of ablation in 2006, chronic anticoagulation on Xarelto 20 mg, CHF with 60 % EF. Presented to GI for removal of biliary stent and was found to be in SVT. She has been started on diltiazem infusion    Patient now presents for the above procedure(s).      LDA:   20 G PIV Rt hand    Prev airway: None documented.    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  Has not taken since  As a child had an unknown reaction.  Has not taken since       Current Inpatient Medications:   furosemide  40 mg Intravenous Once    furosemide  40 mg Oral Daily    metoprolol tartrate  50 mg Oral TID    senna-docusate 8.6-50 mg  1 tablet Oral BID       Current Facility-Administered Medications on File Prior to Encounter   Medication Dose Route Frequency Provider Last Rate Last Dose    ferumoxytol (FERAHEME) 510 mg in dextrose 5 % 100 mL IVPB  510 mg Intravenous Once Kalyan Flores MD        heparin, porcine (PF) 100 unit/mL injection flush 500 Units  500 Units Intravenous PRN Kalyan Flores MD         sodium chloride 0.9% flush 10 mL  10 mL Intravenous PRN Kalyan Flores MD         Current Outpatient Prescriptions on File Prior to Encounter   Medication Sig Dispense Refill    blood sugar diagnostic, disc Strp USE AS DIRECTED EVERY DAY *THANK YOU*      docusate sodium (COLACE) 50 MG capsule Take by mouth 2 (two) times daily.      fluticasone (FLOVENT HFA) 110 mcg/actuation inhaler Inhale 2 puffs into the lungs once daily.      furosemide (LASIX) 40 MG tablet Take 40 mg by mouth once daily.       glipiZIDE (GLUCOTROL) 10 MG tablet Take 1 tablet (10 mg total) by mouth 2 (two) times daily. 180 tablet 3    KLOR-CON M20 20 mEq tablet       lancets Misc Use as directed      linagliptin (TRADJENTA) 5 mg Tab tablet Take 5 mg by mouth once daily.      metformin (GLUCOPHAGE) 500 MG tablet Take 1 tablet (500 mg total) by mouth 2 (two) times daily with meals. Take 2 tablets (1000mg) in am and 3 tablets (1500 mg)in the pm (Patient taking differently: Take 2 tablets (1000mg) in am and 3 tablets (1500 mg)in the pm) 180 tablet 3    metoprolol succinate (TOPROL-XL) 50 MG 24 hr tablet Take 1 tablet (50 mg total) by mouth once daily. 90 tablet 4    metoprolol tartrate (LOPRESSOR) 25 MG tablet Take 25 mg by mouth once daily.      ondansetron (ZOFRAN) 4 MG tablet Take 1 tablet (4 mg total) by mouth every 12 (twelve) hours as needed for Nausea. 30 tablet 6    polyethylene glycol (GLYCOLAX) 17 gram PwPk Take by mouth once daily.      prochlorperazine (COMPAZINE) 5 MG tablet Take 1 tablet (5 mg total) by mouth every 6 (six) hours as needed for Nausea. 30 tablet 6    rivaroxaban (XARELTO) 20 mg Tab Take 20 mg by mouth.      zolpidem (AMBIEN) 10 mg Tab Take 1 tablet (10 mg total) by mouth nightly as needed. 30 tablet 5       Past Surgical History:   Procedure Laterality Date    ADENOIDECTOMY      CARDIAC ELECTROPHYSIOLOGY MAPPING AND ABLATION      CARDIAC SURGERY       SECTION      ERCP      ERCP N/A  6/5/2018    Procedure: ERCP (ENDOSCOPIC RETROGRADE CHOLANGIOPANCREATOGRAPHY);  Surgeon: Tang Tanner MD;  Location: [facility] ([address] [place] [address]);  Service: Endoscopy;  Laterality: N/A;  2 day hold Dr Kalyan Whittaker    TONSILLECTOMY      uvulopalatopharygoplasty         Social History     Social History    Marital status:      Spouse name: N/A    Number of children: N/A    Years of education: N/A     Occupational History    Not on file.     Social History Main Topics    Smoking status: Never Smoker    Smokeless tobacco: Never Used    Alcohol use No    Drug use: No    Sexual activity: Not on file     Other Topics Concern    Not on file     Social History Narrative    No narrative on file       OBJECTIVE:     Vital Signs Range (Last 24H):  Temp:  [36.1 °C (97 °F)-37.7 °C (99.9 °F)]   Pulse:  [102-147]   Resp:  [15-27]   BP: (100-135)/(58-90)   SpO2:  [93 %-97 %]       CBC:   Recent Labs      06/05/18   1043  06/05/18   1043   WBC  8.20   --    RBC  3.34*   --    HGB  10.7*   --    HCT  34.3*  36   PLT  264   --    MCV  103*   --    MCH  32.0*   --    MCHC  31.2*   --        CMP:   Recent Labs      06/05/18   1043  06/06/18   0800   NA  136  136   K  4.6  4.1   CL  102  105   CO2  24  21*   BUN  35*  21*   CREATININE  1.4  1.1   GLU  255*  194*   CALCIUM  10.2  9.5   ALBUMIN  3.6  3.2*   PROT  9.7*  8.7*   ALKPHOS  101  90   ALT  22  18   AST  23  20   BILITOT  1.0  1.0       INR:  Recent Labs      06/05/18   1043   INR  1.1       Diagnostic Studies: No relevant studies.    EKG: No recent studies available.    2D ECHO:  Results for orders placed or performed during the hospital encounter of 01/13/16   2D echo with color flow doppler   Result Value Ref Range    EF 60 55 - 65    Mitral Valve Regurgitation MILD     Est. PA Systolic Pressure 62.6 (A)     Mitral Valve Mobility NORMAL     Tricuspid Valve Regurgitation MODERATE (A)      CONCLUSIONS     1 - Mild left ventricular enlargement.     2 -  Normal left ventricular systolic function (EF 60-65%).     3 - Right ventricular enlargement with mildly to moderately depressed systolic function.     4 - Pulmonary hypertension. The estimated PA systolic pressure is 63 mmHg.     5 - Mild mitral regurgitation.     6 - Moderate tricuspid regurgitation.     ASSESSMENT/PLAN:         Anesthesia Evaluation    I have reviewed the Patient Summary Reports.     I have reviewed the Medications.     Review of Systems  Anesthesia Hx:  No problems with previous Anesthesia  History of prior surgery of interest to airway management or planning: Denies Family Hx of Anesthesia complications.   Denies Personal Hx of Anesthesia complications.   Social:  Non-Smoker, No Alcohol Use    Cardiovascular:   Hypertension Valvular problems/Murmurs Dysrhythmias atrial fibrillation CHF    Pulmonary:  Pulmonary Normal Pul htn   Hepatic/GI:  Hepatic/GI Normal  Denies GERD.    Musculoskeletal:  Musculoskeletal Normal    Neurological:   Denies CVA. Headaches Denies Seizures.    Endocrine:   Diabetes        Physical Exam  General:  Well nourished, Obesity    Airway/Jaw/Neck:  Airway Findings: Mouth Opening: Normal Tongue: Normal  General Airway Assessment: Adult  Mallampati: III  Improves to II with phonation.  TM Distance: Normal, at least 6 cm  Jaw/Neck Findings:  Neck ROM: Normal ROM      Dental:  Dental Findings: In tact, Periodontal disease, Mild   Chest/Lungs:  Chest/Lungs Clear    Heart/Vascular:  Heart Findings: Rhythm: Irregularly Irregular  Heart murmur: negative       Mental Status:  Mental Status Findings:  Cooperative, Alert and Oriented         Anesthesia Plan  Type of Anesthesia, risks & benefits discussed:  Anesthesia Type:  general  Patient's Preference:   Intra-op Monitoring Plan: standard ASA monitors  Intra-op Monitoring Plan Comments:   Post Op Pain Control Plan: per primary service following discharge from PACU  Post Op Pain Control Plan Comments:   Induction:   IV  Beta  Blocker:  Patient is on a Beta-Blocker and has received one dose within the past 24 hours (No further documentation required).       Informed Consent: Patient understands risks and agrees with Anesthesia plan.  Questions answered. Anesthesia consent signed with patient.  ASA Score: 3     Day of Surgery Review of History & Physical:  There are no significant changes.          Ready For Surgery From Anesthesia Perspective.

## 2018-06-06 NOTE — ASSESSMENT & PLAN NOTE
- most likely atypical AFL per Dr. Onofre in the setting of prior convergent procedure  - she missed doses of her AC b/c of a planned but since cancelled biliary procedure  - discussed with Dr. Onofre: will pursue rhythm control with KAMILLE +/- DCCV  - the patient ate today and is stable, so KAMILLE +/- DCCV will be pursued tomorrow  - resume NOAC  - rate control with diltiazem gtt  - NPO past midnight  - goal K>4 and Mg>2  - obtain TTE with CFD

## 2018-06-06 NOTE — HOSPITAL COURSE
Patient admitted to medicine for atrial flutter. Cardiology and EP consulted and following. Patient started on Cardizem drip. EP s/p DCCV on 6/7.  Post cardioversion patient had 9-10 second pause.  Patient was taken to EP lab and had a PPM placed on 6/7.  Sotalol was initiated and is now dosed at 40mg bid.  Her QTC hangs around the 500-524 aleksey post initiation. PPM discharge instructions reviewed over two days. Doxycycline called into home pharmacy already, ready for .       Dispo: will arrange a f/u with wound and pacer check with primary cardiologist if possible.

## 2018-06-06 NOTE — PROGRESS NOTES
Pt transferred from ED to . Pt transferred via stretcher. Pt transferred from stretcher to bed without assistance. Pt denies any distress. PIV intact. No redness or swelling noted. Pt oriented to room. Call bell within reach. Will continue to monitor.

## 2018-06-06 NOTE — ASSESSMENT & PLAN NOTE
Patient with history of afib with ablation being admitted for SVT found on presentation for GI procedure. Patient follows with Cardiologist at [facility].   - IV metoprolol x 1 given with no improvement.  - EKG with aflutter and HR in 140s, mild ST depressions in inferior leads  - Cardiology consulted in the ED for recommendations, continuing to follow on the floor  - Cardizem drip, titrate as needed for rate control  - EP consulted, planning for DCCV on 6/7  - maintain on telemetry  - electrolytes normal, infectious workup unremarkable (CXR, UA clear)  - continue Xarelto for AC  - NPO at [facility]

## 2018-06-06 NOTE — SUBJECTIVE & OBJECTIVE
Interval History: No events overnight. Patient evaluated by Cardiology and EP. EP planning for DCCV on 6/7. Continue AC, start Cardizem drip, titrate as needed.    Review of Systems   Constitutional: Positive for diaphoresis. Negative for fever.   Respiratory: Positive for shortness of breath. Negative for chest tightness.    Cardiovascular: Positive for palpitations. Negative for chest pain.   Gastrointestinal: Negative for abdominal pain and nausea.   Genitourinary: Negative for dysuria and urgency.   Neurological: Positive for light-headedness. Negative for weakness.   Psychiatric/Behavioral: Negative for confusion. The patient is nervous/anxious.      Objective:     Vital Signs (Most Recent):  Temp: 99.9 °F (37.7 °C) (06/06/18 1222)  Pulse: (!) 145 (06/06/18 1451)  Resp: 16 (06/06/18 1222)  BP: 118/79 (06/06/18 1222)  SpO2: (!) 94 % (06/06/18 1222) Vital Signs (24h Range):  Temp:  [97 °F (36.1 °C)-99.9 °F (37.7 °C)] 99.9 °F (37.7 °C)  Pulse:  [102-147] 145  Resp:  [15-18] 16  SpO2:  [93 %-97 %] 94 %  BP: (100-135)/(58-90) 118/79     Weight: 99.8 kg (220 lb 0.3 oz)  Body mass index is 35.51 kg/m².    Intake/Output Summary (Last 24 hours) at 06/06/18 1506  Last data filed at 06/06/18 1400   Gross per 24 hour   Intake              900 ml   Output             1250 ml   Net             -350 ml      Physical Exam   Constitutional: She is oriented to person, place, and time. She appears well-developed and well-nourished.   Cardiovascular: Intact distal pulses and normal pulses.  An irregular rhythm present. Tachycardia present.    Pulmonary/Chest: Effort normal. She has rales (lower, bilateral).   Abdominal: Soft. Bowel sounds are normal. There is tenderness.   Musculoskeletal: She exhibits no edema or tenderness.   Neurological: She is alert and oriented to person, place, and time. No cranial nerve deficit.   Skin: Skin is warm and dry.   Psychiatric: Her behavior is normal. Thought content normal. Her mood appears  anxious. Her speech is rapid and/or pressured.       Significant Labs:   BMP:   Recent Labs  Lab 06/06/18  0800   *      K 4.1      CO2 21*   BUN 21*   CREATININE 1.1   CALCIUM 9.5     CBC:   Recent Labs  Lab 06/05/18  1043 06/05/18  1043   WBC 8.20  --    HGB 10.7*  --    HCT 34.3* 36     --      Urine Studies:   Recent Labs  Lab 06/05/18  1055   COLORU Yellow   APPEARANCEUA Hazy*   PHUR 5.0   SPECGRAV 1.010   PROTEINUA Negative   GLUCUA Negative   KETONESU Negative   BILIRUBINUA Negative   OCCULTUA Negative   NITRITE Negative   UROBILINOGEN Negative   LEUKOCYTESUR Negative     All pertinent labs within the past 24 hours have been reviewed.    Significant Imaging: I have reviewed all pertinent imaging results/findings within the past 24 hours.

## 2018-06-06 NOTE — PROGRESS NOTES
DIANE Leary RN from 8th floor Oncology to room 323 bedside to access PAC. Power injectable 20G 1inch needle inserted. Blood return noted, PAC saline locked.

## 2018-06-06 NOTE — CONSULTS
[facility]  Cardiology  Consult Note    Patient Name: Janine Souza  MRN: [id number]  Admission Date: 6/5/2018  Hospital Length of Stay: 1 days  Code Status: Full Code   Attending Provider: Aneta Santiago MD   Consulting Provider: Viv Angel MD  Primary Care Physician: Charles Lopez MD  Principal Problem:Supraventricular tachycardia    Patient information was obtained from patient and past medical records.     Inpatient consult to Cardiology  Consult performed by: VIV ANGEL  Consult ordered by: KAYLEIGH WISEMAN JR        Subjective:     Chief Complaint:  Tachycardia     HPI:   59 y/o female with PMH of secondary neuroendocrine tumor of liver s/o chemotherapy with biliary stent in place, DM, HTN, AF s/p convergent procedure with recurrence within weeks per Dr. Sánchez, s/p several failed DCCVs, JACKSON, and CHF (EF 50% 4/2017) who presented to the ED from GI clinic. She was in GI clinic for assessment and management of biliary stent, as there was concern for migration. The patient had SOB and tachycardia with vitals notable for HR 140s. An ERCP was cancelled, and the patient went to the ED. The patient was initially going to be admitted to [facility]; however, she declined admission, requesting transfer to  where she gets her cardiology care. Her primary cardiologist is Dr. Benedicto Sánchez. This plan did not go forward, and the patient was admitted to the medicine co-management service. In the setting of her convergent procedure, the patient is most likely in atypical AFL currently with HR persistently >120 BPM. She is asymptomatic currently and stable. The patient held her NOAC lou-procedurally. A KAMILLE with DCCV was planned for today; however, the patient reportedly ate sausage at 11A.         Past Medical History:   Diagnosis Date    Acute headache 1/8/2016    Anemia     Atrial fibrillation, persistent     Bronchitis     CHF (congestive heart failure)     Diabetes mellitus, type 2      Hypertension     Malignant poorly differentiated neuroendocrine carcinoma 2016    Secondary neuroendocrine tumor of liver 2016       Past Surgical History:   Procedure Laterality Date    ADENOIDECTOMY      CARDIAC ELECTROPHYSIOLOGY MAPPING AND ABLATION      CARDIAC SURGERY       SECTION      ERCP      ERCP N/A 2018    Procedure: ERCP (ENDOSCOPIC RETROGRADE CHOLANGIOPANCREATOGRAPHY);  Surgeon: Tang Tanner MD;  Location: [facility] ([address] [place] [address]);  Service: Endoscopy;  Laterality: N/A;  2 day hold Dr Kalyan Whittaker    TONSILLECTOMY      uvulopalatopharygoplasty         Review of patient's allergies indicates:   Allergen Reactions    Pcn [penicillins] Other (See Comments), Hives and Swelling     As a child had an unknown reaction.  Has not taken since  As a child had an unknown reaction.  Has not taken since       Current Facility-Administered Medications on File Prior to Encounter   Medication    ferumoxytol (FERAHEME) 510 mg in dextrose 5 % 100 mL IVPB    heparin, porcine (PF) 100 unit/mL injection flush 500 Units    sodium chloride 0.9% flush 10 mL     Current Outpatient Prescriptions on File Prior to Encounter   Medication Sig    blood sugar diagnostic, disc Strp USE AS DIRECTED EVERY DAY *THANK YOU*    docusate sodium (COLACE) 50 MG capsule Take by mouth 2 (two) times daily.    fluticasone (FLOVENT HFA) 110 mcg/actuation inhaler Inhale 2 puffs into the lungs once daily.    furosemide (LASIX) 40 MG tablet Take 40 mg by mouth once daily.     glipiZIDE (GLUCOTROL) 10 MG tablet Take 1 tablet (10 mg total) by mouth 2 (two) times daily.    KLOR-CON M20 20 mEq tablet     lancets Misc Use as directed    linagliptin (TRADJENTA) 5 mg Tab tablet Take 5 mg by mouth once daily.    metformin (GLUCOPHAGE) 500 MG tablet Take 1 tablet (500 mg total) by mouth 2 (two) times daily with meals. Take 2 tablets (1000mg) in am and 3 tablets (1500 mg)in the pm (Patient taking differently:  Take 2 tablets (1000mg) in am and 3 tablets (1500 mg)in the pm)    metoprolol succinate (TOPROL-XL) 50 MG 24 hr tablet Take 1 tablet (50 mg total) by mouth once daily.    metoprolol tartrate (LOPRESSOR) 25 MG tablet Take 25 mg by mouth once daily.    ondansetron (ZOFRAN) 4 MG tablet Take 1 tablet (4 mg total) by mouth every 12 (twelve) hours as needed for Nausea.    polyethylene glycol (GLYCOLAX) 17 gram PwPk Take by mouth once daily.    prochlorperazine (COMPAZINE) 5 MG tablet Take 1 tablet (5 mg total) by mouth every 6 (six) hours as needed for Nausea.    rivaroxaban (XARELTO) 20 mg Tab Take 20 mg by mouth.    zolpidem (AMBIEN) 10 mg Tab Take 1 tablet (10 mg total) by mouth nightly as needed.     Family History     Problem Relation (Age of Onset)    Breast cancer Paternal Aunt, Maternal Aunt    Diabetes type II Mother    Prostate cancer Father        Social History Main Topics    Smoking status: Never Smoker    Smokeless tobacco: Never Used    Alcohol use No    Drug use: No    Sexual activity: Not on file     Review of Systems   Constitution: Negative for chills and fever.   HENT: Negative for ear discharge.    Eyes: Negative for pain and visual disturbance.   Cardiovascular: Positive for palpitations. Negative for chest pain, dyspnea on exertion, irregular heartbeat, leg swelling, orthopnea, paroxysmal nocturnal dyspnea and syncope.   Respiratory: Positive for shortness of breath. Negative for hemoptysis and wheezing.    Skin: Negative for rash and suspicious lesions.   Musculoskeletal: Negative for joint pain and muscle weakness.   Gastrointestinal: Negative for abdominal pain, diarrhea, hematemesis, hematochezia, melena, nausea and vomiting.   Genitourinary: Negative for dysuria and frequency.   Neurological: Negative for focal weakness, headaches and tremors.   Psychiatric/Behavioral: Negative for altered mental status, suicidal ideas and thoughts of violence.     Objective:     Vital Signs (Most  Recent):  Temp: 99.9 °F (37.7 °C) (06/06/18 1222)  Pulse: (!) 142 (06/06/18 1114)  Resp: 16 (06/06/18 1222)  BP: 118/79 (06/06/18 1222)  SpO2: (!) 94 % (06/06/18 1222) Vital Signs (24h Range):  Temp:  [97 °F (36.1 °C)-99.9 °F (37.7 °C)] 99.9 °F (37.7 °C)  Pulse:  [102-147] 142  Resp:  [15-27] 16  SpO2:  [93 %-97 %] 94 %  BP: (100-135)/(58-90) 118/79     Weight: 99.8 kg (220 lb 0.3 oz)  Body mass index is 35.51 kg/m².    SpO2: (!) 94 %  O2 Device (Oxygen Therapy): room air      Intake/Output Summary (Last 24 hours) at 06/06/18 1426  Last data filed at 06/06/18 1400   Gross per 24 hour   Intake             1400 ml   Output             1250 ml   Net              150 ml       Lines/Drains/Airways     Central Venous Catheter Line                 Port A Cath Single Lumen 02/02/16 1502 right subclavian 854 days          Peripheral Intravenous Line                 Peripheral IV - Single Lumen 06/05/18 0940 Right Hand 1 day                Physical Exam   Constitutional: She is oriented to person, place, and time. She appears well-developed and well-nourished.   HENT:   Head: Normocephalic and atraumatic.   Eyes: EOM are normal.   Cardiovascular: Regular rhythm.  Tachycardia present.  Exam reveals no gallop and no friction rub.    Pulmonary/Chest: Effort normal and breath sounds normal. No stridor. She has no wheezes. She has no rales.   Abdominal: Soft. Bowel sounds are normal. There is no rebound and no guarding.   Musculoskeletal: She exhibits no edema.   Neurological: She is alert and oriented to person, place, and time. No cranial nerve deficit.   Skin: Skin is warm and dry.   Psychiatric: She has a normal mood and affect. Her behavior is normal.       Significant Labs: All pertinent lab results from the last 24 hours have been reviewed.      Assessment and Plan:     Atrial flutter    - most likely atypical AFL per Dr. Jemima in the setting of prior convergent procedure  - she missed doses of her AC b/c of a planned but  since cancelled biliary procedure  - discussed with Dr. Onofre: will pursue rhythm control with KAMILLE +/- DCCV  - the patient ate today and is stable, so KAMILLE +/- DCCV will be pursued tomorrow  - resume NOAC  - rate control with diltiazem gtt  - NPO past midnight  - goal K>4 and Mg>2  - obtain TTE with CFD            VTE Risk Mitigation         Ordered     Place sequential compression device  Until discontinued      06/05/18 1839     Place LAILA hose  Until discontinued      06/05/18 1839          Thank you for your consult. I will follow-up with patient. Please contact us if you have any additional questions.    Augustine Oleary MD  Cardiology   [facility]

## 2018-06-06 NOTE — SUBJECTIVE & OBJECTIVE
Past Medical History:   Diagnosis Date    Acute headache 2016    Anemia     Atrial fibrillation, persistent     Bronchitis     CHF (congestive heart failure)     Diabetes mellitus, type 2     Hypertension     Malignant poorly differentiated neuroendocrine carcinoma 2016    Secondary neuroendocrine tumor of liver 2016       Past Surgical History:   Procedure Laterality Date    ADENOIDECTOMY      CARDIAC ELECTROPHYSIOLOGY MAPPING AND ABLATION      CARDIAC SURGERY       SECTION      ERCP      ERCP N/A 2018    Procedure: ERCP (ENDOSCOPIC RETROGRADE CHOLANGIOPANCREATOGRAPHY);  Surgeon: Tang Tanner MD;  Location: [address]);  Service: Endoscopy;  Laterality: N/A;  2 day hold Dr Kalyan Whittaker    TONSILLECTOMY      uvulopalatopharygoplasty         Review of patient's allergies indicates:   Allergen Reactions    Pcn [penicillins] Other (See Comments), Hives and Swelling     As a child had an unknown reaction.  Has not taken since  As a child had an unknown reaction.  Has not taken since       Current Facility-Administered Medications on File Prior to Encounter   Medication    ferumoxytol (FERAHEME) 510 mg in dextrose 5 % 100 mL IVPB    heparin, porcine (PF) 100 unit/mL injection flush 500 Units    sodium chloride 0.9% flush 10 mL     Current Outpatient Prescriptions on File Prior to Encounter   Medication Sig    blood sugar diagnostic, disc Strp USE AS DIRECTED EVERY DAY *THANK YOU*    docusate sodium (COLACE) 50 MG capsule Take by mouth 2 (two) times daily.    fluticasone (FLOVENT HFA) 110 mcg/actuation inhaler Inhale 2 puffs into the lungs once daily.    furosemide (LASIX) 40 MG tablet Take 40 mg by mouth once daily.     glipiZIDE (GLUCOTROL) 10 MG tablet Take 1 tablet (10 mg total) by mouth 2 (two) times daily.    KLOR-CON M20 20 mEq tablet     lancets Misc Use as directed    linagliptin (TRADJENTA) 5 mg Tab tablet Take 5 mg by mouth once daily.     metformin (GLUCOPHAGE) 500 MG tablet Take 1 tablet (500 mg total) by mouth 2 (two) times daily with meals. Take 2 tablets (1000mg) in am and 3 tablets (1500 mg)in the pm (Patient taking differently: Take 2 tablets (1000mg) in am and 3 tablets (1500 mg)in the pm)    metoprolol succinate (TOPROL-XL) 50 MG 24 hr tablet Take 1 tablet (50 mg total) by mouth once daily.    metoprolol tartrate (LOPRESSOR) 25 MG tablet Take 25 mg by mouth once daily.    ondansetron (ZOFRAN) 4 MG tablet Take 1 tablet (4 mg total) by mouth every 12 (twelve) hours as needed for Nausea.    polyethylene glycol (GLYCOLAX) 17 gram PwPk Take by mouth once daily.    prochlorperazine (COMPAZINE) 5 MG tablet Take 1 tablet (5 mg total) by mouth every 6 (six) hours as needed for Nausea.    rivaroxaban (XARELTO) 20 mg Tab Take 20 mg by mouth.    zolpidem (AMBIEN) 10 mg Tab Take 1 tablet (10 mg total) by mouth nightly as needed.     Family History     Problem Relation (Age of Onset)    Breast cancer Paternal Aunt, Maternal Aunt    Diabetes type II Mother    Prostate cancer Father        Social History Main Topics    Smoking status: Never Smoker    Smokeless tobacco: Never Used    Alcohol use No    Drug use: No    Sexual activity: Not on file     Review of Systems   Constitution: Negative for chills and fever.   HENT: Negative for ear discharge.    Eyes: Negative for pain and visual disturbance.   Cardiovascular: Positive for palpitations. Negative for chest pain, dyspnea on exertion, irregular heartbeat, leg swelling, orthopnea, paroxysmal nocturnal dyspnea and syncope.   Respiratory: Positive for shortness of breath. Negative for hemoptysis and wheezing.    Skin: Negative for rash and suspicious lesions.   Musculoskeletal: Negative for joint pain and muscle weakness.   Gastrointestinal: Negative for abdominal pain, diarrhea, hematemesis, hematochezia, melena, nausea and vomiting.   Genitourinary: Negative for dysuria and frequency.    Neurological: Negative for focal weakness, headaches and tremors.   Psychiatric/Behavioral: Negative for altered mental status, suicidal ideas and thoughts of violence.     Objective:     Vital Signs (Most Recent):  Temp: 99.9 °F (37.7 °C) (06/06/18 1222)  Pulse: (!) 142 (06/06/18 1114)  Resp: 16 (06/06/18 1222)  BP: 118/79 (06/06/18 1222)  SpO2: (!) 94 % (06/06/18 1222) Vital Signs (24h Range):  Temp:  [97 °F (36.1 °C)-99.9 °F (37.7 °C)] 99.9 °F (37.7 °C)  Pulse:  [102-147] 142  Resp:  [15-27] 16  SpO2:  [93 %-97 %] 94 %  BP: (100-135)/(58-90) 118/79     Weight: 99.8 kg (220 lb 0.3 oz)  Body mass index is 35.51 kg/m².    SpO2: (!) 94 %  O2 Device (Oxygen Therapy): room air      Intake/Output Summary (Last 24 hours) at 06/06/18 1426  Last data filed at 06/06/18 1400   Gross per 24 hour   Intake             1400 ml   Output             1250 ml   Net              150 ml       Lines/Drains/Airways     Central Venous Catheter Line                 Port A Cath Single Lumen 02/02/16 1502 right subclavian 854 days          Peripheral Intravenous Line                 Peripheral IV - Single Lumen 06/05/18 0940 Right Hand 1 day                Physical Exam   Constitutional: She is oriented to person, place, and time. She appears well-developed and well-nourished.   HENT:   Head: Normocephalic and atraumatic.   Eyes: EOM are normal.   Cardiovascular: Regular rhythm.  Tachycardia present.  Exam reveals no gallop and no friction rub.    Pulmonary/Chest: Effort normal and breath sounds normal. No stridor. She has no wheezes. She has no rales.   Abdominal: Soft. Bowel sounds are normal. There is no rebound and no guarding.   Musculoskeletal: She exhibits no edema.   Neurological: She is alert and oriented to person, place, and time. No cranial nerve deficit.   Skin: Skin is warm and dry.   Psychiatric: She has a normal mood and affect. Her behavior is normal.       Significant Labs: All pertinent lab results from the last 24  hours have been reviewed.

## 2018-06-06 NOTE — ED NOTES
Called telemetry to confirm patient is on tele box number [id number] patient is sinus tach, transport here to take patient up to her room.

## 2018-06-06 NOTE — PLAN OF CARE
06/06/18 1030   Discharge Assessment   Assessment Type Discharge Planning Assessment   Confirmed/corrected address and phone number on facesheet? Yes   Assessment information obtained from? Patient;Medical Record   Expected Length of Stay (days) 3   Communicated expected length of stay with patient/caregiver yes   Prior to hospitilization cognitive status: Alert/Oriented   Prior to hospitalization functional status: Independent   Current cognitive status: Alert/Oriented   Current Functional Status: Independent   Lives With spouse   Able to Return to Prior Arrangements yes   Is patient able to care for self after discharge? Yes   Patient's perception of discharge disposition home or selfcare   Readmission Within The Last 30 Days no previous admission in last 30 days   Patient currently being followed by outpatient case management? No   Patient currently receives any other outside agency services? No   Equipment Currently Used at Home none   Do you have any problems affording any of your prescribed medications? No   Is the patient taking medications as prescribed? yes   Does the patient have transportation home? Yes   Transportation Available family or friend will provide   Does the patient receive services at the Coumadin Clinic? No   Discharge Plan A Home with family   Patient/Family In Agreement With Plan yes   Admitted with A-Fib and SVT. Lives with  and is independent in her ADLs. Plan is to DC home. No DC needs identified.     Cardiologist:  Benedicto Sánchez M.D  Phone: [phone]  Fax: [phone]

## 2018-06-07 ENCOUNTER — ANESTHESIA (OUTPATIENT)
Dept: MEDSURG UNIT | Facility: HOSPITAL | Age: 58
DRG: 243 | End: 2018-06-07
Payer: MEDICARE

## 2018-06-07 ENCOUNTER — ANESTHESIA EVENT (OUTPATIENT)
Dept: MEDSURG UNIT | Facility: HOSPITAL | Age: 58
DRG: 243 | End: 2018-06-07
Payer: MEDICARE

## 2018-06-07 PROBLEM — I47.10 SUPRAVENTRICULAR TACHYCARDIA: Status: ACTIVE | Noted: 2018-06-07

## 2018-06-07 PROBLEM — I48.92 ATRIAL FLUTTER: Status: ACTIVE | Noted: 2018-06-07

## 2018-06-07 PROBLEM — I47.10 SVT (SUPRAVENTRICULAR TACHYCARDIA): Status: ACTIVE | Noted: 2018-06-06

## 2018-06-07 LAB
ALBUMIN SERPL BCP-MCNC: 3.2 G/DL
ALP SERPL-CCNC: 86 U/L
ALT SERPL W/O P-5'-P-CCNC: 18 U/L
ANION GAP SERPL CALC-SCNC: 11 MMOL/L
AORTIC ATHEROMA: YES
AST SERPL-CCNC: 19 U/L
BASOPHILS # BLD AUTO: 0.06 K/UL
BASOPHILS NFR BLD: 0.8 %
BILIRUB SERPL-MCNC: 1 MG/DL
BUN SERPL-MCNC: 24 MG/DL
CALCIUM SERPL-MCNC: 9.5 MG/DL
CHLORIDE SERPL-SCNC: 102 MMOL/L
CO2 SERPL-SCNC: 24 MMOL/L
CREAT SERPL-MCNC: 1.1 MG/DL
DIFFERENTIAL METHOD: ABNORMAL
EOSINOPHIL # BLD AUTO: 0.2 K/UL
EOSINOPHIL NFR BLD: 3.1 %
ERYTHROCYTE [DISTWIDTH] IN BLOOD BY AUTOMATED COUNT: 14.1 %
EST. GFR  (AFRICAN AMERICAN): >60 ML/MIN/1.73 M^2
EST. GFR  (NON AFRICAN AMERICAN): 55.9 ML/MIN/1.73 M^2
ESTIMATED PA SYSTOLIC PRESSURE: 37.7
GLUCOSE SERPL-MCNC: 232 MG/DL
HCT VFR BLD AUTO: 29.6 %
HGB BLD-MCNC: 9.4 G/DL
IMM GRANULOCYTES # BLD AUTO: 0.04 K/UL
IMM GRANULOCYTES NFR BLD AUTO: 0.5 %
LYMPHOCYTES # BLD AUTO: 1.8 K/UL
LYMPHOCYTES NFR BLD: 22.7 %
MCH RBC QN AUTO: 31.5 PG
MCHC RBC AUTO-ENTMCNC: 31.8 G/DL
MCV RBC AUTO: 99 FL
MITRAL VALVE MOBILITY: NORMAL
MITRAL VALVE REGURGITATION: ABNORMAL
MONOCYTES # BLD AUTO: 0.9 K/UL
MONOCYTES NFR BLD: 11.6 %
NEUTROPHILS # BLD AUTO: 4.8 K/UL
NEUTROPHILS NFR BLD: 61.3 %
NRBC BLD-RTO: 0 /100 WBC
PLATELET # BLD AUTO: 211 K/UL
PMV BLD AUTO: 9.7 FL
POCT GLUCOSE: 253 MG/DL (ref 70–110)
POCT GLUCOSE: 272 MG/DL (ref 70–110)
POCT GLUCOSE: 281 MG/DL (ref 70–110)
POCT GLUCOSE: 344 MG/DL (ref 70–110)
POTASSIUM SERPL-SCNC: 3.9 MMOL/L
PROT SERPL-MCNC: 8.4 G/DL
RBC # BLD AUTO: 2.98 M/UL
RETIRED EF AND QEF - SEE NOTES: 30 (ref 55–65)
SODIUM SERPL-SCNC: 137 MMOL/L
TRICUSPID VALVE REGURGITATION: ABNORMAL
WBC # BLD AUTO: 7.84 K/UL

## 2018-06-07 PROCEDURE — D9220A PRA ANESTHESIA: Mod: ANES,,, | Performed by: ANESTHESIOLOGY

## 2018-06-07 PROCEDURE — 02H63JZ INSERTION OF PACEMAKER LEAD INTO RIGHT ATRIUM, PERCUTANEOUS APPROACH: ICD-10-PCS | Performed by: INTERNAL MEDICINE

## 2018-06-07 PROCEDURE — 63600175 PHARM REV CODE 636 W HCPCS

## 2018-06-07 PROCEDURE — 93010 ELECTROCARDIOGRAM REPORT: CPT | Mod: 76,59,GW, | Performed by: INTERNAL MEDICINE

## 2018-06-07 PROCEDURE — 92960 CARDIOVERSION ELECTRIC EXT: CPT | Mod: 59,GW,, | Performed by: INTERNAL MEDICINE

## 2018-06-07 PROCEDURE — 25000003 PHARM REV CODE 250: Performed by: NURSE ANESTHETIST, CERTIFIED REGISTERED

## 2018-06-07 PROCEDURE — 85025 COMPLETE CBC W/AUTO DIFF WBC: CPT

## 2018-06-07 PROCEDURE — 25000003 PHARM REV CODE 250

## 2018-06-07 PROCEDURE — 27100006 EP LAB PROCEDURE

## 2018-06-07 PROCEDURE — 80053 COMPREHEN METABOLIC PANEL: CPT

## 2018-06-07 PROCEDURE — 63600175 PHARM REV CODE 636 W HCPCS: Performed by: NURSE ANESTHETIST, CERTIFIED REGISTERED

## 2018-06-07 PROCEDURE — 93005 ELECTROCARDIOGRAM TRACING: CPT

## 2018-06-07 PROCEDURE — 93325 DOPPLER ECHO COLOR FLOW MAPG: CPT | Mod: 26,,, | Performed by: INTERNAL MEDICINE

## 2018-06-07 PROCEDURE — 63600175 PHARM REV CODE 636 W HCPCS: Performed by: PHYSICIAN ASSISTANT

## 2018-06-07 PROCEDURE — 25000003 PHARM REV CODE 250: Performed by: INTERNAL MEDICINE

## 2018-06-07 PROCEDURE — 27100006 CARDIOVERSION (DCCV)

## 2018-06-07 PROCEDURE — 93320 DOPPLER ECHO COMPLETE: CPT | Mod: 26,,, | Performed by: INTERNAL MEDICINE

## 2018-06-07 PROCEDURE — 02HK3JZ INSERTION OF PACEMAKER LEAD INTO RIGHT VENTRICLE, PERCUTANEOUS APPROACH: ICD-10-PCS | Performed by: INTERNAL MEDICINE

## 2018-06-07 PROCEDURE — 33208 INSRT HEART PM ATRIAL & VENT: CPT | Mod: KX,GW,, | Performed by: INTERNAL MEDICINE

## 2018-06-07 PROCEDURE — 37000008 HC ANESTHESIA 1ST 15 MINUTES: Performed by: INTERNAL MEDICINE

## 2018-06-07 PROCEDURE — 37000009 HC ANESTHESIA EA ADD 15 MINS: Performed by: INTERNAL MEDICINE

## 2018-06-07 PROCEDURE — 82962 GLUCOSE BLOOD TEST: CPT | Performed by: INTERNAL MEDICINE

## 2018-06-07 PROCEDURE — 99231 SBSQ HOSP IP/OBS SF/LOW 25: CPT | Mod: GV,GC,, | Performed by: INTERNAL MEDICINE

## 2018-06-07 PROCEDURE — D9220A PRA ANESTHESIA: Mod: CRNA,,, | Performed by: NURSE ANESTHETIST, CERTIFIED REGISTERED

## 2018-06-07 PROCEDURE — 20600001 HC STEP DOWN PRIVATE ROOM

## 2018-06-07 PROCEDURE — 93320 DOPPLER ECHO COMPLETE: CPT

## 2018-06-07 PROCEDURE — C1894 INTRO/SHEATH, NON-LASER: HCPCS

## 2018-06-07 PROCEDURE — 25000003 PHARM REV CODE 250: Performed by: PHYSICIAN ASSISTANT

## 2018-06-07 PROCEDURE — 25000003 PHARM REV CODE 250: Performed by: HOSPITALIST

## 2018-06-07 PROCEDURE — 93010 ELECTROCARDIOGRAM REPORT: CPT | Mod: 59,GW,, | Performed by: INTERNAL MEDICINE

## 2018-06-07 PROCEDURE — 0JH606Z INSERTION OF PACEMAKER, DUAL CHAMBER INTO CHEST SUBCUTANEOUS TISSUE AND FASCIA, OPEN APPROACH: ICD-10-PCS | Performed by: INTERNAL MEDICINE

## 2018-06-07 PROCEDURE — A4216 STERILE WATER/SALINE, 10 ML: HCPCS | Performed by: NURSE ANESTHETIST, CERTIFIED REGISTERED

## 2018-06-07 PROCEDURE — 25500020 PHARM REV CODE 255: Performed by: NURSE ANESTHETIST, CERTIFIED REGISTERED

## 2018-06-07 PROCEDURE — 93312 ECHO TRANSESOPHAGEAL: CPT | Mod: 26,,, | Performed by: INTERNAL MEDICINE

## 2018-06-07 PROCEDURE — 5A2204Z RESTORATION OF CARDIAC RHYTHM, SINGLE: ICD-10-PCS | Performed by: INTERNAL MEDICINE

## 2018-06-07 RX ORDER — PROPOFOL 10 MG/ML
VIAL (ML) INTRAVENOUS CONTINUOUS PRN
Status: DISCONTINUED | OUTPATIENT
Start: 2018-06-07 | End: 2018-06-07

## 2018-06-07 RX ORDER — PROPOFOL 10 MG/ML
VIAL (ML) INTRAVENOUS
Status: DISCONTINUED | OUTPATIENT
Start: 2018-06-07 | End: 2018-06-07

## 2018-06-07 RX ORDER — SOTALOL HYDROCHLORIDE 80 MG/1
80 TABLET ORAL 2 TIMES DAILY
Status: DISCONTINUED | OUTPATIENT
Start: 2018-06-07 | End: 2018-06-08

## 2018-06-07 RX ORDER — DOXYCYCLINE 100 MG/1
100 CAPSULE ORAL 2 TIMES DAILY
Qty: 10 CAPSULE | Refills: 0 | Status: SHIPPED | OUTPATIENT
Start: 2018-06-07 | End: 2018-06-12

## 2018-06-07 RX ORDER — ACETAMINOPHEN 325 MG/1
650 TABLET ORAL EVERY 6 HOURS PRN
Status: DISCONTINUED | OUTPATIENT
Start: 2018-06-07 | End: 2018-06-07

## 2018-06-07 RX ORDER — DOXYCYCLINE HYCLATE 100 MG
100 TABLET ORAL EVERY 12 HOURS
Status: DISCONTINUED | OUTPATIENT
Start: 2018-06-08 | End: 2018-06-09 | Stop reason: HOSPADM

## 2018-06-07 RX ORDER — MIDAZOLAM HYDROCHLORIDE 1 MG/ML
INJECTION, SOLUTION INTRAMUSCULAR; INTRAVENOUS
Status: DISCONTINUED | OUTPATIENT
Start: 2018-06-07 | End: 2018-06-07

## 2018-06-07 RX ORDER — GLYCOPYRROLATE 0.2 MG/ML
INJECTION INTRAMUSCULAR; INTRAVENOUS
Status: DISCONTINUED | OUTPATIENT
Start: 2018-06-07 | End: 2018-06-07

## 2018-06-07 RX ORDER — LIDOCAINE HYDROCHLORIDE 10 MG/ML
1 INJECTION INFILTRATION; PERINEURAL ONCE
Status: DISCONTINUED | OUTPATIENT
Start: 2018-06-07 | End: 2018-06-08

## 2018-06-07 RX ORDER — IODIXANOL 320 MG/ML
INJECTION, SOLUTION INTRAVASCULAR
Status: DISCONTINUED | OUTPATIENT
Start: 2018-06-07 | End: 2018-06-07

## 2018-06-07 RX ORDER — VANCOMYCIN HYDROCHLORIDE
15
Status: DISCONTINUED | OUTPATIENT
Start: 2018-06-07 | End: 2018-06-08

## 2018-06-07 RX ORDER — VANCOMYCIN HYDROCHLORIDE
1500
Status: COMPLETED | OUTPATIENT
Start: 2018-06-08 | End: 2018-06-08

## 2018-06-07 RX ORDER — LIDOCAINE HCL/PF 100 MG/5ML
SYRINGE (ML) INTRAVENOUS
Status: DISCONTINUED | OUTPATIENT
Start: 2018-06-07 | End: 2018-06-07

## 2018-06-07 RX ORDER — EPHEDRINE SULFATE 50 MG/ML
INJECTION, SOLUTION INTRAVENOUS
Status: DISCONTINUED | OUTPATIENT
Start: 2018-06-07 | End: 2018-06-07

## 2018-06-07 RX ORDER — HYDROCODONE BITARTRATE AND ACETAMINOPHEN 5; 325 MG/1; MG/1
1 TABLET ORAL EVERY 6 HOURS PRN
Status: DISCONTINUED | OUTPATIENT
Start: 2018-06-07 | End: 2018-06-09 | Stop reason: HOSPADM

## 2018-06-07 RX ADMIN — VANCOMYCIN HYDROCHLORIDE 1500 MG: 1 INJECTION, POWDER, LYOPHILIZED, FOR SOLUTION INTRAVENOUS at 01:06

## 2018-06-07 RX ADMIN — SOTALOL HYDROCHLORIDE 80 MG: 80 TABLET ORAL at 09:06

## 2018-06-07 RX ADMIN — INSULIN ASPART 3 UNITS: 100 INJECTION, SOLUTION INTRAVENOUS; SUBCUTANEOUS at 06:06

## 2018-06-07 RX ADMIN — DILTIAZEM HYDROCHLORIDE 10 MG/HR: 5 INJECTION INTRAVENOUS at 04:06

## 2018-06-07 RX ADMIN — LIDOCAINE HYDROCHLORIDE 100 MG: 20 INJECTION, SOLUTION INTRAVENOUS at 08:06

## 2018-06-07 RX ADMIN — STANDARDIZED SENNA CONCENTRATE AND DOCUSATE SODIUM 1 TABLET: 8.6; 5 TABLET, FILM COATED ORAL at 09:06

## 2018-06-07 RX ADMIN — IODIXANOL 10 ML: 320 INJECTION, SOLUTION INTRAVASCULAR at 02:06

## 2018-06-07 RX ADMIN — FUROSEMIDE 40 MG: 40 TABLET ORAL at 11:06

## 2018-06-07 RX ADMIN — PROPOFOL 30 MG: 10 INJECTION, EMULSION INTRAVENOUS at 03:06

## 2018-06-07 RX ADMIN — PROPOFOL 20 MG: 10 INJECTION, EMULSION INTRAVENOUS at 02:06

## 2018-06-07 RX ADMIN — EPHEDRINE SULFATE 5 MG: 50 INJECTION, SOLUTION INTRAMUSCULAR; INTRAVENOUS; SUBCUTANEOUS at 02:06

## 2018-06-07 RX ADMIN — MIDAZOLAM 1 MG: 1 INJECTION INTRAMUSCULAR; INTRAVENOUS at 02:06

## 2018-06-07 RX ADMIN — GLYCOPYRROLATE 0.2 MG: 0.2 INJECTION, SOLUTION INTRAMUSCULAR; INTRAVENOUS at 01:06

## 2018-06-07 RX ADMIN — SODIUM CHLORIDE, SODIUM GLUCONATE, SODIUM ACETATE, POTASSIUM CHLORIDE, MAGNESIUM CHLORIDE, SODIUM PHOSPHATE, DIBASIC, AND POTASSIUM PHOSPHATE: .53; .5; .37; .037; .03; .012; .00082 INJECTION, SOLUTION INTRAVENOUS at 01:06

## 2018-06-07 RX ADMIN — INSULIN ASPART 2 UNITS: 100 INJECTION, SOLUTION INTRAVENOUS; SUBCUTANEOUS at 09:06

## 2018-06-07 RX ADMIN — MIDAZOLAM 2 MG: 1 INJECTION INTRAMUSCULAR; INTRAVENOUS at 01:06

## 2018-06-07 RX ADMIN — PROPOFOL 70 MG: 10 INJECTION, EMULSION INTRAVENOUS at 02:06

## 2018-06-07 RX ADMIN — PROPOFOL 100 MG: 10 INJECTION, EMULSION INTRAVENOUS at 08:06

## 2018-06-07 RX ADMIN — MIDAZOLAM 1 MG: 1 INJECTION INTRAMUSCULAR; INTRAVENOUS at 01:06

## 2018-06-07 RX ADMIN — GLYCOPYRROLATE 0.2 MG: 0.2 INJECTION, SOLUTION INTRAMUSCULAR; INTRAVENOUS at 09:06

## 2018-06-07 RX ADMIN — HYDROCODONE BITARTRATE AND ACETAMINOPHEN 1 TABLET: 5; 325 TABLET ORAL at 11:06

## 2018-06-07 RX ADMIN — EPHEDRINE SULFATE 10 MG: 50 INJECTION, SOLUTION INTRAMUSCULAR; INTRAVENOUS; SUBCUTANEOUS at 01:06

## 2018-06-07 RX ADMIN — SODIUM CHLORIDE, SODIUM GLUCONATE, SODIUM ACETATE, POTASSIUM CHLORIDE, MAGNESIUM CHLORIDE, SODIUM PHOSPHATE, DIBASIC, AND POTASSIUM PHOSPHATE: .53; .5; .37; .037; .03; .012; .00082 INJECTION, SOLUTION INTRAVENOUS at 08:06

## 2018-06-07 RX ADMIN — IODIXANOL 70 ML: 320 INJECTION, SOLUTION INTRAVASCULAR at 02:06

## 2018-06-07 RX ADMIN — DEXMEDETOMIDINE HYDROCHLORIDE 0.5 MCG/KG/HR: 100 INJECTION, SOLUTION, CONCENTRATE INTRAVENOUS at 01:06

## 2018-06-07 RX ADMIN — ZOLPIDEM TARTRATE 10 MG: 10 TABLET ORAL at 09:06

## 2018-06-07 RX ADMIN — PROPOFOL 100 MCG/KG/MIN: 10 INJECTION, EMULSION INTRAVENOUS at 08:06

## 2018-06-07 RX ADMIN — STANDARDIZED SENNA CONCENTRATE AND DOCUSATE SODIUM 1 TABLET: 8.6; 5 TABLET, FILM COATED ORAL at 11:06

## 2018-06-07 NOTE — NURSING TRANSFER
Nursing Transfer Note      6/7/2018     Transfer From:     Transfer via {TRANSFER VIA:46829}    Transfer with {TRANSFER WITH:49713}    Transported by ***    Medicines sent: ***    Chart send with patient: {YES (DEF)/NO:67573}    Notified: {NOTIFIED:62770}    Patient reassessed at: *** (date, time)    Upon arrival to floor: {IP NSG TRANSFER ARRIVAL OHS:78897}

## 2018-06-07 NOTE — PROGRESS NOTES
[facility]  Cardiology  Progress Note    Patient Name: Janine Souza  MRN: [id number]  Admission Date: 6/5/2018  Hospital Length of Stay: 2 days  Code Status: Full Code   Attending Physician: Aneta Santiago MD   Primary Care Physician: Charles Lopez MD  Expected Discharge Date: 6/7/2018  Principal Problem:SVT (supraventricular tachycardia)    Subjective:     Interval History: Rate controlled this morning.     Review of Systems   Constitution: Negative for chills and fever.   HENT: Negative for ear discharge.    Eyes: Negative for pain and visual disturbance.   Cardiovascular: Negative for chest pain, dyspnea on exertion, irregular heartbeat, leg swelling, orthopnea, palpitations, paroxysmal nocturnal dyspnea and syncope.   Respiratory: Negative for hemoptysis, shortness of breath and wheezing.    Skin: Negative for rash and suspicious lesions.   Musculoskeletal: Negative for joint pain and muscle weakness.   Gastrointestinal: Negative for abdominal pain, diarrhea, hematemesis, hematochezia, melena, nausea and vomiting.   Genitourinary: Negative for dysuria and frequency.   Neurological: Negative for focal weakness, headaches and tremors.   Psychiatric/Behavioral: Negative for altered mental status, suicidal ideas and thoughts of violence.     Objective:     Vital Signs (Most Recent):  Temp: 98.1 °F (36.7 °C) (06/07/18 0811)  Pulse: (!) 127 (06/07/18 0811)  Resp: 18 (06/07/18 0811)  BP: (!) 94/58 (06/07/18 0811)  SpO2: (!) 94 % (06/07/18 0811) Vital Signs (24h Range):  Temp:  [98 °F (36.7 °C)-99.9 °F (37.7 °C)] 98.1 °F (36.7 °C)  Pulse:  [] 127  Resp:  [16-18] 18  SpO2:  [94 %-98 %] 94 %  BP: ()/(53-79) 94/58     Weight: 98.5 kg (217 lb 2.5 oz)  Body mass index is 35.05 kg/m².     SpO2: (!) 94 %  O2 Device (Oxygen Therapy): room air      Intake/Output Summary (Last 24 hours) at 06/07/18 0905  Last data filed at 06/07/18 0700   Gross per 24 hour   Intake              500 ml   Output              2550 ml   Net            -2050 ml       Lines/Drains/Airways     Central Venous Catheter Line                 Port A Cath Single Lumen 02/02/16 1502 right subclavian 855 days          Peripheral Intravenous Line                 Peripheral IV - Single Lumen 06/05/18 0940 Right Hand 1 day                Physical Exam   Constitutional: She is oriented to person, place, and time. She appears well-developed and well-nourished.   HENT:   Head: Normocephalic and atraumatic.   Eyes: EOM are normal.   Cardiovascular: An irregularly irregular rhythm present. Tachycardia present.  Exam reveals no gallop and no friction rub.    Pulmonary/Chest: Effort normal and breath sounds normal. No stridor. She has no wheezes. She has no rales.   Abdominal: Soft. Bowel sounds are normal. There is no rebound and no guarding.   Musculoskeletal: She exhibits no edema.   Neurological: She is alert and oriented to person, place, and time. No cranial nerve deficit.   Skin: Skin is warm and dry.   Psychiatric: She has a normal mood and affect. Her behavior is normal.       Significant Labs: All pertinent lab results from the last 24 hours have been reviewed.      Assessment and Plan:     * SVT (supraventricular tachycardia)    - has had e/o AFL and AF episodes with failed prior DCCVs  - she missed doses of her AC recently b/c of a planned but since cancelled biliary procedure  - discussed with Dr. Onofre: EP consult for EPS, AAD consideration and KAMILLE/DCCV   - c/w NOAC  - rate control with diltiazem gtt  - NPO   - goal K>4 and Mg>2              VTE Risk Mitigation         Ordered     rivaroxaban tablet 20 mg  With dinner      06/06/18 1504     Place sequential compression device  Until discontinued      06/05/18 1839     Place LAILA hose  Until discontinued      06/05/18 1839          Augustine Oleary MD  Cardiology  [facility]

## 2018-06-07 NOTE — PROGRESS NOTES
[facility]  Adult Nutrition  Education Note    Diet Education: Diabetes  Time Spent: 15 min  Learners: Pt      Nutrition Education provided with handouts: Meal Planning Using the Plate Method      Comments: Per glycemic index committee protocol, diabetes education provided for HbA1c of 9.6. Provided and explained handout detailing sources of carbohydrates, appropriate serving sizes, and the plate method for meal planning. Pt voiced understanding. All questions and concerns answered.      Follow-Up: 1x weekly    Please re-consult as needed.

## 2018-06-07 NOTE — ASSESSMENT & PLAN NOTE
- has had e/o AFL and AF episodes with failed prior DCCVs  - she missed doses of her AC recently b/c of a planned but since cancelled biliary procedure  - discussed with Dr. Onofre: EP consult for EPS, AAD consideration and KAMILLE/DCCV   - c/w NOAC  - rate control with diltiazem gtt  - NPO   - goal K>4 and Mg>2

## 2018-06-07 NOTE — ASSESSMENT & PLAN NOTE
Patient is s/p dccv  Patient had long conversion pause after dccv and is now in junctional rhythm increasing the concern that she will likely need a PPM  EPS and AAD to be discussed with staff

## 2018-06-07 NOTE — PLAN OF CARE
Problem: Patient Care Overview  Goal: Plan of Care Review  Plan of care discussed with patient.  Patient ambulating independently, fall precautions in place. Patient has no complaints of pain. Discussed medications and care. Patient has no questions at this time.. Covered with SSI. NPO @ midnight for KAMILLE/DCCV. White board updated. Bed locked in lowest position. Side rails up x2. Will continue to monitor.

## 2018-06-07 NOTE — TRANSFER OF CARE
"Anesthesia Transfer of Care Note    Patient: Janine Souza    Procedure(s) Performed: Procedure(s) (LRB):  INSERTION, CARDIAC PACEMAKER, DUAL CHAMBER (Left)    Patient location: PACU    Anesthesia Type: MAC    Transport from OR: Transported from OR on 2-3 L/min O2 by NC with adequate spontaneous ventilation    Post pain: adequate analgesia    Post assessment: no apparent anesthetic complications    Post vital signs: stable    Level of consciousness: awake    Nausea/Vomiting: no nausea/vomiting    Complications: none    Transfer of care protocol was followed      Last vitals:   Visit Vitals  /62 (BP Location: Left arm, Patient Position: Sitting)   Pulse 90   Temp 36.4 °C (97.6 °F) (Oral)   Resp 18   Ht 5' 6" (1.676 m)   Wt 98.5 kg (217 lb 2.5 oz)   LMP  (LMP Unknown)   SpO2 (!) 92%   Breastfeeding? No   BMI 35.05 kg/m²     "

## 2018-06-07 NOTE — TRANSFER OF CARE
"Anesthesia Transfer of Care Note    Patient: Janine Souza    Procedure(s) Performed: Procedure(s) (LRB):  CARDIOVERSION (N/A)    Patient location: Cath Lab    Anesthesia Type: general    Transport from OR: Transported from OR on room air with adequate spontaneous ventilation    Post pain: adequate analgesia    Post assessment: no apparent anesthetic complications    Post vital signs: stable    Level of consciousness: awake and alert    Nausea/Vomiting: no nausea/vomiting    Complications: none    Transfer of care protocol was followed      Last vitals:   Visit Vitals  BP (!) 94/58 (BP Location: Left arm, Patient Position: Sitting)   Pulse (!) 127   Temp 36.7 °C (98.1 °F) (Oral)   Resp 18   Ht 5' 6" (1.676 m)   Wt 98.5 kg (217 lb 2.5 oz)   LMP  (LMP Unknown)   SpO2 (!) 94%   Breastfeeding? No   BMI 35.05 kg/m²     "

## 2018-06-07 NOTE — SUBJECTIVE & OBJECTIVE
Interval History: Rate controlled this morning.     Review of Systems   Constitution: Negative for chills and fever.   HENT: Negative for ear discharge.    Eyes: Negative for pain and visual disturbance.   Cardiovascular: Negative for chest pain, dyspnea on exertion, irregular heartbeat, leg swelling, orthopnea, palpitations, paroxysmal nocturnal dyspnea and syncope.   Respiratory: Negative for hemoptysis, shortness of breath and wheezing.    Skin: Negative for rash and suspicious lesions.   Musculoskeletal: Negative for joint pain and muscle weakness.   Gastrointestinal: Negative for abdominal pain, diarrhea, hematemesis, hematochezia, melena, nausea and vomiting.   Genitourinary: Negative for dysuria and frequency.   Neurological: Negative for focal weakness, headaches and tremors.   Psychiatric/Behavioral: Negative for altered mental status, suicidal ideas and thoughts of violence.     Objective:     Vital Signs (Most Recent):  Temp: 98.1 °F (36.7 °C) (06/07/18 0811)  Pulse: (!) 127 (06/07/18 0811)  Resp: 18 (06/07/18 0811)  BP: (!) 94/58 (06/07/18 0811)  SpO2: (!) 94 % (06/07/18 0811) Vital Signs (24h Range):  Temp:  [98 °F (36.7 °C)-99.9 °F (37.7 °C)] 98.1 °F (36.7 °C)  Pulse:  [] 127  Resp:  [16-18] 18  SpO2:  [94 %-98 %] 94 %  BP: ()/(53-79) 94/58     Weight: 98.5 kg (217 lb 2.5 oz)  Body mass index is 35.05 kg/m².     SpO2: (!) 94 %  O2 Device (Oxygen Therapy): room air      Intake/Output Summary (Last 24 hours) at 06/07/18 0905  Last data filed at 06/07/18 0700   Gross per 24 hour   Intake              500 ml   Output             2550 ml   Net            -2050 ml       Lines/Drains/Airways     Central Venous Catheter Line                 Port A Cath Single Lumen 02/02/16 1502 right subclavian 855 days          Peripheral Intravenous Line                 Peripheral IV - Single Lumen 06/05/18 0940 Right Hand 1 day                Physical Exam   Constitutional: She is oriented to person, place, and  time. She appears well-developed and well-nourished.   HENT:   Head: Normocephalic and atraumatic.   Eyes: EOM are normal.   Cardiovascular: An irregularly irregular rhythm present. Tachycardia present.  Exam reveals no gallop and no friction rub.    Pulmonary/Chest: Effort normal and breath sounds normal. No stridor. She has no wheezes. She has no rales.   Abdominal: Soft. Bowel sounds are normal. There is no rebound and no guarding.   Musculoskeletal: She exhibits no edema.   Neurological: She is alert and oriented to person, place, and time. No cranial nerve deficit.   Skin: Skin is warm and dry.   Psychiatric: She has a normal mood and affect. Her behavior is normal.       Significant Labs: All pertinent lab results from the last 24 hours have been reviewed.

## 2018-06-07 NOTE — NURSING TRANSFER
Nursing Transfer Note      6/7/2018     Transfer From: PPM placement    Transfer via stretcher    Transfer with cardiac monitoring    Transported by escort    Chart send with patient: Yes    Notified: spouse, daughter

## 2018-06-07 NOTE — ANESTHESIA PREPROCEDURE EVALUATION
[facility]  Anesthesia Pre-Operative Evaluation         Patient Name: Janine Souza  YOB: 1960  MRN: [id number]    SUBJECTIVE:     Pre-operative evaluation for Procedure(s) (LRB):  INSERTION, CARDIAC PACEMAKER, DUAL CHAMBER (Left)     06/07/2018    Janine Souza is a 58 y.o. female w/ a significant PMHx of secondary neuroendocrine tumor of liver s/o chemotherapy with biliary stent in place, DM, HTN, AF/AFL s/p convergence procedure with recurrence, s/p multiple DCCVs, JACKSON on BIPAP, who was admitted from GI clinic for tachycardia, now found to have atypical aflutter. Has been on Xarelto for OAC.  Is now s/p DCCV today with long conversion pause and then resumption of junctional rhythm at 48bpm.  She now presents for the above procedure.      LDA:   R hand 20G    Prev airway: None documented.     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  Has not taken since  As a child had an unknown reaction.  Has not taken since       Current Inpatient Medications:   furosemide  40 mg Oral Daily    rivaroxaban  20 mg Oral Daily with dinner    senna-docusate 8.6-50 mg  1 tablet Oral BID       Current Facility-Administered Medications on File Prior to Encounter   Medication Dose Route Frequency Provider Last Rate Last Dose    ferumoxytol (FERAHEME) 510 mg in dextrose 5 % 100 mL IVPB  510 mg Intravenous Once  Kalyan Flores MD        heparin, porcine (PF) 100 unit/mL injection flush 500 Units  500 Units Intravenous PRN Kalyan Flores MD        sodium chloride 0.9% flush 10 mL  10 mL Intravenous PRN Kalyan Flores MD         Current Outpatient Prescriptions on File Prior to Encounter   Medication Sig Dispense Refill    blood sugar diagnostic, disc Strp USE AS DIRECTED EVERY DAY *THANK YOU*      docusate sodium (COLACE) 50 MG capsule Take by mouth 2 (two) times daily.      fluticasone (FLOVENT HFA) 110 mcg/actuation inhaler Inhale 2 puffs into the lungs once daily.      furosemide (LASIX) 40 MG tablet Take 40 mg by mouth once daily.       glipiZIDE (GLUCOTROL) 10 MG tablet Take 1 tablet (10 mg total) by mouth 2 (two) times daily. 180 tablet 3    KLOR-CON M20 20 mEq tablet       lancets Misc Use as directed      linagliptin (TRADJENTA) 5 mg Tab tablet Take 5 mg by mouth once daily.      metformin (GLUCOPHAGE) 500 MG tablet Take 1 tablet (500 mg total) by mouth 2 (two) times daily with meals. Take 2 tablets (1000mg) in am and 3 tablets (1500 mg)in the pm (Patient taking differently: Take 2 tablets (1000mg) in am and 3 tablets (1500 mg)in the pm) 180 tablet 3    metoprolol succinate (TOPROL-XL) 50 MG 24 hr tablet Take 1 tablet (50 mg total) by mouth once daily. 90 tablet 4    metoprolol tartrate (LOPRESSOR) 25 MG tablet Take 25 mg by mouth once daily.      ondansetron (ZOFRAN) 4 MG tablet Take 1 tablet (4 mg total) by mouth every 12 (twelve) hours as needed for Nausea. 30 tablet 6    polyethylene glycol (GLYCOLAX) 17 gram PwPk Take by mouth once daily.      prochlorperazine (COMPAZINE) 5 MG tablet Take 1 tablet (5 mg total) by mouth every 6 (six) hours as needed for Nausea. 30 tablet 6    rivaroxaban (XARELTO) 20 mg Tab Take 20 mg by mouth.      zolpidem (AMBIEN) 10 mg Tab Take 1 tablet (10 mg total) by mouth nightly as needed. 30 tablet 5       Past Surgical History:   Procedure Laterality Date     ADENOIDECTOMY      CARDIAC ELECTROPHYSIOLOGY MAPPING AND ABLATION      CARDIAC SURGERY       SECTION      ERCP      ERCP N/A 2018    Procedure: ERCP (ENDOSCOPIC RETROGRADE CHOLANGIOPANCREATOGRAPHY);  Surgeon: Tang Tanner MD;  Location: [address]);  Service: Endoscopy;  Laterality: N/A;  2 day hold Dr Kalyan Whittaker    TONSILLECTOMY      uvulopalatopharygoplasty         Social History     Social History    Marital status:      Spouse name: N/A    Number of children: N/A    Years of education: N/A     Occupational History    Not on file.     Social History Main Topics    Smoking status: Never Smoker    Smokeless tobacco: Never Used    Alcohol use No    Drug use: No    Sexual activity: Not on file     Other Topics Concern    Not on file     Social History Narrative    No narrative on file       OBJECTIVE:     Vital Signs Range (Last 24H):  Temp:  [36.6 °C (97.8 °F)-37.7 °C (99.9 °F)]   Pulse:  []   Resp:  [16-21]   BP: ()/(53-79)   SpO2:  [94 %-98 %]       CBC:   Recent Labs      18   1043  18   1043  18   0538   WBC  8.20   --   7.84   RBC  3.34*   --   2.98*   HGB  10.7*   --   9.4*   HCT  34.3*  36  29.6*   PLT  264   --   211   MCV  103*   --   99*   MCH  32.0*   --   31.5*   MCHC  31.2*   --   31.8*       CMP:   Recent Labs      18   0800  18   0538   NA  136  137   K  4.1  3.9   CL  105  102   CO2  21*  24   BUN  21*  24*   CREATININE  1.1  1.1   GLU  194*  232*   CALCIUM  9.5  9.5   ALBUMIN  3.2*  3.2*   PROT  8.7*  8.4   ALKPHOS  90  86   ALT  18  18   AST  20  19   BILITOT  1.0  1.0       INR:  Recent Labs      18   1043   INR  1.1       Diagnostic Studies: No relevant studies.    EK/6/18  SVT versus Atrial flutter with 2 to 1 block  Nonspecific ST-t wave abnormality  Abnormal ECG  When compared with ECG of 2018 10:25,  No significant change was found    2D ECHO:  Results for orders placed  or performed during the hospital encounter of 01/13/16   2D echo with color flow doppler   Result Value Ref Range    EF 60 55 - 65    Mitral Valve Regurgitation MILD     Est. PA Systolic Pressure 62.6 (A)     Mitral Valve Mobility NORMAL     Tricuspid Valve Regurgitation MODERATE (A)      [facility] 6/7/18: pending       ASSESSMENT/PLAN:         Anesthesia Evaluation    I have reviewed the Patient Summary Reports.     I have reviewed the Medications.     Review of Systems  Anesthesia Hx:  No problems with previous Anesthesia  History of prior surgery of interest to airway management or planning: Previous anesthesia: MAC Denies Family Hx of Anesthesia complications.   Denies Personal Hx of Anesthesia complications.   Social:  Non-Smoker, No Alcohol Use    Cardiovascular:   Hypertension Valvular problems/Murmurs Dysrhythmias atrial fibrillation CHF    Pulmonary:  Pulmonary Normal Pul htn PASP 63   Hepatic/GI:  Hepatic/GI Normal  Denies GERD.    Musculoskeletal:  Musculoskeletal Normal    Neurological:   Denies CVA. Headaches Denies Seizures.    Endocrine:   Diabetes        Physical Exam  General:  Well nourished, Obesity    Airway/Jaw/Neck:  Airway Findings: Mouth Opening: Normal Tongue: Normal  General Airway Assessment: Adult  Mallampati: III  Improves to II with phonation.  TM Distance: Normal, at least 6 cm  Jaw/Neck Findings:  Neck ROM: Normal ROM      Dental:  Dental Findings: In tact, Periodontal disease, Mild   Chest/Lungs:  Chest/Lungs Clear    Heart/Vascular:  Heart Findings: Rhythm: Irregularly Irregular  Heart murmur: negative       Mental Status:  Mental Status Findings:  Cooperative, Alert and Oriented         Anesthesia Plan  Type of Anesthesia, risks & benefits discussed:  Anesthesia Type:  general, MAC  Patient's Preference:   Intra-op Monitoring Plan: standard ASA monitors  Intra-op Monitoring Plan Comments:   Post Op Pain Control Plan: multimodal analgesia, IV/PO Opioids PRN and per primary service  following discharge from PACU  Post Op Pain Control Plan Comments:   Induction:   IV  Beta Blocker:  Patient is not currently on a Beta-Blocker (No further documentation required).       Informed Consent: Patient understands risks and agrees with Anesthesia plan.  Questions answered. Anesthesia consent signed with patient.  ASA Score: 3     Day of Surgery Review of History & Physical:  There are no significant changes.      Anesthesia Plan Notes: Patient had a glass of water after her cardioversion.  Will wait 2 hrs for procedure.         Ready For Surgery From Anesthesia Perspective.

## 2018-06-07 NOTE — CONSULTS
[facility]  Cardiac Electrophysiology  Consult Note    Admission Date: 2018  Code Status: Full Code   Attending Provider: Aneta Santiago MD  Consulting Provider: David Holman MD  Principal Problem:Atrial flutter    Inpatient consult to Electrophysiology  Consult performed by: DAVID HOLMAN  Consult ordered by: VIV ANGEL        Subjective:     Chief Complaint:  Atrial flutter     HPI:   Janine Souza is a 58 y.o. woman with PMHx of secondary neuroendocrine tumor of liver s/o chemotherapy with biliary stent in place, DM, HTN, AF/AFL s/p convergence procedure with recurrence, s/p multiple DCCVs, JACKSON on BIPAP, who was admitted from GI clinic for tachycardia, now found to have atypical aflutter. Has been on Xarelto for OAC.  Is now s/p DCCV today with long conversion pause and then resumption of junctional rhythm at 48bpm.      Past Medical History:   Diagnosis Date    Acute headache 2016    Anemia     Atrial fibrillation, persistent     Bronchitis     CHF (congestive heart failure)     Diabetes mellitus, type 2     Hypertension     Malignant poorly differentiated neuroendocrine carcinoma 2016    Secondary neuroendocrine tumor of liver 2016       Past Surgical History:   Procedure Laterality Date    ADENOIDECTOMY      CARDIAC ELECTROPHYSIOLOGY MAPPING AND ABLATION      CARDIAC SURGERY       SECTION      ERCP      ERCP N/A 2018    Procedure: ERCP (ENDOSCOPIC RETROGRADE CHOLANGIOPANCREATOGRAPHY);  Surgeon: Tang Tanner MD;  Location: [address]);  Service: Endoscopy;  Laterality: N/A;  2 day hold Dr Kalyan Whittaker    TONSILLECTOMY      uvulopalatopharygoplasty         Review of patient's allergies indicates:   Allergen Reactions    Pcn [penicillins] Other (See Comments), Hives and Swelling     As a child had an unknown reaction.  Has not taken since  As a child had an unknown reaction.  Has not taken since       Current  Facility-Administered Medications on File Prior to Encounter   Medication    ferumoxytol (FERAHEME) 510 mg in dextrose 5 % 100 mL IVPB    heparin, porcine (PF) 100 unit/mL injection flush 500 Units    sodium chloride 0.9% flush 10 mL     Current Outpatient Prescriptions on File Prior to Encounter   Medication Sig    blood sugar diagnostic, disc Strp USE AS DIRECTED EVERY DAY *THANK YOU*    docusate sodium (COLACE) 50 MG capsule Take by mouth 2 (two) times daily.    fluticasone (FLOVENT HFA) 110 mcg/actuation inhaler Inhale 2 puffs into the lungs once daily.    furosemide (LASIX) 40 MG tablet Take 40 mg by mouth once daily.     glipiZIDE (GLUCOTROL) 10 MG tablet Take 1 tablet (10 mg total) by mouth 2 (two) times daily.    KLOR-CON M20 20 mEq tablet     lancets Misc Use as directed    linagliptin (TRADJENTA) 5 mg Tab tablet Take 5 mg by mouth once daily.    metformin (GLUCOPHAGE) 500 MG tablet Take 1 tablet (500 mg total) by mouth 2 (two) times daily with meals. Take 2 tablets (1000mg) in am and 3 tablets (1500 mg)in the pm (Patient taking differently: Take 2 tablets (1000mg) in am and 3 tablets (1500 mg)in the pm)    metoprolol succinate (TOPROL-XL) 50 MG 24 hr tablet Take 1 tablet (50 mg total) by mouth once daily.    metoprolol tartrate (LOPRESSOR) 25 MG tablet Take 25 mg by mouth once daily.    ondansetron (ZOFRAN) 4 MG tablet Take 1 tablet (4 mg total) by mouth every 12 (twelve) hours as needed for Nausea.    polyethylene glycol (GLYCOLAX) 17 gram PwPk Take by mouth once daily.    prochlorperazine (COMPAZINE) 5 MG tablet Take 1 tablet (5 mg total) by mouth every 6 (six) hours as needed for Nausea.    rivaroxaban (XARELTO) 20 mg Tab Take 20 mg by mouth.    zolpidem (AMBIEN) 10 mg Tab Take 1 tablet (10 mg total) by mouth nightly as needed.     Family History     Problem Relation (Age of Onset)    Breast cancer Paternal Aunt, Maternal Aunt    Diabetes type II Mother    Prostate cancer Father         Social History Main Topics    Smoking status: Never Smoker    Smokeless tobacco: Never Used    Alcohol use No    Drug use: No    Sexual activity: Not on file     Review of Systems   Constitution: Negative for chills, fever and weight loss.   HENT: Negative for sore throat and stridor.    Eyes: Negative for blurred vision, double vision and pain.   Cardiovascular: Positive for palpitations. Negative for chest pain, claudication, dyspnea on exertion, near-syncope, orthopnea, paroxysmal nocturnal dyspnea and syncope.   Respiratory: Positive for shortness of breath. Negative for cough, sputum production and wheezing.    Endocrine: Negative for polydipsia, polyphagia and polyuria.   Skin: Negative for rash.   Musculoskeletal: Negative for joint pain, joint swelling and myalgias.   Gastrointestinal: Negative for abdominal pain, constipation, diarrhea, heartburn, melena, nausea and vomiting.   Genitourinary: Negative for dysuria and hematuria.   Neurological: Negative for focal weakness and loss of balance.   Psychiatric/Behavioral: Negative for depression and memory loss.     Objective:     Vital Signs (Most Recent):  Temp: 97.8 °F (36.6 °C) (06/07/18 1000)  Pulse: (!) 59 (06/07/18 1000)  Resp: 20 (06/07/18 1000)  BP: (!) 112/59 (06/07/18 1000)  SpO2: 95 % (06/07/18 1000) Vital Signs (24h Range):  Temp:  [97.8 °F (36.6 °C)-99.9 °F (37.7 °C)] 97.8 °F (36.6 °C)  Pulse:  [] 59  Resp:  [16-20] 20  SpO2:  [94 %-98 %] 95 %  BP: ()/(53-79) 112/59     Weight: 98.5 kg (217 lb 2.5 oz)  Body mass index is 35.05 kg/m².    SpO2: 95 %  O2 Device (Oxygen Therapy): room air    Physical Exam   Constitutional: She is oriented to person, place, and time. She appears well-developed and well-nourished.   HENT:   Head: Normocephalic and atraumatic.   Eyes: Pupils are equal, round, and reactive to light.   Neck: Normal range of motion. No JVD present.   Cardiovascular: Regular rhythm.  Bradycardia present.    No murmur  heard.  Pulmonary/Chest: Effort normal and breath sounds normal. She has no wheezes. She has no rales.   Abdominal: Soft. Bowel sounds are normal. She exhibits no distension. There is no tenderness.   Neurological: She is alert and oriented to person, place, and time.   Skin: Skin is warm and dry.       Significant Labs: EP:   Recent Labs  Lab 06/05/18  1043  06/06/18  0800 06/07/18  0538     --  136 137   K 4.6  --  4.1 3.9     --  105 102   CO2 24  --  21* 24   *  --  194* 232*   BUN 35*  --  21* 24*   CREATININE 1.4  --  1.1 1.1   CALCIUM 10.2  --  9.5 9.5   PROT 9.7*  --  8.7* 8.4   ALBUMIN 3.6  --  3.2* 3.2*   BILITOT 1.0  --  1.0 1.0   ALKPHOS 101  --  90 86   AST 23  --  20 19   ALT 22  --  18 18   ANIONGAP 10  --  10 11   ESTGFRAFRICA 48.1*  --  >60.0 >60.0   EGFRNONAA 41.7*  --  55.9* 55.9*   WBC 8.20  --   --  7.84   HGB 10.7*  --   --  9.4*   HCT 34.3*  < >  --  29.6*     --   --  211   INR 1.1  --   --   --    < > = values in this interval not displayed.    Significant Imaging: Echocardiogram:   2D echo with color flow doppler:   Results for orders placed or performed during the hospital encounter of 01/13/16   2D echo with color flow doppler   Result Value Ref Range    EF 60 55 - 65    Mitral Valve Regurgitation MILD     Est. PA Systolic Pressure 62.6 (A)     Mitral Valve Mobility NORMAL     Tricuspid Valve Regurgitation MODERATE (A)     and EKG: junctional rhythm at 48 bpm    Assessment and Plan:     * Atrial flutter    Patient is s/p dccv  Patient had long conversion pause after dccv and is now in junctional rhythm increasing the concern that she will need PPM in AM.  No EPS at this time            Thank you for your consult. I will follow-up with patient. Please contact us if you have any additional questions.    David Parks MD  Cardiac Electrophysiology  [facility]

## 2018-06-07 NOTE — ANESTHESIA POSTPROCEDURE EVALUATION
"Anesthesia Post Evaluation    Patient: Janine Souza    Procedure(s) Performed: Procedure(s) (LRB):  CARDIOVERSION (N/A)    Final Anesthesia Type: general  Patient location during evaluation: PACU  Patient participation: Yes- Able to Participate  Level of consciousness: awake and alert  Post-procedure vital signs: reviewed and stable  Pain management: adequate  Airway patency: patent  PONV status at discharge: No PONV  Anesthetic complications: no      Cardiovascular status: blood pressure returned to baseline  Respiratory status: unassisted, spontaneous ventilation and room air  Hydration status: euvolemic  Follow-up not needed.        Visit Vitals  BP (!) 97/55 (BP Location: Left arm, Patient Position: Lying)   Pulse 71   Temp 36.6 °C (97.9 °F) (Temporal)   Resp 20   Ht 5' 6" (1.676 m)   Wt 98.5 kg (217 lb 2.5 oz)   LMP  (LMP Unknown)   SpO2 95%   Breastfeeding? No   BMI 35.05 kg/m²       Pain/Tiffany Score: Pain Assessment Performed: Yes (6/7/2018  9:30 AM)  Presence of Pain: denies (6/7/2018  9:30 AM)  Tiffany Score: 10 (6/7/2018  9:41 AM)      "

## 2018-06-07 NOTE — NURSING TRANSFER
Nursing Transfer Note      6/7/2018     Transfer To: PPM    Transfer via stretcher    Transfer with cardiac monitoring    Transported by RN    Chart send with patient: Yes    Notified: spouse, daughter

## 2018-06-07 NOTE — NURSING TRANSFER
Nursing Transfer Note      6/7/2018     Transfer To: [facility]/D[facility]    Transfer via stretcher    Transfer with cardiac monitoring    Transported by escort    Medicines sent: Cardizem gtts    Chart send with patient: Yes    Notified: spouse, daughter

## 2018-06-07 NOTE — HPI
Janine Souza is a 58 y.o. woman with PMHx of secondary neuroendocrine tumor of liver s/o chemotherapy with biliary stent in place, DM, HTN, AF/AFL s/p convergence procedure with recurrence, s/p multiple DCCVs, JACKSON on BIPAP, who was admitted from GI clinic for tachycardia, now found to have atypical aflutter. Has been on Xarelto for OAC.  Is now s/p DCCV today with long conversion pause and then resumption of junctional rhythm at 48bpm.

## 2018-06-07 NOTE — NURSING TRANSFER
Nursing Transfer Note      6/7/2018     Transfer To: 323    Transfer via stretcher    Transfer with cardiac monitoring    Transported by pct with telemetry (called war room) and ticket to ride in chart    Medicines sent: none    Chart send with patient: Yes    Notified: daughter    Patient reassessed at: see [facility] (date, time

## 2018-06-07 NOTE — SUBJECTIVE & OBJECTIVE
Past Medical History:   Diagnosis Date    Acute headache 2016    Anemia     Atrial fibrillation, persistent     Bronchitis     CHF (congestive heart failure)     Diabetes mellitus, type 2     Hypertension     Malignant poorly differentiated neuroendocrine carcinoma 2016    Secondary neuroendocrine tumor of liver 2016       Past Surgical History:   Procedure Laterality Date    ADENOIDECTOMY      CARDIAC ELECTROPHYSIOLOGY MAPPING AND ABLATION      CARDIAC SURGERY       SECTION      ERCP      ERCP N/A 2018    Procedure: ERCP (ENDOSCOPIC RETROGRADE CHOLANGIOPANCREATOGRAPHY);  Surgeon: Tang Tanner MD;  Location: [address]);  Service: Endoscopy;  Laterality: N/A;  2 day hold Dr Kalyan Whittaker    TONSILLECTOMY      uvulopalatopharygoplasty         Review of patient's allergies indicates:   Allergen Reactions    Pcn [penicillins] Other (See Comments), Hives and Swelling     As a child had an unknown reaction.  Has not taken since  As a child had an unknown reaction.  Has not taken since       Current Facility-Administered Medications on File Prior to Encounter   Medication    ferumoxytol (FERAHEME) 510 mg in dextrose 5 % 100 mL IVPB    heparin, porcine (PF) 100 unit/mL injection flush 500 Units    sodium chloride 0.9% flush 10 mL     Current Outpatient Prescriptions on File Prior to Encounter   Medication Sig    blood sugar diagnostic, disc Strp USE AS DIRECTED EVERY DAY *THANK YOU*    docusate sodium (COLACE) 50 MG capsule Take by mouth 2 (two) times daily.    fluticasone (FLOVENT HFA) 110 mcg/actuation inhaler Inhale 2 puffs into the lungs once daily.    furosemide (LASIX) 40 MG tablet Take 40 mg by mouth once daily.     glipiZIDE (GLUCOTROL) 10 MG tablet Take 1 tablet (10 mg total) by mouth 2 (two) times daily.    KLOR-CON M20 20 mEq tablet     lancets Misc Use as directed    linagliptin (TRADJENTA) 5 mg Tab tablet Take 5 mg by mouth once daily.     metformin (GLUCOPHAGE) 500 MG tablet Take 1 tablet (500 mg total) by mouth 2 (two) times daily with meals. Take 2 tablets (1000mg) in am and 3 tablets (1500 mg)in the pm (Patient taking differently: Take 2 tablets (1000mg) in am and 3 tablets (1500 mg)in the pm)    metoprolol succinate (TOPROL-XL) 50 MG 24 hr tablet Take 1 tablet (50 mg total) by mouth once daily.    metoprolol tartrate (LOPRESSOR) 25 MG tablet Take 25 mg by mouth once daily.    ondansetron (ZOFRAN) 4 MG tablet Take 1 tablet (4 mg total) by mouth every 12 (twelve) hours as needed for Nausea.    polyethylene glycol (GLYCOLAX) 17 gram PwPk Take by mouth once daily.    prochlorperazine (COMPAZINE) 5 MG tablet Take 1 tablet (5 mg total) by mouth every 6 (six) hours as needed for Nausea.    rivaroxaban (XARELTO) 20 mg Tab Take 20 mg by mouth.    zolpidem (AMBIEN) 10 mg Tab Take 1 tablet (10 mg total) by mouth nightly as needed.     Family History     Problem Relation (Age of Onset)    Breast cancer Paternal Aunt, Maternal Aunt    Diabetes type II Mother    Prostate cancer Father        Social History Main Topics    Smoking status: Never Smoker    Smokeless tobacco: Never Used    Alcohol use No    Drug use: No    Sexual activity: Not on file     Review of Systems   Constitution: Negative for chills, fever and weight loss.   HENT: Negative for sore throat and stridor.    Eyes: Negative for blurred vision, double vision and pain.   Cardiovascular: Positive for palpitations. Negative for chest pain, claudication, dyspnea on exertion, near-syncope, orthopnea, paroxysmal nocturnal dyspnea and syncope.   Respiratory: Positive for shortness of breath. Negative for cough, sputum production and wheezing.    Endocrine: Negative for polydipsia, polyphagia and polyuria.   Skin: Negative for rash.   Musculoskeletal: Negative for joint pain, joint swelling and myalgias.   Gastrointestinal: Negative for abdominal pain, constipation, diarrhea, heartburn,  melena, nausea and vomiting.   Genitourinary: Negative for dysuria and hematuria.   Neurological: Negative for focal weakness and loss of balance.   Psychiatric/Behavioral: Negative for depression and memory loss.     Objective:     Vital Signs (Most Recent):  Temp: 97.8 °F (36.6 °C) (06/07/18 1000)  Pulse: (!) 59 (06/07/18 1000)  Resp: 20 (06/07/18 1000)  BP: (!) 112/59 (06/07/18 1000)  SpO2: 95 % (06/07/18 1000) Vital Signs (24h Range):  Temp:  [97.8 °F (36.6 °C)-99.9 °F (37.7 °C)] 97.8 °F (36.6 °C)  Pulse:  [] 59  Resp:  [16-20] 20  SpO2:  [94 %-98 %] 95 %  BP: ()/(53-79) 112/59     Weight: 98.5 kg (217 lb 2.5 oz)  Body mass index is 35.05 kg/m².    SpO2: 95 %  O2 Device (Oxygen Therapy): room air    Physical Exam   Constitutional: She is oriented to person, place, and time. She appears well-developed and well-nourished.   HENT:   Head: Normocephalic and atraumatic.   Eyes: Pupils are equal, round, and reactive to light.   Neck: Normal range of motion. No JVD present.   Cardiovascular: Regular rhythm.  Bradycardia present.    No murmur heard.  Pulmonary/Chest: Effort normal and breath sounds normal. She has no wheezes. She has no rales.   Abdominal: Soft. Bowel sounds are normal. She exhibits no distension. There is no tenderness.   Neurological: She is alert and oriented to person, place, and time.   Skin: Skin is warm and dry.       Significant Labs: EP:   Recent Labs  Lab 06/05/18  1043  06/06/18  0800 06/07/18  0538     --  136 137   K 4.6  --  4.1 3.9     --  105 102   CO2 24  --  21* 24   *  --  194* 232*   BUN 35*  --  21* 24*   CREATININE 1.4  --  1.1 1.1   CALCIUM 10.2  --  9.5 9.5   PROT 9.7*  --  8.7* 8.4   ALBUMIN 3.6  --  3.2* 3.2*   BILITOT 1.0  --  1.0 1.0   ALKPHOS 101  --  90 86   AST 23  --  20 19   ALT 22  --  18 18   ANIONGAP 10  --  10 11   ESTGFRAFRICA 48.1*  --  >60.0 >60.0   EGFRNONAA 41.7*  --  55.9* 55.9*   WBC 8.20  --   --  7.84   HGB 10.7*  --   --  9.4*    HCT 34.3*  < >  --  29.6*     --   --  211   INR 1.1  --   --   --    < > = values in this interval not displayed.    Significant Imaging: Echocardiogram:   2D echo with color flow doppler:   Results for orders placed or performed during the hospital encounter of 01/13/16   2D echo with color flow doppler   Result Value Ref Range    EF 60 55 - 65    Mitral Valve Regurgitation MILD     Est. PA Systolic Pressure 62.6 (A)     Mitral Valve Mobility NORMAL     Tricuspid Valve Regurgitation MODERATE (A)     and EKG: junctional rhythm at 48 bpm

## 2018-06-07 NOTE — H&P
[facility]  Cardiology  History and Physical     Patient Name: Janine Souza  MRN: [id number]  Admission Date: 2018  Code Status: Full Code   Attending Provider: Aneta Santiago MD   Primary Care Physician: Charles Lopez MD  Principal Problem:Atrial flutter    Patient information was obtained from patient and ER records.     Subjective:     TRANSESOPHAGEAL ECHOCARDIOGRAPHY   PRE-PROCEDURE NOTE    2018    Janine Souza is a 58 y.o. woman with PMHx of secondary neuroendocrine tumor of liver s/o chemotherapy with biliary stent in place, DM, HTN, AF s/p convergent procedure with recurrence, s/p multiple DCCVs, JACKSON on BIPAP, who was admitted from GI clinic for tachycardia, now found to have atypical aflutter. She is now referred for KAMILLE prior to DCCV.    Has been on Xarelto for OAC.  Currently on diltiazem gtt @ 10 mg/hr.  Has a hiatal hernia.    Dysphagia or odynophagia:  No  Liver Disease, esophageal disease, or known varices:  No  Upper GI Bleeding: No  Snoring:  Yes  Sleep Apnea:  Yes  Prior neck surgery or radiation:  No  History of anesthetic difficulties:  No  Family history of anesthetic difficulties:  No  Last oral intake:  12 hours ago  Able to move neck in all directions:  Yes    Past Medical History:   Diagnosis Date    Acute headache 2016    Anemia     Atrial fibrillation, persistent     Bronchitis     CHF (congestive heart failure)     Diabetes mellitus, type 2     Hypertension     Malignant poorly differentiated neuroendocrine carcinoma 2016    Secondary neuroendocrine tumor of liver 2016       Past Surgical History:   Procedure Laterality Date    ADENOIDECTOMY      CARDIAC ELECTROPHYSIOLOGY MAPPING AND ABLATION      CARDIAC SURGERY       SECTION      ERCP      ERCP N/A 2018    Procedure: ERCP (ENDOSCOPIC RETROGRADE CHOLANGIOPANCREATOGRAPHY);  Surgeon: Tang Tanner MD;  Location: [address]);  Service: Endoscopy;  Laterality:  N/A;  2 day hold Dr Kalyan Whittaker    TONSILLECTOMY      uvulopalatopharygoplasty         Review of patient's allergies indicates:   Allergen Reactions    Pcn [penicillins] Other (See Comments), Hives and Swelling     As a child had an unknown reaction.  Has not taken since  As a child had an unknown reaction.  Has not taken since       Current Facility-Administered Medications on File Prior to Encounter   Medication    ferumoxytol (FERAHEME) 510 mg in dextrose 5 % 100 mL IVPB    heparin, porcine (PF) 100 unit/mL injection flush 500 Units    sodium chloride 0.9% flush 10 mL     Current Outpatient Prescriptions on File Prior to Encounter   Medication Sig    blood sugar diagnostic, disc Strp USE AS DIRECTED EVERY DAY *THANK YOU*    docusate sodium (COLACE) 50 MG capsule Take by mouth 2 (two) times daily.    fluticasone (FLOVENT HFA) 110 mcg/actuation inhaler Inhale 2 puffs into the lungs once daily.    furosemide (LASIX) 40 MG tablet Take 40 mg by mouth once daily.     glipiZIDE (GLUCOTROL) 10 MG tablet Take 1 tablet (10 mg total) by mouth 2 (two) times daily.    KLOR-CON M20 20 mEq tablet     lancets Misc Use as directed    linagliptin (TRADJENTA) 5 mg Tab tablet Take 5 mg by mouth once daily.    metformin (GLUCOPHAGE) 500 MG tablet Take 1 tablet (500 mg total) by mouth 2 (two) times daily with meals. Take 2 tablets (1000mg) in am and 3 tablets (1500 mg)in the pm (Patient taking differently: Take 2 tablets (1000mg) in am and 3 tablets (1500 mg)in the pm)    metoprolol succinate (TOPROL-XL) 50 MG 24 hr tablet Take 1 tablet (50 mg total) by mouth once daily.    metoprolol tartrate (LOPRESSOR) 25 MG tablet Take 25 mg by mouth once daily.    ondansetron (ZOFRAN) 4 MG tablet Take 1 tablet (4 mg total) by mouth every 12 (twelve) hours as needed for Nausea.    polyethylene glycol (GLYCOLAX) 17 gram PwPk Take by mouth once daily.    prochlorperazine (COMPAZINE) 5 MG tablet Take 1 tablet (5 mg  total) by mouth every 6 (six) hours as needed for Nausea.    rivaroxaban (XARELTO) 20 mg Tab Take 20 mg by mouth.    zolpidem (AMBIEN) 10 mg Tab Take 1 tablet (10 mg total) by mouth nightly as needed.     Family History     Problem Relation (Age of Onset)    Breast cancer Paternal Aunt, Maternal Aunt    Diabetes type II Mother    Prostate cancer Father        Social History Main Topics    Smoking status: Never Smoker    Smokeless tobacco: Never Used    Alcohol use No    Drug use: No    Sexual activity: Not on file     Review of Systems   Constitution: Negative for chills, fever and weight loss.   HENT: Negative for sore throat and stridor.    Eyes: Negative for blurred vision, double vision and pain.   Cardiovascular: Positive for palpitations. Negative for chest pain, claudication, dyspnea on exertion, near-syncope, orthopnea, paroxysmal nocturnal dyspnea and syncope.   Respiratory: Positive for shortness of breath. Negative for cough, sputum production and wheezing.    Endocrine: Negative for polydipsia, polyphagia and polyuria.   Skin: Negative for rash.   Musculoskeletal: Negative for joint pain, joint swelling and myalgias.   Gastrointestinal: Negative for abdominal pain, constipation, diarrhea, heartburn, melena, nausea and vomiting.   Genitourinary: Negative for dysuria and hematuria.   Neurological: Negative for focal weakness and loss of balance.   Psychiatric/Behavioral: Negative for depression and memory loss.     Objective:     Vital Signs (Most Recent):  Temp: 98.2 °F (36.8 °C) (06/07/18 0625)  Pulse: (!) 118 (06/07/18 0625)  Resp: 18 (06/07/18 0625)  BP: (!) 116/58 (06/07/18 0625)  SpO2: (!) 94 % (06/07/18 0625) Vital Signs (24h Range):  Temp:  [97 °F (36.1 °C)-99.9 °F (37.7 °C)] 98.2 °F (36.8 °C)  Pulse:  [] 118  Resp:  [16-18] 18  SpO2:  [93 %-98 %] 94 %  BP: (106-118)/(53-79) 116/58     Weight: 98.5 kg (217 lb 2.5 oz)  Body mass index is 35.05 kg/m².    SpO2: (!) 94 %  O2 Device (Oxygen  Therapy): room air      Intake/Output Summary (Last 24 hours) at 06/07/18 0753  Last data filed at 06/07/18 0700   Gross per 24 hour   Intake              500 ml   Output             2550 ml   Net            -2050 ml       Lines/Drains/Airways     Central Venous Catheter Line                 Port A Cath Single Lumen 02/02/16 1502 right subclavian 855 days          Peripheral Intravenous Line                 Peripheral IV - Single Lumen 06/05/18 0940 Right Hand 1 day              Physical Exam   Constitutional: She is oriented to person, place, and time. She appears well-developed and well-nourished.   HENT:   Head: Normocephalic and atraumatic.   Eyes: Pupils are equal, round, and reactive to light.   Neck: Normal range of motion. No JVD present.   Cardiovascular: Regular rhythm.  Tachycardia present.    No murmur heard.  Pulmonary/Chest: Effort normal and breath sounds normal. She has no wheezes. She has no rales.   Abdominal: Soft. Bowel sounds are normal. She exhibits no distension. There is no tenderness.   Neurological: She is alert and oriented to person, place, and time.   Skin: Skin is warm and dry.     Significant Labs:   CMP   Recent Labs  Lab 06/05/18  1043 06/06/18  0800 06/07/18  0538    136 137   K 4.6 4.1 3.9    105 102   CO2 24 21* 24   * 194* 232*   BUN 35* 21* 24*   CREATININE 1.4 1.1 1.1   CALCIUM 10.2 9.5 9.5   PROT 9.7* 8.7* 8.4   ALBUMIN 3.6 3.2* 3.2*   BILITOT 1.0 1.0 1.0   ALKPHOS 101 90 86   AST 23 20 19   ALT 22 18 18   ANIONGAP 10 10 11   ESTGFRAFRICA 48.1* >60.0 >60.0   EGFRNONAA 41.7* 55.9* 55.9*   , CBC   Recent Labs  Lab 06/05/18  1043  06/07/18  0538   WBC 8.20  --  7.84   HGB 10.7*  --  9.4*   HCT 34.3*  < > 29.6*     --  211   < > = values in this interval not displayed. and INR   Recent Labs  Lab 06/05/18  1043   INR 1.1     Assessment and Plan:     * Atrial flutter    1. KAMILLE for evaluation of GENOVEVA?LA for thrombus prior to KAMILLE.    -The risks, benefits &  alternatives of the procedure were explained to the patient.    -The risks of transesophageal echo include but are not limited to:  Dental trauma, esophageal trauma/perforation, bleeding, laryngospasm/brochospasm, aspiration, sore throat/hoarseness, & dislodgement of the endotracheal tube/nasogastric tube (where applicable).    -The risks of moderate sedation include hypotension, respiratory depression, arrhythmias, bronchospasm, & death.    -Informed consent was obtained & the patient is agreeable to proceed with the procedure.      Patient seen and examined this morning. Thank you for the opportunity to participate in the care of this interesting patient. Discussed with Dr. Mohamud - staff attestation to follow.    Rosalio Ferrell MD  Cardiology   [facility]

## 2018-06-07 NOTE — PROGRESS NOTES
Patient admitted to recovery see [facility] for complete assessment pacu bcg' s maintained safety measures verified patient instructed on pain scale and patient verbalized understanding. Candace from ep called dr. Parks due to patient coming out of procedure off of cardizem gtt and he stated to her d/c cardizem gtt will place order in computer also candace from ep called patient's daughter and updated on patient location also patient's blood sugar 281 and ekg was done in cardioversion room and it was reported per candace ep that dr. cross did see ekg will continue to monitor patient status and condition.

## 2018-06-07 NOTE — PROGRESS NOTES
Patient admitted to recovery see [facility] for complete assessment pacu bcg's maintained safety measures verified patient instructed on pain scale and patient verbalized understanding. Also patient's blood sugar 253. Called for patient's chest xray and ekg ekg done and placed in patient's chart.  Called patient's daughter and updated on patient location with the permission of patient.

## 2018-06-07 NOTE — NURSING TRANSFER
Nursing Transfer Note      6/7/2018     Transfer To: 323    Transfer via stretcher    Transfer with cardiac monitoring    Transported by cinda    Medicines sent: silvadene cream    Chart send with patient: Yes    Notified: nurse    Patient reassessed at: see epic (date, time)

## 2018-06-07 NOTE — SUBJECTIVE & OBJECTIVE
Past Medical History:   Diagnosis Date    Acute headache 2016    Anemia     Atrial fibrillation, persistent     Bronchitis     CHF (congestive heart failure)     Diabetes mellitus, type 2     Hypertension     Malignant poorly differentiated neuroendocrine carcinoma 2016    Secondary neuroendocrine tumor of liver 2016       Past Surgical History:   Procedure Laterality Date    ADENOIDECTOMY      CARDIAC ELECTROPHYSIOLOGY MAPPING AND ABLATION      CARDIAC SURGERY       SECTION      ERCP      ERCP N/A 2018    Procedure: ERCP (ENDOSCOPIC RETROGRADE CHOLANGIOPANCREATOGRAPHY);  Surgeon: Tang Tanner MD;  Location: [address]);  Service: Endoscopy;  Laterality: N/A;  2 day hold Dr Kalyan Whittaker    TONSILLECTOMY      uvulopalatopharygoplasty         Review of patient's allergies indicates:   Allergen Reactions    Pcn [penicillins] Other (See Comments), Hives and Swelling     As a child had an unknown reaction.  Has not taken since  As a child had an unknown reaction.  Has not taken since       Current Facility-Administered Medications on File Prior to Encounter   Medication    ferumoxytol (FERAHEME) 510 mg in dextrose 5 % 100 mL IVPB    heparin, porcine (PF) 100 unit/mL injection flush 500 Units    sodium chloride 0.9% flush 10 mL     Current Outpatient Prescriptions on File Prior to Encounter   Medication Sig    blood sugar diagnostic, disc Strp USE AS DIRECTED EVERY DAY *THANK YOU*    docusate sodium (COLACE) 50 MG capsule Take by mouth 2 (two) times daily.    fluticasone (FLOVENT HFA) 110 mcg/actuation inhaler Inhale 2 puffs into the lungs once daily.    furosemide (LASIX) 40 MG tablet Take 40 mg by mouth once daily.     glipiZIDE (GLUCOTROL) 10 MG tablet Take 1 tablet (10 mg total) by mouth 2 (two) times daily.    KLOR-CON M20 20 mEq tablet     lancets Misc Use as directed    linagliptin (TRADJENTA) 5 mg Tab tablet Take 5 mg by mouth once daily.     metformin (GLUCOPHAGE) 500 MG tablet Take 1 tablet (500 mg total) by mouth 2 (two) times daily with meals. Take 2 tablets (1000mg) in am and 3 tablets (1500 mg)in the pm (Patient taking differently: Take 2 tablets (1000mg) in am and 3 tablets (1500 mg)in the pm)    metoprolol succinate (TOPROL-XL) 50 MG 24 hr tablet Take 1 tablet (50 mg total) by mouth once daily.    metoprolol tartrate (LOPRESSOR) 25 MG tablet Take 25 mg by mouth once daily.    ondansetron (ZOFRAN) 4 MG tablet Take 1 tablet (4 mg total) by mouth every 12 (twelve) hours as needed for Nausea.    polyethylene glycol (GLYCOLAX) 17 gram PwPk Take by mouth once daily.    prochlorperazine (COMPAZINE) 5 MG tablet Take 1 tablet (5 mg total) by mouth every 6 (six) hours as needed for Nausea.    rivaroxaban (XARELTO) 20 mg Tab Take 20 mg by mouth.    zolpidem (AMBIEN) 10 mg Tab Take 1 tablet (10 mg total) by mouth nightly as needed.     Family History     Problem Relation (Age of Onset)    Breast cancer Paternal Aunt, Maternal Aunt    Diabetes type II Mother    Prostate cancer Father        Social History Main Topics    Smoking status: Never Smoker    Smokeless tobacco: Never Used    Alcohol use No    Drug use: No    Sexual activity: Not on file     Review of Systems   Constitution: Negative for chills, fever and weight loss.   HENT: Negative for sore throat and stridor.    Eyes: Negative for blurred vision, double vision and pain.   Cardiovascular: Positive for palpitations. Negative for chest pain, claudication, dyspnea on exertion, near-syncope, orthopnea, paroxysmal nocturnal dyspnea and syncope.   Respiratory: Positive for shortness of breath. Negative for cough, sputum production and wheezing.    Endocrine: Negative for polydipsia, polyphagia and polyuria.   Skin: Negative for rash.   Musculoskeletal: Negative for joint pain, joint swelling and myalgias.   Gastrointestinal: Negative for abdominal pain, constipation, diarrhea, heartburn,  melena, nausea and vomiting.   Genitourinary: Negative for dysuria and hematuria.   Neurological: Negative for focal weakness and loss of balance.   Psychiatric/Behavioral: Negative for depression and memory loss.     Objective:     Vital Signs (Most Recent):  Temp: 98.2 °F (36.8 °C) (06/07/18 0625)  Pulse: (!) 118 (06/07/18 0625)  Resp: 18 (06/07/18 0625)  BP: (!) 116/58 (06/07/18 0625)  SpO2: (!) 94 % (06/07/18 0625) Vital Signs (24h Range):  Temp:  [97 °F (36.1 °C)-99.9 °F (37.7 °C)] 98.2 °F (36.8 °C)  Pulse:  [] 118  Resp:  [16-18] 18  SpO2:  [93 %-98 %] 94 %  BP: (106-118)/(53-79) 116/58     Weight: 98.5 kg (217 lb 2.5 oz)  Body mass index is 35.05 kg/m².    SpO2: (!) 94 %  O2 Device (Oxygen Therapy): room air      Intake/Output Summary (Last 24 hours) at 06/07/18 0753  Last data filed at 06/07/18 0700   Gross per 24 hour   Intake              500 ml   Output             2550 ml   Net            -2050 ml       Lines/Drains/Airways     Central Venous Catheter Line                 Port A Cath Single Lumen 02/02/16 1502 right subclavian 855 days          Peripheral Intravenous Line                 Peripheral IV - Single Lumen 06/05/18 0940 Right Hand 1 day              Physical Exam   Constitutional: She is oriented to person, place, and time. She appears well-developed and well-nourished.   HENT:   Head: Normocephalic and atraumatic.   Eyes: Pupils are equal, round, and reactive to light.   Neck: Normal range of motion. No JVD present.   Cardiovascular: Regular rhythm.  Tachycardia present.    No murmur heard.  Pulmonary/Chest: Effort normal and breath sounds normal. She has no wheezes. She has no rales.   Abdominal: Soft. Bowel sounds are normal. She exhibits no distension. There is no tenderness.   Neurological: She is alert and oriented to person, place, and time.   Skin: Skin is warm and dry.     Significant Labs:   CMP   Recent Labs  Lab 06/05/18  1043 06/06/18  0800 06/07/18  0538    136 137   K  4.6 4.1 3.9    105 102   CO2 24 21* 24   * 194* 232*   BUN 35* 21* 24*   CREATININE 1.4 1.1 1.1   CALCIUM 10.2 9.5 9.5   PROT 9.7* 8.7* 8.4   ALBUMIN 3.6 3.2* 3.2*   BILITOT 1.0 1.0 1.0   ALKPHOS 101 90 86   AST 23 20 19   ALT 22 18 18   ANIONGAP 10 10 11   ESTGFRAFRICA 48.1* >60.0 >60.0   EGFRNONAA 41.7* 55.9* 55.9*   , CBC   Recent Labs  Lab 06/05/18  1043  06/07/18  0538   WBC 8.20  --  7.84   HGB 10.7*  --  9.4*   HCT 34.3*  < > 29.6*     --  211   < > = values in this interval not displayed. and INR   Recent Labs  Lab 06/05/18  1043   INR 1.1

## 2018-06-08 ENCOUNTER — ANESTHESIA (OUTPATIENT)
Dept: MEDSURG UNIT | Facility: HOSPITAL | Age: 58
DRG: 243 | End: 2018-06-08
Payer: MEDICARE

## 2018-06-08 PROBLEM — Z95.0 S/P PLACEMENT OF CARDIAC PACEMAKER: Status: ACTIVE | Noted: 2018-06-08

## 2018-06-08 LAB
ALBUMIN SERPL BCP-MCNC: 3.2 G/DL
ALP SERPL-CCNC: 83 U/L
ALT SERPL W/O P-5'-P-CCNC: 17 U/L
ANION GAP SERPL CALC-SCNC: 11 MMOL/L
AST SERPL-CCNC: 18 U/L
BILIRUB SERPL-MCNC: 1 MG/DL
BUN SERPL-MCNC: 22 MG/DL
CALCIUM SERPL-MCNC: 9.2 MG/DL
CHLORIDE SERPL-SCNC: 101 MMOL/L
CO2 SERPL-SCNC: 24 MMOL/L
CREAT SERPL-MCNC: 1.2 MG/DL
EST. GFR  (AFRICAN AMERICAN): 58 ML/MIN/1.73 M^2
EST. GFR  (NON AFRICAN AMERICAN): 50.3 ML/MIN/1.73 M^2
GLUCOSE SERPL-MCNC: 224 MG/DL
POCT GLUCOSE: 205 MG/DL (ref 70–110)
POCT GLUCOSE: 223 MG/DL (ref 70–110)
POCT GLUCOSE: 229 MG/DL (ref 70–110)
POCT GLUCOSE: 255 MG/DL (ref 70–110)
POTASSIUM SERPL-SCNC: 3.6 MMOL/L
PROT SERPL-MCNC: 8.3 G/DL
RETIRED EF AND QEF - SEE NOTES: 43 (ref 55–65)
SODIUM SERPL-SCNC: 136 MMOL/L

## 2018-06-08 PROCEDURE — 25000003 PHARM REV CODE 250: Performed by: NURSE PRACTITIONER

## 2018-06-08 PROCEDURE — 20600001 HC STEP DOWN PRIVATE ROOM

## 2018-06-08 PROCEDURE — 99232 SBSQ HOSP IP/OBS MODERATE 35: CPT | Mod: ,,, | Performed by: NURSE PRACTITIONER

## 2018-06-08 PROCEDURE — 25000003 PHARM REV CODE 250: Performed by: HOSPITALIST

## 2018-06-08 PROCEDURE — 93010 ELECTROCARDIOGRAM REPORT: CPT | Mod: ,,, | Performed by: INTERNAL MEDICINE

## 2018-06-08 PROCEDURE — 63600175 PHARM REV CODE 636 W HCPCS: Performed by: INTERNAL MEDICINE

## 2018-06-08 PROCEDURE — 25000003 PHARM REV CODE 250: Performed by: INTERNAL MEDICINE

## 2018-06-08 PROCEDURE — 63600175 PHARM REV CODE 636 W HCPCS: Performed by: NURSE PRACTITIONER

## 2018-06-08 PROCEDURE — C8923 2D TTE W OR W/O FOL W/CON,CO: HCPCS

## 2018-06-08 PROCEDURE — 25000003 PHARM REV CODE 250: Performed by: PHYSICIAN ASSISTANT

## 2018-06-08 PROCEDURE — 80053 COMPREHEN METABOLIC PANEL: CPT

## 2018-06-08 PROCEDURE — 93307 TTE W/O DOPPLER COMPLETE: CPT | Mod: 26,GW,, | Performed by: INTERNAL MEDICINE

## 2018-06-08 PROCEDURE — 93005 ELECTROCARDIOGRAM TRACING: CPT

## 2018-06-08 RX ORDER — SOTALOL HYDROCHLORIDE 80 MG/1
40 TABLET ORAL 2 TIMES DAILY
Status: DISCONTINUED | OUTPATIENT
Start: 2018-06-08 | End: 2018-06-09 | Stop reason: HOSPADM

## 2018-06-08 RX ORDER — POTASSIUM CHLORIDE 750 MG/1
40 CAPSULE, EXTENDED RELEASE ORAL ONCE
Status: COMPLETED | OUTPATIENT
Start: 2018-06-08 | End: 2018-06-08

## 2018-06-08 RX ADMIN — SOTALOL HYDROCHLORIDE 80 MG: 80 TABLET ORAL at 08:06

## 2018-06-08 RX ADMIN — DOXYCYCLINE HYCLATE 100 MG: 100 TABLET, COATED ORAL at 09:06

## 2018-06-08 RX ADMIN — INSULIN ASPART 1 UNITS: 100 INJECTION, SOLUTION INTRAVENOUS; SUBCUTANEOUS at 09:06

## 2018-06-08 RX ADMIN — STANDARDIZED SENNA CONCENTRATE AND DOCUSATE SODIUM 1 TABLET: 8.6; 5 TABLET, FILM COATED ORAL at 08:06

## 2018-06-08 RX ADMIN — VANCOMYCIN HYDROCHLORIDE 1500 MG: 10 INJECTION, POWDER, LYOPHILIZED, FOR SOLUTION INTRAVENOUS at 02:06

## 2018-06-08 RX ADMIN — FUROSEMIDE 40 MG: 40 TABLET ORAL at 08:06

## 2018-06-08 RX ADMIN — INSULIN ASPART 3 UNITS: 100 INJECTION, SOLUTION INTRAVENOUS; SUBCUTANEOUS at 12:06

## 2018-06-08 RX ADMIN — STANDARDIZED SENNA CONCENTRATE AND DOCUSATE SODIUM 1 TABLET: 8.6; 5 TABLET, FILM COATED ORAL at 09:06

## 2018-06-08 RX ADMIN — DOXYCYCLINE HYCLATE 100 MG: 100 TABLET, COATED ORAL at 02:06

## 2018-06-08 RX ADMIN — SOTALOL HYDROCHLORIDE 40 MG: 80 TABLET ORAL at 09:06

## 2018-06-08 RX ADMIN — INSULIN ASPART 2 UNITS: 100 INJECTION, SOLUTION INTRAVENOUS; SUBCUTANEOUS at 08:06

## 2018-06-08 RX ADMIN — ZOLPIDEM TARTRATE 10 MG: 10 TABLET ORAL at 09:06

## 2018-06-08 RX ADMIN — POTASSIUM CHLORIDE 40 MEQ: 750 CAPSULE, EXTENDED RELEASE ORAL at 09:06

## 2018-06-08 RX ADMIN — INSULIN DETEMIR 5 UNITS: 100 INJECTION, SOLUTION SUBCUTANEOUS at 08:06

## 2018-06-08 RX ADMIN — INSULIN ASPART 2 UNITS: 100 INJECTION, SOLUTION INTRAVENOUS; SUBCUTANEOUS at 05:06

## 2018-06-08 NOTE — PROGRESS NOTES
EKG taken 2 hours post sotalol dose and placed in chart. Notified MD Briseyda. No further orders were given. Will continue to monitor.

## 2018-06-08 NOTE — SUBJECTIVE & OBJECTIVE
Interval History: The patient received a PM and is on sotalol. EF borderline on TTE. No CP or SOB.     Review of Systems   Constitution: Negative for chills and fever.   HENT: Negative for ear discharge.    Eyes: Negative for pain and visual disturbance.   Cardiovascular: Negative for chest pain, dyspnea on exertion, irregular heartbeat, leg swelling, orthopnea, palpitations, paroxysmal nocturnal dyspnea and syncope.   Respiratory: Negative for hemoptysis, shortness of breath and wheezing.    Skin: Negative for rash and suspicious lesions.   Musculoskeletal: Negative for joint pain and muscle weakness.   Gastrointestinal: Negative for abdominal pain, diarrhea, hematemesis, hematochezia, melena, nausea and vomiting.   Genitourinary: Negative for dysuria and frequency.   Neurological: Negative for focal weakness, headaches and tremors.   Psychiatric/Behavioral: Negative for altered mental status, suicidal ideas and thoughts of violence.     Objective:     Vital Signs (Most Recent):  Temp: 97.5 °F (36.4 °C) (06/08/18 1205)  Pulse: 60 (06/08/18 1408)  Resp: 18 (06/08/18 1205)  BP: (!) 104/57 (06/08/18 1205)  SpO2: (!) 91 % (06/08/18 1205) Vital Signs (24h Range):  Temp:  [97.4 °F (36.3 °C)-98.3 °F (36.8 °C)] 97.5 °F (36.4 °C)  Pulse:  [60-98] 60  Resp:  [16-20] 18  SpO2:  [91 %-98 %] 91 %  BP: ()/(52-63) 104/57     Weight: 99.4 kg (219 lb 2.2 oz)  Body mass index is 35.37 kg/m².     SpO2: (!) 91 %  O2 Device (Oxygen Therapy): room air      Intake/Output Summary (Last 24 hours) at 06/08/18 1453  Last data filed at 06/08/18 1234   Gross per 24 hour   Intake              180 ml   Output             1600 ml   Net            -1420 ml       Lines/Drains/Airways     Central Venous Catheter Line                 Port A Cath Single Lumen 02/02/16 1502 right subclavian 856 days          Peripheral Intravenous Line                 Peripheral IV - Single Lumen 06/07/18 1348 Left Forearm 1 day                Physical Exam    Constitutional: She is oriented to person, place, and time. She appears well-developed and well-nourished.   HENT:   Head: Normocephalic and atraumatic.   Eyes: EOM are normal.   Cardiovascular: Normal rate and regular rhythm.  Exam reveals no gallop and no friction rub.    Pulmonary/Chest: Effort normal and breath sounds normal. No stridor. She has no wheezes. She has no rales.   Abdominal: Soft. Bowel sounds are normal. There is no rebound and no guarding.   Musculoskeletal: She exhibits no edema.   Neurological: She is alert and oriented to person, place, and time. No cranial nerve deficit.   Skin: Skin is warm and dry.   Fresh left upper thorax incision.    Psychiatric: She has a normal mood and affect. Her behavior is normal.       Significant Labs: All pertinent lab results from the last 24 hours have been reviewed.

## 2018-06-08 NOTE — ASSESSMENT & PLAN NOTE
- due for biliary stent to be removed when found to have svt  - in remission   - Follows with Dr. Flores in heme/onc  - stable

## 2018-06-08 NOTE — ASSESSMENT & PLAN NOTE
Patient with history of afib with ablation being admitted for SVT found on presentation for GI procedure. Patient follows with Cardiologist at [facility]. Dr. Jelani Sánchez   - IV metoprolol x 1 given with no improvement.  - EKG with aflutter and HR in 140s, mild ST depressions in inferior leads  - Cardiology consulted in the ED for recommendations, continuing to follow on the floor  - Cardizem drip, titrated as needed for rate control  - EP consulted, s/p DCCV on 6/7 s/p 9-10 second pause sent for PPM later that day 6/7, now started on sotalol   - maintain on telemetry  - electrolytes normal, infectious workup unremarkable (CXR, UA clear)  - resume Xarelto for AC Saturday night

## 2018-06-08 NOTE — PLAN OF CARE
Patient is s/p dcppm  For prolonged conversion pause about 9 s after dccv for AFL  This morning, patient is in and out of slow AFL.  She is a-paced at 60 when she is out of AFL at which time she is in a junctional rhythm  Rate controlled  Continue sotalol, cardiology to monitor ekg qtc's  Not tender, no drainage, no swelling  Follow up with wound care in ep clinic 1-2 weeks, Dr Jean Carlos Onofre in clinic in 3 months  Arm in sling x 72 hours straight then nightly x 2 weeks  No lifting more than 5 lbs in left hand  No raising arm above shoulder length  Do not let water hit wound directly once dressing removed x 48 hours  No driving  Re-address restrictions in 1-2 weeks at wound care clinic    David Parks MD  PGY 4 Cardiology  [phone]

## 2018-06-08 NOTE — PLAN OF CARE
Problem: Patient Care Overview  Goal: Plan of Care Review  Outcome: Revised  Plan of care discussed with patient. Patient is free of fall/trauma/injury. Denies CP, SOB, or pain/discomfort.  Left arm still in sling. PPM site pressure dressing removed, and left open to air. AM sotalol given ( as per MD south). All questions addressed. Will continue to monitor

## 2018-06-08 NOTE — ANESTHESIA POSTPROCEDURE EVALUATION
"Anesthesia Post Evaluation    Patient: Janine Souza    Procedure(s) Performed: Procedure(s) (LRB):  INSERTION, CARDIAC PACEMAKER, DUAL CHAMBER (Left)    Final Anesthesia Type: general  Patient location during evaluation: med/surg floor  Patient participation: Yes- Able to Participate  Level of consciousness: awake and alert  Post-procedure vital signs: reviewed and stable  Pain management: adequate  Airway patency: patent  PONV status at discharge: No PONV  Anesthetic complications: no      Cardiovascular status: blood pressure returned to baseline  Respiratory status: unassisted, spontaneous ventilation and room air  Hydration status: euvolemic          Visit Vitals  BP (!) 109/59 (BP Location: Right arm, Patient Position: Sitting)   Pulse 60   Temp 36.8 °C (98.2 °F) (Oral)   Resp 18   Ht 5' 6" (1.676 m)   Wt 99.4 kg (219 lb 2.2 oz)   LMP  (LMP Unknown)   SpO2 (!) 94%   Breastfeeding? No   BMI 35.37 kg/m²       Pain/Tiffany Score: Pain Assessment Performed: Yes (6/8/2018  8:00 AM)  Presence of Pain: denies (6/8/2018  8:00 AM)  Pain Rating Prior to Med Admin: 8 (6/7/2018 11:44 PM)  Pain Rating Post Med Admin: 0 (6/8/2018  2:00 AM)  Tiffany Score: 10 (6/7/2018  4:30 PM)      "

## 2018-06-08 NOTE — ASSESSMENT & PLAN NOTE
- initially had EP see the patient for AFL for KAMILLE +/- DCCV and EPS  - patient was cardioverted and had a prolonged pause; s/p PPM 6/7  - EF today is borderline, and EP would like to continue sotalol  - EP to continue to follow for sotalol dosing  - Dr. Onofre preferred NOAC re-initiation tomorrow night; f/u EP recs

## 2018-06-08 NOTE — ASSESSMENT & PLAN NOTE
Hold home oral medications  - low dose SSI, ACHS, started on levemir 5 units, will add aspart with meals as necessary  - cardiac diabetic diet

## 2018-06-08 NOTE — ASSESSMENT & PLAN NOTE
Seen in GI clinic for removal of biliary stent that was causing abdominal pain  - will follow up outpatient to have removed

## 2018-06-08 NOTE — PROGRESS NOTES
[facility]  Cardiology  Progress Note    Patient Name: Janine Souza  MRN: [id number]  Admission Date: 6/5/2018  Hospital Length of Stay: 3 days  Code Status: Full Code   Attending Physician: Aneta Santiago MD   Primary Care Physician: Charles Lopez MD  Expected Discharge Date: 6/7/2018  Principal Problem:Atrial flutter    Subjective:     Interval History: The patient received a PM and is on sotalol. EF borderline on TTE. No CP or SOB.     Review of Systems   Constitution: Negative for chills and fever.   HENT: Negative for ear discharge.    Eyes: Negative for pain and visual disturbance.   Cardiovascular: Negative for chest pain, dyspnea on exertion, irregular heartbeat, leg swelling, orthopnea, palpitations, paroxysmal nocturnal dyspnea and syncope.   Respiratory: Negative for hemoptysis, shortness of breath and wheezing.    Skin: Negative for rash and suspicious lesions.   Musculoskeletal: Negative for joint pain and muscle weakness.   Gastrointestinal: Negative for abdominal pain, diarrhea, hematemesis, hematochezia, melena, nausea and vomiting.   Genitourinary: Negative for dysuria and frequency.   Neurological: Negative for focal weakness, headaches and tremors.   Psychiatric/Behavioral: Negative for altered mental status, suicidal ideas and thoughts of violence.     Objective:     Vital Signs (Most Recent):  Temp: 97.5 °F (36.4 °C) (06/08/18 1205)  Pulse: 60 (06/08/18 1408)  Resp: 18 (06/08/18 1205)  BP: (!) 104/57 (06/08/18 1205)  SpO2: (!) 91 % (06/08/18 1205) Vital Signs (24h Range):  Temp:  [97.4 °F (36.3 °C)-98.3 °F (36.8 °C)] 97.5 °F (36.4 °C)  Pulse:  [60-98] 60  Resp:  [16-20] 18  SpO2:  [91 %-98 %] 91 %  BP: ()/(52-63) 104/57     Weight: 99.4 kg (219 lb 2.2 oz)  Body mass index is 35.37 kg/m².     SpO2: (!) 91 %  O2 Device (Oxygen Therapy): room air      Intake/Output Summary (Last 24 hours) at 06/08/18 0707  Last data filed at 06/08/18 1234   Gross per 24 hour   Intake               180 ml   Output             1600 ml   Net            -1420 ml       Lines/Drains/Airways     Central Venous Catheter Line                 Port A Cath Single Lumen 02/02/16 1502 right subclavian 856 days          Peripheral Intravenous Line                 Peripheral IV - Single Lumen 06/07/18 1348 Left Forearm 1 day                Physical Exam   Constitutional: She is oriented to person, place, and time. She appears well-developed and well-nourished.   HENT:   Head: Normocephalic and atraumatic.   Eyes: EOM are normal.   Cardiovascular: Normal rate and regular rhythm.  Exam reveals no gallop and no friction rub.    Pulmonary/Chest: Effort normal and breath sounds normal. No stridor. She has no wheezes. She has no rales.   Abdominal: Soft. Bowel sounds are normal. There is no rebound and no guarding.   Musculoskeletal: She exhibits no edema.   Neurological: She is alert and oriented to person, place, and time. No cranial nerve deficit.   Skin: Skin is warm and dry.   Fresh left upper thorax incision.    Psychiatric: She has a normal mood and affect. Her behavior is normal.       Significant Labs: All pertinent lab results from the last 24 hours have been reviewed.      Assessment and Plan:       SVT (supraventricular tachycardia)    - initially had EP see the patient for AFL for KAMILLE +/- DCCV and EPS  - patient was cardioverted and had a prolonged pause; s/p PPM 6/7  - EF today is borderline, and EP would like to continue sotalol  - EP to continue to follow for sotalol dosing  - Dr. Onofre preferred NOAC re-initiation tomorrow night; f/u EP recs              VTE Risk Mitigation         Ordered     rivaroxaban tablet 20 mg  With dinner      06/07/18 1917     Place sequential compression device  Until discontinued      06/05/18 1839     Place LAILA hose  Until discontinued      06/05/18 1839          Augustine Oleary MD  Cardiology  [facility]

## 2018-06-08 NOTE — PLAN OF CARE
Problem: Patient Care Overview  Goal: Plan of Care Review  Outcome: Revised  Plan of care discussed with patient. Patient is free of fall/trauma/injury. Denies CP, SOB, or pain/discomfort. S/p KAMILLE/DCCV, and PPM placement. Left arm in sling, with pressure dressing in place. All questions addressed. Will continue to monitor

## 2018-06-08 NOTE — ASSESSMENT & PLAN NOTE
Last ECHO 1/16 showed EF of 60% with mild to moderate depression of systolic function  - started on sotalol   - lasix PO  - no need for ACE/ ARB with diastolic failure, if b/p elevated can add norvasc  - daily weights, strict Is/Os

## 2018-06-08 NOTE — PLAN OF CARE
Problem: Patient Care Overview  Goal: Plan of Care Review  Plan of care discussed with patient.  Patient ambulating independently, fall precautions in place. Patient has  complaints of pain. Administered PRN pain med.  Discussed medications and care. Patient has no questions at this time.White board updated. S/P KAMILLE/DCCV and PPM placement to L upper chest wall. Pressing dressing in place with L arm in sling. . Administered SSI coverage. First dose of sotalol therapy administered and EKG taken two hours post administration. Bed locked in lowest position. Side rails up x2. Will continue to monitor.

## 2018-06-08 NOTE — SUBJECTIVE & OBJECTIVE
Interval History: Patient had ppm inserted yesterday after having a 9-10 second pause post DCCV.  Patient feeling well today.  No  C/o chest pain, lightheaded or dizziness.     Review of Systems   Constitutional: Negative for activity change, appetite change, chills and fatigue.   HENT: Negative for congestion, sore throat and trouble swallowing.    Respiratory: Negative for cough, shortness of breath and wheezing.    Cardiovascular: Negative for chest pain, palpitations and leg swelling.   Gastrointestinal: Negative for abdominal pain, constipation, diarrhea, nausea and vomiting.   Endocrine: Negative for cold intolerance and heat intolerance.   Genitourinary: Negative for decreased urine volume, difficulty urinating and hematuria.   Musculoskeletal: Negative for back pain and myalgias.   Skin: Negative for color change, rash and wound.   Allergic/Immunologic: Negative for immunocompromised state.   Neurological: Negative for dizziness, weakness, light-headedness, numbness and headaches.   Hematological: Does not bruise/bleed easily.   Psychiatric/Behavioral: Negative for agitation, decreased concentration and sleep disturbance. The patient is not nervous/anxious.      Objective:     Vital Signs (Most Recent):  Temp: 97.5 °F (36.4 °C) (06/08/18 1205)  Pulse: 60 (06/08/18 1205)  Resp: 18 (06/08/18 1205)  BP: (!) 104/57 (06/08/18 1205)  SpO2: (!) 91 % (06/08/18 1205) Vital Signs (24h Range):  Temp:  [97.4 °F (36.3 °C)-98.3 °F (36.8 °C)] 97.5 °F (36.4 °C)  Pulse:  [60-98] 60  Resp:  [16-20] 18  SpO2:  [91 %-98 %] 91 %  BP: ()/(52-63) 104/57     Weight: 99.4 kg (219 lb 2.2 oz)  Body mass index is 35.37 kg/m².    Intake/Output Summary (Last 24 hours) at 06/08/18 1403  Last data filed at 06/08/18 1234   Gross per 24 hour   Intake              280 ml   Output             1600 ml   Net            -1320 ml      Physical Exam   Constitutional: She is oriented to person, place, and time. She appears well-developed and  well-nourished. No distress.   HENT:   Head: Normocephalic and atraumatic.   Right Ear: External ear normal.   Left Ear: External ear normal.   Nose: Nose normal.   Eyes: Conjunctivae and EOM are normal.   Neck: Normal range of motion. Neck supple. No JVD present.   Cardiovascular: Normal rate, regular rhythm, normal heart sounds and intact distal pulses.    No murmur heard.  Pulmonary/Chest: Effort normal and breath sounds normal. No respiratory distress. She has no wheezes. She has no rales.   Abdominal: Soft. Bowel sounds are normal. She exhibits no distension and no mass. There is no tenderness. There is no guarding.   Musculoskeletal: Normal range of motion. She exhibits no edema.   L shoulder immobilized for 48 hrs then nightly for 6 weeks nightly   Neurological: She is alert and oriented to person, place, and time. No cranial nerve deficit or sensory deficit. Coordination normal.   Skin: Skin is warm and dry. Capillary refill takes 2 to 3 seconds. No rash noted. She is not diaphoretic. No erythema.   L chest incision looks good.  Mild ecchymosis.     Psychiatric: She has a normal mood and affect. Her behavior is normal. Judgment and thought content normal.   Nursing note and vitals reviewed.      Significant Labs:   BMP:   Recent Labs  Lab 06/08/18  0407   *      K 3.6      CO2 24   BUN 22*   CREATININE 1.2   CALCIUM 9.2       Significant Imaging: I have reviewed all pertinent imaging results/findings within the past 24 hours.     Echo:  1 - Upper limit of normal left ventricular enlargement.     2 - Mildly to moderately depressed left ventricular systolic function (EF 40-45%).     3 - Eccentric hypertrophy.

## 2018-06-08 NOTE — PROGRESS NOTES
[facility] Hospital Medicine  Progress Note    Patient Name: Janine Souza  MRN: [id number]  Patient Class: IP- Inpatient   Admission Date: 6/5/2018  Length of Stay: 3 days  Attending Physician: Aneta Santiago MD  Primary Care Provider: Charles Lopez MD    [facility] Medicine Team: [facility] Kaylah Barlow NP    Subjective:     Principal Problem:Atrial flutter    HPI:  Patient is a 59yo female with a PMHx of secondary neuroendocrine tumor of liver, Afib, hx of ablation in 2006, chronic anticoagulation on Xarelto 20 mg, CHF with 60 % EF, HTN, pulmonary HTN, DM2, and anemia being admitted to medicine for evaluation of SVT. She presented to her GI clinic today for removal of her biliary stent. She was found to be in SVT when evaluated for the procedure and was sent to the ED. Initially, she was pursuing stent removal to address the pain. At this moment, she complains of SOB, tachycardia, and diaphoresis and found to have HR of ~ 140 bpm. Patient denies chest pain, nausea, vomiting, weakness.    In the ED, patient with SVT and HR in the 140s. Intake labs remarkable for Hgb 10.7, Cr 1.4. EKG confirms SVT with questionable ST changes. Cardiology consulted in the ED for further recommendations with admission to co-management service.    Hospital Course:  Patient admitted to medicine for atrial flutter. Cardiology and EP consulted and following. Patient started on Cardizem drip. EP s/p DCCV on 6/7.  Post cardioversion patient had 9-10 second pause.  Patient was taken to EP lab and had a PPM placed on 6/7.  Sotalol was initiated and is now dosed at 40mg bid.        Dispo: Sunday after sotalol load, will arrange a f/u with wound and pacer check with primary cardiologist if possible.     Review of Systems   Constitutional: Negative.    HENT: Negative.    Respiratory: Negative.    Cardiovascular: Negative.    Gastrointestinal: Negative.    Genitourinary: Negative.    Musculoskeletal: Negative.     Skin: Negative for rash.        L chest incision   Neurological: Negative.  Negative for weakness.   Psychiatric/Behavioral: Negative.      .Physical Exam   Constitutional: She is oriented to person, place, and time and well-developed, well-nourished, and in no distress. No distress.   HENT:   Head: Normocephalic and atraumatic.   Nose: Nose normal.   Mouth/Throat: Oropharynx is clear and moist.   Eyes: Conjunctivae and EOM are normal.   Neck: Normal range of motion. Neck supple. No JVD present.   Cardiovascular: Normal rate, regular rhythm, normal heart sounds and intact distal pulses.    No murmur heard.  Pulmonary/Chest: Effort normal and breath sounds normal. No respiratory distress. She has no rales.   Abdominal: Soft. Bowel sounds are normal. She exhibits no distension.   Musculoskeletal: Normal range of motion. She exhibits no edema or tenderness.   Neurological: She is alert and oriented to person, place, and time.   Skin: Skin is warm and dry. She is not diaphoretic.   L chest wall incision clean dry and intact   Psychiatric: Mood, memory, affect and judgment normal.     Assessment/Plan:      * Atrial flutter    - s/p DCCV, started on sotalol dose reduced to 40mg bid           SVT (supraventricular tachycardia)    Patient with history of afib with ablation being admitted for SVT found on presentation for GI procedure. Patient follows with Cardiologist at [facility]. Dr. Jelani Sánchez   - IV metoprolol x 1 given with no improvement.  - EKG with aflutter and HR in 140s, mild ST depressions in inferior leads  - Cardiology consulted in the ED for recommendations, continuing to follow on the floor  - Cardizem drip, titrated as needed for rate control  - EP consulted, s/p DCCV on 6/7 s/p 9-10 second pause sent for PPM later that day 6/7, now started on sotalol   - maintain on telemetry  - electrolytes normal, infectious workup unremarkable (CXR, UA clear)  - resume Xarelto for AC Saturday night          S/P placement  of cardiac pacemaker    - 9-10 second pause post cardioversion, s/p PPM 6/7 Dr. Onofre           Biliary stent migration    Seen in GI clinic for removal of biliary stent that was causing abdominal pain  - will follow up outpatient to have removed        Chronic diastolic heart failure    Last ECHO 1/16 showed EF of 60% with mild to moderate depression of systolic function  - started on sotalol   - lasix PO  - no need for ACE/ ARB with diastolic failure, if b/p elevated can add norvasc  - daily weights, strict Is/Os        Secondary neuroendocrine tumor of liver    - due for biliary stent to be removed when found to have svt  - in remission   - Follows with Dr. Flores in heme/onc  - stable        Malignant poorly differentiated neuroendocrine carcinoma    Follows with Dr. Flores in heme/onc  - stable        Type 2 diabetes mellitus, without long-term current use of insulin    Hold home oral medications  - low dose SSI, ACHS, started on levemir 5 units, will add aspart with meals as necessary  - cardiac diabetic diet        Hypertension    - b/p well controlled on sotalol 40mg bid  - lasix 40 daily            VTE Risk Mitigation         Ordered     rivaroxaban tablet 20 mg  With dinner      06/07/18 1917     Place sequential compression device  Until discontinued      06/05/18 1839     Place LAILA hose  Until discontinued      06/05/18 1839              Kaylah Barlow NP  Department of Hospital Medicine   [facility]  Spectra:  [phone]  Pager: [phone]

## 2018-06-08 NOTE — PROGRESS NOTES
Patient has been off floor for most of the day for several procedures.  I tried several attempts to see her however was unsuccessful.  I am unable generate a note for billing.  PLan per Dr. Onofre EP post PPM insertion: started on sotalol for Aflutter, they will follow qtc/ ecg's.  Restart Xarelto Saturday night since she's low risk and has no GENOVEVA.

## 2018-06-09 VITALS
BODY MASS INDEX: 34.01 KG/M2 | OXYGEN SATURATION: 96 % | RESPIRATION RATE: 18 BRPM | TEMPERATURE: 98 F | HEART RATE: 64 BPM | HEIGHT: 66 IN | DIASTOLIC BLOOD PRESSURE: 57 MMHG | WEIGHT: 211.63 LBS | SYSTOLIC BLOOD PRESSURE: 117 MMHG

## 2018-06-09 LAB
ALBUMIN SERPL BCP-MCNC: 3.3 G/DL
ALP SERPL-CCNC: 83 U/L
ALT SERPL W/O P-5'-P-CCNC: 15 U/L
ANION GAP SERPL CALC-SCNC: 8 MMOL/L
AST SERPL-CCNC: 16 U/L
BILIRUB SERPL-MCNC: 1 MG/DL
BUN SERPL-MCNC: 22 MG/DL
CALCIUM SERPL-MCNC: 9.4 MG/DL
CHLORIDE SERPL-SCNC: 101 MMOL/L
CO2 SERPL-SCNC: 27 MMOL/L
CREAT SERPL-MCNC: 1.3 MG/DL
EST. GFR  (AFRICAN AMERICAN): 52.6 ML/MIN/1.73 M^2
EST. GFR  (NON AFRICAN AMERICAN): 45.6 ML/MIN/1.73 M^2
GLUCOSE SERPL-MCNC: 230 MG/DL
POCT GLUCOSE: 253 MG/DL (ref 70–110)
POCT GLUCOSE: 262 MG/DL (ref 70–110)
POTASSIUM SERPL-SCNC: 3.7 MMOL/L
PROT SERPL-MCNC: 8.6 G/DL
SODIUM SERPL-SCNC: 136 MMOL/L

## 2018-06-09 PROCEDURE — 25000003 PHARM REV CODE 250: Performed by: INTERNAL MEDICINE

## 2018-06-09 PROCEDURE — 99239 HOSP IP/OBS DSCHRG MGMT >30: CPT | Mod: ,,, | Performed by: NURSE PRACTITIONER

## 2018-06-09 PROCEDURE — 93005 ELECTROCARDIOGRAM TRACING: CPT

## 2018-06-09 PROCEDURE — 63600175 PHARM REV CODE 636 W HCPCS: Performed by: NURSE PRACTITIONER

## 2018-06-09 PROCEDURE — 80053 COMPREHEN METABOLIC PANEL: CPT

## 2018-06-09 PROCEDURE — 25000003 PHARM REV CODE 250: Performed by: NURSE PRACTITIONER

## 2018-06-09 PROCEDURE — 93010 ELECTROCARDIOGRAM REPORT: CPT | Mod: GV,,, | Performed by: INTERNAL MEDICINE

## 2018-06-09 PROCEDURE — 25000003 PHARM REV CODE 250: Performed by: PHYSICIAN ASSISTANT

## 2018-06-09 RX ORDER — SOTALOL HYDROCHLORIDE 80 MG/1
40 TABLET ORAL 2 TIMES DAILY
Qty: 90 TABLET | Refills: 0 | Status: SHIPPED | OUTPATIENT
Start: 2018-06-09 | End: 2019-05-10

## 2018-06-09 RX ORDER — HEPARIN 100 UNIT/ML
100 SYRINGE INTRAVENOUS ONCE
Status: COMPLETED | OUTPATIENT
Start: 2018-06-09 | End: 2018-06-09

## 2018-06-09 RX ORDER — POTASSIUM CHLORIDE 750 MG/1
40 CAPSULE, EXTENDED RELEASE ORAL ONCE
Status: COMPLETED | OUTPATIENT
Start: 2018-06-09 | End: 2018-06-09

## 2018-06-09 RX ORDER — INSULIN ASPART 100 [IU]/ML
3 INJECTION, SOLUTION INTRAVENOUS; SUBCUTANEOUS
Status: DISCONTINUED | OUTPATIENT
Start: 2018-06-09 | End: 2018-06-09 | Stop reason: HOSPADM

## 2018-06-09 RX ADMIN — STANDARDIZED SENNA CONCENTRATE AND DOCUSATE SODIUM 1 TABLET: 8.6; 5 TABLET, FILM COATED ORAL at 09:06

## 2018-06-09 RX ADMIN — POTASSIUM CHLORIDE 40 MEQ: 750 CAPSULE, EXTENDED RELEASE ORAL at 09:06

## 2018-06-09 RX ADMIN — DOXYCYCLINE HYCLATE 100 MG: 100 TABLET, COATED ORAL at 09:06

## 2018-06-09 RX ADMIN — INSULIN ASPART 3 UNITS: 100 INJECTION, SOLUTION INTRAVENOUS; SUBCUTANEOUS at 09:06

## 2018-06-09 RX ADMIN — INSULIN ASPART 3 UNITS: 100 INJECTION, SOLUTION INTRAVENOUS; SUBCUTANEOUS at 12:06

## 2018-06-09 RX ADMIN — SOTALOL HYDROCHLORIDE 40 MG: 80 TABLET ORAL at 09:06

## 2018-06-09 RX ADMIN — HEPARIN 100 UNITS: 100 SYRINGE at 01:06

## 2018-06-09 RX ADMIN — FUROSEMIDE 40 MG: 40 TABLET ORAL at 09:06

## 2018-06-09 NOTE — PLAN OF CARE
Problem: Patient Care Overview  Goal: Plan of Care Review  Plan of care discussed with patient.  Patient ambulating independently, fall precautions in place. Patient has no complaints of pain to L chest wall incision. Left arm in sling. Administered third dose of sotalol. LELE abx continued.  Discussed medications and care. Patient has no questions at this time.White board updated. Bed locked in lowest position. Side rails up x2. Will continue to monitor.

## 2018-06-09 NOTE — NURSING
Pt D/C home per MD orders. Port de-acess per protocol using heparin to lock.  Pt received medications from pharmacy. Tele removed, IV access removed and intact X 2.  VSS, NAD, and no complaints at this time. Pt given and explained med list and prescriptions. Pt verbalizes complete understanding of all  D/C instructions and follow up care. Pt given printed AVS, and went over all pertinent information. Pt awaiting escort. Will continue to monitor.

## 2018-06-09 NOTE — PLAN OF CARE
Problem: Patient Care Overview  Goal: Plan of Care Review  Outcome: Ongoing (interventions implemented as appropriate)  Pt free of falls/traumas/injuries. Skin remains clean, dry, and intact. CBG checks ACHS. 4TH dose of sotolol given. K replaced.  Pt re-educated on importance of measuring accurate intake and out put; pt verbalized and demonstrates understanding. Reviewed plan of care with pt; and pt verbalized understanding.  Pt VSS in no distress will continue to monitor.

## 2018-06-09 NOTE — PLAN OF CARE
-Patient is s/p dcPPM for prolonged conversion pause about 9 s after DCCV for AFL. In A-paced rhythm at 60 bpm this AM.  -QTc following administration of Sotalol yesterday evening unchanged from previous.  -Continue sotalol at current dosing, can be restarted on Xarelto in evening today  -She can be discharged from EP standpoint on current dosage of Sotalol  -Repeat ECG on Monday 6/11    PPM precautions  -Complete 5 day course of Doxycycline  -Follow up with wound care in ep clinic 1-2 weeks, Dr Jean Carlos Onofre in clinic in 3 months  -Arm in sling x 72 hours straight then nightly x 2 weeks  -No lifting more than 5 lbs in left hand  -No raising arm above shoulder length  -Do not let water hit wound directly once dressing removed x 48 hours  -No driving  -Re-address restrictions in 1-2 weeks at wound care clinic    Kamaljit Greco MD, MPH  PGY-V  Cardiovascular Disease Fellow

## 2018-06-10 LAB
BACTERIA BLD CULT: NORMAL
BACTERIA BLD CULT: NORMAL

## 2018-06-10 NOTE — ASSESSMENT & PLAN NOTE
- s/p DCCV, started on sotalol dose reduced to 40mg bid d/t qtc >500  - Dr. French states to send home on this dose

## 2018-06-10 NOTE — ASSESSMENT & PLAN NOTE
Patient with history of afib with ablation being admitted for SVT found on presentation for GI procedure. Patient follows with Cardiologist at [facility]. Dr. Jelani Sánchez   - IV metoprolol x 1 given with no improvement.  - EKG with aflutter and HR in 140s, mild ST depressions in inferior leads  - Cardiology consulted in the ED for recommendations, continuing to follow on the floor  - Cardizem drip, titrated as needed for rate control  - EP consulted, s/p DCCV on 6/7 s/p 9-10 second pause sent for PPM later that day 6/7, now started on sotalol   - maintain on telemetry  - electrolytes normal, infectious workup unremarkable (CXR, UA clear)  - resume Xarelto for AC 6/9  - d/c on 40mg bid sotalol

## 2018-06-10 NOTE — DISCHARGE SUMMARY
[facility] Hospital Medicine  Discharge Summary      Patient Name: Janine Souza  MRN: [id number]  Admission Date: 6/5/2018  Hospital Length of Stay: 4 days  Discharge Date and Time: 6/9/2018  1:51 PM  Attending Physician: Dr. Aneta Santiago   Discharging Provider: Kaylah Barlow NP  Primary Care Provider: Charles Lopez MD  [facility] Medicine Team: [facility] Kaylah Barlow NP    HPI:   Patient is a 57yo female with a PMHx of secondary neuroendocrine tumor of liver, Afib, hx of ablation in 2006, chronic anticoagulation on Xarelto 20 mg, CHF with 60 % EF, HTN, pulmonary HTN, DM2, and anemia being admitted to medicine for evaluation of SVT. She presented to her GI clinic today for removal of her biliary stent. She was found to be in SVT when evaluated for the procedure and was sent to the ED. Initially, she was pursuing stent removal to address the pain. At this moment, she complains of SOB, tachycardia, and diaphoresis and found to have HR of ~ 140 bpm. Patient denies chest pain, nausea, vomiting, weakness.    In the ED, patient with SVT and HR in the 140s. Intake labs remarkable for Hgb 10.7, Cr 1.4. EKG confirms SVT with questionable ST changes. Cardiology consulted in the ED for further recommendations with admission to co-management service.    Procedure(s) (LRB):  INSERTION, CARDIAC PACEMAKER, DUAL CHAMBER (Left)      Hospital Course:   Patient admitted to medicine for atrial flutter. Cardiology and EP consulted and following. Patient started on Cardizem drip. EP s/p DCCV on 6/7.  Post cardioversion patient had 9-10 second pause.  Patient was taken to EP lab and had a PPM placed on 6/7.  Sotalol was initiated and is now dosed at 40mg bid.  Her QTC hangs around the 500-524 aleksey post initiation. PPM discharge instructions reviewed over two days. Doxycycline called into home pharmacy already, ready for .       Dispo: will arrange a f/u with wound and pacer check with primary  cardiologist if possible.        Consults:   Consults         Status Ordering Provider     Inpatient consult to Cardiology  Once     Provider:  (Not yet assigned)    Completed GERARDO OGDEN     Inpatient consult to Cardiology  Once     Provider:  (Not yet assigned)    Completed KAYLEIGH WISEMAN JR     Inpatient consult to Electrophysiology  Once     Provider:  (Not yet assigned)    Completed VIV ANGEL      Review of Systems   Constitutional: Negative.    HENT: Negative.    Respiratory: Negative.    Cardiovascular: Negative.    Gastrointestinal: Negative.    Genitourinary: Negative.    Musculoskeletal: Negative.    Skin: Negative for rash.        L chest incision   Neurological: Negative.  Negative for weakness.   Psychiatric/Behavioral: Negative.       .Physical Exam   Constitutional: She is oriented to person, place, and time and well-developed, well-nourished, and in no distress. No distress.   HENT:   Head: Normocephalic and atraumatic.   Nose: Nose normal.   Mouth/Throat: Oropharynx is clear and moist.   Eyes: Conjunctivae and EOM are normal.   Neck: Normal range of motion. Neck supple. No JVD present.   Cardiovascular: Normal rate, regular rhythm, normal heart sounds and intact distal pulses.    No murmur heard.  Pulmonary/Chest: Effort normal and breath sounds normal. No respiratory distress. She has no rales.   Abdominal: Soft. Bowel sounds are normal. She exhibits no distension.   Musculoskeletal: Normal range of motion. She exhibits no edema or tenderness.   Neurological: She is alert and oriented to person, place, and time.   Skin: Skin is warm and dry. She is not diaphoretic.   L chest wall incision clean dry and intact   Psychiatric: Mood, memory, affect and judgment normal.     * Atrial flutter    - s/p DCCV, started on sotalol dose reduced to 40mg bid d/t qtc >500  - Dr. French states to send home on this dose          SVT (supraventricular tachycardia)    Patient with history of afib  with ablation being admitted for SVT found on presentation for GI procedure. Patient follows with Cardiologist at [facility]. Dr. Jelani Sánchez   - IV metoprolol x 1 given with no improvement.  - EKG with aflutter and HR in 140s, mild ST depressions in inferior leads  - Cardiology consulted in the ED for recommendations, continuing to follow on the floor  - Cardizem drip, titrated as needed for rate control  - EP consulted, s/p DCCV on 6/7 s/p 9-10 second pause sent for PPM later that day 6/7, now started on sotalol   - maintain on telemetry  - electrolytes normal, infectious workup unremarkable (CXR, UA clear)  - resume Xarelto for AC 6/9  - d/c on 40mg bid sotalol           S/P placement of cardiac pacemaker    - 9-10 second pause post cardioversion, s/p PPM 6/7 Dr. Onofre           Biliary stent migration    Seen in GI clinic for removal of biliary stent that was causing abdominal pain  - will follow up outpatient to have removed        Chronic diastolic heart failure    Last ECHO 1/16 showed EF of 60% with mild to moderate depression of systolic function  - started on sotalol   - lasix PO  - no need for ACE/ ARB with diastolic failure, if b/p elevated can add norvasc  - daily weights, strict Is/Os        Secondary neuroendocrine tumor of liver    - due for biliary stent to be removed when found to have svt  - in remission   - Follows with Dr. Flores in heme/onc  - stable        Malignant poorly differentiated neuroendocrine carcinoma    Follows with Dr. Flores in heme/onc  - stable        Type 2 diabetes mellitus, without long-term current use of insulin    Hold home oral medications  - low dose SSI, ACHS, started on levemir 5 units, will add aspart with meals as necessary  - cardiac diabetic diet        Hypertension    - b/p well controlled on sotalol 40mg bid  - lasix 40 daily            Final Active Diagnoses:    Diagnosis Date Noted POA    PRINCIPAL PROBLEM:  Atrial flutter [I48.92] 06/07/2018 Yes    SVT  (supraventricular tachycardia) [I47.1] 06/06/2018 Yes    S/P placement of cardiac pacemaker [Z95.0] 06/08/2018 No    Biliary stent migration [T85.520A] 06/01/2018 Yes    Chronic diastolic heart failure [I50.32] 01/16/2016 Yes    Malignant poorly differentiated neuroendocrine carcinoma [C7A.1] 01/08/2016 Yes    Secondary neuroendocrine tumor of liver [C7B.8] 01/08/2016 Yes    Hypertension [I10]  Yes    Type 2 diabetes mellitus, without long-term current use of insulin [E11.9] 12/02/2014 Yes      Problems Resolved During this Admission:    Diagnosis Date Noted Date Resolved POA    Atrial fibrillation, persistent [I48.1]  06/06/2018 Yes       Discharged Condition: good    Disposition: Home or Self Care    Follow Up:  Follow-up Information     Benedicto Sánchez MD On 6/18/2018.    Specialty:  Cardiology  Why:  09:00 hospital follow up appointment  9:45 pacemaker check  Contact information:  [address] [place] [address]  [phone]             Patricia Onofre MD.    Specialties:  Electrophysiology, Cardiovascular Disease, Cardiology  Why:  4-6 weeks   Contact information:  [address] [facility] [address]  [phone]                 Patient Instructions:     Ambulatory Referral to Electrophysiology   Referral Priority: Routine Referral Type: Consultation   Referral Reason: Specialty Services Required    Referred to Provider: PATRICIA ONOFRE Requested Specialty: Electrophysiology   Number of Visits Requested: 1      Activity as tolerated     Lifting restrictions   Scheduling Instructions: No lifting L arm above shoulder or behind for 6 weeks.   No pushing or pulling with Left arm.  No driving of steering wheel with L arm, if driving place pillow between you and seatbelt  No shower beam on incision for 6 weeks.     Notify your health care provider if you experience any of the following:  temperature >100.4     Notify your health care provider if you experience any of the following:  persistent  nausea and vomiting or diarrhea     Notify your health care provider if you experience any of the following:  severe uncontrolled pain     Notify your health care provider if you experience any of the following:  redness, tenderness, or signs of infection (pain, swelling, redness, odor or green/yellow discharge around incision site)     Notify your health care provider if you experience any of the following:  difficulty breathing or increased cough     Notify your health care provider if you experience any of the following:  severe persistent headache     Notify your health care provider if you experience any of the following:  worsening rash     Notify your health care provider if you experience any of the following:  persistent dizziness, light-headedness, or visual disturbances     Notify your health care provider if you experience any of the following:  increased confusion or weakness     Notify your health care provider if you experience any of the following:         Significant Diagnostic Studies: Labs:   BMP:   Recent Labs  Lab 06/08/18  0407 06/09/18  0538   * 230*    136   K 3.6 3.7    101   CO2 24 27   BUN 22* 22*   CREATININE 1.2 1.3   CALCIUM 9.2 9.4       Pending Diagnostic Studies:     None         Medications:  Reconciled Home Medications:      Medication List      START taking these medications    doxycycline 100 MG Cap  Commonly known as:  VIBRAMYCIN  Take 1 capsule (100 mg total) by mouth 2 (two) times daily.     sotalol 80 MG tablet  Commonly known as:  BETAPACE  Take 0.5 tablets (40 mg total) by mouth 2 (two) times daily.        CHANGE how you take these medications    metFORMIN 500 MG tablet  Commonly known as:  GLUCOPHAGE  Take 1 tablet (500 mg total) by mouth 2 (two) times daily with meals. Take 2 tablets (1000mg) in am and 3 tablets (1500 mg)in the pm  What changed:  · how much to take  · how to take this  · when to take this  · additional instructions        CONTINUE  taking these medications    blood sugar diagnostic, disc Strp  USE AS DIRECTED EVERY DAY *THANK YOU*     docusate sodium 50 MG capsule  Commonly known as:  COLACE  Take by mouth 2 (two) times daily.     fluticasone 110 mcg/actuation inhaler  Commonly known as:  FLOVENT HFA  Inhale 2 puffs into the lungs once daily.     furosemide 40 MG tablet  Commonly known as:  LASIX  Take 40 mg by mouth once daily.     glipiZIDE 10 MG tablet  Commonly known as:  GLUCOTROL  Take 1 tablet (10 mg total) by mouth 2 (two) times daily.     KLOR-CON M20 20 MEQ tablet  Generic drug:  potassium chloride SA     lancets Misc  Use as directed     ondansetron 4 MG tablet  Commonly known as:  ZOFRAN  Take 1 tablet (4 mg total) by mouth every 12 (twelve) hours as needed for Nausea.     polyethylene glycol 17 gram Pwpk  Commonly known as:  GLYCOLAX  Take by mouth once daily.     prochlorperazine 5 MG tablet  Commonly known as:  COMPAZINE  Take 1 tablet (5 mg total) by mouth every 6 (six) hours as needed for Nausea.     rivaroxaban 20 mg Tab  Commonly known as:  XARELTO  Take 20 mg by mouth.     TRADJENTA 5 mg Tab tablet  Generic drug:  linagliptin  Take 5 mg by mouth once daily.     zolpidem 10 mg Tab  Commonly known as:  AMBIEN  Take 1 tablet (10 mg total) by mouth nightly as needed.        STOP taking these medications    metoprolol succinate 50 MG 24 hr tablet  Commonly known as:  TOPROL-XL     metoprolol tartrate 25 MG tablet  Commonly known as:  LOPRESSOR            Indwelling Lines/Drains at time of discharge:   Lines/Drains/Airways     Central Venous Catheter Line                 Port A Cath Single Lumen 02/02/16 1502 right subclavian 858 days                Time spent on the discharge of patient: 40 minutes  Patient was seen and examined on the date of discharge and determined to be suitable for discharge.         Kaylah Barlow NP  Department of Hospital Medicine  [facility]  Spectra:  [phone]  Pager:  [phone]

## 2018-06-11 NOTE — PLAN OF CARE
06/11/18 0748   Final Note   Assessment Type Final Discharge Note   Discharge Disposition Home   Hospital Follow Up  Appt(s) scheduled? Yes

## 2018-06-11 NOTE — PHYSICIAN QUERY
PT Name: Janine Souza  MR #: [id number]     Physician Query Form - Documentation Clarification      CDS/: Kathia Mccarthy RN, CCDS             Contact information: [email or web address]    This form is a permanent document in the medical record.     Query Date: June 11, 2018    By submitting this query, we are merely seeking further clarification of documentation. Please utilize your independent clinical judgment when addressing the question(s) below.    The Medical record reflects the following:    Supporting Clinical Findings Location in Medical Record      Elevated glucose 255    Diabetes mellitus, type 2     6/5 ed note    6/5 ed note, 6/5 h/p, 6/6, 6/8 prog notes, 6/9 d/c summary     Glucose- 255->194->232->224->230    A1C 9.6   6/5- 6/9 lab    6/6 lab                                                                            Doctor, Please specify diagnosis or diagnoses associated with above clinical findings.    Provider Use Only        (  x  )  DM 2 with hyperglycemia    (    )  Other_________________                                                                                                                       [  ] Clinically undetermined

## 2018-06-12 ENCOUNTER — PATIENT OUTREACH (OUTPATIENT)
Dept: ADMINISTRATIVE | Facility: CLINIC | Age: 58
End: 2018-06-12

## 2018-06-12 NOTE — PATIENT INSTRUCTIONS
Atrial Flutter    Atrial flutter means that your heart is beating very fast. It is caused by a problem in the electrical pathways of the heart. It can be a sign of heart disease or other health problems that affect your heart.  Palpitations are the most common symptom of atrial flutter. This is the feeling that your heart is fluttering or beating fast or hard. When your heart beats too fast, it doesnt pump blood very well. This can cause other symptoms, including:  · Anxiety  · Fatigue  · Shortness of breath  · Chest pain  · Dizziness  · Fainting  If this is the first time youve had atrial flutter, and you dont have heart or lung disease, it may never happen again. But in most cases, atrial flutter comes and goes. It can last from a few hours to a couple of days. Sometimes the atrial flutter doesnt ever go away. This is chronic atrial flutter.  Atrial flutter may be caused by heart disease. It may also be caused by other conditions that affect the heart:  · Coronary artery disease (arteriosclerosis)  · High blood pressure  · Disease of the heart valves  · Enlarged heart  Atrial flutter can occur without heart disease. This may be because of:  · Overactive thyroid (hyperthyroid)  · Chronic lung disease (COPD, emphysema, or bronchitis)  · Alcohol use  · Drugs or medicines that stimulate the heart. These include cocaine, amphetamines, diet pills, some decongestant cold medicines, caffeine, and nicotine.  · Infection  Treating or removing these causes will make it more likely that treatment for atrial flutter will work. It will also make it less likely that atrial flutter will come back.  Atrial flutter can happen along with another abnormal rhythm called atrial fibrillation. The risk for stroke is higher with these conditions. Getting treatment can reduce your risk.  Home care  Follow these guidelines when caring for yourself at home:  · Go back to your usual activities as soon as you feel back to normal.  · If  you smoke, stop smoking. Talk with your healthcare provider or call a local stop-smoking program for help.  · Dont take drugs or medicines that stimulate your heart. These include cocaine, amphetamines, diet pills, some decongestant cold medicines, caffeine, and nicotine.  · Your provider may have prescribed medicine to stop atrial fibrillation from coming back. Take this medicine exactly as directed. Some medicines must be taken every day to work as they should.  · Your provider may also have prescribed warfarin to lower your risk for stroke. Have your blood tested regularly as advised by your healthcare provider. This will help make sure the dose is right for you.  Follow-up care  Follow up with your healthcare provider, or as advised.  When should I call my healthcare provider?  Call your healthcare provider right away if any of these occur:  · Shortness of breath gets worse  · Swelling in either leg  · Unexpected weight gain  · Chest pain or pressure  · Near fainting or fainting  · You feel like your heart is fluttering, or beating fast or hard  · Fever of 100.4°F (38°C) or higher, or as directed by your healthcare provider  · Cough with dark-colored or bloody mucus  Also call your provider right away if you have signs of stroke:  · Weakness or numbness of an arm or leg or one side of the face  · Difficulty speaking or seeing  · Extreme drowsiness, confusion, dizziness, or fainting   Date Last Reviewed: 5/1/2016  © 1155-6770 [employer]. [address] [place] [address]. All rights reserved. This information is not intended as a substitute for professional medical care. Always follow your healthcare professional's instructions.

## 2018-07-15 ENCOUNTER — PATIENT MESSAGE (OUTPATIENT)
Dept: HEMATOLOGY/ONCOLOGY | Facility: CLINIC | Age: 58
End: 2018-07-15

## 2018-07-17 ENCOUNTER — LAB VISIT (OUTPATIENT)
Dept: LAB | Facility: HOSPITAL | Age: 58
End: 2018-07-17
Attending: INTERNAL MEDICINE
Payer: MEDICARE

## 2018-07-17 ENCOUNTER — OFFICE VISIT (OUTPATIENT)
Dept: HEMATOLOGY/ONCOLOGY | Facility: CLINIC | Age: 58
End: 2018-07-17
Payer: MEDICARE

## 2018-07-17 ENCOUNTER — INFUSION (OUTPATIENT)
Dept: INFUSION THERAPY | Facility: HOSPITAL | Age: 58
End: 2018-07-17
Attending: NURSE PRACTITIONER
Payer: MEDICARE

## 2018-07-17 VITALS
OXYGEN SATURATION: 95 % | HEIGHT: 65 IN | DIASTOLIC BLOOD PRESSURE: 77 MMHG | HEART RATE: 111 BPM | SYSTOLIC BLOOD PRESSURE: 130 MMHG | BODY MASS INDEX: 36.69 KG/M2 | WEIGHT: 220.25 LBS | TEMPERATURE: 97 F

## 2018-07-17 VITALS — HEIGHT: 65 IN | WEIGHT: 220.25 LBS | BODY MASS INDEX: 36.69 KG/M2

## 2018-07-17 DIAGNOSIS — C78.6 METASTATIC CANCER TO RETROPERITONEUM: ICD-10-CM

## 2018-07-17 DIAGNOSIS — D50.0 IRON DEFICIENCY ANEMIA DUE TO CHRONIC BLOOD LOSS: ICD-10-CM

## 2018-07-17 DIAGNOSIS — C7A.1 MALIGNANT POORLY DIFFERENTIATED NEUROENDOCRINE CARCINOMA: Primary | ICD-10-CM

## 2018-07-17 DIAGNOSIS — C7B.8 SECONDARY NEUROENDOCRINE TUMOR OF LIVER: ICD-10-CM

## 2018-07-17 DIAGNOSIS — C7B.8 SECONDARY NEUROENDOCRINE TUMOR OF LIVER: Primary | ICD-10-CM

## 2018-07-17 DIAGNOSIS — C7A.1 MALIGNANT POORLY DIFFERENTIATED NEUROENDOCRINE CARCINOMA: ICD-10-CM

## 2018-07-17 LAB
ALBUMIN SERPL BCP-MCNC: 3.5 G/DL
ALP SERPL-CCNC: 62 U/L
ALT SERPL W/O P-5'-P-CCNC: 14 U/L
ANION GAP SERPL CALC-SCNC: 10 MMOL/L
AST SERPL-CCNC: 14 U/L
BASOPHILS # BLD AUTO: 0.04 K/UL
BASOPHILS NFR BLD: 0.4 %
BILIRUB SERPL-MCNC: 0.5 MG/DL
BUN SERPL-MCNC: 37 MG/DL
CALCIUM SERPL-MCNC: 9.9 MG/DL
CHLORIDE SERPL-SCNC: 100 MMOL/L
CO2 SERPL-SCNC: 27 MMOL/L
CREAT SERPL-MCNC: 1.4 MG/DL
DIFFERENTIAL METHOD: ABNORMAL
EOSINOPHIL # BLD AUTO: 0.4 K/UL
EOSINOPHIL NFR BLD: 4.1 %
ERYTHROCYTE [DISTWIDTH] IN BLOOD BY AUTOMATED COUNT: 14.6 %
EST. GFR  (AFRICAN AMERICAN): 48 ML/MIN/1.73 M^2
EST. GFR  (NON AFRICAN AMERICAN): 42 ML/MIN/1.73 M^2
FERRITIN SERPL-MCNC: 2003 NG/ML
GLUCOSE SERPL-MCNC: 292 MG/DL
HCT VFR BLD AUTO: 32.9 %
HGB BLD-MCNC: 10.7 G/DL
IRON SERPL-MCNC: 75 UG/DL
LDH SERPL L TO P-CCNC: 153 U/L
LYMPHOCYTES # BLD AUTO: 1.8 K/UL
LYMPHOCYTES NFR BLD: 19.2 %
MCH RBC QN AUTO: 32.4 PG
MCHC RBC AUTO-ENTMCNC: 32.5 G/DL
MCV RBC AUTO: 100 FL
MONOCYTES # BLD AUTO: 0.9 K/UL
MONOCYTES NFR BLD: 9.8 %
NEUTROPHILS # BLD AUTO: 6.1 K/UL
NEUTROPHILS NFR BLD: 66.5 %
PLATELET # BLD AUTO: 176 K/UL
PMV BLD AUTO: 9.1 FL
POTASSIUM SERPL-SCNC: 4.9 MMOL/L
PROT SERPL-MCNC: 9.2 G/DL
RBC # BLD AUTO: 3.3 M/UL
SATURATED IRON: 21 %
SODIUM SERPL-SCNC: 137 MMOL/L
TOTAL IRON BINDING CAPACITY: 354 UG/DL
TRANSFERRIN SERPL-MCNC: 239 MG/DL
WBC # BLD AUTO: 9.1 K/UL

## 2018-07-17 PROCEDURE — 3078F DIAST BP <80 MM HG: CPT | Mod: CPTII,S$GLB,, | Performed by: INTERNAL MEDICINE

## 2018-07-17 PROCEDURE — 96523 IRRIG DRUG DELIVERY DEVICE: CPT

## 2018-07-17 PROCEDURE — 82728 ASSAY OF FERRITIN: CPT

## 2018-07-17 PROCEDURE — 83540 ASSAY OF IRON: CPT

## 2018-07-17 PROCEDURE — 63600175 PHARM REV CODE 636 W HCPCS: Performed by: INTERNAL MEDICINE

## 2018-07-17 PROCEDURE — 3075F SYST BP GE 130 - 139MM HG: CPT | Mod: CPTII,S$GLB,, | Performed by: INTERNAL MEDICINE

## 2018-07-17 PROCEDURE — 99214 OFFICE O/P EST MOD 30 MIN: CPT | Mod: 25,S$GLB,, | Performed by: INTERNAL MEDICINE

## 2018-07-17 PROCEDURE — 3008F BODY MASS INDEX DOCD: CPT | Mod: CPTII,S$GLB,, | Performed by: INTERNAL MEDICINE

## 2018-07-17 PROCEDURE — 83615 LACTATE (LD) (LDH) ENZYME: CPT

## 2018-07-17 PROCEDURE — A4216 STERILE WATER/SALINE, 10 ML: HCPCS | Performed by: INTERNAL MEDICINE

## 2018-07-17 PROCEDURE — 99999 PR PBB SHADOW E&M-EST. PATIENT-LVL III: CPT | Mod: PBBFAC,,, | Performed by: INTERNAL MEDICINE

## 2018-07-17 PROCEDURE — 80053 COMPREHEN METABOLIC PANEL: CPT

## 2018-07-17 PROCEDURE — 36415 COLL VENOUS BLD VENIPUNCTURE: CPT

## 2018-07-17 PROCEDURE — 25000003 PHARM REV CODE 250: Performed by: INTERNAL MEDICINE

## 2018-07-17 PROCEDURE — 85025 COMPLETE CBC W/AUTO DIFF WBC: CPT

## 2018-07-17 RX ORDER — SODIUM CHLORIDE 0.9 % (FLUSH) 0.9 %
10 SYRINGE (ML) INJECTION
Status: CANCELLED | OUTPATIENT
Start: 2018-07-17

## 2018-07-17 RX ORDER — HEPARIN 100 UNIT/ML
500 SYRINGE INTRAVENOUS
Status: CANCELLED | OUTPATIENT
Start: 2018-07-17

## 2018-07-17 RX ORDER — HEPARIN 100 UNIT/ML
500 SYRINGE INTRAVENOUS
Status: COMPLETED | OUTPATIENT
Start: 2018-07-17 | End: 2018-07-17

## 2018-07-17 RX ORDER — METOPROLOL TARTRATE 25 MG/1
25 TABLET, FILM COATED ORAL
COMMUNITY
Start: 2018-07-02

## 2018-07-17 RX ORDER — SODIUM CHLORIDE 0.9 % (FLUSH) 0.9 %
10 SYRINGE (ML) INJECTION
Status: COMPLETED | OUTPATIENT
Start: 2018-07-17 | End: 2018-07-17

## 2018-07-17 RX ADMIN — HEPARIN 500 UNITS: 100 SYRINGE at 10:07

## 2018-07-17 RX ADMIN — SODIUM CHLORIDE, PRESERVATIVE FREE 10 ML: 5 INJECTION INTRAVENOUS at 10:07

## 2018-07-17 NOTE — PROGRESS NOTES
Subjective:       Patient ID: Janine Souza is a 58 y.o. female.    Chief Complaint: Results (Neuroendocrine tumor) and Cancer    HPI 58-year-old female who is being evaluated by GI for evaluation of stent removal suddenly developed SVT with prolonged pause pacemaker placed patient returns for review stent evaluation not completed    Past Medical History:   Diagnosis Date    Acute headache 2016    Anemia     Atrial fibrillation, persistent     Bronchitis     CHF (congestive heart failure)     Diabetes mellitus, type 2     Hypertension     Malignant poorly differentiated neuroendocrine carcinoma 2016    Secondary neuroendocrine tumor of liver 2016     Family History   Problem Relation Age of Onset    Diabetes type II Mother     Prostate cancer Father     Breast cancer Paternal Aunt         breast    Breast cancer Maternal Aunt         breast     Social History     Social History    Marital status:      Spouse name: N/A    Number of children: N/A    Years of education: N/A     Occupational History    Not on file.     Social History Main Topics    Smoking status: Never Smoker    Smokeless tobacco: Never Used    Alcohol use No    Drug use: No    Sexual activity: Not on file     Other Topics Concern    Not on file     Social History Narrative    No narrative on file     Past Surgical History:   Procedure Laterality Date    A-V CARDIAC PACEMAKER INSERTION Left 2018    Procedure: INSERTION, CARDIAC PACEMAKER, DUAL CHAMBER;  Surgeon: Jean Carlos Onofre MD;  Location: [facility] CATH LAB;  Service: Cardiology;  Laterality: Left;  sinus pause, dPPM, BIO, DM, 323A    ADENOIDECTOMY      CARDIAC ELECTROPHYSIOLOGY MAPPING AND ABLATION      CARDIAC SURGERY      CARDIOVERSION N/A 2018    Procedure: CARDIOVERSION;  Surgeon: Jean Carlos Onofre MD;  Location: [facility] CATH LAB;  Service: Cardiology;  Laterality: N/A;     SECTION      ERCP      ERCP N/A 2018    Procedure: ERCP  (ENDOSCOPIC RETROGRADE CHOLANGIOPANCREATOGRAPHY);  Surgeon: Tang Tanner MD;  Location: [facility] ([address] [place] [address]);  Service: Endoscopy;  Laterality: N/A;  2 day hold Dr Kalyan Whittaker    TONSILLECTOMY      uvulopalatopharygoplasty         Labs:  Lab Results   Component Value Date    WBC 9.10 07/17/2018    HGB 10.7 (L) 07/17/2018    HCT 32.9 (L) 07/17/2018     (H) 07/17/2018     07/17/2018     BMP  Lab Results   Component Value Date     07/17/2018    K 4.9 07/17/2018     07/17/2018    CO2 27 07/17/2018    BUN 37 (H) 07/17/2018    CREATININE 1.4 07/17/2018    CALCIUM 9.9 07/17/2018    ANIONGAP 10 07/17/2018    ESTGFRAFRICA 48 (A) 07/17/2018    EGFRNONAA 42 (A) 07/17/2018     Lab Results   Component Value Date    ALT 14 07/17/2018    AST 14 07/17/2018    ALKPHOS 62 07/17/2018    BILITOT 0.5 07/17/2018       Lab Results   Component Value Date    IRON 55 04/27/2018    TIBC 388 04/27/2018    FERRITIN 1,061 (H) 04/27/2018     No results found for: BNPHCGWG05  No results found for: FOLATE  Lab Results   Component Value Date    TSH 3.423 05/22/2018         Review of Systems   Constitutional: Positive for activity change and fatigue. Negative for appetite change, chills, diaphoresis, fever and unexpected weight change.   HENT: Negative for congestion, dental problem, drooling, ear discharge, ear pain, facial swelling, hearing loss, mouth sores, nosebleeds, postnasal drip, rhinorrhea, sinus pressure, sneezing, sore throat, tinnitus, trouble swallowing and voice change.    Eyes: Negative for photophobia, pain, discharge, redness, itching and visual disturbance.   Respiratory: Negative for cough, choking, chest tightness, shortness of breath, wheezing and stridor.    Cardiovascular: Negative for chest pain, palpitations and leg swelling.   Gastrointestinal: Negative for abdominal distention, abdominal pain, anal bleeding, blood in stool, constipation, diarrhea, nausea, rectal pain and vomiting.    Endocrine: Negative for cold intolerance, heat intolerance, polydipsia, polyphagia and polyuria.   Genitourinary: Negative for decreased urine volume, difficulty urinating, dyspareunia, dysuria, enuresis, flank pain, frequency, genital sores, hematuria, menstrual problem, pelvic pain, urgency, vaginal bleeding, vaginal discharge and vaginal pain.   Musculoskeletal: Negative for arthralgias, back pain, gait problem, joint swelling, myalgias, neck pain and neck stiffness.   Skin: Negative for color change, pallor and rash.   Allergic/Immunologic: Negative for environmental allergies, food allergies and immunocompromised state.   Neurological: Positive for weakness. Negative for dizziness, tremors, seizures, syncope, facial asymmetry, speech difficulty, light-headedness, numbness and headaches.   Hematological: Negative for adenopathy. Does not bruise/bleed easily.   Psychiatric/Behavioral: Positive for dysphoric mood. Negative for agitation, behavioral problems, confusion, decreased concentration, hallucinations, self-injury, sleep disturbance and suicidal ideas. The patient is nervous/anxious. The patient is not hyperactive.        Objective:      Physical Exam   Constitutional: She is oriented to person, place, and time. She appears well-developed and well-nourished. She appears distressed.   HENT:   Head: Normocephalic and atraumatic.   Right Ear: External ear normal.   Left Ear: External ear normal.   Nose: Nose normal. Right sinus exhibits no maxillary sinus tenderness and no frontal sinus tenderness. Left sinus exhibits no maxillary sinus tenderness and no frontal sinus tenderness.   Mouth/Throat: Oropharynx is clear and moist. No oropharyngeal exudate.   Eyes: Conjunctivae, EOM and lids are normal. Pupils are equal, round, and reactive to light. Right eye exhibits no discharge. Left eye exhibits no discharge. Right conjunctiva is not injected. Right conjunctiva has no hemorrhage. Left conjunctiva is not  injected. Left conjunctiva has no hemorrhage. No scleral icterus.   Neck: Normal range of motion. Neck supple. No JVD present. No tracheal deviation present. No thyromegaly present.   Cardiovascular: Normal rate and regular rhythm.    Pulmonary/Chest: Effort normal. No stridor. No respiratory distress. She exhibits no tenderness.   Abdominal: Soft. She exhibits no distension and no mass. There is no splenomegaly or hepatomegaly. There is no tenderness. There is no rebound.   Musculoskeletal: Normal range of motion. She exhibits no edema or tenderness.   Lymphadenopathy:     She has no cervical adenopathy.     She has no axillary adenopathy.        Right: No supraclavicular adenopathy present.        Left: No supraclavicular adenopathy present.   Neurological: She is alert and oriented to person, place, and time. No cranial nerve deficit. Coordination normal.   Skin: Skin is dry. No rash noted. She is not diaphoretic. No erythema.   Psychiatric: She has a normal mood and affect. Her behavior is normal. Judgment and thought content normal.   Vitals reviewed.          Assessment:      1. Malignant poorly differentiated neuroendocrine carcinoma    2. Iron deficiency anemia due to chronic blood loss    3. 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 Secondary neuroendocrine tumor of liver           Plan:     Results demonstrate no clear evidence of detrimental findings in reference stent patient is somewhat overwhelmed with information and findings over the last several weeks this point will return to clinic to see us in 3 months will make decision on stent she is aware that if she develops pain fever or jaundice to call immediately flush port Q 6 weeks labs to be followed to her today on iron status        Kalyan Flores Jr, MD FACP

## 2018-07-17 NOTE — PATIENT INSTRUCTIONS
[facility]  [address]  [phone] phone     [phone] fax  Hours of Operation: Monday- Friday 8:00am- 5:00pm  After hours phone  [phone]  Hematology / Oncology Physicians on call      Dr. Mark Henderson                        Please call with any concerns regarding your appointment today.

## 2018-07-23 RX ORDER — GLIPIZIDE 10 MG/1
TABLET ORAL
Qty: 180 TABLET | Refills: 3 | Status: SHIPPED | OUTPATIENT
Start: 2018-07-23 | End: 2019-10-28 | Stop reason: SDUPTHER

## 2018-07-30 DIAGNOSIS — Z95.0 CARDIAC PACEMAKER IN SITU: Primary | ICD-10-CM

## 2018-08-14 ENCOUNTER — OFFICE VISIT (OUTPATIENT)
Dept: FAMILY MEDICINE | Facility: CLINIC | Age: 58
End: 2018-08-14
Payer: MEDICARE

## 2018-08-14 ENCOUNTER — TELEPHONE (OUTPATIENT)
Dept: FAMILY MEDICINE | Facility: CLINIC | Age: 58
End: 2018-08-14

## 2018-08-14 ENCOUNTER — LAB VISIT (OUTPATIENT)
Dept: LAB | Facility: HOSPITAL | Age: 58
End: 2018-08-14
Attending: FAMILY MEDICINE
Payer: MEDICARE

## 2018-08-14 ENCOUNTER — CLINICAL SUPPORT (OUTPATIENT)
Dept: FAMILY MEDICINE | Facility: CLINIC | Age: 58
End: 2018-08-14
Attending: FAMILY MEDICINE
Payer: MEDICARE

## 2018-08-14 VITALS
HEIGHT: 65 IN | DIASTOLIC BLOOD PRESSURE: 83 MMHG | SYSTOLIC BLOOD PRESSURE: 119 MMHG | BODY MASS INDEX: 36.89 KG/M2 | TEMPERATURE: 98 F | HEART RATE: 80 BPM | WEIGHT: 221.38 LBS

## 2018-08-14 DIAGNOSIS — I10 ESSENTIAL HYPERTENSION: ICD-10-CM

## 2018-08-14 DIAGNOSIS — E11.9 TYPE 2 DIABETES MELLITUS WITHOUT COMPLICATION, WITHOUT LONG-TERM CURRENT USE OF INSULIN: Primary | ICD-10-CM

## 2018-08-14 DIAGNOSIS — C7B.8 SECONDARY NEUROENDOCRINE TUMOR OF LIVER: ICD-10-CM

## 2018-08-14 DIAGNOSIS — I48.92 ATRIAL FLUTTER, UNSPECIFIED TYPE: ICD-10-CM

## 2018-08-14 DIAGNOSIS — C78.6 METASTATIC CANCER TO RETROPERITONEUM: ICD-10-CM

## 2018-08-14 DIAGNOSIS — E11.9 TYPE 2 DIABETES MELLITUS WITHOUT COMPLICATION, WITHOUT LONG-TERM CURRENT USE OF INSULIN: ICD-10-CM

## 2018-08-14 LAB
ALBUMIN/CREAT UR: 16.7 UG/MG
CHOLEST SERPL-MCNC: 184 MG/DL
CHOLEST/HDLC SERPL: 3.1 {RATIO}
CREAT UR-MCNC: 24 MG/DL
ESTIMATED AVG GLUCOSE: 203 MG/DL
HBA1C MFR BLD HPLC: 8.7 %
HDLC SERPL-MCNC: 59 MG/DL
HDLC SERPL: 32.1 %
LDLC SERPL CALC-MCNC: 100.2 MG/DL
MICROALBUMIN UR DL<=1MG/L-MCNC: 4 UG/ML
NONHDLC SERPL-MCNC: 125 MG/DL
TRIGL SERPL-MCNC: 124 MG/DL

## 2018-08-14 PROCEDURE — 80061 LIPID PANEL: CPT

## 2018-08-14 PROCEDURE — 3008F BODY MASS INDEX DOCD: CPT | Mod: CPTII,S$GLB,, | Performed by: FAMILY MEDICINE

## 2018-08-14 PROCEDURE — 99214 OFFICE O/P EST MOD 30 MIN: CPT | Mod: S$GLB,,, | Performed by: FAMILY MEDICINE

## 2018-08-14 PROCEDURE — 3046F HEMOGLOBIN A1C LEVEL >9.0%: CPT | Mod: CPTII,S$GLB,, | Performed by: FAMILY MEDICINE

## 2018-08-14 PROCEDURE — 36415 COLL VENOUS BLD VENIPUNCTURE: CPT | Mod: PO

## 2018-08-14 PROCEDURE — 82043 UR ALBUMIN QUANTITATIVE: CPT

## 2018-08-14 PROCEDURE — 99999 PR PBB SHADOW E&M-EST. PATIENT-LVL I: CPT | Mod: PBBFAC,,,

## 2018-08-14 PROCEDURE — 83036 HEMOGLOBIN GLYCOSYLATED A1C: CPT

## 2018-08-14 PROCEDURE — 99999 PR PBB SHADOW E&M-EST. PATIENT-LVL III: CPT | Mod: PBBFAC,,, | Performed by: FAMILY MEDICINE

## 2018-08-14 PROCEDURE — 3074F SYST BP LT 130 MM HG: CPT | Mod: CPTII,S$GLB,, | Performed by: FAMILY MEDICINE

## 2018-08-14 PROCEDURE — 99499 UNLISTED E&M SERVICE: CPT | Mod: S$GLB,,, | Performed by: FAMILY MEDICINE

## 2018-08-14 PROCEDURE — 3079F DIAST BP 80-89 MM HG: CPT | Mod: CPTII,S$GLB,, | Performed by: FAMILY MEDICINE

## 2018-08-14 PROCEDURE — 92250 FUNDUS PHOTOGRAPHY W/I&R: CPT | Mod: S$GLB,,, | Performed by: OPHTHALMOLOGY

## 2018-08-14 RX ORDER — METFORMIN HYDROCHLORIDE 500 MG/1
TABLET ORAL
Qty: 180 TABLET | Refills: 3 | Status: SHIPPED | OUTPATIENT
Start: 2018-08-14 | End: 2019-02-11 | Stop reason: SDUPTHER

## 2018-08-14 RX ORDER — ZOLPIDEM TARTRATE 10 MG/1
10 TABLET ORAL NIGHTLY PRN
Qty: 30 TABLET | Refills: 5 | Status: SHIPPED | OUTPATIENT
Start: 2018-08-14 | End: 2018-11-13 | Stop reason: SDUPTHER

## 2018-08-14 RX ORDER — METFORMIN HYDROCHLORIDE 500 MG/1
500 TABLET ORAL 2 TIMES DAILY WITH MEALS
Qty: 180 TABLET | Refills: 3 | Status: SHIPPED | OUTPATIENT
Start: 2018-08-14 | End: 2018-08-14

## 2018-08-14 NOTE — TELEPHONE ENCOUNTER
----- Message from Rosalie Brennan sent at 8/14/2018 11:22 AM CDT -----  Contact: [facility]  Daphne called and stated she received a prescription with 2 different directions.     [id number] - [place] [id number] [place] [address] [address]  Phone: [phone] Fax: [phone]    Thanks,  Rosalie

## 2018-08-14 NOTE — PROGRESS NOTES
Janine Souza is a 58 y.o. female here for a diabetic eye screening with non-dilated fundus photos per dr. Lopez.    Patient cooperative?: Yes  Small pupils?: Yes  Last eye exam: unknown    For exam results, see Encounter Report.

## 2018-08-14 NOTE — PROGRESS NOTES
The patient presents today to fu DM Last a1c elevated 9+   r Follows Card for AF prev had ablation-since PM notes lower glu reading HF stable currently.   Following w Dr Flores w hx NE Carcinoma,p chemotx, no active tx currently,last series of scans stable but biliary stent migrated-GI fu pending   Past Medical History:  Past Medical History:   Diagnosis Date    Acute headache 2016    Anemia     Atrial fibrillation, persistent     Bronchitis     CHF (congestive heart failure)     Diabetes mellitus, type 2     Hypertension     Malignant poorly differentiated neuroendocrine carcinoma 2016    Secondary neuroendocrine tumor of liver 2016     Past Surgical History:   Procedure Laterality Date    ADENOIDECTOMY      CARDIAC ELECTROPHYSIOLOGY MAPPING AND ABLATION      CARDIAC SURGERY       SECTION      ERCP      TONSILLECTOMY      uvulopalatopharygoplasty       Review of patient's allergies indicates:   Allergen Reactions    Epinephrine Other (See Comments)     Neuroendocrine patient-currently on chemo per patient  Neuroendocrine patient    Pcn [penicillins] Other (See Comments), Hives and Swelling     As a child had an unknown reaction.  Has not taken since  As a child had an unknown reaction.  Has not taken since     Current Outpatient Medications on File Prior to Visit   Medication Sig Dispense Refill    blood sugar diagnostic, disc Strp USE AS DIRECTED EVERY DAY *THANK YOU*      fluticasone (FLOVENT HFA) 110 mcg/actuation inhaler Inhale 2 puffs into the lungs once daily.      furosemide (LASIX) 40 MG tablet Take 40 mg by mouth once daily.       glipiZIDE (GLUCOTROL) 10 MG tablet TAKE 1 TABLET TWICE A  tablet 3    KLOR-CON M20 20 mEq tablet       lancets Misc Use as directed      linagliptin (TRADJENTA) 5 mg Tab tablet Take 5 mg by mouth once daily.      metformin (GLUCOPHAGE) 500 MG tablet Take 1 tablet (500 mg total) by mouth 2 (two) times daily with meals.  Take 2 tablets (1000mg) in am and 3 tablets (1500 mg)in the pm (Patient taking differently: Take 2 tablets (1000mg) in am and 3 tablets (1500 mg)in the pm) 180 tablet 3    metoprolol tartrate (LOPRESSOR) 25 MG tablet 25 mg 2 (two) times daily.       ondansetron (ZOFRAN) 4 MG tablet Take 1 tablet (4 mg total) by mouth every 12 (twelve) hours as needed for Nausea. 30 tablet 6    polyethylene glycol (GLYCOLAX) 17 gram PwPk Take by mouth once daily.      prochlorperazine (COMPAZINE) 5 MG tablet Take 1 tablet (5 mg total) by mouth every 6 (six) hours as needed for Nausea. 30 tablet 6    rivaroxaban (XARELTO) 20 mg Tab Take 20 mg by mouth.      sotalol (BETAPACE) 80 MG tablet Take 0.5 tablets (40 mg total) by mouth 2 (two) times daily. 90 tablet 0    zolpidem (AMBIEN) 10 mg Tab Take 1 tablet (10 mg total) by mouth nightly as needed. 30 tablet 5     Current Facility-Administered Medications on File Prior to Visit   Medication Dose Route Frequency Provider Last Rate Last Dose    ferumoxytol (FERAHEME) 510 mg in dextrose 5 % 100 mL IVPB  510 mg Intravenous Once Kalyan Flores MD        heparin, porcine (PF) 100 unit/mL injection flush 500 Units  500 Units Intravenous PRN Kalyan Flores MD        sodium chloride 0.9% flush 10 mL  10 mL Intravenous PRN Kalyan Flores MD         Social History     Socioeconomic History    Marital status:      Spouse name: Not on file    Number of children: Not on file    Years of education: Not on file    Highest education level: Not on file   Social Needs    Financial resource strain: Not on file    Food insecurity - worry: Not on file    Food insecurity - inability: Not on file    Transportation needs - medical: Not on file    Transportation needs - non-medical: Not on file   Occupational History    Not on file   Tobacco Use    Smoking status: Never Smoker    Smokeless tobacco: Never Used   Substance and Sexual Activity    Alcohol use: No     Alcohol/week:  0.0 oz    Drug use: No    Sexual activity: Not on file   Other Topics Concern    Not on file   Social History Narrative    Not on file     Family History   Problem Relation Age of Onset    Diabetes type II Mother     Prostate cancer Father     Breast cancer Paternal Aunt         breast    Breast cancer Maternal Aunt         breast         ROS:GENERAL: No fever, chills, fatigability or weight loss.  SKIN: No rashes, itching or changes in color or texture of skin.  HEAD: No headaches or recent head trauma.EYES: Visual acuity fine. No photophobia, ocular pain or diplopia.EARS: Denies ear pain, discharge or vertigo.NOSE: No loss of smell, no epistaxis or postnasal drip.MOUTH & THROAT: No hoarseness or change in voice. No excessive gum bleeding.NODES: Denies swollen glands.  CHEST: Denies HURLEY, cyanosis, wheezing, cough and sputum production.  CARDIOVASCULAR: Denies chest pain, PND, orthopnea or reduced exercise tolerance.  ABDOMEN: Appetite fine. No weight loss. Denies diarrhea, abdominal pain, hematemesis or blood in stool.  URINARY: No flank pain, dysuria or hematuria.  PERIPHERAL VASCULAR: No claudication or cyanosis.  MUSCULOSKELETAL: See above.  NEUROLOGIC: No history of seizures, paralysis, alteration of gait or coordination.  PE:   HEAD: Normocephalic, atraumatic.EYES: PERRL. EOMI.   EARS: TM's intact. Light reflex normal. No retraction or perforation.   NOSE: Mucosa pink. Airway clear.MOUTH & THROAT: No tonsillar enlargement. No pharyngeal erythema or exudate. No stridor.  NODES: No cervical, axillary or inguinal lymph node enlargement.  CHEST: Lungs clear to auscultation.  CARDIOVASCULAR: Normal S1, S2. No rubs, murmurs or gallops.  ABDOMEN: Bowel sounds normal. Not distended. Soft. No tenderness or masses.  MUSCULOSKELETAL: No palpable abnormality  NEUROLOGIC: Cranial Nerves: II-XII grossly intact.  Motor: 5/5 strength major flexors/extensors.  DTR's: Knees, Ankles 2+ and equal bilaterally; downgoing  toes.  Sensory: Intact to light touch distally.  Gait & Posture: Normal gait and fine motion. No cerebellar signs.   Protective Sensation (w/ 10 gram monofilament): Intact  Visual Inspection (Evidence of Foot Deformity, Ulceration, Nails Intact, Skin Intact):Normal  Pedal Pulses: Present       Plan:Lab eval-rev  a1c and if still el rec precose  tid w GI caution   Rec diet and ex recs  Card Heme Onc GI fu   a1c today and Fu a1c 3m,    Pt still very busy dealing with Cancer and Heart issues and wishes defer C scope mammo and statin tx etc

## 2018-08-15 ENCOUNTER — TELEPHONE (OUTPATIENT)
Dept: FAMILY MEDICINE | Facility: CLINIC | Age: 58
End: 2018-08-15

## 2018-08-15 DIAGNOSIS — E11.8 TYPE 2 DIABETES MELLITUS WITH COMPLICATION, UNSPECIFIED WHETHER LONG TERM INSULIN USE: Primary | ICD-10-CM

## 2018-08-25 ENCOUNTER — PATIENT MESSAGE (OUTPATIENT)
Dept: HEMATOLOGY/ONCOLOGY | Facility: CLINIC | Age: 58
End: 2018-08-25

## 2018-08-27 ENCOUNTER — INFUSION (OUTPATIENT)
Dept: INFUSION THERAPY | Facility: HOSPITAL | Age: 58
End: 2018-08-27
Attending: NURSE PRACTITIONER
Payer: MEDICARE

## 2018-08-27 DIAGNOSIS — C7B.8 SECONDARY NEUROENDOCRINE TUMOR OF LIVER: Primary | ICD-10-CM

## 2018-08-27 PROCEDURE — 96523 IRRIG DRUG DELIVERY DEVICE: CPT

## 2018-08-27 PROCEDURE — 63600175 PHARM REV CODE 636 W HCPCS: Performed by: INTERNAL MEDICINE

## 2018-08-27 RX ORDER — HEPARIN 100 UNIT/ML
500 SYRINGE INTRAVENOUS
Status: CANCELLED | OUTPATIENT
Start: 2018-08-27

## 2018-08-27 RX ORDER — SODIUM CHLORIDE 0.9 % (FLUSH) 0.9 %
10 SYRINGE (ML) INJECTION
Status: DISCONTINUED | OUTPATIENT
Start: 2018-08-27 | End: 2018-08-27 | Stop reason: HOSPADM

## 2018-08-27 RX ORDER — SODIUM CHLORIDE 0.9 % (FLUSH) 0.9 %
10 SYRINGE (ML) INJECTION
Status: CANCELLED | OUTPATIENT
Start: 2018-08-27

## 2018-08-27 RX ORDER — HEPARIN 100 UNIT/ML
500 SYRINGE INTRAVENOUS
Status: COMPLETED | OUTPATIENT
Start: 2018-08-27 | End: 2018-08-27

## 2018-08-27 RX ADMIN — HEPARIN 500 UNITS: 100 SYRINGE at 11:08

## 2018-08-27 NOTE — PATIENT INSTRUCTIONS
[facility]  [address]  [address]  [phone] phone     [phone] fax  Hours of Operation: Monday- Friday 8:00am- 5:00pm  After hours phone  [phone]  Hematology / Oncology Physicians on call      Dr. Mark Henderson                        Please call with any concerns regarding your appointment today.  FALL PREVENTION   Falls often occur due to slipping, tripping or losing your balance. Here are ways to reduce your risk of falling again.   Was there anything that caused your fall that can be fixed, removed or replaced?   Make your home safe by keeping walkways clear of objects you may trip over.   Use non-slip pads under rugs.   Do not walk in poorly lit areas.   Do not stand on chairs or wobbly ladders.   Use caution when reaching overhead or looking upward. This position can cause a loss of balance.   Be sure your shoes fit properly, have non-slip bottoms and are in good condition.   Be cautious when going up and down stairs, curbs, and when walking on uneven sidewalks.   If your balance is poor, consider using a cane or walker.   If your fall was related to alcohol use, stop or limit alcohol intake.   If your fall was related to use of sleeping medicines, talk to your doctor about this. You may need to reduce your dosage at bedtime if you awaken during the night to go to the bathroom.   To reduce the need for nighttime bathroom trips:   Avoid drinking fluids for several hours before going to bed   Empty your bladder before going to bed   Men can keep a urinal at the bedside   © 0642-3044 [employer], [address] [place] [address], [place] [address]. All rights reserved. This information is not intended as a substitute for professional medical care. Always follow your healthcare professional's instructions.

## 2018-09-10 ENCOUNTER — TELEPHONE (OUTPATIENT)
Dept: ELECTROPHYSIOLOGY | Facility: CLINIC | Age: 58
End: 2018-09-10

## 2018-09-10 NOTE — TELEPHONE ENCOUNTER
Called pt to see if she was still going to keep her appt today, or if she'd like to r/s it.  CINDY on VM.

## 2018-10-19 ENCOUNTER — LAB VISIT (OUTPATIENT)
Dept: LAB | Facility: HOSPITAL | Age: 58
End: 2018-10-19
Attending: INTERNAL MEDICINE
Payer: MEDICARE

## 2018-10-19 ENCOUNTER — INFUSION (OUTPATIENT)
Dept: INFUSION THERAPY | Facility: HOSPITAL | Age: 58
End: 2018-10-19
Attending: INTERNAL MEDICINE
Payer: MEDICARE

## 2018-10-19 ENCOUNTER — OFFICE VISIT (OUTPATIENT)
Dept: HEMATOLOGY/ONCOLOGY | Facility: CLINIC | Age: 58
End: 2018-10-19
Payer: MEDICARE

## 2018-10-19 VITALS
WEIGHT: 215.81 LBS | DIASTOLIC BLOOD PRESSURE: 88 MMHG | RESPIRATION RATE: 17 BRPM | HEIGHT: 65 IN | TEMPERATURE: 98 F | BODY MASS INDEX: 35.96 KG/M2 | SYSTOLIC BLOOD PRESSURE: 118 MMHG | HEART RATE: 98 BPM | OXYGEN SATURATION: 96 %

## 2018-10-19 DIAGNOSIS — C7B.8 SECONDARY NEUROENDOCRINE TUMOR OF LIVER: ICD-10-CM

## 2018-10-19 DIAGNOSIS — D50.0 IRON DEFICIENCY ANEMIA DUE TO CHRONIC BLOOD LOSS: ICD-10-CM

## 2018-10-19 DIAGNOSIS — C78.6 METASTATIC CANCER TO RETROPERITONEUM: ICD-10-CM

## 2018-10-19 DIAGNOSIS — C7B.8 SECONDARY NEUROENDOCRINE TUMOR OF LIVER: Primary | ICD-10-CM

## 2018-10-19 DIAGNOSIS — C7A.1 MALIGNANT POORLY DIFFERENTIATED NEUROENDOCRINE CARCINOMA: ICD-10-CM

## 2018-10-19 DIAGNOSIS — D49.89 NEOPLASM OF ABDOMEN: ICD-10-CM

## 2018-10-19 DIAGNOSIS — C78.6 METASTATIC CANCER TO RETROPERITONEUM: Primary | ICD-10-CM

## 2018-10-19 LAB
ALBUMIN SERPL BCP-MCNC: 3.9 G/DL
ALP SERPL-CCNC: 61 U/L
ALT SERPL W/O P-5'-P-CCNC: 15 U/L
ANION GAP SERPL CALC-SCNC: 9 MMOL/L
AST SERPL-CCNC: 18 U/L
BASOPHILS # BLD AUTO: 0.03 K/UL
BASOPHILS NFR BLD: 0.3 %
BILIRUB SERPL-MCNC: 0.4 MG/DL
BUN SERPL-MCNC: 29 MG/DL
CALCIUM SERPL-MCNC: 10 MG/DL
CHLORIDE SERPL-SCNC: 105 MMOL/L
CO2 SERPL-SCNC: 24 MMOL/L
CREAT SERPL-MCNC: 1.2 MG/DL
DIFFERENTIAL METHOD: ABNORMAL
EOSINOPHIL # BLD AUTO: 0.2 K/UL
EOSINOPHIL NFR BLD: 2.4 %
ERYTHROCYTE [DISTWIDTH] IN BLOOD BY AUTOMATED COUNT: 13.4 %
EST. GFR  (AFRICAN AMERICAN): 58 ML/MIN/1.73 M^2
EST. GFR  (NON AFRICAN AMERICAN): 50 ML/MIN/1.73 M^2
FERRITIN SERPL-MCNC: 1241 NG/ML
GLUCOSE SERPL-MCNC: 135 MG/DL
HCT VFR BLD AUTO: 33.9 %
HGB BLD-MCNC: 11.1 G/DL
IRON SERPL-MCNC: 72 UG/DL
LDH SERPL L TO P-CCNC: 153 U/L
LYMPHOCYTES # BLD AUTO: 1.6 K/UL
LYMPHOCYTES NFR BLD: 18.6 %
MCH RBC QN AUTO: 32.8 PG
MCHC RBC AUTO-ENTMCNC: 32.7 G/DL
MCV RBC AUTO: 100 FL
MONOCYTES # BLD AUTO: 0.8 K/UL
MONOCYTES NFR BLD: 8.9 %
NEUTROPHILS # BLD AUTO: 6.1 K/UL
NEUTROPHILS NFR BLD: 69.8 %
PLATELET # BLD AUTO: 183 K/UL
PMV BLD AUTO: 9.4 FL
POTASSIUM SERPL-SCNC: 4.7 MMOL/L
PROT SERPL-MCNC: 9.3 G/DL
RBC # BLD AUTO: 3.38 M/UL
SATURATED IRON: 19 %
SODIUM SERPL-SCNC: 138 MMOL/L
TOTAL IRON BINDING CAPACITY: 383 UG/DL
TRANSFERRIN SERPL-MCNC: 259 MG/DL
WBC # BLD AUTO: 8.8 K/UL

## 2018-10-19 PROCEDURE — 99999 PR PBB SHADOW E&M-EST. PATIENT-LVL III: CPT | Mod: PBBFAC,,, | Performed by: INTERNAL MEDICINE

## 2018-10-19 PROCEDURE — G0008 ADMIN INFLUENZA VIRUS VAC: HCPCS | Mod: ,,, | Performed by: INTERNAL MEDICINE

## 2018-10-19 PROCEDURE — 3008F BODY MASS INDEX DOCD: CPT | Mod: CPTII,,, | Performed by: INTERNAL MEDICINE

## 2018-10-19 PROCEDURE — 85025 COMPLETE CBC W/AUTO DIFF WBC: CPT

## 2018-10-19 PROCEDURE — 3074F SYST BP LT 130 MM HG: CPT | Mod: CPTII,,, | Performed by: INTERNAL MEDICINE

## 2018-10-19 PROCEDURE — A4216 STERILE WATER/SALINE, 10 ML: HCPCS | Performed by: INTERNAL MEDICINE

## 2018-10-19 PROCEDURE — 82728 ASSAY OF FERRITIN: CPT

## 2018-10-19 PROCEDURE — G0008 ADMIN INFLUENZA VIRUS VAC: HCPCS | Mod: PBBFAC

## 2018-10-19 PROCEDURE — 63600175 PHARM REV CODE 636 W HCPCS: Performed by: INTERNAL MEDICINE

## 2018-10-19 PROCEDURE — 36415 COLL VENOUS BLD VENIPUNCTURE: CPT

## 2018-10-19 PROCEDURE — 25000003 PHARM REV CODE 250: Performed by: INTERNAL MEDICINE

## 2018-10-19 PROCEDURE — 99213 OFFICE O/P EST LOW 20 MIN: CPT | Mod: PBBFAC,25 | Performed by: INTERNAL MEDICINE

## 2018-10-19 PROCEDURE — 90686 IIV4 VACC NO PRSV 0.5 ML IM: CPT | Mod: PBBFAC

## 2018-10-19 PROCEDURE — 80053 COMPREHEN METABOLIC PANEL: CPT

## 2018-10-19 PROCEDURE — 99214 OFFICE O/P EST MOD 30 MIN: CPT | Mod: 25,S$PBB,, | Performed by: INTERNAL MEDICINE

## 2018-10-19 PROCEDURE — 90686 IIV4 VACC NO PRSV 0.5 ML IM: CPT | Mod: ,,, | Performed by: INTERNAL MEDICINE

## 2018-10-19 PROCEDURE — 3079F DIAST BP 80-89 MM HG: CPT | Mod: CPTII,,, | Performed by: INTERNAL MEDICINE

## 2018-10-19 PROCEDURE — 96523 IRRIG DRUG DELIVERY DEVICE: CPT

## 2018-10-19 PROCEDURE — 83540 ASSAY OF IRON: CPT

## 2018-10-19 PROCEDURE — 83615 LACTATE (LD) (LDH) ENZYME: CPT

## 2018-10-19 RX ORDER — HEPARIN 100 UNIT/ML
500 SYRINGE INTRAVENOUS
Status: COMPLETED | OUTPATIENT
Start: 2018-10-19 | End: 2018-10-19

## 2018-10-19 RX ORDER — SODIUM CHLORIDE 0.9 % (FLUSH) 0.9 %
10 SYRINGE (ML) INJECTION
Status: COMPLETED | OUTPATIENT
Start: 2018-10-19 | End: 2018-10-19

## 2018-10-19 RX ORDER — HEPARIN 100 UNIT/ML
500 SYRINGE INTRAVENOUS
Status: CANCELLED | OUTPATIENT
Start: 2018-10-19

## 2018-10-19 RX ORDER — SODIUM CHLORIDE 0.9 % (FLUSH) 0.9 %
10 SYRINGE (ML) INJECTION
Status: CANCELLED | OUTPATIENT
Start: 2018-10-19

## 2018-10-19 RX ADMIN — Medication 10 ML: at 11:10

## 2018-10-19 RX ADMIN — HEPARIN 500 UNITS: 100 SYRINGE at 11:10

## 2018-10-19 NOTE — PROGRESS NOTES
Subjective:       Patient ID: Janine Souza is a 58 y.o. female.    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     Family History   Problem Relation Age of Onset    Diabetes type II Mother     Prostate cancer Father     Breast cancer Paternal Aunt         breast    Breast cancer Maternal Aunt         breast     Social History     Socioeconomic History    Marital status:      Spouse name: Not on file    Number of children: Not on file    Years of education: Not on file    Highest education level: Not on file   Social Needs    Financial resource strain: Not on file    Food insecurity - worry: Not on file    Food insecurity - inability: Not on file    Transportation needs - medical: Not on file    Transportation needs - non-medical: Not on file   Occupational History    Not on file   Tobacco Use    Smoking status: Never Smoker    Smokeless tobacco: Never Used   Substance and Sexual Activity    Alcohol use: No     Alcohol/week: 0.0 oz    Drug use: No    Sexual activity: Not on file   Other Topics Concern    Not on file   Social History Narrative    Not on file     Past Surgical History:   Procedure Laterality Date    A-V CARDIAC PACEMAKER INSERTION Left 6/7/2018    Procedure: INSERTION, CARDIAC PACEMAKER, DUAL CHAMBER;  Surgeon: Jean Carlos Onofre MD;  Location:  [facility] CATH LAB;  Service: Cardiology;  Laterality: Left;  sinus pause, dPPM, BIO, DM, 323A    ADENOIDECTOMY      CARDIAC ELECTROPHYSIOLOGY MAPPING AND ABLATION      CARDIAC SURGERY      CARDIOVERSION N/A 2018    Procedure: CARDIOVERSION;  Surgeon: Jean Carlos Onofre MD;  Location: [facility] CATH LAB;  Service: Cardiology;  Laterality: N/A;    CARDIOVERSION N/A 2018    Performed by Jean Carlos Onofre MD at [facility] CATH LAB     SECTION      ECHOCARDIOGRAM,TRANSESOPHAGEAL N/A 2018    Performed by [facility] Diagnostic Provider at [facility] CATH LAB    ERCP      ERCP N/A 2018    Procedure: ERCP (ENDOSCOPIC RETROGRADE CHOLANGIOPANCREATOGRAPHY);  Surgeon: Tang Tanner MD;  Location: [facility] (2ND FLR);  Service: Endoscopy;  Laterality: N/A;  2 day hold Dr Kalyan Whittaker    ERCP (ENDOSCOPIC RETROGRADE CHOLANGIOPANCREATOGRAPHY) N/A 2018    Performed by Tang Tanner MD at [facility] ENDO (2ND FLR)    INSERTION, CARDIAC PACEMAKER, DUAL CHAMBER Left 2018    Performed by Jean Carlos Onofre MD at [facility] CATH LAB    HEOIZILCP-CDAY-O-CATH N/A 2016    Performed by Rajinder Rubio MD at [facility] OR    TONSILLECTOMY      ULTRASOUND-ENDOSCOPIC-UPPER N/A 2015    Performed by Misael Treadwell MD at [facility] (2ND FLR)    uvulopalatopharygoplasty         Labs:  Lab Results   Component Value Date    WBC 8.80 10/19/2018    HGB 11.1 (L) 10/19/2018    HCT 33.9 (L) 10/19/2018     (H) 10/19/2018     10/19/2018     BMP  Lab Results   Component Value Date     2018    K 4.9 2018     2018    CO2 27 2018    BUN 37 (H) 2018    CREATININE 1.4 2018    CALCIUM 9.9 2018    ANIONGAP 10 2018    ESTGFRAFRICA 48 (A) 2018    EGFRNONAA 42 (A) 2018     Lab Results   Component Value Date    ALT 14 2018    AST 14 2018    ALKPHOS 62 2018    BILITOT 0.5 2018       Lab Results   Component Value Date    IRON 75 2018     TIBC 354 07/17/2018    FERRITIN 2,003 (H) 07/17/2018     No results found for: AOJMYULP23  No results found for: FOLATE  Lab Results   Component Value Date    TSH 3.423 05/22/2018         Review of Systems   Constitutional: Positive for activity change. Negative for appetite change, chills, diaphoresis, fatigue, fever and unexpected weight change.   HENT: Negative for congestion, dental problem, drooling, ear discharge, ear pain, facial swelling, hearing loss, mouth sores, nosebleeds, postnasal drip, rhinorrhea, sinus pressure, sneezing, sore throat, tinnitus, trouble swallowing and voice change.    Eyes: Negative for photophobia, pain, discharge, redness, itching and visual disturbance.   Respiratory: Negative for cough, choking, chest tightness, shortness of breath, wheezing and stridor.    Cardiovascular: Negative for chest pain, palpitations and leg swelling.   Gastrointestinal: Negative for abdominal distention, abdominal pain, anal bleeding, blood in stool, constipation, diarrhea, nausea, rectal pain and vomiting.   Endocrine: Negative for cold intolerance, heat intolerance, polydipsia, polyphagia and polyuria.   Genitourinary: Negative for decreased urine volume, difficulty urinating, dyspareunia, dysuria, enuresis, flank pain, frequency, genital sores, hematuria, menstrual problem, pelvic pain, urgency, vaginal bleeding, vaginal discharge and vaginal pain.   Musculoskeletal: Negative for arthralgias, back pain, gait problem, joint swelling, myalgias, neck pain and neck stiffness.   Skin: Negative for color change, pallor and rash.   Allergic/Immunologic: Negative for environmental allergies, food allergies and immunocompromised state.   Neurological: Negative for dizziness, tremors, seizures, syncope, facial asymmetry, speech difficulty, weakness, light-headedness, numbness and headaches.   Hematological: Negative for adenopathy. Does not bruise/bleed easily.   Psychiatric/Behavioral: Positive for  dysphoric mood. Negative for agitation, behavioral problems, confusion, decreased concentration, hallucinations, self-injury, sleep disturbance and suicidal ideas. The patient is nervous/anxious. The patient is not hyperactive.        Objective:      Physical Exam   Constitutional: She is oriented to person, place, and time. She appears well-developed and well-nourished. She appears distressed.   HENT:   Head: Normocephalic and atraumatic.   Right Ear: External ear normal.   Left Ear: External ear normal.   Nose: Nose normal. Right sinus exhibits no maxillary sinus tenderness and no frontal sinus tenderness. Left sinus exhibits no maxillary sinus tenderness and no frontal sinus tenderness.   Mouth/Throat: Oropharynx is clear and moist. No oropharyngeal exudate.   Eyes: Conjunctivae, EOM and lids are normal. Pupils are equal, round, and reactive to light. Right eye exhibits no discharge. Left eye exhibits no discharge. Right conjunctiva is not injected. Right conjunctiva has no hemorrhage. Left conjunctiva is not injected. Left conjunctiva has no hemorrhage. No scleral icterus.   Neck: Normal range of motion. Neck supple. No JVD present. No tracheal deviation present. No thyromegaly present.   Cardiovascular: Normal rate and regular rhythm.   Pulmonary/Chest: Effort normal. No stridor. No respiratory distress. She exhibits no tenderness.   Abdominal: Soft. She exhibits no distension and no mass. There is no splenomegaly or hepatomegaly. There is no tenderness. There is no rebound.   Musculoskeletal: Normal range of motion. She exhibits no edema or tenderness.   Lymphadenopathy:     She has no cervical adenopathy.     She has no axillary adenopathy.        Right: No supraclavicular adenopathy present.        Left: No supraclavicular adenopathy present.   Neurological: She is alert and oriented to person, place, and time. No cranial nerve deficit. Coordination normal.   Skin: Skin is dry. No rash noted. She is not  diaphoretic. No erythema.   Psychiatric: She has a normal mood and affect. Her behavior is normal. Judgment and thought content normal.   Vitals reviewed.          Assessment:      1. 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 Neoplasm of abdomen    3. Secondary neuroendocrine tumor of liver    4. Iron deficiency anemia due to chronic blood loss           Plan:   Clinically stable findings labs returns patient stable return in 2-3 months with repeat imaging studies to be compared to June 2018 for progression flush port on monthly basis          Kalyan Flores Jr, MD FACP
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
## 2018-11-05 NOTE — ASSESSMENT & PLAN NOTE
"              After Visit Summary   11/5/2018    Teresa Han    MRN: [id number]           Patient Information     Date Of Birth          2011        Visit Information        Provider Department      11/5/2018 7:30 AM [facility] PEDS INFUSION CHAIR 3 Peds IV Infusion        Today's Diagnoses     Juvenile dermatomyositis     -  1       Follow-ups after your visit        Your next 10 appointments already scheduled     Dec 17, 2018  7:30 AM CST   PEDS INFUSION 300 with [facility] PEDS INFUSION CHAIR 3   Peds IV Infusion ([facility])    [facility]  9th Floor  [address] [address] [phone]              Who to contact     Please call your clinic at [phone] to:    Ask questions about your health    Make or cancel appointments    Discuss your medicines    Learn about your test results    Speak to your doctor            Additional Information About Your Visit        MyChart Information     Meet You[employer] is an electronic gateway that provides easy, online access to your medical records. With [employer], you can request a clinic appointment, read your test results, renew a prescription or communicate with your care team.     To sign up for [employer], please contact your [facility] or call [phone] for assistance.           Care EveryWhere ID     This is your Care EveryWhere ID. This could be used by other organizations to access your [facility] medical records  [id number]        Your Vitals Were     Pulse Temperature Respirations Height Pulse Oximetry BMI (Body Mass Index)    120 98.2  F (36.8  C) (Oral) 22 1.193 m (3' 10.97\") 100% 19.18 kg/m2       Blood Pressure from Last 3 Encounters:   11/05/18 116/63   09/24/18 110/69   08/13/18 115/76    Weight from Last 3 Encounters:   11/05/18 27.3 kg (60 lb 3 oz) (95 %)*   09/24/18 27.8 kg (61 lb 4.6 oz) (96 %)*   08/13/18 27.3 kg (60 lb 3 oz) (96 %)*     * Growth percentiles are based on CDC 2-20 Years " Follows with Dr. Flores in heme/onc  - stable   data.              We Performed the Following     Aldolase     ALT     AST     CBC with platelets differential     CK total     Creatinine     Lactate Dehydrogenase     Von Willebrand antigen          Where to get your medicines      These medications were sent to [facility] [id number] [facility] [place] - [address]  [address] [place] [address]     Phone:  [phone]     methotrexate 50 MG/2ML injection CHEMO          Primary Care Provider Office Phone # Fax #    Pita Stevenson, SÁNCHEZ [phone]       [facility] [address] [place] [address]        Equal Access to Services     [facility]: Hadii aad ku hadasho Soomaali, waaxda luqadaha, qaybta kaalmada adeegyada, waxay idiin hayaan ademary vargas . So [facility] [phone].    ATENCIÓN: Si habla español, tiene a champagne disposición servicios gratuitos de asistencia lingüística. [facility] [phone].    We comply with applicable federal civil rights laws and [place] laws. We do not discriminate on the basis of race, color, national origin, age, disability, sex, sexual orientation, or gender identity.            Thank you!     Thank you for choosing PEDS IV INFUSION  for your care. Our goal is always to provide you with excellent care. Hearing back from our patients is one way we can continue to improve our services. Please take a few minutes to complete the written survey that you may receive in the mail after your visit with us. Thank you!             Your Updated Medication List - Protect others around you: Learn how to safely use, store and throw away your medicines at www.disposemymeds.org.          This list is accurate as of 11/5/18  3:48 PM.  Always use your most recent med list.                   Brand Name Dispense Instructions for use Diagnosis    folic acid 1 MG tablet    FOLVITE    30 tablet    Take 1 tablet (1 mg) by mouth daily    Long term methotrexate user       GUMMY VITAMINS & MINERALS chewable tablet  "    30 tablet    Take 1 tablet by mouth daily    Dermatomyositis (H)       immune globulin - sucrose free 10 % injection      Reported on 4/21/2017        * insulin syringe 31G X 5/16\" 1 ML Misc     100 each    For use with methotrexate    Juvenile dermatomyositis (H), Long term (current) use of systemic steroids, Immunosuppressed status (H)       * Insulin Syringe 29G X 1/2\" 1 ML Misc     100 each    For use with administration of methotrexate    Juvenile dermatomyositis (H)       lidocaine-prilocaine cream    EMLA    30 g    APPLY TOPICALLY AS NEEDED FOR MODERATE PAIN    Juvenile dermatomyositis (H)       methotrexate 50 MG/2ML injection CHEMO     2 mL    Inject 0.6 mLs (15 mg) Subcutaneous every 7 days    Juvenile dermatomyositis (H), Long term (current) use of systemic steroids, Immunosuppressed status (H)       ondansetron 4 MG/5ML solution    ZOFRAN    90 mL    Take 2.5 mLs (2 mg) by mouth 2 times daily as needed for nausea or vomiting    JDMS (juvenile dermatomyositis) (H), Nausea       * Notice:  This list has 2 medication(s) that are the same as other medications prescribed for you. Read the directions carefully, and ask your doctor or other care provider to review them with you.      "

## 2018-11-09 ENCOUNTER — PATIENT MESSAGE (OUTPATIENT)
Dept: FAMILY MEDICINE | Facility: CLINIC | Age: 58
End: 2018-11-09

## 2018-11-13 ENCOUNTER — LAB VISIT (OUTPATIENT)
Dept: LAB | Facility: HOSPITAL | Age: 58
End: 2018-11-13
Attending: FAMILY MEDICINE
Payer: MEDICARE

## 2018-11-13 ENCOUNTER — OFFICE VISIT (OUTPATIENT)
Dept: FAMILY MEDICINE | Facility: CLINIC | Age: 58
End: 2018-11-13
Payer: MEDICARE

## 2018-11-13 VITALS
DIASTOLIC BLOOD PRESSURE: 71 MMHG | BODY MASS INDEX: 35.82 KG/M2 | WEIGHT: 215 LBS | SYSTOLIC BLOOD PRESSURE: 115 MMHG | HEIGHT: 65 IN | TEMPERATURE: 98 F | HEART RATE: 119 BPM

## 2018-11-13 DIAGNOSIS — R00.2 PALPITATIONS: ICD-10-CM

## 2018-11-13 DIAGNOSIS — E11.9 TYPE 2 DIABETES MELLITUS WITHOUT COMPLICATION, WITHOUT LONG-TERM CURRENT USE OF INSULIN: ICD-10-CM

## 2018-11-13 DIAGNOSIS — F41.9 ANXIETY: ICD-10-CM

## 2018-11-13 DIAGNOSIS — I10 ESSENTIAL HYPERTENSION: ICD-10-CM

## 2018-11-13 DIAGNOSIS — C7B.8 SECONDARY NEUROENDOCRINE TUMOR OF LIVER: ICD-10-CM

## 2018-11-13 DIAGNOSIS — I47.10 SVT (SUPRAVENTRICULAR TACHYCARDIA): ICD-10-CM

## 2018-11-13 DIAGNOSIS — E11.9 TYPE 2 DIABETES MELLITUS WITHOUT COMPLICATION, WITHOUT LONG-TERM CURRENT USE OF INSULIN: Primary | ICD-10-CM

## 2018-11-13 LAB
ESTIMATED AVG GLUCOSE: 160 MG/DL
HBA1C MFR BLD HPLC: 7.2 %
T4 SERPL-MCNC: 8.8 UG/DL
TSH SERPL DL<=0.005 MIU/L-ACNC: 3.8 UIU/ML

## 2018-11-13 PROCEDURE — 3008F BODY MASS INDEX DOCD: CPT | Mod: CPTII,S$GLB,, | Performed by: FAMILY MEDICINE

## 2018-11-13 PROCEDURE — 84443 ASSAY THYROID STIM HORMONE: CPT

## 2018-11-13 PROCEDURE — 84436 ASSAY OF TOTAL THYROXINE: CPT

## 2018-11-13 PROCEDURE — 99999 PR PBB SHADOW E&M-EST. PATIENT-LVL III: CPT | Mod: PBBFAC,,, | Performed by: FAMILY MEDICINE

## 2018-11-13 PROCEDURE — 83036 HEMOGLOBIN GLYCOSYLATED A1C: CPT

## 2018-11-13 PROCEDURE — 3078F DIAST BP <80 MM HG: CPT | Mod: CPTII,S$GLB,, | Performed by: FAMILY MEDICINE

## 2018-11-13 PROCEDURE — 99214 OFFICE O/P EST MOD 30 MIN: CPT | Mod: S$GLB,,, | Performed by: FAMILY MEDICINE

## 2018-11-13 PROCEDURE — 3074F SYST BP LT 130 MM HG: CPT | Mod: CPTII,S$GLB,, | Performed by: FAMILY MEDICINE

## 2018-11-13 PROCEDURE — 36415 COLL VENOUS BLD VENIPUNCTURE: CPT | Mod: PO

## 2018-11-13 PROCEDURE — 3045F PR MOST RECENT HEMOGLOBIN A1C LEVEL 7.0-9.0%: CPT | Mod: CPTII,S$GLB,, | Performed by: FAMILY MEDICINE

## 2018-11-13 RX ORDER — ZOLPIDEM TARTRATE 10 MG/1
10 TABLET ORAL NIGHTLY PRN
Qty: 90 TABLET | Refills: 1 | Status: SHIPPED | OUTPATIENT
Start: 2018-11-13 | End: 2019-02-11 | Stop reason: SDUPTHER

## 2018-11-13 RX ORDER — ESCITALOPRAM OXALATE 10 MG/1
10 TABLET ORAL DAILY
Qty: 90 TABLET | Refills: 3 | Status: SHIPPED | OUTPATIENT
Start: 2018-11-13 | End: 2019-10-28 | Stop reason: SDUPTHER

## 2018-11-13 NOTE — PROGRESS NOTES
The patient presents today to fu DM Last a1c sl better Follows Card for AF prev had ablation-since PM notes lower glu reading HF stable currently.   Following w Dr Flores w hx NE Carcinoma,p chemotx, no active tx currently,last series of scans stable but biliary stent migrated-GI fu pending   Also co mod severe situational anxiety and situational anxiety   Past Medical History:  Past Medical History:   Diagnosis Date    Acute headache 2016    Anemia     Atrial fibrillation, persistent     Bronchitis     CHF (congestive heart failure)     Diabetes mellitus, type 2     Hypertension     Malignant poorly differentiated neuroendocrine carcinoma 2016    Malignant poorly differentiated neuroendocrine carcinoma 2016    Secondary neuroendocrine tumor of liver 2016     Past Surgical History:   Procedure Laterality Date    A-V CARDIAC PACEMAKER INSERTION Left 2018    Procedure: INSERTION, CARDIAC PACEMAKER, DUAL CHAMBER;  Surgeon: Jean Carlos Onofre MD;  Location: [facility] CATH LAB;  Service: Cardiology;  Laterality: Left;  sinus pause, dPPM, BIO, DM, 323A    ADENOIDECTOMY      CARDIAC ELECTROPHYSIOLOGY MAPPING AND ABLATION      CARDIAC SURGERY      CARDIOVERSION N/A 2018    Procedure: CARDIOVERSION;  Surgeon: Jean Carlos Onofre MD;  Location: [facility] CATH LAB;  Service: Cardiology;  Laterality: N/A;    CARDIOVERSION N/A 2018    Performed by Jean Carlos Onofre MD at [facility] CATH LAB     SECTION      ECHOCARDIOGRAM,TRANSESOPHAGEAL N/A 2018    Performed by [facility] Diagnostic Provider at [facility] CATH LAB    ERCP      ERCP N/A 2018    Procedure: ERCP (ENDOSCOPIC RETROGRADE CHOLANGIOPANCREATOGRAPHY);  Surgeon: Tang Tanner MD;  Location: [facility] (2ND FLR);  Service: Endoscopy;  Laterality: N/A;  2 day hold Dr Kalyan Whittaker    ERCP (ENDOSCOPIC RETROGRADE CHOLANGIOPANCREATOGRAPHY) N/A 2018    Performed by Tang Tanner MD at [facility] ENDO (2ND FLR)    INSERTION, CARDIAC  PACEMAKER, DUAL CHAMBER Left 6/7/2018    Performed by [facility]re MD at [facility] CATH LAB    FQUMZMBVF-EYWS-T-CATH N/A 2/2/2016    Performed by [facility] at [facility] OR    TONSILLECTOMY      ULTRASOUND-ENDOSCOPIC-UPPER N/A 12/21/2015    Performed by Misael Treadwell MD at [facility] ENDO (2ND FLR)    uvulopalatopharygoplasty       Review of patient's allergies indicates:   Allergen Reactions    Epinephrine Other (See Comments)     Neuroendocrine patient-currently on chemo per patient  Neuroendocrine patient    Pcn [penicillins] Other (See Comments), Hives and Swelling     As a child had an unknown reaction.  Has not taken since  As a child had an unknown reaction.  Has not taken since     Current Outpatient Medications on File Prior to Visit   Medication Sig Dispense Refill    blood sugar diagnostic, disc Strp USE AS DIRECTED EVERY DAY *THANK YOU*      fluticasone (FLOVENT HFA) 110 mcg/actuation inhaler Inhale 2 puffs into the lungs once daily.      furosemide (LASIX) 40 MG tablet Take 40 mg by mouth once daily.       glipiZIDE (GLUCOTROL) 10 MG tablet TAKE 1 TABLET TWICE A  tablet 3    KLOR-CON M20 20 mEq tablet       lancets Misc Use as directed      linagliptin (TRADJENTA) 5 mg Tab tablet Take 1 tablet (5 mg total) by mouth once daily. 90 tablet 4    metFORMIN (GLUCOPHAGE) 500 MG tablet Take 2 tablets (1000mg) in am and 3 tablets (1500 mg)in the pm 180 tablet 3    metoprolol tartrate (LOPRESSOR) 25 MG tablet 25 mg 2 (two) times daily.       rivaroxaban (XARELTO) 20 mg Tab Take 20 mg by mouth.      sotalol (BETAPACE) 80 MG tablet Take 0.5 tablets (40 mg total) by mouth 2 (two) times daily. 90 tablet 0    zolpidem (AMBIEN) 10 mg Tab Take 1 tablet (10 mg total) by mouth nightly as needed. 30 tablet 5     Current Facility-Administered Medications on File Prior to Visit   Medication Dose Route Frequency Provider Last Rate Last Dose    ferumoxytol (FERAHEME) 510 mg in dextrose 5 % 100 mL IVPB   510 mg Intravenous Once Kalyan Flores MD        heparin, porcine (PF) 100 unit/mL injection flush 500 Units  500 Units Intravenous PRN Kalyan Flores MD        sodium chloride 0.9% flush 10 mL  10 mL Intravenous PRN Kalyan Flores MD         Social History     Socioeconomic History    Marital status:      Spouse name: Not on file    Number of children: Not on file    Years of education: Not on file    Highest education level: Not on file   Social Needs    Financial resource strain: Not on file    Food insecurity - worry: Not on file    Food insecurity - inability: Not on file    Transportation needs - medical: Not on file    Transportation needs - non-medical: Not on file   Occupational History    Not on file   Tobacco Use    Smoking status: Never Smoker    Smokeless tobacco: Never Used   Substance and Sexual Activity    Alcohol use: No     Alcohol/week: 0.0 oz    Drug use: No    Sexual activity: Not on file   Other Topics Concern    Not on file   Social History Narrative    Not on file     Family History   Problem Relation Age of Onset    Diabetes type II Mother     Prostate cancer Father     Breast cancer Paternal Aunt         breast    Breast cancer Maternal Aunt         breast         ROS:GENERAL: No fever, chills, fatigability or weight loss.  SKIN: No rashes, itching or changes in color or texture of skin.  HEAD: No headaches or recent head trauma.EYES: Visual acuity fine. No photophobia, ocular pain or diplopia.EARS: Denies ear pain, discharge or vertigo.NOSE: No loss of smell, no epistaxis or postnasal drip.MOUTH & THROAT: No hoarseness or change in voice. No excessive gum bleeding.NODES: Denies swollen glands.  CHEST: Denies HURLEY, cyanosis, wheezing, cough and sputum production.  CARDIOVASCULAR: Denies chest pain, PND, orthopnea or reduced exercise tolerance.  ABDOMEN: Appetite fine. No weight loss. Denies diarrhea, abdominal pain, hematemesis or blood in stool.  URINARY:  No flank pain, dysuria or hematuria.  PERIPHERAL VASCULAR: No claudication or cyanosis.  MUSCULOSKELETAL: See above.  NEUROLOGIC: No history of seizures, paralysis, alteration of gait or coordination.  PE:   HEAD: Normocephalic, atraumatic.EYES: PERRL. EOMI.   EARS: TM's intact. Light reflex normal. No retraction or perforation.   NOSE: Mucosa pink. Airway clear.MOUTH & THROAT: No tonsillar enlargement. No pharyngeal erythema or exudate. No stridor.  NODES: No cervical, axillary or inguinal lymph node enlargement.  CHEST: Lungs clear to auscultation.  CARDIOVASCULAR: Normal S1, S2. No rubs, murmurs or gallops.  ABDOMEN: Bowel sounds normal. Not distended. Soft. No tenderness or masses.  MUSCULOSKELETAL: No palpable abnormality  NEUROLOGIC: Cranial Nerves: II-XII grossly intact.  Motor: 5/5 strength major flexors/extensors.  DTR's: Knees, Ankles 2+ and equal bilaterally; downgoing toes.  Sensory: Intact to light touch distally.  Gait & Posture: Normal gait and fine motion. No cerebellar signs.       Diagnoses and all orders for this visit:    Type 2 diabetes mellitus without complication, without long-term current use of insulin    Essential hypertension    Secondary neuroendocrine tumor of liver    Anxiety      Plan:Lab eval-rev  Willie a1c w next lab  Rec diet and ex recs  Card Heme Onc GI fu  Rev r/b and consider ration of cognitive therapy, SSRI, benzodiazepine trial

## 2018-12-13 ENCOUNTER — TELEPHONE (OUTPATIENT)
Dept: RADIOLOGY | Facility: HOSPITAL | Age: 58
End: 2018-12-13

## 2018-12-14 ENCOUNTER — INFUSION (OUTPATIENT)
Dept: INFUSION THERAPY | Facility: HOSPITAL | Age: 58
End: 2018-12-14
Attending: INTERNAL MEDICINE
Payer: MEDICARE

## 2018-12-14 ENCOUNTER — HOSPITAL ENCOUNTER (OUTPATIENT)
Dept: RADIOLOGY | Facility: HOSPITAL | Age: 58
Discharge: HOME | End: 2018-12-14
Attending: INTERNAL MEDICINE
Payer: MEDICARE

## 2018-12-14 ENCOUNTER — OFFICE VISIT (OUTPATIENT)
Dept: HEMATOLOGY/ONCOLOGY | Facility: CLINIC | Age: 58
End: 2018-12-14
Payer: MEDICARE

## 2018-12-14 VITALS
SYSTOLIC BLOOD PRESSURE: 127 MMHG | BODY MASS INDEX: 35.63 KG/M2 | DIASTOLIC BLOOD PRESSURE: 74 MMHG | TEMPERATURE: 98 F | HEART RATE: 60 BPM | WEIGHT: 214.13 LBS | OXYGEN SATURATION: 99 %

## 2018-12-14 DIAGNOSIS — C7B.8 SECONDARY NEUROENDOCRINE TUMOR OF LIVER: ICD-10-CM

## 2018-12-14 DIAGNOSIS — D50.0 IRON DEFICIENCY ANEMIA DUE TO CHRONIC BLOOD LOSS: Primary | ICD-10-CM

## 2018-12-14 DIAGNOSIS — D49.89 NEOPLASM OF ABDOMEN: ICD-10-CM

## 2018-12-14 DIAGNOSIS — T85.520D MIGRATION OF BILIARY STENT, SUBSEQUENT ENCOUNTER: ICD-10-CM

## 2018-12-14 DIAGNOSIS — C7B.8 SECONDARY NEUROENDOCRINE TUMOR OF LIVER: Primary | ICD-10-CM

## 2018-12-14 PROCEDURE — 25500020 PHARM REV CODE 255: Mod: PO | Performed by: INTERNAL MEDICINE

## 2018-12-14 PROCEDURE — 3074F SYST BP LT 130 MM HG: CPT | Mod: CPTII,S$GLB,, | Performed by: INTERNAL MEDICINE

## 2018-12-14 PROCEDURE — 3008F BODY MASS INDEX DOCD: CPT | Mod: CPTII,S$GLB,, | Performed by: INTERNAL MEDICINE

## 2018-12-14 PROCEDURE — 74178 CT ABD&PLV WO CNTR FLWD CNTR: CPT | Mod: 26,,, | Performed by: RADIOLOGY

## 2018-12-14 PROCEDURE — 25000003 PHARM REV CODE 250: Mod: PO | Performed by: INTERNAL MEDICINE

## 2018-12-14 PROCEDURE — 74178 CT ABD&PLV WO CNTR FLWD CNTR: CPT | Mod: TC,PO

## 2018-12-14 PROCEDURE — A4216 STERILE WATER/SALINE, 10 ML: HCPCS | Mod: PO | Performed by: INTERNAL MEDICINE

## 2018-12-14 PROCEDURE — 96523 IRRIG DRUG DELIVERY DEVICE: CPT | Mod: PO

## 2018-12-14 PROCEDURE — 99214 OFFICE O/P EST MOD 30 MIN: CPT | Mod: 25,S$GLB,, | Performed by: INTERNAL MEDICINE

## 2018-12-14 PROCEDURE — 3078F DIAST BP <80 MM HG: CPT | Mod: CPTII,S$GLB,, | Performed by: INTERNAL MEDICINE

## 2018-12-14 PROCEDURE — 63600175 PHARM REV CODE 636 W HCPCS: Mod: PO | Performed by: INTERNAL MEDICINE

## 2018-12-14 PROCEDURE — 99999 PR PBB SHADOW E&M-EST. PATIENT-LVL III: CPT | Mod: PBBFAC,,, | Performed by: INTERNAL MEDICINE

## 2018-12-14 PROCEDURE — 71260 CT THORAX DX C+: CPT | Mod: 26,,, | Performed by: RADIOLOGY

## 2018-12-14 RX ORDER — HEPARIN 100 UNIT/ML
500 SYRINGE INTRAVENOUS
Status: CANCELLED | OUTPATIENT
Start: 2018-12-14

## 2018-12-14 RX ORDER — HEPARIN 100 UNIT/ML
500 SYRINGE INTRAVENOUS
Status: COMPLETED | OUTPATIENT
Start: 2018-12-14 | End: 2018-12-14

## 2018-12-14 RX ORDER — SODIUM CHLORIDE 0.9 % (FLUSH) 0.9 %
10 SYRINGE (ML) INJECTION
Status: COMPLETED | OUTPATIENT
Start: 2018-12-14 | End: 2018-12-14

## 2018-12-14 RX ORDER — SODIUM CHLORIDE 0.9 % (FLUSH) 0.9 %
10 SYRINGE (ML) INJECTION
Status: CANCELLED | OUTPATIENT
Start: 2018-12-14

## 2018-12-14 RX ADMIN — IOHEXOL 100 ML: 350 INJECTION, SOLUTION INTRAVENOUS at 11:12

## 2018-12-14 RX ADMIN — IOHEXOL 16 ML: 350 INJECTION, SOLUTION INTRAVENOUS at 10:12

## 2018-12-14 RX ADMIN — HEPARIN 500 UNITS: 100 SYRINGE at 12:12

## 2018-12-14 RX ADMIN — Medication 10 ML: at 12:12

## 2018-12-14 NOTE — NURSING
"Pt here for Mediport access for CT scan and flush. Right chestwall mediport accessed with a 20g 1" walters via sterile technique. Excellent blood return noted. Flushed with 10ml NS and 5 ml heparin solution. Needle D/C, site without redness, swelling, or drainage noted.  Dressing applied. Patient tolerated well. Patient to return to clinic on January 25, 2018.    "

## 2018-12-14 NOTE — PROGRESS NOTES
Subjective:       Patient ID: Janine Souza is a 58 y.o. female.    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     Family History   Problem Relation Age of Onset    Diabetes type II Mother     Prostate cancer Father     Breast cancer Paternal Aunt         breast    Breast cancer Maternal Aunt         breast     Social History     Socioeconomic History    Marital status:      Spouse name: Not on file    Number of children: Not on file    Years of education: Not on file    Highest education level: Not on file   Social Needs    Financial resource strain: Not on file    Food insecurity - worry: Not on file    Food insecurity - inability: Not on file    Transportation needs - medical: Not on file    Transportation needs - non-medical: Not on file   Occupational History    Not on file   Tobacco Use    Smoking status: Never Smoker    Smokeless tobacco: Never Used   Substance and Sexual Activity    Alcohol use: No     Alcohol/week: 0.0 oz    Drug use: No    Sexual activity: Not on file   Other Topics Concern    Not on file   Social History Narrative    Not on file     Past Surgical History:   Procedure Laterality Date    A-V CARDIAC PACEMAKER INSERTION Left 6/7/2018    Procedure: INSERTION, CARDIAC PACEMAKER, DUAL CHAMBER;   Surgeon: Jean Carlos Onofre MD;  Location: [facility] CATH LAB;  Service: Cardiology;  Laterality: Left;  sinus pause, dPPM, BIO, DM, 323A    ADENOIDECTOMY      CARDIAC ELECTROPHYSIOLOGY MAPPING AND ABLATION      CARDIAC SURGERY      CARDIOVERSION N/A 2018    Procedure: CARDIOVERSION;  Surgeon: Jean Carlos Onofre MD;  Location: [facility] CATH LAB;  Service: Cardiology;  Laterality: N/A;    CARDIOVERSION N/A 2018    Performed by Jean Carlos Onofre MD at [facility] CATH LAB     SECTION      ECHOCARDIOGRAM,TRANSESOPHAGEAL N/A 2018    Performed by [facility] Diagnostic Provider at [facility] CATH LAB    ERCP      ERCP N/A 2018    Procedure: ERCP (ENDOSCOPIC RETROGRADE CHOLANGIOPANCREATOGRAPHY);  Surgeon: Tang Tanner MD;  Location: [facility] ENDO (2ND FLR);  Service: Endoscopy;  Laterality: N/A;  2 day hold Dr Kalyan Whittaker    ERCP (ENDOSCOPIC RETROGRADE CHOLANGIOPANCREATOGRAPHY) N/A 2018    Performed by Tang Tanner MD at [facility] ENDO (2ND FLR)    INSERTION, CARDIAC PACEMAKER, DUAL CHAMBER Left 2018    Performed by Jean Carlos Onofre MD at [facility] CATH LAB    MTYVOITIX-CGJI-U-CATH N/A 2016    Performed by Rajinder [facility] MD at [facility] OR    TONSILLECTOMY      ULTRASOUND-ENDOSCOPIC-UPPER N/A 2015    Performed by Misael Treadwell MD at [facility] ENDO (2ND FLR)    uvulopalatopharygoplasty         Labs:  Lab Results   Component Value Date    WBC 7.70 2018    HGB 11.1 (L) 2018    HCT 34.7 (L) 2018     (H) 2018     2018     BMP  Lab Results   Component Value Date     10/19/2018    K 4.7 10/19/2018     10/19/2018    CO2 24 10/19/2018    BUN 29 (H) 10/19/2018    CREATININE 1.2 10/19/2018    CALCIUM 10.0 10/19/2018    ANIONGAP 9 10/19/2018    ESTGFRAFRICA 58 (A) 10/19/2018    EGFRNONAA 50 (A) 10/19/2018     Lab Results   Component Value Date    ALT 15 10/19/2018    AST 18 10/19/2018    ALKPHOS 61 10/19/2018    BILITOT 0.4 10/19/2018       Lab Results    Component Value Date    IRON 72 10/19/2018    TIBC 383 10/19/2018    FERRITIN 1,241 (H) 10/19/2018     No results found for: [id number]  No results found for: FOLATE  Lab Results   Component Value Date    TSH 3.802 11/13/2018         Review of Systems   Constitutional: Positive for fatigue. Negative for activity change, appetite change, chills, diaphoresis, fever and unexpected weight change.   HENT: Negative for congestion, dental problem, drooling, ear discharge, ear pain, facial swelling, hearing loss, mouth sores, nosebleeds, postnasal drip, rhinorrhea, sinus pressure, sneezing, sore throat, tinnitus, trouble swallowing and voice change.    Eyes: Negative for photophobia, pain, discharge, redness, itching and visual disturbance.   Respiratory: Negative for cough, choking, chest tightness, shortness of breath, wheezing and stridor.    Cardiovascular: Negative for chest pain, palpitations and leg swelling.   Gastrointestinal: Negative for abdominal distention, abdominal pain, anal bleeding, blood in stool, constipation, diarrhea, nausea, rectal pain and vomiting.   Endocrine: Negative for cold intolerance, heat intolerance, polydipsia, polyphagia and polyuria.   Genitourinary: Negative for decreased urine volume, difficulty urinating, dyspareunia, dysuria, enuresis, flank pain, frequency, genital sores, hematuria, menstrual problem, pelvic pain, urgency, vaginal bleeding, vaginal discharge and vaginal pain.   Musculoskeletal: Negative for arthralgias, back pain, gait problem, joint swelling, myalgias, neck pain and neck stiffness.   Skin: Negative for color change, pallor and rash.   Allergic/Immunologic: Negative for environmental allergies, food allergies and immunocompromised state.   Neurological: Negative for dizziness, tremors, seizures, syncope, facial asymmetry, speech difficulty, weakness, light-headedness, numbness and headaches.   Hematological: Negative for adenopathy. Does not bruise/bleed easily.    Psychiatric/Behavioral: Positive for dysphoric mood. Negative for agitation, behavioral problems, confusion, decreased concentration, hallucinations, self-injury, sleep disturbance and suicidal ideas. The patient is nervous/anxious. The patient is not hyperactive.        Objective:      Physical Exam   Constitutional: She is oriented to person, place, and time. She appears well-developed and well-nourished. She appears distressed.   HENT:   Head: Normocephalic and atraumatic.   Right Ear: External ear normal.   Left Ear: External ear normal.   Nose: Nose normal. Right sinus exhibits no maxillary sinus tenderness and no frontal sinus tenderness. Left sinus exhibits no maxillary sinus tenderness and no frontal sinus tenderness.   Mouth/Throat: Oropharynx is clear and moist. No oropharyngeal exudate.   Eyes: Conjunctivae, EOM and lids are normal. Pupils are equal, round, and reactive to light. Right eye exhibits no discharge. Left eye exhibits no discharge. Right conjunctiva is not injected. Right conjunctiva has no hemorrhage. Left conjunctiva is not injected. Left conjunctiva has no hemorrhage. No scleral icterus.   Neck: Normal range of motion. Neck supple. No JVD present. No tracheal deviation present. No thyromegaly present.   Cardiovascular: Normal rate and regular rhythm.   Pulmonary/Chest: Effort normal. No stridor. No respiratory distress. She exhibits no tenderness.   Abdominal: Soft. Bowel sounds are normal. She exhibits no distension and no mass. There is no splenomegaly or hepatomegaly. There is no tenderness. There is no rebound.   Musculoskeletal: Normal range of motion. She exhibits no edema or tenderness.   Lymphadenopathy:     She has no cervical adenopathy.     She has no axillary adenopathy.        Right: No supraclavicular adenopathy present.        Left: No supraclavicular adenopathy present.   Neurological: She is alert and oriented to person, place, and time. No cranial nerve deficit.  Coordination normal.   Skin: Skin is dry. No rash noted. She is not diaphoretic. No erythema.   Psychiatric: She has a normal mood and affect. Her behavior is normal. Judgment and thought content normal.   Vitals reviewed.          Assessment:      1. Iron deficiency anemia due to chronic blood loss    2. Migration of biliary stent, subsequent encounter    3. Secondary neuroendocrine tumor of liver           Plan:     Reviewed imaging studies stable findings no evidence of progression laboratory studies stable.  Return in 4 months with labs prior continue flush port on 4-6 week basis patient has done amazingly well no evidence of stent migration no evidence of progressive disease with neuroendocrine tumor status post initial cisplatin etoposide chemotherapy        Kalyan Flores Jr, MD FACP

## 2018-12-19 ENCOUNTER — PATIENT OUTREACH (OUTPATIENT)
Dept: ADMINISTRATIVE | Facility: HOSPITAL | Age: 58
End: 2018-12-19

## 2018-12-19 ENCOUNTER — PATIENT MESSAGE (OUTPATIENT)
Dept: ADMINISTRATIVE | Facility: HOSPITAL | Age: 58
End: 2018-12-19

## 2018-12-19 NOTE — PROGRESS NOTES
I have attempted without success to contact this patient by phone to schedule pap. Patient not available, left voicemail and sent a my chart letter.

## 2019-01-25 ENCOUNTER — INFUSION (OUTPATIENT)
Dept: INFUSION THERAPY | Facility: HOSPITAL | Age: 59
End: 2019-01-25
Attending: INTERNAL MEDICINE
Payer: MEDICARE

## 2019-01-25 DIAGNOSIS — C7B.8 SECONDARY NEUROENDOCRINE TUMOR OF LIVER: Primary | ICD-10-CM

## 2019-01-25 PROCEDURE — 25000003 PHARM REV CODE 250: Performed by: INTERNAL MEDICINE

## 2019-01-25 PROCEDURE — A4216 STERILE WATER/SALINE, 10 ML: HCPCS | Performed by: INTERNAL MEDICINE

## 2019-01-25 PROCEDURE — 96523 IRRIG DRUG DELIVERY DEVICE: CPT

## 2019-01-25 PROCEDURE — 63600175 PHARM REV CODE 636 W HCPCS: Performed by: INTERNAL MEDICINE

## 2019-01-25 RX ORDER — HEPARIN 100 UNIT/ML
500 SYRINGE INTRAVENOUS
Status: COMPLETED | OUTPATIENT
Start: 2019-01-25 | End: 2019-01-25

## 2019-01-25 RX ORDER — SODIUM CHLORIDE 0.9 % (FLUSH) 0.9 %
10 SYRINGE (ML) INJECTION
Status: CANCELLED | OUTPATIENT
Start: 2019-01-25

## 2019-01-25 RX ORDER — HEPARIN 100 UNIT/ML
500 SYRINGE INTRAVENOUS
Status: CANCELLED | OUTPATIENT
Start: 2019-01-25

## 2019-01-25 RX ORDER — SODIUM CHLORIDE 0.9 % (FLUSH) 0.9 %
10 SYRINGE (ML) INJECTION
Status: COMPLETED | OUTPATIENT
Start: 2019-01-25 | End: 2019-01-25

## 2019-01-25 RX ADMIN — Medication 500 UNITS: at 12:01

## 2019-01-25 RX ADMIN — SODIUM CHLORIDE, PRESERVATIVE FREE 10 ML: 5 INJECTION INTRAVENOUS at 12:01

## 2019-01-25 NOTE — NURSING
"Pt here for Mediport Flush. Right chestwall mediport accessed with a 20g 1" walters via sterile technique.  Excellent blood return noted.  Flushed with 10ml NS and 5 ml heparin solution.  Needle D/C, site without redness, swelling, or drainage noted.  Dressing applied.  Patient tolerated well.  Patient to return to clinic in 6 weeks.  "
no

## 2019-02-11 ENCOUNTER — LAB VISIT (OUTPATIENT)
Dept: LAB | Facility: HOSPITAL | Age: 59
End: 2019-02-11
Attending: FAMILY MEDICINE
Payer: MEDICARE

## 2019-02-11 ENCOUNTER — TELEPHONE (OUTPATIENT)
Dept: FAMILY MEDICINE | Facility: CLINIC | Age: 59
End: 2019-02-11

## 2019-02-11 ENCOUNTER — PATIENT MESSAGE (OUTPATIENT)
Dept: FAMILY MEDICINE | Facility: CLINIC | Age: 59
End: 2019-02-11

## 2019-02-11 ENCOUNTER — OFFICE VISIT (OUTPATIENT)
Dept: FAMILY MEDICINE | Facility: CLINIC | Age: 59
End: 2019-02-11
Payer: MEDICARE

## 2019-02-11 VITALS
WEIGHT: 218.69 LBS | SYSTOLIC BLOOD PRESSURE: 124 MMHG | DIASTOLIC BLOOD PRESSURE: 73 MMHG | TEMPERATURE: 98 F | HEIGHT: 64 IN | BODY MASS INDEX: 37.34 KG/M2 | HEART RATE: 62 BPM

## 2019-02-11 DIAGNOSIS — F41.9 ANXIETY: ICD-10-CM

## 2019-02-11 DIAGNOSIS — I48.92 ATRIAL FLUTTER, UNSPECIFIED TYPE: ICD-10-CM

## 2019-02-11 DIAGNOSIS — I50.32 CHRONIC DIASTOLIC HEART FAILURE: ICD-10-CM

## 2019-02-11 DIAGNOSIS — I10 ESSENTIAL HYPERTENSION: Primary | ICD-10-CM

## 2019-02-11 DIAGNOSIS — E11.9 TYPE 2 DIABETES MELLITUS WITHOUT COMPLICATION, WITHOUT LONG-TERM CURRENT USE OF INSULIN: ICD-10-CM

## 2019-02-11 DIAGNOSIS — C78.6 METASTATIC CANCER TO RETROPERITONEUM: ICD-10-CM

## 2019-02-11 DIAGNOSIS — C7B.8 SECONDARY NEUROENDOCRINE TUMOR OF LIVER: ICD-10-CM

## 2019-02-11 PROBLEM — I47.10 SVT (SUPRAVENTRICULAR TACHYCARDIA): Status: RESOLVED | Noted: 2018-06-06 | Resolved: 2019-02-11

## 2019-02-11 LAB
ESTIMATED AVG GLUCOSE: 154 MG/DL
HBA1C MFR BLD HPLC: 7 %
IRON SERPL-MCNC: 93 UG/DL

## 2019-02-11 PROCEDURE — 3074F SYST BP LT 130 MM HG: CPT | Mod: CPTII,S$GLB,, | Performed by: FAMILY MEDICINE

## 2019-02-11 PROCEDURE — 83036 HEMOGLOBIN GLYCOSYLATED A1C: CPT

## 2019-02-11 PROCEDURE — 99213 PR OFFICE/OUTPT VISIT, EST, LEVL III, 20-29 MIN: ICD-10-PCS | Mod: S$GLB,,, | Performed by: FAMILY MEDICINE

## 2019-02-11 PROCEDURE — 3045F PR MOST RECENT HEMOGLOBIN A1C LEVEL 7.0-9.0%: CPT | Mod: CPTII,S$GLB,, | Performed by: FAMILY MEDICINE

## 2019-02-11 PROCEDURE — 99999 PR PBB SHADOW E&M-EST. PATIENT-LVL III: ICD-10-PCS | Mod: PBBFAC,,, | Performed by: FAMILY MEDICINE

## 2019-02-11 PROCEDURE — 3074F PR MOST RECENT SYSTOLIC BLOOD PRESSURE < 130 MM HG: ICD-10-PCS | Mod: CPTII,S$GLB,, | Performed by: FAMILY MEDICINE

## 2019-02-11 PROCEDURE — 3078F DIAST BP <80 MM HG: CPT | Mod: CPTII,S$GLB,, | Performed by: FAMILY MEDICINE

## 2019-02-11 PROCEDURE — 99999 PR PBB SHADOW E&M-EST. PATIENT-LVL III: CPT | Mod: PBBFAC,,, | Performed by: FAMILY MEDICINE

## 2019-02-11 PROCEDURE — 36415 COLL VENOUS BLD VENIPUNCTURE: CPT | Mod: PO

## 2019-02-11 PROCEDURE — 3008F BODY MASS INDEX DOCD: CPT | Mod: CPTII,S$GLB,, | Performed by: FAMILY MEDICINE

## 2019-02-11 PROCEDURE — 3078F PR MOST RECENT DIASTOLIC BLOOD PRESSURE < 80 MM HG: ICD-10-PCS | Mod: CPTII,S$GLB,, | Performed by: FAMILY MEDICINE

## 2019-02-11 PROCEDURE — 99499 UNLISTED E&M SERVICE: CPT | Mod: S$GLB,,, | Performed by: FAMILY MEDICINE

## 2019-02-11 PROCEDURE — 99213 OFFICE O/P EST LOW 20 MIN: CPT | Mod: S$GLB,,, | Performed by: FAMILY MEDICINE

## 2019-02-11 PROCEDURE — 99499 RISK ADDL DX/OHS AUDIT: ICD-10-PCS | Mod: S$GLB,,, | Performed by: FAMILY MEDICINE

## 2019-02-11 PROCEDURE — 83540 ASSAY OF IRON: CPT

## 2019-02-11 PROCEDURE — 3045F PR MOST RECENT HEMOGLOBIN A1C LEVEL 7.0-9.0%: ICD-10-PCS | Mod: CPTII,S$GLB,, | Performed by: FAMILY MEDICINE

## 2019-02-11 PROCEDURE — 3008F PR BODY MASS INDEX (BMI) DOCUMENTED: ICD-10-PCS | Mod: CPTII,S$GLB,, | Performed by: FAMILY MEDICINE

## 2019-02-11 RX ORDER — METFORMIN HYDROCHLORIDE 500 MG/1
TABLET ORAL
Qty: 450 TABLET | Refills: 3 | Status: SHIPPED | OUTPATIENT
Start: 2019-02-11 | End: 2019-12-10

## 2019-02-11 RX ORDER — ZOLPIDEM TARTRATE 10 MG/1
10 TABLET ORAL NIGHTLY PRN
Qty: 90 TABLET | Refills: 1 | Status: SHIPPED | OUTPATIENT
Start: 2019-02-11 | End: 2019-10-28 | Stop reason: SDUPTHER

## 2019-02-11 RX ORDER — METOPROLOL TARTRATE 50 MG/1
50 TABLET ORAL NIGHTLY
COMMUNITY

## 2019-02-11 RX ORDER — METOPROLOL SUCCINATE 50 MG/1
TABLET, EXTENDED RELEASE ORAL
COMMUNITY
Start: 2019-01-26 | End: 2019-02-11

## 2019-02-11 RX ORDER — ZOLPIDEM TARTRATE 10 MG/1
10 TABLET ORAL NIGHTLY PRN
Qty: 90 TABLET | Refills: 1 | Status: SHIPPED | OUTPATIENT
Start: 2019-02-11 | End: 2019-02-11 | Stop reason: SDUPTHER

## 2019-02-11 NOTE — TELEPHONE ENCOUNTER
The refill in nov was sent to [facility] but the transmission failed last filled so last filled at [facility] in January. Pt is going to keep her appt

## 2019-02-11 NOTE — TELEPHONE ENCOUNTER
----- Message from Charles Lopez MD sent at 2/11/2019  7:22 AM CST -----  Check w pt-we did 3m supply w 1 rf of her zolpidem in nov-if she's ok otherwise she can skip-

## 2019-02-11 NOTE — PROGRESS NOTES
This medication has been called in to [facility] at [facility], Metformin, and tradjenta, escripts did not go through

## 2019-02-11 NOTE — TELEPHONE ENCOUNTER
Called in Trajenta to [facility] at [facility], it did not escribe correctly, also called in Metformin, per [facility] had not received it through escribe either.

## 2019-02-11 NOTE — PROGRESS NOTES
The patient presents today to fu DM Last a1c better 7.2 from 8.x.9.3. Home monitering stable  Follows Card for AF prev had ablation-/PM    Following w Dr Flores w hx NE Carcinoma,p chemotx, no active tx currently,last series of scans stable but biliary stent migrated-GI fu pending   Also co mod severe situational anxiety stable w current tx  Past Medical History:  Past Medical History:   Diagnosis Date    Acute headache 2016    Anemia     Atrial fibrillation, persistent     Bronchitis     CHF (congestive heart failure)     Diabetes mellitus, type 2     Hypertension     Malignant poorly differentiated neuroendocrine carcinoma 2016    Malignant poorly differentiated neuroendocrine carcinoma 2016    Secondary neuroendocrine tumor of liver 2016     Past Surgical History:   Procedure Laterality Date    ADENOIDECTOMY      CARDIAC ELECTROPHYSIOLOGY MAPPING AND ABLATION      CARDIAC SURGERY      CARDIOVERSION N/A 2018    Performed by [facility]re MD at [facility] CATH LAB     SECTION      ECHOCARDIOGRAM,TRANSESOPHAGEAL N/A 2018    Performed by [facility] Diagnostic Provider at [facility] CATH LAB    ERCP      ERCP (ENDOSCOPIC RETROGRADE CHOLANGIOPANCREATOGRAPHY) N/A 2018    Performed by Tang Tanner MD at [facility] ENDO (2ND FLR)    INSERTION, CARDIAC PACEMAKER, DUAL CHAMBER Left 2018    Performed by Jean Carlos Onofre MD at [facility] CATH LAB    GDEZQTZUP-BYVC-R-CATH N/A 2016    Performed by [facility]inder Rubio MD at [facility] OR    TONSILLECTOMY      ULTRASOUND-ENDOSCOPIC-UPPER N/A 2015    Performed by Misael Treadwell MD at [facility] ENDO (2ND FLR)    uvulopalatopharygoplasty       Review of patient's allergies indicates:   Allergen Reactions    Epinephrine Other (See Comments)     Neuroendocrine patient-currently on chemo per patient  Neuroendocrine patient    Pcn [penicillins] Other (See Comments), Hives and Swelling     As a child had an unknown reaction.  Has not taken since  As  a child had an unknown reaction.  Has not taken since     Current Outpatient Medications on File Prior to Visit   Medication Sig Dispense Refill    blood sugar diagnostic, disc Strp USE AS DIRECTED EVERY DAY *THANK YOU*      escitalopram oxalate (LEXAPRO) 10 MG tablet Take 1 tablet (10 mg total) by mouth once daily. 90 tablet 3    fluticasone (FLOVENT HFA) 110 mcg/actuation inhaler Inhale 2 puffs into the lungs once daily.      furosemide (LASIX) 40 MG tablet Take 40 mg by mouth once daily.       glipiZIDE (GLUCOTROL) 10 MG tablet TAKE 1 TABLET TWICE A  tablet 3    KLOR-CON M20 20 mEq tablet 20 mEq once daily.       lancets Misc Use as directed      linagliptin (TRADJENTA) 5 mg Tab tablet Take 1 tablet (5 mg total) by mouth once daily. 90 tablet 4    metFORMIN (GLUCOPHAGE) 500 MG tablet Take 2 tablets (1000mg) in am and 3 tablets (1500 mg)in the pm 180 tablet 3    metoprolol tartrate (LOPRESSOR) 25 MG tablet 50 mg 2 (two) times daily.       metoprolol tartrate (LOPRESSOR) 50 MG tablet Take 50 mg by mouth.      rivaroxaban (XARELTO) 20 mg Tab Take 20 mg by mouth.      sotalol (BETAPACE) 80 MG tablet Take 0.5 tablets (40 mg total) by mouth 2 (two) times daily. (Patient taking differently: Take 80 mg by mouth 2 (two) times daily. ) 90 tablet 0    zolpidem (AMBIEN) 10 mg Tab Take 1 tablet (10 mg total) by mouth nightly as needed. 90 tablet 1    [DISCONTINUED] metoprolol succinate (TOPROL-XL) 50 MG 24 hr tablet        Current Facility-Administered Medications on File Prior to Visit   Medication Dose Route Frequency Provider Last Rate Last Dose    ferumoxytol (FERAHEME) 510 mg in dextrose 5 % 100 mL IVPB  510 mg Intravenous Once Kalyan Flores MD        heparin, porcine (PF) 100 unit/mL injection flush 500 Units  500 Units Intravenous PRN Kalyan Flores MD        sodium chloride 0.9% flush 10 mL  10 mL Intravenous PRN Kalyan Flores MD         Social History     Socioeconomic History     Marital status:      Spouse name: Not on file    Number of children: Not on file    Years of education: Not on file    Highest education level: Not on file   Social Needs    Financial resource strain: Not on file    Food insecurity - worry: Not on file    Food insecurity - inability: Not on file    Transportation needs - medical: Not on file    Transportation needs - non-medical: Not on file   Occupational History    Not on file   Tobacco Use    Smoking status: Never Smoker    Smokeless tobacco: Never Used   Substance and Sexual Activity    Alcohol use: No     Alcohol/week: 0.0 oz    Drug use: No    Sexual activity: Not on file   Other Topics Concern    Not on file   Social History Narrative    Not on file     Family History   Problem Relation Age of Onset    Diabetes type II Mother     Prostate cancer Father     Breast cancer Paternal Aunt         breast    Breast cancer Maternal Aunt         breast         ROS:GENERAL: No fever, chills, fatigability or weight loss.  SKIN: No rashes, itching or changes in color or texture of skin.  HEAD: No headaches or recent head trauma.EYES: Visual acuity fine. No photophobia, ocular pain or diplopia.EARS: Denies ear pain, discharge or vertigo.NOSE: No loss of smell, no epistaxis or postnasal drip.MOUTH & THROAT: No hoarseness or change in voice. No excessive gum bleeding.NODES: Denies swollen glands.  CHEST: Denies HURLEY, cyanosis, wheezing, cough and sputum production.  CARDIOVASCULAR: Denies chest pain, PND, orthopnea or reduced exercise tolerance.  ABDOMEN: Appetite fine. No weight loss. Denies diarrhea, abdominal pain, hematemesis or blood in stool.  URINARY: No flank pain, dysuria or hematuria.  PERIPHERAL VASCULAR: No claudication or cyanosis.  MUSCULOSKELETAL: See above.  NEUROLOGIC: No history of seizures, paralysis, alteration of gait or coordination.  PE:   HEAD: Normocephalic, atraumatic.EYES: PERRL. EOMI.   EARS: TM's intact. Light reflex  normal. No retraction or perforation.   NOSE: Mucosa pink. Airway clear.MOUTH & THROAT: No tonsillar enlargement. No pharyngeal erythema or exudate. No stridor.  NODES: No cervical, axillary or inguinal lymph node enlargement.  CHEST: Lungs clear to auscultation.  CARDIOVASCULAR: Normal S1, S2. No rubs, murmurs or gallops.  ABDOMEN: Bowel sounds normal. Not distended. Soft. No tenderness or masses.  MUSCULOSKELETAL: No palpable abnormality  NEUROLOGIC: Cranial Nerves: II-XII grossly intact.  Motor: 5/5 strength major flexors/extensors.  DTR's: Knees, Ankles 2+ and equal bilaterally; downgoing toes.  Sensory: Intact to light touch distally.  Gait & Posture: Normal gait and fine motion. No cerebellar signs.          Plan:Lab eval-rev  Willie a1c w   Rev diet and ex recs  Card Heme Onc GI fu

## 2019-02-11 NOTE — TELEPHONE ENCOUNTER
The ambien for Ms Mehta did not go through, however she wants Ambien to be 90 day and to go to [facility], so I have repended it to you

## 2019-02-12 ENCOUNTER — TELEPHONE (OUTPATIENT)
Dept: FAMILY MEDICINE | Facility: CLINIC | Age: 59
End: 2019-02-12

## 2019-02-12 DIAGNOSIS — E11.8 TYPE 2 DIABETES MELLITUS WITH COMPLICATION, UNSPECIFIED WHETHER LONG TERM INSULIN USE: Primary | ICD-10-CM

## 2019-03-14 ENCOUNTER — INFUSION (OUTPATIENT)
Dept: INFUSION THERAPY | Facility: HOSPITAL | Age: 59
End: 2019-03-14
Attending: INTERNAL MEDICINE
Payer: MEDICARE

## 2019-03-14 VITALS — SYSTOLIC BLOOD PRESSURE: 126 MMHG | DIASTOLIC BLOOD PRESSURE: 66 MMHG | HEART RATE: 60 BPM | RESPIRATION RATE: 18 BRPM

## 2019-03-14 DIAGNOSIS — C7B.8 SECONDARY NEUROENDOCRINE TUMOR OF LIVER: Primary | ICD-10-CM

## 2019-03-14 PROCEDURE — 25000003 PHARM REV CODE 250: Performed by: INTERNAL MEDICINE

## 2019-03-14 PROCEDURE — 63600175 PHARM REV CODE 636 W HCPCS: Performed by: INTERNAL MEDICINE

## 2019-03-14 PROCEDURE — 96523 IRRIG DRUG DELIVERY DEVICE: CPT

## 2019-03-14 PROCEDURE — A4216 STERILE WATER/SALINE, 10 ML: HCPCS | Performed by: INTERNAL MEDICINE

## 2019-03-14 RX ORDER — SODIUM CHLORIDE 0.9 % (FLUSH) 0.9 %
10 SYRINGE (ML) INJECTION
Status: COMPLETED | OUTPATIENT
Start: 2019-03-14 | End: 2019-03-14

## 2019-03-14 RX ORDER — SODIUM CHLORIDE 0.9 % (FLUSH) 0.9 %
10 SYRINGE (ML) INJECTION
Status: CANCELLED | OUTPATIENT
Start: 2019-03-14

## 2019-03-14 RX ORDER — HEPARIN 100 UNIT/ML
500 SYRINGE INTRAVENOUS
Status: CANCELLED | OUTPATIENT
Start: 2019-03-14

## 2019-03-14 RX ORDER — HEPARIN 100 UNIT/ML
500 SYRINGE INTRAVENOUS
Status: COMPLETED | OUTPATIENT
Start: 2019-03-14 | End: 2019-03-14

## 2019-03-14 RX ADMIN — Medication 10 ML: at 10:03

## 2019-03-14 RX ADMIN — HEPARIN SODIUM (PORCINE) LOCK FLUSH IV SOLN 100 UNIT/ML 500 UNITS: 100 SOLUTION at 10:03

## 2019-03-14 NOTE — DISCHARGE INSTRUCTIONS
[facility] [place]  [id number] [address]  [address]  [phone] phone     [phone] fax  Hours of Operation: Monday- Friday 8:00am- 5:00pm  After hours phone  [phone]  Hematology / Oncology Physicians on call      Dr. Mark Morris      Please call with any concerns regarding your appointment today.

## 2019-04-15 ENCOUNTER — LAB VISIT (OUTPATIENT)
Dept: LAB | Facility: HOSPITAL | Age: 59
End: 2019-04-15
Attending: INTERNAL MEDICINE
Payer: MEDICARE

## 2019-04-15 DIAGNOSIS — T85.520D MIGRATION OF BILIARY STENT, SUBSEQUENT ENCOUNTER: ICD-10-CM

## 2019-04-15 DIAGNOSIS — D50.0 IRON DEFICIENCY ANEMIA DUE TO CHRONIC BLOOD LOSS: ICD-10-CM

## 2019-04-15 DIAGNOSIS — C7B.8 SECONDARY NEUROENDOCRINE TUMOR OF LIVER: ICD-10-CM

## 2019-04-15 LAB
ALBUMIN SERPL BCP-MCNC: 3.9 G/DL (ref 3.5–5.2)
ALP SERPL-CCNC: 65 U/L (ref 55–135)
ALT SERPL W/O P-5'-P-CCNC: 16 U/L (ref 10–44)
ANION GAP SERPL CALC-SCNC: 13 MMOL/L (ref 8–16)
AST SERPL-CCNC: 15 U/L (ref 10–40)
BASOPHILS # BLD AUTO: 0.06 K/UL (ref 0–0.2)
BASOPHILS NFR BLD: 0.8 % (ref 0–1.9)
BILIRUB SERPL-MCNC: 0.4 MG/DL (ref 0.1–1)
BUN SERPL-MCNC: 34 MG/DL (ref 6–20)
CALCIUM SERPL-MCNC: 10.2 MG/DL (ref 8.7–10.5)
CHLORIDE SERPL-SCNC: 105 MMOL/L (ref 95–110)
CO2 SERPL-SCNC: 22 MMOL/L (ref 23–29)
CREAT SERPL-MCNC: 1.3 MG/DL (ref 0.5–1.4)
DIFFERENTIAL METHOD: ABNORMAL
EOSINOPHIL # BLD AUTO: 0.2 K/UL (ref 0–0.5)
EOSINOPHIL NFR BLD: 2.3 % (ref 0–8)
ERYTHROCYTE [DISTWIDTH] IN BLOOD BY AUTOMATED COUNT: 13.2 % (ref 11.5–14.5)
EST. GFR  (AFRICAN AMERICAN): 52 ML/MIN/1.73 M^2
EST. GFR  (NON AFRICAN AMERICAN): 45 ML/MIN/1.73 M^2
FERRITIN SERPL-MCNC: 1055 NG/ML (ref 20–300)
GLUCOSE SERPL-MCNC: 177 MG/DL (ref 70–110)
HCT VFR BLD AUTO: 35.6 % (ref 37–48.5)
HGB BLD-MCNC: 11.2 G/DL (ref 12–16)
IRON SERPL-MCNC: 72 UG/DL (ref 30–160)
LDH SERPL L TO P-CCNC: 180 U/L (ref 110–260)
LYMPHOCYTES # BLD AUTO: 1.8 K/UL (ref 1–4.8)
LYMPHOCYTES NFR BLD: 22.5 % (ref 18–48)
MCH RBC QN AUTO: 31.7 PG (ref 27–31)
MCHC RBC AUTO-ENTMCNC: 31.5 G/DL (ref 32–36)
MCV RBC AUTO: 101 FL (ref 82–98)
MONOCYTES # BLD AUTO: 0.7 K/UL (ref 0.3–1)
MONOCYTES NFR BLD: 9.1 % (ref 4–15)
NEUTROPHILS # BLD AUTO: 5.1 K/UL (ref 1.8–7.7)
NEUTROPHILS NFR BLD: 64.9 % (ref 38–73)
NRBC BLD-RTO: 0 /100 WBC
PLATELET # BLD AUTO: 187 K/UL (ref 150–350)
PMV BLD AUTO: 10.5 FL (ref 9.2–12.9)
POTASSIUM SERPL-SCNC: 4.6 MMOL/L (ref 3.5–5.1)
PROT SERPL-MCNC: 9 G/DL (ref 6–8.4)
RBC # BLD AUTO: 3.53 M/UL (ref 4–5.4)
SATURATED IRON: 20 % (ref 20–50)
SODIUM SERPL-SCNC: 140 MMOL/L (ref 136–145)
TOTAL IRON BINDING CAPACITY: 369 UG/DL (ref 250–450)
TRANSFERRIN SERPL-MCNC: 249 MG/DL (ref 200–375)
WBC # BLD AUTO: 7.88 K/UL (ref 3.9–12.7)

## 2019-04-15 PROCEDURE — 36415 COLL VENOUS BLD VENIPUNCTURE: CPT

## 2019-04-15 PROCEDURE — 85025 COMPLETE CBC W/AUTO DIFF WBC: CPT

## 2019-04-15 PROCEDURE — 83540 ASSAY OF IRON: CPT

## 2019-04-15 PROCEDURE — 82728 ASSAY OF FERRITIN: CPT

## 2019-04-15 PROCEDURE — 83615 LACTATE (LD) (LDH) ENZYME: CPT

## 2019-04-15 PROCEDURE — 80053 COMPREHEN METABOLIC PANEL: CPT

## 2019-04-18 ENCOUNTER — OFFICE VISIT (OUTPATIENT)
Dept: HEMATOLOGY/ONCOLOGY | Facility: CLINIC | Age: 59
End: 2019-04-18
Payer: MEDICARE

## 2019-04-18 VITALS
WEIGHT: 217.38 LBS | RESPIRATION RATE: 17 BRPM | TEMPERATURE: 98 F | SYSTOLIC BLOOD PRESSURE: 117 MMHG | BODY MASS INDEX: 37.11 KG/M2 | OXYGEN SATURATION: 98 % | HEART RATE: 97 BPM | DIASTOLIC BLOOD PRESSURE: 80 MMHG | HEIGHT: 64 IN

## 2019-04-18 DIAGNOSIS — D49.1 NEOPLASM OF LUNG: ICD-10-CM

## 2019-04-18 DIAGNOSIS — D49.89 NEOPLASM OF ABDOMEN: ICD-10-CM

## 2019-04-18 DIAGNOSIS — K22.89 OTHER SPECIFIED DISEASES OF ESOPHAGUS: ICD-10-CM

## 2019-04-18 DIAGNOSIS — C7B.8 SECONDARY NEUROENDOCRINE TUMOR OF LIVER: Primary | ICD-10-CM

## 2019-04-18 PROCEDURE — 3074F PR MOST RECENT SYSTOLIC BLOOD PRESSURE < 130 MM HG: ICD-10-PCS | Mod: CPTII,S$GLB,, | Performed by: INTERNAL MEDICINE

## 2019-04-18 PROCEDURE — 3008F BODY MASS INDEX DOCD: CPT | Mod: CPTII,S$GLB,, | Performed by: INTERNAL MEDICINE

## 2019-04-18 PROCEDURE — 99214 PR OFFICE/OUTPT VISIT, EST, LEVL IV, 30-39 MIN: ICD-10-PCS | Mod: S$GLB,,, | Performed by: INTERNAL MEDICINE

## 2019-04-18 PROCEDURE — 99214 OFFICE O/P EST MOD 30 MIN: CPT | Mod: S$GLB,,, | Performed by: INTERNAL MEDICINE

## 2019-04-18 PROCEDURE — 3074F SYST BP LT 130 MM HG: CPT | Mod: CPTII,S$GLB,, | Performed by: INTERNAL MEDICINE

## 2019-04-18 PROCEDURE — 99999 PR PBB SHADOW E&M-EST. PATIENT-LVL IV: ICD-10-PCS | Mod: PBBFAC,,, | Performed by: INTERNAL MEDICINE

## 2019-04-18 PROCEDURE — 99999 PR PBB SHADOW E&M-EST. PATIENT-LVL IV: CPT | Mod: PBBFAC,,, | Performed by: INTERNAL MEDICINE

## 2019-04-18 PROCEDURE — 3008F PR BODY MASS INDEX (BMI) DOCUMENTED: ICD-10-PCS | Mod: CPTII,S$GLB,, | Performed by: INTERNAL MEDICINE

## 2019-04-18 PROCEDURE — 3079F PR MOST RECENT DIASTOLIC BLOOD PRESSURE 80-89 MM HG: ICD-10-PCS | Mod: CPTII,S$GLB,, | Performed by: INTERNAL MEDICINE

## 2019-04-18 PROCEDURE — 3079F DIAST BP 80-89 MM HG: CPT | Mod: CPTII,S$GLB,, | Performed by: INTERNAL MEDICINE

## 2019-04-18 NOTE — PROGRESS NOTES
Subjective:       Patient ID: Janine Souza is a 59 y.o. female.    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     Family History   Problem Relation Age of Onset    Diabetes type II Mother     Prostate cancer Father     Breast cancer Paternal Aunt         breast    Breast cancer Maternal Aunt         breast     Social History     Socioeconomic History    Marital status:      Spouse name: Not on file    Number of children: Not on file    Years of education: Not on file    Highest education level: Not on file   Occupational History    Not on file   Social Needs    Financial resource strain: Not on file    Food insecurity:     Worry: Not on file     Inability: Not on file    Transportation needs:     Medical: Not on file     Non-medical: Not on file   Tobacco Use    Smoking status: Never Smoker    Smokeless tobacco: Never Used   Substance and Sexual Activity    Alcohol use: No     Alcohol/week: 0.0 oz    Drug use: No    Sexual activity: Not on file   Lifestyle    Physical activity:     Days per week: Not on file     Minutes per session: Not on file    Stress: Not on file   Relationships    Social connections:     Talks on phone: Not on file     Gets together: Not on file     Attends Yazdanism service: Not on file     Active member of club or organization: Not on file     Attends meetings of clubs or  organizations: Not on file     Relationship status: Not on file   Other Topics Concern    Not on file   Social History Narrative    Not on file     Past Surgical History:   Procedure Laterality Date    ADENOIDECTOMY      CARDIAC ELECTROPHYSIOLOGY MAPPING AND ABLATION      CARDIAC SURGERY      CARDIOVERSION N/A 2018    Performed by [facility] at [facility] CATH LAB     SECTION      ECHOCARDIOGRAM,TRANSESOPHAGEAL N/A 2018    Performed by [facility] at [facility] CATH LAB    ERCP      ERCP (ENDOSCOPIC RETROGRADE CHOLANGIOPANCREATOGRAPHY) N/A 2018    Performed by Tang Tanner MD at [facility] ENDO (2ND FLR)    INSERTION, CARDIAC PACEMAKER, DUAL CHAMBER Left 2018    Performed by [facility] at [facility] CATH LAB    XCZUBLMGY-[id number]-Y-CATH N/A 2016    Performed by [facility] at [facility] OR    TONSILLECTOMY      ULTRASOUND-ENDOSCOPIC-UPPER N/A 2015    Performed by Misael Treadwell MD at [facility] ENDO (2ND FLR)    uvulopalatopharygoplasty         Labs:  Lab Results   Component Value Date    WBC 7.88 04/15/2019    HGB 11.2 (L) 04/15/2019    HCT 35.6 (L) 04/15/2019     (H) 04/15/2019     04/15/2019     BMP  Lab Results   Component Value Date     04/15/2019    K 4.6 04/15/2019     04/15/2019    CO2 22 (L) 04/15/2019    BUN 34 (H) 04/15/2019    CREATININE 1.3 04/15/2019    CALCIUM 10.2 04/15/2019    ANIONGAP 13 04/15/2019    ESTGFRAFRICA 52 (A) 04/15/2019    EGFRNONAA 45 (A) 04/15/2019     Lab Results   Component Value Date    ALT 16 04/15/2019    AST 15 04/15/2019    ALKPHOS 65 04/15/2019    BILITOT 0.4 04/15/2019       Lab Results   Component Value Date    IRON 72 04/15/2019    TIBC 369 04/15/2019    FERRITIN 1,055 (H) 04/15/2019     No results found for: SLFHESNY21  No results found for: FOLATE  Lab Results   Component Value Date    TSH 3.802 2018         Review of Systems   Constitutional: Positive for fatigue. Negative for activity  change, appetite change, chills, diaphoresis, fever and unexpected weight change.   HENT: Negative for congestion, dental problem, drooling, ear discharge, ear pain, facial swelling, hearing loss, mouth sores, nosebleeds, postnasal drip, rhinorrhea, sinus pressure, sneezing, sore throat, tinnitus, trouble swallowing and voice change.    Eyes: Negative for photophobia, pain, discharge, redness, itching and visual disturbance.   Respiratory: Negative for cough, choking, chest tightness, shortness of breath, wheezing and stridor.    Cardiovascular: Negative for chest pain, palpitations and leg swelling.   Gastrointestinal: Negative for abdominal distention, abdominal pain, anal bleeding, blood in stool, constipation, diarrhea, nausea, rectal pain and vomiting.   Endocrine: Negative for cold intolerance, heat intolerance, polydipsia, polyphagia and polyuria.   Genitourinary: Negative for decreased urine volume, difficulty urinating, dyspareunia, dysuria, enuresis, flank pain, frequency, genital sores, hematuria, menstrual problem, pelvic pain, urgency, vaginal bleeding, vaginal discharge and vaginal pain.   Musculoskeletal: Positive for neck pain. Negative for arthralgias, back pain, gait problem, joint swelling, myalgias and neck stiffness.   Skin: Negative for color change, pallor and rash.   Allergic/Immunologic: Negative for environmental allergies, food allergies and immunocompromised state.   Neurological: Positive for weakness. Negative for dizziness, tremors, seizures, syncope, facial asymmetry, speech difficulty, light-headedness, numbness and headaches.   Hematological: Negative for adenopathy. Does not bruise/bleed easily.   Psychiatric/Behavioral: Positive for dysphoric mood. Negative for agitation, behavioral problems, confusion, decreased concentration, hallucinations, self-injury, sleep disturbance and suicidal ideas. The patient is nervous/anxious. The patient is not hyperactive.         Patient patient  is very stressed at the present time with a very ill father and mother with dementia       Objective:      Physical Exam   Constitutional: She is oriented to person, place, and time. She has a sickly appearance. She appears ill. She appears distressed.   HENT:   Head: Normocephalic and atraumatic.   Right Ear: External ear normal.   Left Ear: External ear normal.   Nose: Nose normal. Right sinus exhibits no maxillary sinus tenderness and no frontal sinus tenderness. Left sinus exhibits no maxillary sinus tenderness and no frontal sinus tenderness.   Mouth/Throat: Oropharynx is clear and moist. No oropharyngeal exudate.   Eyes: Pupils are equal, round, and reactive to light. Conjunctivae, EOM and lids are normal. Right eye exhibits no discharge. Left eye exhibits no discharge. Right conjunctiva is not injected. Right conjunctiva has no hemorrhage. Left conjunctiva is not injected. Left conjunctiva has no hemorrhage. No scleral icterus.   Neck: Normal range of motion. Neck supple. No JVD present. No tracheal deviation present. No thyromegaly present.   Cardiovascular: Normal rate and regular rhythm.   Pulmonary/Chest: Effort normal. No stridor. No respiratory distress. She exhibits no tenderness.   Abdominal: Soft. She exhibits no distension and no mass. There is no splenomegaly or hepatomegaly. There is no tenderness. There is no rebound.   Musculoskeletal: Normal range of motion. She exhibits no edema or tenderness.   Lymphadenopathy:     She has no cervical adenopathy.     She has no axillary adenopathy.        Right: No supraclavicular adenopathy present.        Left: No supraclavicular adenopathy present.   Neurological: She is alert and oriented to person, place, and time. No cranial nerve deficit. Coordination normal.   Skin: Skin is dry. No rash noted. She is not diaphoretic. No erythema.   Psychiatric: Her behavior is normal. Judgment and thought content normal. Her mood appears anxious. She exhibits a  depressed mood.   Vitals reviewed.          Assessment:      1. Secondary neuroendocrine tumor of liver    2. Other specified diseases of esophagus    3. Neoplasm of lung    4. Neoplasm of abdomen           Plan:       Patient is concerned about area in neck I cannot palpate anything but will proceed with CT of neck as well as CT chest abdomen pelvis to see status of malignancy laboratory studies unremarkable follow-up after imaging studies for review she feels that if no evidence of malignancy will have her MediPort removed      Kalyan Flores Jr, MD FACP

## 2019-04-18 NOTE — PROGRESS NOTES
Distress Screening Results: Psychosocial Distress screening score of Distress Score: 1 {AMB ONC DISTRESS SCORE:87266}

## 2019-04-29 ENCOUNTER — TELEPHONE (OUTPATIENT)
Dept: RADIOLOGY | Facility: HOSPITAL | Age: 59
End: 2019-04-29

## 2019-04-30 ENCOUNTER — HOSPITAL ENCOUNTER (OUTPATIENT)
Dept: RADIOLOGY | Facility: HOSPITAL | Age: 59
Discharge: HOME | End: 2019-04-30
Attending: INTERNAL MEDICINE
Payer: MEDICARE

## 2019-04-30 ENCOUNTER — INFUSION (OUTPATIENT)
Dept: INFUSION THERAPY | Facility: HOSPITAL | Age: 59
End: 2019-04-30
Attending: INTERNAL MEDICINE
Payer: MEDICARE

## 2019-04-30 ENCOUNTER — OFFICE VISIT (OUTPATIENT)
Dept: HEMATOLOGY/ONCOLOGY | Facility: CLINIC | Age: 59
End: 2019-04-30
Payer: MEDICARE

## 2019-04-30 VITALS
HEART RATE: 94 BPM | RESPIRATION RATE: 18 BRPM | BODY MASS INDEX: 37.56 KG/M2 | TEMPERATURE: 98 F | SYSTOLIC BLOOD PRESSURE: 129 MMHG | OXYGEN SATURATION: 98 % | WEIGHT: 220 LBS | DIASTOLIC BLOOD PRESSURE: 81 MMHG | HEIGHT: 64 IN

## 2019-04-30 DIAGNOSIS — K22.89 OTHER SPECIFIED DISEASES OF ESOPHAGUS: ICD-10-CM

## 2019-04-30 DIAGNOSIS — D49.1 NEOPLASM OF LUNG: ICD-10-CM

## 2019-04-30 DIAGNOSIS — C7B.8 SECONDARY NEUROENDOCRINE TUMOR OF LIVER: ICD-10-CM

## 2019-04-30 DIAGNOSIS — D49.89 NEOPLASM OF ABDOMEN: ICD-10-CM

## 2019-04-30 DIAGNOSIS — C78.6 METASTATIC CANCER TO RETROPERITONEUM: Primary | ICD-10-CM

## 2019-04-30 DIAGNOSIS — C7B.8 SECONDARY NEUROENDOCRINE TUMOR OF LIVER: Primary | ICD-10-CM

## 2019-04-30 PROCEDURE — 71260 CT CHEST WITH CONTRAST: ICD-10-PCS | Mod: 26,,, | Performed by: RADIOLOGY

## 2019-04-30 PROCEDURE — 70491 CT SOFT TISSUE NECK W/DYE: CPT | Mod: 26,,, | Performed by: RADIOLOGY

## 2019-04-30 PROCEDURE — 3079F DIAST BP 80-89 MM HG: CPT | Mod: CPTII,S$GLB,, | Performed by: INTERNAL MEDICINE

## 2019-04-30 PROCEDURE — 25000003 PHARM REV CODE 250: Performed by: INTERNAL MEDICINE

## 2019-04-30 PROCEDURE — A4216 STERILE WATER/SALINE, 10 ML: HCPCS | Performed by: INTERNAL MEDICINE

## 2019-04-30 PROCEDURE — 74177 CT ABDOMEN PELVIS WITH CONTRAST: ICD-10-PCS | Mod: 26,,, | Performed by: RADIOLOGY

## 2019-04-30 PROCEDURE — 3074F PR MOST RECENT SYSTOLIC BLOOD PRESSURE < 130 MM HG: ICD-10-PCS | Mod: CPTII,S$GLB,, | Performed by: INTERNAL MEDICINE

## 2019-04-30 PROCEDURE — 25500020 PHARM REV CODE 255: Performed by: INTERNAL MEDICINE

## 2019-04-30 PROCEDURE — 70491 CT SOFT TISSUE NECK WITH CONTRAST: ICD-10-PCS | Mod: 26,,, | Performed by: RADIOLOGY

## 2019-04-30 PROCEDURE — 99999 PR PBB SHADOW E&M-EST. PATIENT-LVL IV: CPT | Mod: PBBFAC,,, | Performed by: INTERNAL MEDICINE

## 2019-04-30 PROCEDURE — 74177 CT ABD & PELVIS W/CONTRAST: CPT | Mod: TC

## 2019-04-30 PROCEDURE — 74177 CT ABD & PELVIS W/CONTRAST: CPT | Mod: 26,,, | Performed by: RADIOLOGY

## 2019-04-30 PROCEDURE — 3008F BODY MASS INDEX DOCD: CPT | Mod: CPTII,S$GLB,, | Performed by: INTERNAL MEDICINE

## 2019-04-30 PROCEDURE — 3074F SYST BP LT 130 MM HG: CPT | Mod: CPTII,S$GLB,, | Performed by: INTERNAL MEDICINE

## 2019-04-30 PROCEDURE — 70491 CT SOFT TISSUE NECK W/DYE: CPT | Mod: TC

## 2019-04-30 PROCEDURE — 71260 CT THORAX DX C+: CPT | Mod: TC

## 2019-04-30 PROCEDURE — 63600175 PHARM REV CODE 636 W HCPCS: Performed by: INTERNAL MEDICINE

## 2019-04-30 PROCEDURE — 3008F PR BODY MASS INDEX (BMI) DOCUMENTED: ICD-10-PCS | Mod: CPTII,S$GLB,, | Performed by: INTERNAL MEDICINE

## 2019-04-30 PROCEDURE — 71260 CT THORAX DX C+: CPT | Mod: 26,,, | Performed by: RADIOLOGY

## 2019-04-30 PROCEDURE — 99214 PR OFFICE/OUTPT VISIT, EST, LEVL IV, 30-39 MIN: ICD-10-PCS | Mod: S$GLB,,, | Performed by: INTERNAL MEDICINE

## 2019-04-30 PROCEDURE — 3079F PR MOST RECENT DIASTOLIC BLOOD PRESSURE 80-89 MM HG: ICD-10-PCS | Mod: CPTII,S$GLB,, | Performed by: INTERNAL MEDICINE

## 2019-04-30 PROCEDURE — 99214 OFFICE O/P EST MOD 30 MIN: CPT | Mod: S$GLB,,, | Performed by: INTERNAL MEDICINE

## 2019-04-30 PROCEDURE — 96523 IRRIG DRUG DELIVERY DEVICE: CPT

## 2019-04-30 PROCEDURE — 99999 PR PBB SHADOW E&M-EST. PATIENT-LVL IV: ICD-10-PCS | Mod: PBBFAC,,, | Performed by: INTERNAL MEDICINE

## 2019-04-30 RX ORDER — SODIUM CHLORIDE 0.9 % (FLUSH) 0.9 %
10 SYRINGE (ML) INJECTION
Status: COMPLETED | OUTPATIENT
Start: 2019-04-30 | End: 2019-04-30

## 2019-04-30 RX ORDER — HEPARIN 100 UNIT/ML
500 SYRINGE INTRAVENOUS
Status: COMPLETED | OUTPATIENT
Start: 2019-04-30 | End: 2019-04-30

## 2019-04-30 RX ORDER — SODIUM CHLORIDE 0.9 % (FLUSH) 0.9 %
10 SYRINGE (ML) INJECTION
Status: CANCELLED | OUTPATIENT
Start: 2019-04-30

## 2019-04-30 RX ORDER — HEPARIN 100 UNIT/ML
500 SYRINGE INTRAVENOUS
Status: CANCELLED | OUTPATIENT
Start: 2019-04-30

## 2019-04-30 RX ADMIN — IOHEXOL 30 ML: 350 INJECTION, SOLUTION INTRAVENOUS at 08:04

## 2019-04-30 RX ADMIN — Medication 10 ML: at 11:04

## 2019-04-30 RX ADMIN — HEPARIN SODIUM (PORCINE) LOCK FLUSH IV SOLN 100 UNIT/ML 500 UNITS: 100 SOLUTION at 11:04

## 2019-04-30 RX ADMIN — IOHEXOL 100 ML: 350 INJECTION, SOLUTION INTRAVENOUS at 10:04

## 2019-04-30 NOTE — PROGRESS NOTES
Subjective:       Patient ID: Janine Souza is a 59 y.o. female.    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  Felt to have growing disease at the completion of staging decided to proceed with no further therapy has been in remission since has stent placement in pancreas.  Was attempted to have replaced but had cardiac arrhythmias and has not done so.  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     Family History   Problem Relation Age of Onset    Diabetes type II Mother     Prostate cancer Father     Breast cancer Paternal Aunt         breast    Breast cancer Maternal Aunt         breast     Social History     Socioeconomic History    Marital status:      Spouse name: Not on file    Number of children: Not on file    Years of education: Not on file    Highest education level: Not on file   Occupational History    Not on file   Social Needs    Financial resource strain: Not on file    Food insecurity:     Worry: Not on file     Inability: Not on file    Transportation needs:     Medical: Not on file     Non-medical: Not on file   Tobacco Use    Smoking status: Never Smoker    Smokeless tobacco: Never Used   Substance and Sexual Activity    Alcohol use: No     Alcohol/week: 0.0 oz    Drug use: No    Sexual activity: Not on file    Lifestyle    Physical activity:     Days per week: Not on file     Minutes per session: Not on file    Stress: Not on file   Relationships    Social connections:     Talks on phone: Not on file     Gets together: Not on file     Attends Caodaism service: Not on file     Active member of club or organization: Not on file     Attends meetings of clubs or organizations: Not on file     Relationship status: Not on file   Other Topics Concern    Not on file   Social History Narrative    Not on file     Past Surgical History:   Procedure Laterality Date    ADENOIDECTOMY      CARDIAC ELECTROPHYSIOLOGY MAPPING AND ABLATION      CARDIAC SURGERY      CARDIOVERSION N/A 2018    Performed by [facility] CATH LAB     SECTION      ECHOCARDIOGRAM,TRANSESOPHAGEAL N/A 2018    Performed by [facility] Provider at [facility] CATH LAB    ERCP      ERCP (ENDOSCOPIC RETROGRADE CHOLANGIOPANCREATOGRAPHY) N/A 2018    Performed by Tang Tanner MD at [facility] ENDO (2ND FLR)    INSERTION, CARDIAC PACEMAKER, DUAL CHAMBER Left 2018    Performed by [facility] at [facility] CATH LAB    [id number]-V-CATH N/A 2016    Performed by [facility]    TONSILLECTOMY      ULTRASOUND-ENDOSCOPIC-UPPER N/A 2015    Performed by Misael Treadwell MD at [facility] ENDO (2ND FLR)    uvulopalatopharygoplasty         Labs:  Lab Results   Component Value Date    WBC 7.88 04/15/2019    HGB 11.2 (L) 04/15/2019    HCT 35.6 (L) 04/15/2019     (H) 04/15/2019     04/15/2019     BMP  Lab Results   Component Value Date     04/15/2019    K 4.6 04/15/2019     04/15/2019    CO2 22 (L) 04/15/2019    BUN 34 (H) 04/15/2019    CREATININE 1.3 04/15/2019    CALCIUM 10.2 04/15/2019    ANIONGAP 13 04/15/2019    ESTGFRAFRICA 52 (A) 04/15/2019    EGFRNONAA 45 (A) 04/15/2019     Lab Results   Component Value Date    ALT 16 04/15/2019    AST 15 04/15/2019    ALKPHOS 65 04/15/2019     BILITOT 0.4 04/15/2019       Lab Results   Component Value Date    IRON 72 04/15/2019    TIBC 369 04/15/2019    FERRITIN 1,055 (H) 04/15/2019     No results found for: [id number]  No results found for: FOLATE  Lab Results   Component Value Date    TSH 3.802 11/13/2018         Review of Systems   Constitutional: Negative for activity change, appetite change, chills, diaphoresis, fatigue, fever and unexpected weight change.   HENT: Negative for congestion, dental problem, drooling, ear discharge, ear pain, facial swelling, hearing loss, mouth sores, nosebleeds, postnasal drip, rhinorrhea, sinus pressure, sneezing, sore throat, tinnitus, trouble swallowing and voice change.    Eyes: Negative for photophobia, pain, discharge, redness, itching and visual disturbance.   Respiratory: Negative for cough, choking, chest tightness, shortness of breath, wheezing and stridor.    Cardiovascular: Negative for chest pain, palpitations and leg swelling.   Gastrointestinal: Negative for abdominal distention, abdominal pain, anal bleeding, blood in stool, constipation, diarrhea, nausea, rectal pain and vomiting.   Endocrine: Negative for cold intolerance, heat intolerance, polydipsia, polyphagia and polyuria.   Genitourinary: Negative for decreased urine volume, difficulty urinating, dyspareunia, dysuria, enuresis, flank pain, frequency, genital sores, hematuria, menstrual problem, pelvic pain, urgency, vaginal bleeding, vaginal discharge and vaginal pain.   Musculoskeletal: Negative for arthralgias, back pain, gait problem, joint swelling, myalgias, neck pain and neck stiffness.   Skin: Negative for color change, pallor and rash.   Allergic/Immunologic: Negative for environmental allergies, food allergies and immunocompromised state.   Neurological: Negative for dizziness, tremors, seizures, syncope, facial asymmetry, speech difficulty, weakness, light-headedness, numbness and headaches.   Hematological: Negative for adenopathy.  Does not bruise/bleed easily.   Psychiatric/Behavioral: Positive for dysphoric mood. Negative for agitation, behavioral problems, confusion, decreased concentration, hallucinations, self-injury, sleep disturbance and suicidal ideas. The patient is nervous/anxious. The patient is not hyperactive.        Objective:      Physical Exam   Constitutional: She is oriented to person, place, and time. She appears well-developed and well-nourished. She appears distressed.   HENT:   Head: Normocephalic and atraumatic.   Right Ear: External ear normal.   Left Ear: External ear normal.   Nose: Nose normal. Right sinus exhibits no maxillary sinus tenderness and no frontal sinus tenderness. Left sinus exhibits no maxillary sinus tenderness and no frontal sinus tenderness.   Mouth/Throat: Oropharynx is clear and moist. No oropharyngeal exudate.   Eyes: Pupils are equal, round, and reactive to light. Conjunctivae, EOM and lids are normal. Right eye exhibits no discharge. Left eye exhibits no discharge. Right conjunctiva is not injected. Right conjunctiva has no hemorrhage. Left conjunctiva is not injected. Left conjunctiva has no hemorrhage. No scleral icterus.   Neck: Normal range of motion. Neck supple. No JVD present. No tracheal deviation present. No thyromegaly present.   Cardiovascular: Normal rate and regular rhythm.   Pulmonary/Chest: Effort normal. No stridor. No respiratory distress. She exhibits no tenderness.   Abdominal: Soft. She exhibits no distension and no mass. There is no splenomegaly or hepatomegaly. There is no tenderness. There is no rebound.   Musculoskeletal: Normal range of motion. She exhibits no edema or tenderness.   Lymphadenopathy:     She has no cervical adenopathy.     She has no axillary adenopathy.        Right: No supraclavicular adenopathy present.        Left: No supraclavicular adenopathy present.   Neurological: She is alert and oriented to person, place, and time. No cranial nerve deficit.  Coordination normal.   Skin: Skin is dry. No rash noted. She is not diaphoretic. No erythema.   Psychiatric: She has a normal mood and affect. Her behavior is normal. Judgment and thought content normal.   Vitals reviewed.          Assessment:      1. 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 Secondary neuroendocrine tumor of liver           Plan:     Amazingly indolent course possibility of a neuroendocrine malignancy with stable findings.  At this point I will ask ENT to evaluate the parotid mass. In addition I will also ask GI to come in as to whether not stent needs to be replaced to remove her can be kept in place flush port today return 6 weeks to review after evaluation by ENT and GI no clear evidence of progression on verbal report and will wait written confirmation and followed to patient        Kalyan Flores Jr, MD FACP

## 2019-05-03 ENCOUNTER — OFFICE VISIT (OUTPATIENT)
Dept: GASTROENTEROLOGY | Facility: CLINIC | Age: 59
End: 2019-05-03
Payer: MEDICARE

## 2019-05-03 ENCOUNTER — OFFICE VISIT (OUTPATIENT)
Dept: OTOLARYNGOLOGY | Facility: CLINIC | Age: 59
End: 2019-05-03
Payer: MEDICARE

## 2019-05-03 VITALS
BODY MASS INDEX: 36.96 KG/M2 | WEIGHT: 221.81 LBS | DIASTOLIC BLOOD PRESSURE: 78 MMHG | HEIGHT: 65 IN | SYSTOLIC BLOOD PRESSURE: 124 MMHG | HEART RATE: 72 BPM

## 2019-05-03 DIAGNOSIS — Z79.01 ANTICOAGULATED: ICD-10-CM

## 2019-05-03 DIAGNOSIS — K11.9 LESION OF PAROTID GLAND: Primary | ICD-10-CM

## 2019-05-03 DIAGNOSIS — C7B.8 SECONDARY NEUROENDOCRINE TUMOR OF LIVER: Primary | ICD-10-CM

## 2019-05-03 DIAGNOSIS — Z98.890 HISTORY OF BILIARY DUCT STENT PLACEMENT: ICD-10-CM

## 2019-05-03 DIAGNOSIS — C7B.8 SECONDARY NEUROENDOCRINE TUMOR OF LIVER: ICD-10-CM

## 2019-05-03 DIAGNOSIS — C78.6 METASTATIC CANCER TO RETROPERITONEUM: ICD-10-CM

## 2019-05-03 PROCEDURE — 3008F PR BODY MASS INDEX (BMI) DOCUMENTED: ICD-10-PCS | Mod: CPTII,S$GLB,, | Performed by: NURSE PRACTITIONER

## 2019-05-03 PROCEDURE — 99214 OFFICE O/P EST MOD 30 MIN: CPT | Mod: S$GLB,,, | Performed by: NURSE PRACTITIONER

## 2019-05-03 PROCEDURE — 99214 PR OFFICE/OUTPT VISIT, EST, LEVL IV, 30-39 MIN: ICD-10-PCS | Mod: S$GLB,,, | Performed by: NURSE PRACTITIONER

## 2019-05-03 PROCEDURE — 99204 PR OFFICE/OUTPT VISIT, NEW, LEVL IV, 45-59 MIN: ICD-10-PCS | Mod: S$GLB,,, | Performed by: PHYSICIAN ASSISTANT

## 2019-05-03 PROCEDURE — 3074F PR MOST RECENT SYSTOLIC BLOOD PRESSURE < 130 MM HG: ICD-10-PCS | Mod: CPTII,S$GLB,, | Performed by: NURSE PRACTITIONER

## 2019-05-03 PROCEDURE — 3074F PR MOST RECENT SYSTOLIC BLOOD PRESSURE < 130 MM HG: ICD-10-PCS | Mod: CPTII,S$GLB,, | Performed by: PHYSICIAN ASSISTANT

## 2019-05-03 PROCEDURE — 3078F PR MOST RECENT DIASTOLIC BLOOD PRESSURE < 80 MM HG: ICD-10-PCS | Mod: CPTII,S$GLB,, | Performed by: NURSE PRACTITIONER

## 2019-05-03 PROCEDURE — 3078F DIAST BP <80 MM HG: CPT | Mod: CPTII,S$GLB,, | Performed by: NURSE PRACTITIONER

## 2019-05-03 PROCEDURE — 3078F PR MOST RECENT DIASTOLIC BLOOD PRESSURE < 80 MM HG: ICD-10-PCS | Mod: CPTII,S$GLB,, | Performed by: PHYSICIAN ASSISTANT

## 2019-05-03 PROCEDURE — 99204 OFFICE O/P NEW MOD 45 MIN: CPT | Mod: S$GLB,,, | Performed by: PHYSICIAN ASSISTANT

## 2019-05-03 PROCEDURE — 99999 PR PBB SHADOW E&M-EST. PATIENT-LVL III: CPT | Mod: PBBFAC,,, | Performed by: PHYSICIAN ASSISTANT

## 2019-05-03 PROCEDURE — 3074F SYST BP LT 130 MM HG: CPT | Mod: CPTII,S$GLB,, | Performed by: PHYSICIAN ASSISTANT

## 2019-05-03 PROCEDURE — 3008F BODY MASS INDEX DOCD: CPT | Mod: CPTII,S$GLB,, | Performed by: NURSE PRACTITIONER

## 2019-05-03 PROCEDURE — 3074F SYST BP LT 130 MM HG: CPT | Mod: CPTII,S$GLB,, | Performed by: NURSE PRACTITIONER

## 2019-05-03 PROCEDURE — 99999 PR PBB SHADOW E&M-EST. PATIENT-LVL III: ICD-10-PCS | Mod: PBBFAC,,, | Performed by: PHYSICIAN ASSISTANT

## 2019-05-03 PROCEDURE — 99999 PR PBB SHADOW E&M-EST. PATIENT-LVL III: CPT | Mod: PBBFAC,,, | Performed by: NURSE PRACTITIONER

## 2019-05-03 PROCEDURE — 3078F DIAST BP <80 MM HG: CPT | Mod: CPTII,S$GLB,, | Performed by: PHYSICIAN ASSISTANT

## 2019-05-03 PROCEDURE — 99999 PR PBB SHADOW E&M-EST. PATIENT-LVL III: ICD-10-PCS | Mod: PBBFAC,,, | Performed by: NURSE PRACTITIONER

## 2019-05-03 NOTE — LETTER
May 3, 2019      Kalyan Flores MD  [address]  [place] [address]           '[facility]  [address] [place] [id number]  Phone: [phone]  Fax: [phone]          Patient: Janine Souza   MR Number: [id number]   YOB: 1960   Date of Visit: 5/3/2019       Dear Dr. Kalyan Flores:    Thank you for referring Janine Souza to me for evaluation. Attached you will find relevant portions of my assessment and plan of care.    If you have questions, please do not hesitate to call me. I look forward to following Janine Souza along with you.    Sincerely,    Prema Corona, TIARRA    Enclosure  CC:  No Recipients    If you would like to receive this communication electronically, please contact [email or web address] or [phone] to request more information on [facility] [employer] access.    For providers and/or their staff who would like to refer a patient to [facility], please contact us through our one-stop-shop provider referral line, [facility], at [phone].    If you feel you have received this communication in error or would no longer like to receive these types of communications, please e-mail [email or web address]

## 2019-05-03 NOTE — PROGRESS NOTES
Referring Provider:    Kalyan Flores Md  [address] [facility]  [place] [address]  Subjective:   Patient: Janine Souza [id number], :1960   Visit date:5/3/2019 11:08 AM    Chief Complaint:  Mass    HPI:  Janine is a 59 y.o. female who I was asked to see in consultation for evaluation of the following issue(s): right parotid mass.  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  About 9 months ago, she noticed a small lump near head of right clavicle on anterior neck (has port nearby and thought maybe related to that).  She mentioned to Dr. Flores who then ordered imaging and was found to have inferior right parotid lesion.  She is on Xarelto and is followed by Cardiology (Dr. Sánchez at  Cardiology).  She has pacemaker.    She has not noticed any swelling or masses near the right parotid but has had occasional tenderness beneath right ear since  (before cancer diagnosis).  Has never had erythema, warmth or swelling in that area.  Denies any pain associated with eating or drinking.  Denies trismus or otalgia but says her right ear occasionally feels stuffy.  She denies any radiation to her head or neck.  Denies any recent facial trauma or dental work; denies any drooling or trouble swallowing solids or liquids.  Denies hoarseness; denies facial weakness.  Denies fever or weight loss.  She does not smoke.        Review of Systems:  Negative unless checked off.  Gen:  []fever   []fatigue  HENT:  [x]nosebleeds (rare)  []dental problem   Eyes:  []photophobia  []visual disturbance  Resp:  []chest tightness []wheezing  Card:  []chest pain  []leg swelling  GI:  []abdominal pain []blood in stool  :  []dysuria  []hematuria  Musc:  []joint swelling  []gait problem  Skin:  []color change  []pallor  Neuro:  []seizures  []numbness  Hem:  []bruise/bleed easily  Psych:  []hallucinations  []behavioral problems  Allergy/Imm: is  allergic to pcn [penicillins].    Her meds, allergies, medical, surgical, social & family histories were reviewed & updated:  -     She has a current medication list which includes the following prescription(s): blood sugar diagnostic, disc, escitalopram oxalate, fluticasone propionate, furosemide, glipizide, klor-con m20, lancets, linagliptin, metformin, metoprolol tartrate, metoprolol tartrate, rivaroxaban, sotalol, and zolpidem, and the following Facility-Administered Medications: ferumoxytol (FERAHEME) 510 mg in dextrose 5 % 100 mL IVPB, heparin, porcine (pf), and sodium chloride 0.9%.  -     She  has a past medical history of Acute headache (2016), Anemia, Atrial fibrillation, persistent, Bronchitis, CHF (congestive heart failure), Diabetes mellitus, type 2, Hypertension, Malignant poorly differentiated neuroendocrine carcinoma (2016), Malignant poorly differentiated neuroendocrine carcinoma (2016), and Secondary neuroendocrine tumor of liver (2016).   -     She does not have any pertinent problems on file.   -     She  has a past surgical history that includes Tonsillectomy; Adenoidectomy; uvulopalatopharygoplasty;  section; Cardiac electrophysiology mapping and ablation; ERCP; Cardiac surgery; ERCP (N/A, 2018); Cardioversion (N/A, 2018); and A-V cardiac pacemaker insertion (Left, 2018).  -     She  reports that she has never smoked. She has never used smokeless tobacco. She reports that she does not drink alcohol or use drugs.  -     Her family history includes Breast cancer in her maternal aunt and paternal aunt; Diabetes type II in her mother; Prostate cancer in her father.  -     She is allergic to pcn [penicillins].    Objective:     Physical Exam:  Vitals:  124/78; pulse 72    General appearance:  Well developed, well nourished.  Face is symmetric    Eyes:  Extraocular motions intact, PERRL    Communication:  no hoarseness, no dysphonia    Ears:  Otoscopy of external  auditory canals and tympanic membranes was normal, clinical speech reception thresholds grossly intact, no mass/lesion of auricle.  Nose:  No masses/lesions of external nose, nasal mucosa normal, septum midline, and inferior turbinates resected.  Able to see head of right middle turbinate  Mouth:  No mass/lesion of lips, teeth, gums, hard/soft palate, tongue, tonsils, or oropharynx.  S/p uvulopalatopharygoplasty    Cardiovascular:   Radial Pulses +2     Neck & Lymphatics:  No cervical lymphadenopathy, no neck mass/crepitus/ asymmetry, trachea is midline, no thyroid enlargement/tenderness/mass.  No palpable parotid mass or tenderness    Psych: Oriented x3,  Alert with normal mood and affect.     Respiration/Chest:  Symmetric expansion during respiration, normal respiratory effort.    Skin:  Warm and intact. No ulcerations of face, scalp, neck.      CT Neck 4/30/19:  EXAMINATION:  CT SOFT TISSUE NECK WITH CONTRAST    CLINICAL HISTORY:  Other diseases of esophagus;Other specified diseases of esophagus    TECHNIQUE:  Low dose axial images as well as sagittal and coronal reconstructions were performed from the skull base to the clavicles following the intravenous administration of 75 mL of Omnipaque 350.    COMPARISON:  None    FINDINGS:  Visualized brain base and orbits are unremarkable.  Mastoid air cells and visualized paranasal sinuses clear.  Multilevel degenerative changes of the cervical spine noted.  No suspicious osseous lesion.    1.5 x 1.0 well-defined inferior right parotid gland mass present.  6 mm and 4 mm inferior left parotid gland nodules present more suggestive of tiny lymph nodes.    Nasopharynx/oropharynx unremarkable.  Supraglottic airway unremarkable.  Larynx unremarkable.  Visualized upper trachea unremarkable.  Thyroid unchanged.  Submandibular glands unremarkable.    No pathologically enlarged submandibular, cervical or supraclavicular lymph nodes present.  Please see concurrent CT chest report  for upper chest findings.      Impression       1.5 cm inferior right parotid lobe lesion.  Finding is nonspecific with malignancy not excluded.  FNA recommended for definitive diagnosis.  Smaller left parotid nodules, likely lymph nodes.           Assessment & Plan:   Janine was seen today for mass.    Diagnoses and all orders for this visit:    Lesion of parotid gland  -     US FNA Biopsy w/ Imaging 1st Lesion; Future    Anticoagulated    Secondary neuroendocrine tumor of liver      Mrs. Souza presents with a mass of the right parotid gland.  She has not yet had FNA.     Recommend US guided FNA with Dr. Hansen.  Will schedule for next week but may need to push back depending on her Xarelto.  Patient will try and contact Dr. Sánchez's office to get his input on holding Xarelto.  I'll have my nurse call as well.         We discussed that there are different types of parotid tumors and the most common tumors of the parotid gland are pleomorphic adenomas.  These are benign masses that have a possibility of becoming cancerous in up to 6% of cases.  We also discussed that Warthin's tumors are the second most common tumors and are not generally associated with a risk of cancer.  However, in both instances, surgery is often recommended due to the risk of tumor growth with associated disfigurement and added complexity of surgery at a later date, and there is the possibility of an incorrect diagnosis based on fine needle aspiration alone.      We discussed her medical conditions, treatments and plan.  Janine should return to clinic if any issues arise (symptoms worsen or persist), otherwise we will see her back in the clinic next week for FNA.    Thank you for allowing me to participate in the care of Janine.

## 2019-05-03 NOTE — Clinical Note
Can you review her case? Biliary stent. Previous concerns for migration or obstruction. Was scheduled to undergo stent replacement but developed cardiac arrhythmia and was never done. I believe she has a plastic stent. Is there anything that needs to be done. Had recent CT abdomen.

## 2019-05-03 NOTE — PROGRESS NOTES
"Clinic Follow Up:  [facility] Follow Up Note    Reason for Follow Up:  The primary encounter diagnosis was Secondary neuroendocrine tumor of liver. 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    PCP: Charles Lopez   [address] [facility] / [place] [address]    HPI:  This is a 59 y.o. female here for follow up of the above. She has metastatic neuroendocrine. She has a bilary stent that was placed in 2015 at [place]. In 2017, she was seen for migrating biliary stent but did not require any intervention. In June 2018, she presented with concerns for biliary stent obstruction. She was set up to have ERCP to remove stent and to place a metal stent in its place but developed cardiac arrhythmia prior to the procedure being done and was never completed. She is here to discuss if stent needs to be replaced or removed. Last imaging showed "pneumobilia has resolved with biliary system mild prominence similar in appearance. CBD stent remains.". She reports feeling well. No current GI complaints. No abdominal pain, hematochezia, melena, nausea, vomiting, or weight loss.      Review of Systems   Constitutional: Negative for activity change and appetite change.        As per interval history above   Respiratory: Negative for cough and shortness of breath.    Cardiovascular: Negative for chest pain.   Gastrointestinal: Negative for abdominal pain, anal bleeding, blood in stool, constipation, diarrhea, nausea, rectal pain and vomiting.   Skin: Negative for color change and rash.       Medical History:  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    Malignant poorly differentiated " neuroendocrine carcinoma 2016    Secondary neuroendocrine tumor of liver 2016       Surgical History:   Past Surgical History:   Procedure Laterality Date    ADENOIDECTOMY      CARDIAC ELECTROPHYSIOLOGY MAPPING AND ABLATION      CARDIAC SURGERY      CARDIOVERSION N/A 2018    [facility] by [facility] CATH LAB     SECTION      ECHOCARDIOGRAM,TRANSESOPHAGEAL N/A 2018    Performed by [facility] Diagnostic Provider at [facility] CATH LAB    ERCP      ERCP (ENDOSCOPIC RETROGRADE CHOLANGIOPANCREATOGRAPHY) N/A 2018    Performed by Tang Tanner MD at [facility] ENDO (2ND FLR)    INSERTION, CARDIAC PACEMAKER, DUAL CHAMBER Left 2018    Performed by [facility] at [facility] CATH LAB    UUDQRTETE-KLPF-K-CATH N/A 2016    [facility] OR    TONSILLECTOMY      ULTRASOUND-ENDOSCOPIC-UPPER N/A 2015    Performed by Misael Treadwell MD at [facility] ENDO (2ND FLR)    uvulopalatopharygoplasty         Family History:   Family History   Problem Relation Age of Onset    Diabetes type II Mother     Prostate cancer Father     Breast cancer Paternal Aunt         breast    Breast cancer Maternal Aunt         breast       Social History:   Social History     Tobacco Use    Smoking status: Never Smoker    Smokeless tobacco: Never Used   Substance Use Topics    Alcohol use: No     Alcohol/week: 0.0 oz    Drug use: No       Allergies:   Review of patient's allergies indicates:   Allergen Reactions    Pcn [penicillins] Other (See Comments), Hives and Swelling     As a child had an unknown reaction.  Has not taken since  As a child had an unknown reaction.  Has not taken since       Home Medications:  Current Outpatient Medications on File Prior to Visit   Medication Sig Dispense Refill    blood sugar diagnostic, disc Strp USE AS DIRECTED EVERY DAY *THANK YOU*      escitalopram oxalate (LEXAPRO) 10 MG tablet Take 1 tablet (10 mg total) by mouth once daily.  "90 tablet 3    fluticasone (FLOVENT HFA) 110 mcg/actuation inhaler Inhale 2 puffs into the lungs once daily.      furosemide (LASIX) 40 MG tablet Take 40 mg by mouth once daily.       glipiZIDE (GLUCOTROL) 10 MG tablet TAKE 1 TABLET TWICE A  tablet 3    KLOR-CON M20 20 mEq tablet 20 mEq once daily.       lancets Misc Use as directed      linagliptin (TRADJENTA) 5 mg Tab tablet Take 1 tablet (5 mg total) by mouth once daily. 30 tablet 11    metFORMIN (GLUCOPHAGE) 500 MG tablet Take 2 tablets (1000mg) in am and 3 tablets (1500 mg)in the pm 450 tablet 3    metoprolol tartrate (LOPRESSOR) 25 MG tablet 50 mg 2 (two) times daily.       metoprolol tartrate (LOPRESSOR) 50 MG tablet Take 50 mg by mouth.      rivaroxaban (XARELTO) 20 mg Tab Take 20 mg by mouth.      sotalol (BETAPACE) 80 MG tablet Take 0.5 tablets (40 mg total) by mouth 2 (two) times daily. (Patient taking differently: Take 80 mg by mouth 2 (two) times daily. ) 90 tablet 0    zolpidem (AMBIEN) 10 mg Tab Take 1 tablet (10 mg total) by mouth nightly as needed. 90 tablet 1     Current Facility-Administered Medications on File Prior to Visit   Medication Dose Route Frequency Provider Last Rate Last Dose    ferumoxytol (FERAHEME) 510 mg in dextrose 5 % 100 mL IVPB  510 mg Intravenous Once Kalyan lFores MD        heparin, porcine (PF) 100 unit/mL injection flush 500 Units  500 Units Intravenous PRN Kalyan Flores MD        sodium chloride 0.9% flush 10 mL  10 mL Intravenous PRN Kalyan Flores MD           /78   Pulse 72   Ht 5' 5" (1.651 m)   Wt 100.6 kg (221 lb 12.5 oz)   LMP  (LMP Unknown)   BMI 36.91 kg/m²   Body mass index is 36.91 kg/m².  Physical Exam   Constitutional: She is oriented to person, place, and time and well-developed, well-nourished, and in no distress. No distress.   HENT:   Head: Normocephalic.   Eyes: Pupils are equal, round, and reactive to light. Conjunctivae are normal.   Cardiovascular: Normal rate, " regular rhythm and normal heart sounds.   Pulmonary/Chest: Effort normal and breath sounds normal. No respiratory distress.   Abdominal: Soft. Bowel sounds are normal. She exhibits no distension. There is no tenderness.   Neurological: She is alert and oriented to person, place, and time. No cranial nerve deficit.   Skin: Skin is warm and dry. No rash noted.   Psychiatric: Mood and affect normal.       Labs: Pertinent labs reviewed.    Assessment:  1. Secondary neuroendocrine tumor of liver    2. 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 History of biliary duct stent placement        Recommendations:  Will discuss with GI team for recommendations regarding biliary stent.   Will notify the patient once discussed.     Thank you for the opportunity to participate in the care of this patient.  TORSTEN Olmos

## 2019-05-03 NOTE — Clinical Note
Please call her cardiologist Dr. Sánchez at [facility] to see how many days can she hold Xarelto before FNA of right parotid mass.  Tentatively scheduled with [place] on 5/10/19; may need to push back

## 2019-05-03 NOTE — LETTER
May 3, 2019      Kalyan Flores MD  [address]  [place] [address]           [facility]  [address] [place] [address]  [place] [address]  Phone: [phone]  Fax: [phone]          Patient: Janine Souza   MR Number: [id number]   YOB: 1960   Date of Visit: 5/3/2019       Dear Dr. Kalyan Flores:    Thank you for referring Janine Souza to me for evaluation. Attached you will find relevant portions of my assessment and plan of care.    If you have questions, please do not hesitate to call me. I look forward to following Janine Souza along with you.    Sincerely,    Manju Arciniega PA-C    Enclosure  CC:  No Recipients    If you would like to receive this communication electronically, please contact [email or web address] or [phone] to request more information on [employer] Link access.    For providers and/or their staff who would like to refer a patient to [facility], please contact us through our one-stop-shop provider referral line, [facility] , at [phone].    If you feel you have received this communication in error or would no longer like to receive these types of communications, please e-mail [email or web address]

## 2019-05-06 ENCOUNTER — PATIENT MESSAGE (OUTPATIENT)
Dept: OTOLARYNGOLOGY | Facility: CLINIC | Age: 59
End: 2019-05-06

## 2019-05-07 DIAGNOSIS — Z12.11 COLON CANCER SCREENING: ICD-10-CM

## 2019-05-10 ENCOUNTER — OFFICE VISIT (OUTPATIENT)
Dept: OTOLARYNGOLOGY | Facility: CLINIC | Age: 59
End: 2019-05-10
Payer: MEDICARE

## 2019-05-10 VITALS
WEIGHT: 218.06 LBS | HEART RATE: 60 BPM | SYSTOLIC BLOOD PRESSURE: 135 MMHG | TEMPERATURE: 98 F | DIASTOLIC BLOOD PRESSURE: 78 MMHG | BODY MASS INDEX: 36.28 KG/M2

## 2019-05-10 DIAGNOSIS — K11.9 LESION OF PAROTID GLAND: ICD-10-CM

## 2019-05-10 PROCEDURE — 88305 TISSUE EXAM BY PATHOLOGIST: CPT | Performed by: PATHOLOGY

## 2019-05-10 PROCEDURE — 99999 PR PBB SHADOW E&M-EST. PATIENT-LVL III: CPT | Mod: PBBFAC,,, | Performed by: OTOLARYNGOLOGY

## 2019-05-10 PROCEDURE — 10005 PR FINE NEEDLE ASP BIOPSY, W/US GUIDANCE, 1ST LESION: ICD-10-PCS | Mod: S$GLB,,, | Performed by: OTOLARYNGOLOGY

## 2019-05-10 PROCEDURE — 99999 PR PBB SHADOW E&M-EST. PATIENT-LVL III: ICD-10-PCS | Mod: PBBFAC,,, | Performed by: OTOLARYNGOLOGY

## 2019-05-10 PROCEDURE — 10005 FNA BX W/US GDN 1ST LES: CPT | Mod: S$GLB,,, | Performed by: OTOLARYNGOLOGY

## 2019-05-10 PROCEDURE — 88173 CYTOPATH EVAL FNA REPORT: CPT | Mod: 26,,, | Performed by: PATHOLOGY

## 2019-05-10 PROCEDURE — 88305 CYTOLOGY SPECIMEN-FNA NON-RADIOLOGY CLINICIAN PERFORMED W/O ON SITE: ICD-10-PCS | Mod: 26,,, | Performed by: PATHOLOGY

## 2019-05-10 PROCEDURE — 88173 CYTOLOGY SPECIMEN-FNA NON-RADIOLOGY CLINICIAN PERFORMED W/O ON SITE: ICD-10-PCS | Mod: 26,,, | Performed by: PATHOLOGY

## 2019-05-10 PROCEDURE — 88305 TISSUE EXAM BY PATHOLOGIST: CPT | Mod: 26,,, | Performed by: PATHOLOGY

## 2019-05-10 PROCEDURE — 99499 NO LOS: ICD-10-PCS | Mod: S$GLB,,, | Performed by: OTOLARYNGOLOGY

## 2019-05-10 PROCEDURE — 99499 UNLISTED E&M SERVICE: CPT | Mod: S$GLB,,, | Performed by: OTOLARYNGOLOGY

## 2019-05-10 NOTE — LETTER
May 10, 2019      Manju Arciniega PA-C  [address] [facility] [address]           [facility]  [address]  [place] [address]  Phone: [phone]  Fax: [phone]          Patient: Janine Souza   MR Number: [id number]   YOB: 1960   Date of Visit: 5/10/2019       Dear Manju Arciniega:    Thank you for referring Janine Souza to me for evaluation. Attached you will find relevant portions of my assessment and plan of care.    If you have questions, please do not hesitate to call me. I look forward to following Janine Souza along with you.    Sincerely,    Hamlet Hansen MD    Enclosure  CC:  No Recipients    If you would like to receive this communication electronically, please contact [email or web address] or [phone] to request more information on [employer] Link access.    For providers and/or their staff who would like to refer a patient to [facility], please contact us through our one-stop-shop provider referral line, [facility], at [phone].    If you feel you have received this communication in error or would no longer like to receive these types of communications, please e-mail [email or web address]

## 2019-05-10 NOTE — PROGRESS NOTES
Procedure: Ultrasound-guided FNA of right tail of parotid mass    Clinical History:    parotid mass    Technique/findings: After the risks, benefits and options of the planned procedure were explained to the patient in full detail, the patient expressed complete understanding and gave signed informed consent.  The skin overlying this area was prepped and draped in the usual sterile fashion. A timeout was performed. 1% lidocaine was used as a local anesthetic.  The mass/nodule was visualized with ultrasound and each needle was visualized to enter the intended biopsy site. Each nodule had 3 passes with 25 gauge needles and 1 22 gauge needle.  These were placed separately onto slides.    Locations biopsied:  1. Right tail of parotid      Post procedure ultrasound fail to demonstrate evidence for a post procedure hemorrhage or other complication. A sterile dressing was applied.  The patient tolerated the procedure well without complication comment departing the ultrasound suite in unchanged condition.   Impression       1. Successful ultrasound-guided FNA.      Hamlet Hansen    05/10/2019   10:25 AM

## 2019-05-13 ENCOUNTER — TELEPHONE (OUTPATIENT)
Dept: ENDOSCOPY | Facility: HOSPITAL | Age: 59
End: 2019-05-13

## 2019-05-13 DIAGNOSIS — K83.1 BILIARY STRICTURE: Primary | ICD-10-CM

## 2019-05-13 NOTE — PROGRESS NOTES
Case was discussed with Dr. Syed who recommends referral to [facility] advanced endoscopy team for metal stent placement. She is on Xarelto. Will contact advanced endo team.

## 2019-05-16 ENCOUNTER — PATIENT MESSAGE (OUTPATIENT)
Dept: OTOLARYNGOLOGY | Facility: CLINIC | Age: 59
End: 2019-05-16

## 2019-05-17 ENCOUNTER — TELEPHONE (OUTPATIENT)
Dept: ENDOSCOPY | Facility: HOSPITAL | Age: 59
End: 2019-05-17

## 2019-05-17 ENCOUNTER — OFFICE VISIT (OUTPATIENT)
Dept: OTOLARYNGOLOGY | Facility: CLINIC | Age: 59
End: 2019-05-17
Payer: MEDICARE

## 2019-05-17 VITALS
HEIGHT: 65 IN | SYSTOLIC BLOOD PRESSURE: 117 MMHG | HEART RATE: 139 BPM | WEIGHT: 220.88 LBS | TEMPERATURE: 98 F | BODY MASS INDEX: 36.8 KG/M2 | DIASTOLIC BLOOD PRESSURE: 80 MMHG

## 2019-05-17 DIAGNOSIS — D11.9 WARTHIN'S TUMOR: Primary | ICD-10-CM

## 2019-05-17 PROCEDURE — 99499 RISK ADDL DX/OHS AUDIT: ICD-10-PCS | Mod: S$GLB,,, | Performed by: OTOLARYNGOLOGY

## 2019-05-17 PROCEDURE — 3079F PR MOST RECENT DIASTOLIC BLOOD PRESSURE 80-89 MM HG: ICD-10-PCS | Mod: CPTII,S$GLB,, | Performed by: OTOLARYNGOLOGY

## 2019-05-17 PROCEDURE — 99214 OFFICE O/P EST MOD 30 MIN: CPT | Mod: S$GLB,,, | Performed by: OTOLARYNGOLOGY

## 2019-05-17 PROCEDURE — 3008F PR BODY MASS INDEX (BMI) DOCUMENTED: ICD-10-PCS | Mod: CPTII,S$GLB,, | Performed by: OTOLARYNGOLOGY

## 2019-05-17 PROCEDURE — 99999 PR PBB SHADOW E&M-EST. PATIENT-LVL IV: ICD-10-PCS | Mod: PBBFAC,,, | Performed by: OTOLARYNGOLOGY

## 2019-05-17 PROCEDURE — 3008F BODY MASS INDEX DOCD: CPT | Mod: CPTII,S$GLB,, | Performed by: OTOLARYNGOLOGY

## 2019-05-17 PROCEDURE — 3074F PR MOST RECENT SYSTOLIC BLOOD PRESSURE < 130 MM HG: ICD-10-PCS | Mod: CPTII,S$GLB,, | Performed by: OTOLARYNGOLOGY

## 2019-05-17 PROCEDURE — 3079F DIAST BP 80-89 MM HG: CPT | Mod: CPTII,S$GLB,, | Performed by: OTOLARYNGOLOGY

## 2019-05-17 PROCEDURE — 3074F SYST BP LT 130 MM HG: CPT | Mod: CPTII,S$GLB,, | Performed by: OTOLARYNGOLOGY

## 2019-05-17 PROCEDURE — 99499 UNLISTED E&M SERVICE: CPT | Mod: S$GLB,,, | Performed by: OTOLARYNGOLOGY

## 2019-05-17 PROCEDURE — 99999 PR PBB SHADOW E&M-EST. PATIENT-LVL IV: CPT | Mod: PBBFAC,,, | Performed by: OTOLARYNGOLOGY

## 2019-05-17 PROCEDURE — 99214 PR OFFICE/OUTPT VISIT, EST, LEVL IV, 30-39 MIN: ICD-10-PCS | Mod: S$GLB,,, | Performed by: OTOLARYNGOLOGY

## 2019-05-17 NOTE — PROGRESS NOTES
Referring Provider:    No referring provider defined for this encounter.  Subjective:   Patient: Janine Souza [id number], :1960   Visit date:2019 11:08 AM    Chief Complaint:  No chief complaint on file.    HPI:  Janine is a 59 y.o. female who I was asked to see in consultation for evaluation of the following issue(s): right parotid mass.  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  About 9 months ago, she noticed a small lump near head of right clavicle on anterior neck (has port nearby and thought maybe related to that).  She mentioned to Dr. Flores who then ordered imaging and was found to have inferior right parotid lesion.  She is on Xarelto and is followed by Cardiology (Dr. Sánchez at  Cardiology).  She has pacemaker. She has a common duct stent due to her neuroendocrine tumor.  This was attempted to be changed but she went into SVT.  This led to the pacemaker.      She has not noticed any swelling or masses near the right parotid but has had occasional tenderness beneath right ear since  (before cancer diagnosis).  Has never had erythema, warmth or swelling in that area.  Denies any pain associated with eating or drinking.  Denies trismus or otalgia but says her right ear occasionally feels stuffy.  She denies any radiation to her head or neck.  Denies any recent facial trauma or dental work; denies any drooling or trouble swallowing solids or liquids.  Denies hoarseness; denies facial weakness.  Denies fever or weight loss.  She does not smoke.        Review of Systems:  Negative unless checked off.  Gen:  []fever   []fatigue  HENT:  [x]nosebleeds (rare)  []dental problem   Eyes:  []photophobia  []visual disturbance  Resp:  []chest tightness []wheezing  Card:  []chest pain  []leg swelling  GI:  []abdominal pain []blood in stool  :  []dysuria  []hematuria  Musc:  []joint swelling  []gait  problem  Skin:  []color change  []pallor  Neuro:  []seizures  []numbness  Hem:  []bruise/bleed easily  Psych:  []hallucinations  []behavioral problems  Allergy/Imm: is allergic to pcn [penicillins].    Her meds, allergies, medical, surgical, social & family histories were reviewed & updated:  -     She has a current medication list which includes the following prescription(s): blood sugar diagnostic, disc, escitalopram oxalate, fluticasone propionate, furosemide, glipizide, klor-con m20, lancets, linagliptin, metformin, metoprolol tartrate, metoprolol tartrate, rivaroxaban, and zolpidem, and the following Facility-Administered Medications: ferumoxytol (FERAHEME) 510 mg in dextrose 5 % 100 mL IVPB, heparin, porcine (pf), and sodium chloride 0.9%.  -     She  has a past medical history of Acute headache (2016), Anemia, Atrial fibrillation, persistent, Bronchitis, CHF (congestive heart failure), Diabetes mellitus, type 2, Hypertension, Malignant poorly differentiated neuroendocrine carcinoma (2016), Malignant poorly differentiated neuroendocrine carcinoma (2016), and Secondary neuroendocrine tumor of liver (2016).   -     She does not have any pertinent problems on file.   -     She  has a past surgical history that includes Tonsillectomy; Adenoidectomy; uvulopalatopharygoplasty;  section; Cardiac electrophysiology mapping and ablation; ERCP; Cardiac surgery; ERCP (N/A, 2018); Cardioversion (N/A, 2018); and A-V cardiac pacemaker insertion (Left, 2018).  -     She  reports that she has never smoked. She has never used smokeless tobacco. She reports that she does not drink alcohol or use drugs.  -     Her family history includes Breast cancer in her maternal aunt and paternal aunt; Diabetes type II in her mother; Prostate cancer in her father.  -     She is allergic to pcn [penicillins].    Objective:     Physical Exam:  Vitals:  124/78; pulse 72    General appearance:  Well developed,  well nourished.  Face is symmetric    Eyes:  Extraocular motions intact, PERRL    Communication:  no hoarseness, no dysphonia    Ears:  Otoscopy of external auditory canals and tympanic membranes was normal, clinical speech reception thresholds grossly intact, no mass/lesion of auricle.  Nose:  No masses/lesions of external nose, nasal mucosa normal, septum midline, and inferior turbinates resected.  Able to see head of right middle turbinate  Mouth:  No mass/lesion of lips, teeth, gums, hard/soft palate, tongue, tonsils, or oropharynx.  S/p uvulopalatopharygoplasty    Cardiovascular:   Radial Pulses +2     Neck & Lymphatics:  No cervical lymphadenopathy, no neck mass/crepitus/ asymmetry, trachea is midline, no thyroid enlargement/tenderness/mass.  Right tail of parotid with small palpable mass.   Psych: Oriented x3,  Alert with normal mood and affect.     Respiration/Chest:  Symmetric expansion during respiration, normal respiratory effort.    Skin:  Warm and intact. No ulcerations of face, scalp, neck.      CT Neck 4/30/19:  EXAMINATION:  CT SOFT TISSUE NECK WITH CONTRAST    CLINICAL HISTORY:  Other diseases of esophagus;Other specified diseases of esophagus    TECHNIQUE:  Low dose axial images as well as sagittal and coronal reconstructions were performed from the skull base to the clavicles following the intravenous administration of 75 mL of Omnipaque 350.    COMPARISON:  None    FINDINGS:  Visualized brain base and orbits are unremarkable.  Mastoid air cells and visualized paranasal sinuses clear.  Multilevel degenerative changes of the cervical spine noted.  No suspicious osseous lesion.    1.5 x 1.0 well-defined inferior right parotid gland mass present.  6 mm and 4 mm inferior left parotid gland nodules present more suggestive of tiny lymph nodes.    Nasopharynx/oropharynx unremarkable.  Supraglottic airway unremarkable.  Larynx unremarkable.  Visualized upper trachea unremarkable.  Thyroid unchanged.   Submandibular glands unremarkable.    No pathologically enlarged submandibular, cervical or supraclavicular lymph nodes present.  Please see concurrent CT chest report for upper chest findings.      Impression       1.5 cm inferior right parotid lobe lesion.  Finding is nonspecific with malignancy not excluded.  FNA recommended for definitive diagnosis.  Smaller left parotid nodules, likely lymph nodes.         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  (Electronically Signed: 2019-05-14 16:44:16)  Page  Assessment & Plan:   There are no diagnoses linked to this encounter.      Janine presents with a mass of the right parotid gland.  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     We discussed that the most common tumors of the parotid gland are pleomorphic adenomas.  These are benign masses that have a possibility of becoming cancerous in up to 6% of cases.  We also discussed that Warthin's tumors are the second most common tumors and are not generally associated with a risk of cancer.      We discussed that she is certainly at an increased risk of perioperative complications related to her cardiovascular disease as well as her chronic anticoagulation.  We discussed the options of proceeding directly with surgery versus observation.  I feel that observation is very reasonable given her cytology results.      We will plan for an ultrasound in 3 months.  She will contact me if she notes increased size, pain or other worrisome features.

## 2019-05-17 NOTE — TELEPHONE ENCOUNTER
----- Message from Any Stratton sent at 5/17/2019  8:54 AM CDT -----  Contact: self [phone]   Patient Returning Call from [facility]    Who Left Message for Patient: Bobbi  Communication Preference: self [phone]  Additional Information:

## 2019-05-17 NOTE — Clinical Note
Her biopsy is consistent with a benign tumor of the parotid.  I think that with her recent problems just trying to change out the stent, its best just to keep an eye on this.  I will repeat her imaging in 12 weeks.

## 2019-05-24 ENCOUNTER — PATIENT MESSAGE (OUTPATIENT)
Dept: ENDOSCOPY | Facility: HOSPITAL | Age: 59
End: 2019-05-24

## 2019-06-05 ENCOUNTER — PATIENT MESSAGE (OUTPATIENT)
Dept: HEMATOLOGY/ONCOLOGY | Facility: CLINIC | Age: 59
End: 2019-06-05

## 2019-06-07 ENCOUNTER — LAB VISIT (OUTPATIENT)
Dept: LAB | Facility: HOSPITAL | Age: 59
End: 2019-06-07
Attending: INTERNAL MEDICINE
Payer: MEDICARE

## 2019-06-07 DIAGNOSIS — C78.6 METASTATIC CANCER TO RETROPERITONEUM: ICD-10-CM

## 2019-06-07 DIAGNOSIS — C7B.8 SECONDARY NEUROENDOCRINE TUMOR OF LIVER: ICD-10-CM

## 2019-06-07 DIAGNOSIS — D50.0 IRON DEFICIENCY ANEMIA DUE TO CHRONIC BLOOD LOSS: ICD-10-CM

## 2019-06-07 DIAGNOSIS — C7A.1 MALIGNANT POORLY DIFFERENTIATED NEUROENDOCRINE CARCINOMA: ICD-10-CM

## 2019-06-07 LAB
ALBUMIN SERPL BCP-MCNC: 4 G/DL (ref 3.5–5.2)
ALP SERPL-CCNC: 70 U/L (ref 55–135)
ALT SERPL W/O P-5'-P-CCNC: 16 U/L (ref 10–44)
ANION GAP SERPL CALC-SCNC: 13 MMOL/L (ref 8–16)
AST SERPL-CCNC: 14 U/L (ref 10–40)
BASOPHILS # BLD AUTO: 0.02 K/UL (ref 0–0.2)
BASOPHILS NFR BLD: 0.2 % (ref 0–1.9)
BILIRUB SERPL-MCNC: 0.3 MG/DL (ref 0.1–1)
BUN SERPL-MCNC: 35 MG/DL (ref 6–20)
CALCIUM SERPL-MCNC: 10.2 MG/DL (ref 8.7–10.5)
CHLORIDE SERPL-SCNC: 104 MMOL/L (ref 95–110)
CO2 SERPL-SCNC: 25 MMOL/L (ref 23–29)
CREAT SERPL-MCNC: 1.4 MG/DL (ref 0.5–1.4)
DIFFERENTIAL METHOD: ABNORMAL
EOSINOPHIL # BLD AUTO: 0.3 K/UL (ref 0–0.5)
EOSINOPHIL NFR BLD: 3 % (ref 0–8)
ERYTHROCYTE [DISTWIDTH] IN BLOOD BY AUTOMATED COUNT: 14 % (ref 11.5–14.5)
EST. GFR  (AFRICAN AMERICAN): 48 ML/MIN/1.73 M^2
EST. GFR  (NON AFRICAN AMERICAN): 41 ML/MIN/1.73 M^2
GLUCOSE SERPL-MCNC: 142 MG/DL (ref 70–110)
HCT VFR BLD AUTO: 35.9 % (ref 37–48.5)
HGB BLD-MCNC: 11.8 G/DL (ref 12–16)
IRON SERPL-MCNC: 71 UG/DL (ref 30–160)
LYMPHOCYTES # BLD AUTO: 2.1 K/UL (ref 1–4.8)
LYMPHOCYTES NFR BLD: 18.4 % (ref 18–48)
MCH RBC QN AUTO: 32.2 PG (ref 27–31)
MCHC RBC AUTO-ENTMCNC: 32.9 G/DL (ref 32–36)
MCV RBC AUTO: 98 FL (ref 82–98)
MONOCYTES # BLD AUTO: 1 K/UL (ref 0.3–1)
MONOCYTES NFR BLD: 8.7 % (ref 4–15)
NEUTROPHILS # BLD AUTO: 7.8 K/UL (ref 1.8–7.7)
NEUTROPHILS NFR BLD: 69.7 % (ref 38–73)
PLATELET # BLD AUTO: 229 K/UL (ref 150–350)
PMV BLD AUTO: 9.2 FL (ref 9.2–12.9)
POTASSIUM SERPL-SCNC: 4.3 MMOL/L (ref 3.5–5.1)
PROT SERPL-MCNC: 9.6 G/DL (ref 6–8.4)
RBC # BLD AUTO: 3.67 M/UL (ref 4–5.4)
SATURATED IRON: 18 % (ref 20–50)
SODIUM SERPL-SCNC: 142 MMOL/L (ref 136–145)
TOTAL IRON BINDING CAPACITY: 394 UG/DL (ref 250–450)
TRANSFERRIN SERPL-MCNC: 266 MG/DL (ref 200–375)
WBC # BLD AUTO: 11.17 K/UL (ref 3.9–12.7)

## 2019-06-07 PROCEDURE — 36415 COLL VENOUS BLD VENIPUNCTURE: CPT

## 2019-06-07 PROCEDURE — 80053 COMPREHEN METABOLIC PANEL: CPT

## 2019-06-07 PROCEDURE — 83540 ASSAY OF IRON: CPT

## 2019-06-07 PROCEDURE — 85025 COMPLETE CBC W/AUTO DIFF WBC: CPT

## 2019-06-07 PROCEDURE — 82728 ASSAY OF FERRITIN: CPT

## 2019-06-08 LAB — FERRITIN SERPL-MCNC: 935 NG/ML (ref 20–300)

## 2019-06-20 ENCOUNTER — OFFICE VISIT (OUTPATIENT)
Dept: HEMATOLOGY/ONCOLOGY | Facility: CLINIC | Age: 59
End: 2019-06-20
Payer: MEDICARE

## 2019-06-20 ENCOUNTER — INFUSION (OUTPATIENT)
Dept: INFUSION THERAPY | Facility: HOSPITAL | Age: 59
End: 2019-06-20
Attending: INTERNAL MEDICINE
Payer: MEDICARE

## 2019-06-20 VITALS
HEART RATE: 128 BPM | WEIGHT: 215.38 LBS | OXYGEN SATURATION: 97 % | SYSTOLIC BLOOD PRESSURE: 117 MMHG | DIASTOLIC BLOOD PRESSURE: 79 MMHG | TEMPERATURE: 97 F | BODY MASS INDEX: 36.77 KG/M2 | HEIGHT: 64 IN

## 2019-06-20 DIAGNOSIS — E07.89 THYROID FULLNESS: ICD-10-CM

## 2019-06-20 DIAGNOSIS — C7B.8 SECONDARY NEUROENDOCRINE TUMOR OF LIVER: Primary | ICD-10-CM

## 2019-06-20 DIAGNOSIS — C78.6 METASTATIC CANCER TO RETROPERITONEUM: ICD-10-CM

## 2019-06-20 DIAGNOSIS — R22.1 NECK MASS: ICD-10-CM

## 2019-06-20 DIAGNOSIS — D49.89 NEOPLASM OF ABDOMEN: ICD-10-CM

## 2019-06-20 PROCEDURE — 3008F PR BODY MASS INDEX (BMI) DOCUMENTED: ICD-10-PCS | Mod: CPTII,S$GLB,, | Performed by: INTERNAL MEDICINE

## 2019-06-20 PROCEDURE — 63600175 PHARM REV CODE 636 W HCPCS: Performed by: INTERNAL MEDICINE

## 2019-06-20 PROCEDURE — 3074F PR MOST RECENT SYSTOLIC BLOOD PRESSURE < 130 MM HG: ICD-10-PCS | Mod: CPTII,S$GLB,, | Performed by: INTERNAL MEDICINE

## 2019-06-20 PROCEDURE — 99999 PR PBB SHADOW E&M-EST. PATIENT-LVL III: CPT | Mod: PBBFAC,,, | Performed by: INTERNAL MEDICINE

## 2019-06-20 PROCEDURE — 96523 IRRIG DRUG DELIVERY DEVICE: CPT

## 2019-06-20 PROCEDURE — 99999 PR PBB SHADOW E&M-EST. PATIENT-LVL III: ICD-10-PCS | Mod: PBBFAC,,, | Performed by: INTERNAL MEDICINE

## 2019-06-20 PROCEDURE — A4216 STERILE WATER/SALINE, 10 ML: HCPCS | Performed by: INTERNAL MEDICINE

## 2019-06-20 PROCEDURE — 3078F PR MOST RECENT DIASTOLIC BLOOD PRESSURE < 80 MM HG: ICD-10-PCS | Mod: CPTII,S$GLB,, | Performed by: INTERNAL MEDICINE

## 2019-06-20 PROCEDURE — 3074F SYST BP LT 130 MM HG: CPT | Mod: CPTII,S$GLB,, | Performed by: INTERNAL MEDICINE

## 2019-06-20 PROCEDURE — 99214 PR OFFICE/OUTPT VISIT, EST, LEVL IV, 30-39 MIN: ICD-10-PCS | Mod: 25,S$GLB,, | Performed by: INTERNAL MEDICINE

## 2019-06-20 PROCEDURE — 99214 OFFICE O/P EST MOD 30 MIN: CPT | Mod: 25,S$GLB,, | Performed by: INTERNAL MEDICINE

## 2019-06-20 PROCEDURE — 3078F DIAST BP <80 MM HG: CPT | Mod: CPTII,S$GLB,, | Performed by: INTERNAL MEDICINE

## 2019-06-20 PROCEDURE — 25000003 PHARM REV CODE 250: Performed by: INTERNAL MEDICINE

## 2019-06-20 PROCEDURE — 3008F BODY MASS INDEX DOCD: CPT | Mod: CPTII,S$GLB,, | Performed by: INTERNAL MEDICINE

## 2019-06-20 RX ORDER — HEPARIN 100 UNIT/ML
500 SYRINGE INTRAVENOUS
Status: COMPLETED | OUTPATIENT
Start: 2019-06-20 | End: 2019-06-20

## 2019-06-20 RX ORDER — SODIUM CHLORIDE 0.9 % (FLUSH) 0.9 %
10 SYRINGE (ML) INJECTION
Status: CANCELLED | OUTPATIENT
Start: 2019-06-20

## 2019-06-20 RX ORDER — SODIUM CHLORIDE 0.9 % (FLUSH) 0.9 %
10 SYRINGE (ML) INJECTION
Status: COMPLETED | OUTPATIENT
Start: 2019-06-20 | End: 2019-06-20

## 2019-06-20 RX ORDER — SOTALOL HYDROCHLORIDE 80 MG/1
80 TABLET ORAL DAILY
Status: ON HOLD | COMMUNITY
Start: 2019-06-19 | End: 2022-01-01 | Stop reason: HOSPADM

## 2019-06-20 RX ORDER — HEPARIN 100 UNIT/ML
500 SYRINGE INTRAVENOUS
Status: CANCELLED | OUTPATIENT
Start: 2019-06-20

## 2019-06-20 RX ADMIN — HEPARIN 500 UNITS: 100 SYRINGE at 03:06

## 2019-06-20 RX ADMIN — SODIUM CHLORIDE, PRESERVATIVE FREE 10 ML: 5 INJECTION INTRAVENOUS at 03:06

## 2019-06-20 NOTE — PROGRESS NOTES
Subjective:       Patient ID: Janine Souza is a 59 y.o. female.    Chief Complaint: Results and Cancer    HPI 59-year-old female history of high-grade neuroendocrine tumor status post chemotherapy with residual.  No evidence of progression patient has stent with decision as to whether not to remove patient otherwise is asymptomatic was found have a parotid mass being monitored by ENT 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     Family History   Problem Relation Age of Onset    Diabetes type II Mother     Prostate cancer Father     Breast cancer Paternal Aunt         breast    Breast cancer Maternal Aunt         breast     Social History     Socioeconomic History    Marital status:      Spouse name: Not on file    Number of children: Not on file    Years of education: Not on file    Highest education level: Not on file   Occupational History    Not on file   Social Needs    Financial resource strain: Not on file    Food insecurity:     Worry: Not on file     Inability: Not on file    Transportation needs:     Medical: Not on file     Non-medical: Not on file   Tobacco Use    Smoking status: Never Smoker    Smokeless tobacco: Never Used   Substance and Sexual Activity    Alcohol use: No     Alcohol/week: 0.0 oz    Drug use: No    Sexual activity: Not on file   Lifestyle    Physical activity:     Days per week: Not on file     Minutes per session: Not on file    Stress: Not on file   Relationships    Social connections:     Talks on phone: Not on file     Gets together: Not on file     Attends Religion service:  Not on file     Active member of club or organization: Not on file     Attends meetings of clubs or organizations: Not on file     Relationship status: Not on file   Other Topics Concern    Not on file   Social History Narrative    Not on file     Past Surgical History:   Procedure Laterality Date    ADENOIDECTOMY      CARDIAC ELECTROPHYSIOLOGY MAPPING AND ABLATION      CARDIAC SURGERY      CARDIOVERSION N/A 2018    Performed by [facility]re [facility] at [facility] CATH LAB     SECTION      ECHOCARDIOGRAM,TRANSESOPHAGEAL N/A 2018    Performed by [facility] Diagnostic Provider at [facility] CATH LAB    ERCP      ERCP (ENDOSCOPIC RETROGRADE CHOLANGIOPANCREATOGRAPHY) N/A 2018    Performed by Tang Tanner MD at [facility] ENDO (2ND FLR)    INSERTION, CARDIAC PACEMAKER, DUAL CHAMBER Left 2018    Performed by [facility]re [facility] at [facility] CATH LAB    VCSAWFDGK-LWVH-Y-CATH N/A 2016    Per[facility] by [facility] at [facility] OR    TONSILLECTOMY      ULTRASOUND-ENDOSCOPIC-UPPER N/A 2015    Performed by Misael Treadwell MD at [facility] ENDO (2ND FLR)    uvulopalatopharygoplasty         Labs:  Lab Results   Component Value Date    WBC 11.17 2019    HGB 11.8 (L) 2019    HCT 35.9 (L) 2019    MCV 98 2019     2019     BMP  Lab Results   Component Value Date     2019    K 4.3 2019     2019    CO2 25 2019    BUN 35 (H) 2019    CREATININE 1.4 2019    CALCIUM 10.2 2019    ANIONGAP 13 2019    ESTGFRAFRICA 48 (A) 2019    EGFRNONAA 41 (A) 2019     Lab Results   Component Value Date    ALT 16 2019    AST 14 2019    ALKPHOS 70 2019    BILITOT 0.3 2019       Lab Results   Component Value Date    IRON 71 2019    TIBC 394 2019    FERRITIN 935 (H) 2019     No results found for: OCMGKYIM85  No results found for: FOLATE  Lab Results   Component Value Date    TSH 3.802 2018          Review of Systems   Constitutional: Negative for activity change, appetite change, chills, diaphoresis, fatigue, fever and unexpected weight change.   HENT: Negative for congestion, dental problem, drooling, ear discharge, ear pain, facial swelling, hearing loss, mouth sores, nosebleeds, postnasal drip, rhinorrhea, sinus pressure, sneezing, sore throat, tinnitus, trouble swallowing and voice change.    Eyes: Negative for photophobia, pain, discharge, redness, itching and visual disturbance.   Respiratory: Negative for cough, choking, chest tightness, shortness of breath, wheezing and stridor.    Cardiovascular: Negative for chest pain, palpitations and leg swelling.   Gastrointestinal: Negative for abdominal distention, abdominal pain, anal bleeding, blood in stool, constipation, diarrhea, nausea, rectal pain and vomiting.   Endocrine: Negative for cold intolerance, heat intolerance, polydipsia, polyphagia and polyuria.   Genitourinary: Negative for decreased urine volume, difficulty urinating, dyspareunia, dysuria, enuresis, flank pain, frequency, genital sores, hematuria, menstrual problem, pelvic pain, urgency, vaginal bleeding, vaginal discharge and vaginal pain.   Musculoskeletal: Negative for arthralgias, back pain, gait problem, joint swelling, myalgias, neck pain and neck stiffness.   Skin: Negative for color change, pallor and rash.   Allergic/Immunologic: Negative for environmental allergies, food allergies and immunocompromised state.   Neurological: Negative for dizziness, tremors, seizures, syncope, facial asymmetry, speech difficulty, weakness, light-headedness, numbness and headaches.   Hematological: Negative for adenopathy. Does not bruise/bleed easily.   Psychiatric/Behavioral: Negative for agitation, behavioral problems, confusion, decreased concentration, dysphoric mood, hallucinations, self-injury, sleep disturbance and suicidal ideas. The patient is not nervous/anxious and is not  hyperactive.        Objective:      Physical Exam   Constitutional: She is oriented to person, place, and time. She appears well-developed and well-nourished. She appears distressed.   HENT:   Head: Normocephalic and atraumatic.   Right Ear: External ear normal.   Left Ear: External ear normal.   Nose: Nose normal. Right sinus exhibits no maxillary sinus tenderness and no frontal sinus tenderness. Left sinus exhibits no maxillary sinus tenderness and no frontal sinus tenderness.   Mouth/Throat: Oropharynx is clear and moist. No oropharyngeal exudate.   Eyes: Pupils are equal, round, and reactive to light. Conjunctivae, EOM and lids are normal. Right eye exhibits no discharge. Left eye exhibits no discharge. Right conjunctiva is not injected. Right conjunctiva has no hemorrhage. Left conjunctiva is not injected. Left conjunctiva has no hemorrhage. No scleral icterus.   Neck: Normal range of motion. Neck supple. No JVD present. No tracheal deviation present. No thyromegaly present.   Cardiovascular: Normal rate, regular rhythm, normal heart sounds and intact distal pulses.   Pulmonary/Chest: Effort normal and breath sounds normal. No stridor. No respiratory distress. She exhibits no tenderness.   Abdominal: Soft. Bowel sounds are normal. She exhibits no distension and no mass. There is no splenomegaly or hepatomegaly. There is no tenderness. There is no rebound.   Musculoskeletal: Normal range of motion. She exhibits no edema or tenderness.   Lymphadenopathy:     She has no cervical adenopathy.     She has no axillary adenopathy.        Right: No supraclavicular adenopathy present.        Left: No supraclavicular adenopathy present.   Neurological: She is alert and oriented to person, place, and time. No cranial nerve deficit. Coordination normal.   Skin: Skin is dry. No rash noted. She is not diaphoretic. No erythema.   Psychiatric: She has a normal mood and affect. Her behavior is normal. Judgment and thought  content normal.   Vitals reviewed.          Assessment:      1. Secondary neuroendocrine tumor of liver    2. 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 Neck mass    4. Neoplasm of abdomen    5. Thyroid fullness           Plan:     High-grade neuroendocrine tumor appears to be in remission no evidence of progression warts tumor evaluated by ENT will proceed with repeat imaging in August of 2019 with labs imaging studies in follow-up for discussion flush port today        Kalyan Flores Jr, MD FACP

## 2019-06-20 NOTE — DISCHARGE INSTRUCTIONS
[facility]  [address]  [address]  [phone] phone     [phone] fax  Hours of Operation: Monday- Friday 8:00am- 5:00pm  After hours phone  [phone]  Hematology / Oncology Physicians on call      Dr. Mark Morris      Please call with any concerns regarding your appointment today.    WAYS TO HELP PREVENT INFECTION         WASH YOUR HANDS OFTEN DURING THE DAY, ESPECIALLY BEFORE YOU EAT, AFTER USING THE BATHROOM, AND AFTER TOUCHING ANIMALS     STAY AWAY FROM PEOPLE WHO HAVE ILLNESSES YOU CAN CATCH; SUCH AS COLDS, FLU, CHICKEN POX     TRY TO AVOID CROWDS     STAY AWAY FROM CHILDREN WHO RECENTLY HAVE RECEIVED LIVE VIRUS VACCINES     MAINTAIN GOOD MOUTH CARE     DO NOT SQUEEZE OR SCRATCH PIMPLES     CLEAN CUTS & SCRAPES RIGHT AWAY AND DAILY UNTIL HEALED WITH WARM WATER, SOAP & AN ANTISEPTIC     AVOID CONTACT WITH LITTER BOXES, BIRD CAGES, & FISH TANKS     AVOID STANDING WATER, IE., BIRD BATHS, FLOWER POTS/VASES, OR HUMIDIFIERS     WEAR GLOVES WHEN GARDENING OR CLEANING UP AFTER OTHERS, ESPECIALLY BABIES & SMALL CHILDREN     DO NOT EAT RAW FISH, SEAFOOD, MEAT, OR EGGS

## 2019-07-02 ENCOUNTER — PATIENT MESSAGE (OUTPATIENT)
Dept: OTOLARYNGOLOGY | Facility: CLINIC | Age: 59
End: 2019-07-02

## 2019-07-05 ENCOUNTER — TELEPHONE (OUTPATIENT)
Dept: ENDOSCOPY | Facility: HOSPITAL | Age: 59
End: 2019-07-05

## 2019-07-11 ENCOUNTER — PATIENT MESSAGE (OUTPATIENT)
Dept: GASTROENTEROLOGY | Facility: CLINIC | Age: 59
End: 2019-07-11

## 2019-07-17 RX ORDER — GLIPIZIDE 10 MG/1
TABLET ORAL
Qty: 180 TABLET | Refills: 3 | Status: SHIPPED | OUTPATIENT
Start: 2019-07-17 | End: 2019-12-10

## 2019-08-02 ENCOUNTER — PATIENT OUTREACH (OUTPATIENT)
Dept: ADMINISTRATIVE | Facility: HOSPITAL | Age: 59
End: 2019-08-02

## 2019-08-09 ENCOUNTER — TELEPHONE (OUTPATIENT)
Dept: ENDOSCOPY | Facility: HOSPITAL | Age: 59
End: 2019-08-09

## 2019-08-09 NOTE — TELEPHONE ENCOUNTER
Called about ERCP scheduled 8/23/19 at 0800.  Left voicemail with call back number for questions.  Instructions mailed.  She will hold Xarelto for 2 days prior to procedure with permission from Dr Kalyan Flores.

## 2019-08-09 NOTE — TELEPHONE ENCOUNTER
Instructions mailed for ERCP rescheduled 8/27/19 at 1030.  She will hold Xarelto for 2 days prior to procedure with permission from Dr Kalyan Flores.

## 2019-08-12 ENCOUNTER — HOSPITAL ENCOUNTER (OUTPATIENT)
Dept: RADIOLOGY | Facility: HOSPITAL | Age: 59
Discharge: HOME | End: 2019-08-12
Attending: INTERNAL MEDICINE
Payer: MEDICARE

## 2019-08-12 DIAGNOSIS — E07.89 THYROID FULLNESS: ICD-10-CM

## 2019-08-12 DIAGNOSIS — C7B.8 SECONDARY NEUROENDOCRINE TUMOR OF LIVER: ICD-10-CM

## 2019-08-12 DIAGNOSIS — C78.6 METASTATIC CANCER TO RETROPERITONEUM: ICD-10-CM

## 2019-08-12 DIAGNOSIS — D49.89 NEOPLASM OF ABDOMEN: ICD-10-CM

## 2019-08-12 DIAGNOSIS — R22.1 NECK MASS: ICD-10-CM

## 2019-08-12 PROCEDURE — 71260 CT THORAX DX C+: CPT | Mod: TC

## 2019-08-12 PROCEDURE — 70492 CT SFT TSUE NCK W/O & W/DYE: CPT | Mod: TC

## 2019-08-12 PROCEDURE — 25500020 PHARM REV CODE 255: Performed by: INTERNAL MEDICINE

## 2019-08-12 PROCEDURE — 74177 CT ABD & PELVIS W/CONTRAST: CPT | Mod: TC

## 2019-08-12 RX ADMIN — IOHEXOL 100 ML: 350 INJECTION, SOLUTION INTRAVENOUS at 12:08

## 2019-08-12 RX ADMIN — IOHEXOL 30 ML: 350 INJECTION, SOLUTION INTRAVENOUS at 10:08

## 2019-08-13 ENCOUNTER — PATIENT MESSAGE (OUTPATIENT)
Dept: HEMATOLOGY/ONCOLOGY | Facility: CLINIC | Age: 59
End: 2019-08-13

## 2019-08-14 ENCOUNTER — INFUSION (OUTPATIENT)
Dept: INFUSION THERAPY | Facility: HOSPITAL | Age: 59
End: 2019-08-14
Attending: INTERNAL MEDICINE
Payer: MEDICARE

## 2019-08-14 ENCOUNTER — OFFICE VISIT (OUTPATIENT)
Dept: HEMATOLOGY/ONCOLOGY | Facility: CLINIC | Age: 59
End: 2019-08-14
Payer: MEDICARE

## 2019-08-14 VITALS
DIASTOLIC BLOOD PRESSURE: 82 MMHG | HEIGHT: 64 IN | SYSTOLIC BLOOD PRESSURE: 139 MMHG | BODY MASS INDEX: 38.17 KG/M2 | HEART RATE: 61 BPM | WEIGHT: 223.56 LBS | OXYGEN SATURATION: 94 % | RESPIRATION RATE: 20 BRPM | TEMPERATURE: 98 F

## 2019-08-14 DIAGNOSIS — D50.0 IRON DEFICIENCY ANEMIA DUE TO CHRONIC BLOOD LOSS: Primary | ICD-10-CM

## 2019-08-14 DIAGNOSIS — D50.0 IRON DEFICIENCY ANEMIA DUE TO CHRONIC BLOOD LOSS: ICD-10-CM

## 2019-08-14 DIAGNOSIS — C7B.8 SECONDARY NEUROENDOCRINE TUMOR OF LIVER: Primary | ICD-10-CM

## 2019-08-14 DIAGNOSIS — C7B.8 SECONDARY NEUROENDOCRINE TUMOR OF LIVER: ICD-10-CM

## 2019-08-14 LAB
ALBUMIN SERPL BCP-MCNC: 3.5 G/DL (ref 3.5–5.2)
ALP SERPL-CCNC: 68 U/L (ref 55–135)
ALT SERPL W/O P-5'-P-CCNC: 14 U/L (ref 10–44)
ANION GAP SERPL CALC-SCNC: 12 MMOL/L (ref 8–16)
AST SERPL-CCNC: 11 U/L (ref 10–40)
BASOPHILS # BLD AUTO: 0.02 K/UL (ref 0–0.2)
BASOPHILS NFR BLD: 0.3 % (ref 0–1.9)
BILIRUB SERPL-MCNC: 0.4 MG/DL (ref 0.1–1)
BUN SERPL-MCNC: 19 MG/DL (ref 6–20)
CALCIUM SERPL-MCNC: 9.7 MG/DL (ref 8.7–10.5)
CHLORIDE SERPL-SCNC: 103 MMOL/L (ref 95–110)
CO2 SERPL-SCNC: 24 MMOL/L (ref 23–29)
CREAT SERPL-MCNC: 1.1 MG/DL (ref 0.5–1.4)
DIFFERENTIAL METHOD: ABNORMAL
EOSINOPHIL # BLD AUTO: 0.2 K/UL (ref 0–0.5)
EOSINOPHIL NFR BLD: 2.7 % (ref 0–8)
ERYTHROCYTE [DISTWIDTH] IN BLOOD BY AUTOMATED COUNT: 13.9 % (ref 11.5–14.5)
EST. GFR  (AFRICAN AMERICAN): >60 ML/MIN/1.73 M^2
EST. GFR  (NON AFRICAN AMERICAN): 55 ML/MIN/1.73 M^2
FERRITIN SERPL-MCNC: 996 NG/ML (ref 20–300)
GLUCOSE SERPL-MCNC: 208 MG/DL (ref 70–110)
HCT VFR BLD AUTO: 30.5 % (ref 37–48.5)
HGB BLD-MCNC: 10.1 G/DL (ref 12–16)
IRON SERPL-MCNC: 56 UG/DL (ref 30–160)
LYMPHOCYTES # BLD AUTO: 1.6 K/UL (ref 1–4.8)
LYMPHOCYTES NFR BLD: 20.5 % (ref 18–48)
MCH RBC QN AUTO: 32 PG (ref 27–31)
MCHC RBC AUTO-ENTMCNC: 33.1 G/DL (ref 32–36)
MCV RBC AUTO: 97 FL (ref 82–98)
MONOCYTES # BLD AUTO: 0.7 K/UL (ref 0.3–1)
MONOCYTES NFR BLD: 8.7 % (ref 4–15)
NEUTROPHILS # BLD AUTO: 5.3 K/UL (ref 1.8–7.7)
NEUTROPHILS NFR BLD: 68.3 % (ref 38–73)
PLATELET # BLD AUTO: 132 K/UL (ref 150–350)
PMV BLD AUTO: 10.3 FL (ref 9.2–12.9)
POTASSIUM SERPL-SCNC: 4.1 MMOL/L (ref 3.5–5.1)
PROT SERPL-MCNC: 8.7 G/DL (ref 6–8.4)
RBC # BLD AUTO: 3.16 M/UL (ref 4–5.4)
SATURATED IRON: 15 % (ref 20–50)
SODIUM SERPL-SCNC: 139 MMOL/L (ref 136–145)
TOTAL IRON BINDING CAPACITY: 376 UG/DL (ref 250–450)
TRANSFERRIN SERPL-MCNC: 254 MG/DL (ref 200–375)
WBC # BLD AUTO: 7.82 K/UL (ref 3.9–12.7)

## 2019-08-14 PROCEDURE — 3075F PR MOST RECENT SYSTOLIC BLOOD PRESS GE 130-139MM HG: ICD-10-PCS | Mod: CPTII,S$GLB,, | Performed by: INTERNAL MEDICINE

## 2019-08-14 PROCEDURE — 63600175 PHARM REV CODE 636 W HCPCS: Performed by: INTERNAL MEDICINE

## 2019-08-14 PROCEDURE — 82728 ASSAY OF FERRITIN: CPT

## 2019-08-14 PROCEDURE — 3075F SYST BP GE 130 - 139MM HG: CPT | Mod: CPTII,S$GLB,, | Performed by: INTERNAL MEDICINE

## 2019-08-14 PROCEDURE — 25000003 PHARM REV CODE 250: Performed by: INTERNAL MEDICINE

## 2019-08-14 PROCEDURE — 3008F BODY MASS INDEX DOCD: CPT | Mod: CPTII,S$GLB,, | Performed by: INTERNAL MEDICINE

## 2019-08-14 PROCEDURE — 36591 DRAW BLOOD OFF VENOUS DEVICE: CPT

## 2019-08-14 PROCEDURE — 3079F PR MOST RECENT DIASTOLIC BLOOD PRESSURE 80-89 MM HG: ICD-10-PCS | Mod: CPTII,S$GLB,, | Performed by: INTERNAL MEDICINE

## 2019-08-14 PROCEDURE — 99214 PR OFFICE/OUTPT VISIT, EST, LEVL IV, 30-39 MIN: ICD-10-PCS | Mod: 25,S$GLB,, | Performed by: INTERNAL MEDICINE

## 2019-08-14 PROCEDURE — A4216 STERILE WATER/SALINE, 10 ML: HCPCS | Performed by: INTERNAL MEDICINE

## 2019-08-14 PROCEDURE — 85025 COMPLETE CBC W/AUTO DIFF WBC: CPT

## 2019-08-14 PROCEDURE — 80053 COMPREHEN METABOLIC PANEL: CPT

## 2019-08-14 PROCEDURE — 99999 PR PBB SHADOW E&M-EST. PATIENT-LVL III: ICD-10-PCS | Mod: PBBFAC,,, | Performed by: INTERNAL MEDICINE

## 2019-08-14 PROCEDURE — 3079F DIAST BP 80-89 MM HG: CPT | Mod: CPTII,S$GLB,, | Performed by: INTERNAL MEDICINE

## 2019-08-14 PROCEDURE — 99999 PR PBB SHADOW E&M-EST. PATIENT-LVL III: CPT | Mod: PBBFAC,,, | Performed by: INTERNAL MEDICINE

## 2019-08-14 PROCEDURE — 3008F PR BODY MASS INDEX (BMI) DOCUMENTED: ICD-10-PCS | Mod: CPTII,S$GLB,, | Performed by: INTERNAL MEDICINE

## 2019-08-14 PROCEDURE — 83540 ASSAY OF IRON: CPT

## 2019-08-14 PROCEDURE — 99214 OFFICE O/P EST MOD 30 MIN: CPT | Mod: 25,S$GLB,, | Performed by: INTERNAL MEDICINE

## 2019-08-14 RX ORDER — HEPARIN 100 UNIT/ML
500 SYRINGE INTRAVENOUS
Status: COMPLETED | OUTPATIENT
Start: 2019-08-14 | End: 2019-08-14

## 2019-08-14 RX ORDER — SODIUM CHLORIDE 0.9 % (FLUSH) 0.9 %
10 SYRINGE (ML) INJECTION
Status: CANCELLED | OUTPATIENT
Start: 2019-08-14

## 2019-08-14 RX ORDER — SODIUM CHLORIDE 0.9 % (FLUSH) 0.9 %
10 SYRINGE (ML) INJECTION
Status: COMPLETED | OUTPATIENT
Start: 2019-08-14 | End: 2019-08-14

## 2019-08-14 RX ORDER — HEPARIN 100 UNIT/ML
500 SYRINGE INTRAVENOUS
Status: CANCELLED | OUTPATIENT
Start: 2019-08-14

## 2019-08-14 RX ADMIN — HEPARIN 500 UNITS: 100 SYRINGE at 12:08

## 2019-08-14 RX ADMIN — Medication 10 ML: at 12:08

## 2019-08-14 NOTE — PROGRESS NOTES
Subjective:       Patient ID: Janine Souza is a 59 y.o. female.    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     Family History   Problem Relation Age of Onset    Diabetes type II Mother     Prostate cancer Father     Breast cancer Paternal Aunt         breast    Breast cancer Maternal Aunt         breast     Social History     Socioeconomic History    Marital status:      Spouse name: Not on file    Number of children: Not on file    Years of education: Not on file    Highest education level: Not on file   Occupational History    Not on file   Social Needs    Financial resource strain: Not on file    Food insecurity:     Worry: Not on file     Inability: Not on file    Transportation needs:     Medical: Not on file     Non-medical: Not on file   Tobacco Use    Smoking status: Never Smoker    Smokeless tobacco: Never Used   Substance and Sexual Activity    Alcohol use: No     Alcohol/week: 0.0 oz    Drug use: No    Sexual activity: Not on file   Lifestyle    Physical activity:     Days per week: Not on file     Minutes per session: Not on file    Stress: Not on file   Relationships    Social connections:     Talks on phone: Not on file     Gets together: Not on file     Attends Sabianist service: Not on file     Active  member of club or organization: Not on file     Attends meetings of clubs or organizations: Not on file     Relationship status: Not on file   Other Topics Concern    Not on file   Social History Narrative    Not on file     Past Surgical History:   Procedure Laterality Date    ADENOIDECTOMY      CARDIAC ELECTROPHYSIOLOGY MAPPING AND ABLATION      CARDIAC SURGERY      CARDIOVERSION N/A 2018    Performed by [facility] at [facility] CATH LAB     SECTION      ECHOCARDIOGRAM,TRANSESOPHAGEAL N/A 2018    Performed by [facility] Diagnostic Provider at [facility] CATH LAB    ERCP      ERCP (ENDOSCOPIC RETROGRADE CHOLANGIOPANCREATOGRAPHY) N/A 2018    Performed by Tang Tanner MD at [facility] ENDO (2ND FLR)    INSERTION, CARDIAC PACEMAKER, DUAL CHAMBER Left 2018    Performed by [facility]re [facility] at [facility] CATH LAB    [id number]-CATH N/A 2016    Performed by [facility]inder Rubio [facility] at [facility] OR    TONSILLECTOMY      ULTRASOUND-ENDOSCOPIC-UPPER N/A 2015    Performed by Misael Treadwell MD at [facility] ENDO (2ND FLR)    uvulopalatopharygoplasty         Labs:  Lab Results   Component Value Date    WBC 11.17 2019    HGB 11.8 (L) 2019    HCT 35.9 (L) 2019    MCV 98 2019     2019     BMP  Lab Results   Component Value Date     2019    K 4.4 2019     2019    CO2 24 2019    BUN 24 (H) 2019    CREATININE 1.2 2019    CALCIUM 9.5 2019    ANIONGAP 10 2019    ESTGFRAFRICA 58 (A) 2019    EGFRNONAA 50 (A) 2019     Lab Results   Component Value Date    ALT 11 2019    AST 10 2019    ALKPHOS 68 2019    BILITOT 0.3 2019       Lab Results   Component Value Date    IRON 71 2019    TIBC 394 2019    FERRITIN 935 (H) 2019     No results found for: CGJXMEHM15  No results found for: FOLATE  Lab Results   Component Value Date    TSH 3.802 2018         Review of  Systems   Constitutional: Negative for activity change, appetite change, chills, diaphoresis, fatigue, fever and unexpected weight change.   HENT: Negative for congestion, dental problem, drooling, ear discharge, ear pain, facial swelling, hearing loss, mouth sores, nosebleeds, postnasal drip, rhinorrhea, sinus pressure, sneezing, sore throat, tinnitus, trouble swallowing and voice change.    Eyes: Negative for photophobia, pain, discharge, redness, itching and visual disturbance.   Respiratory: Negative for cough, choking, chest tightness, shortness of breath, wheezing and stridor.    Cardiovascular: Negative for chest pain, palpitations and leg swelling.   Gastrointestinal: Negative for abdominal distention, abdominal pain, anal bleeding, blood in stool, constipation, diarrhea, nausea, rectal pain and vomiting.   Endocrine: Negative for cold intolerance, heat intolerance, polydipsia, polyphagia and polyuria.   Genitourinary: Negative for decreased urine volume, difficulty urinating, dyspareunia, dysuria, enuresis, flank pain, frequency, genital sores, hematuria, menstrual problem, pelvic pain, urgency, vaginal bleeding, vaginal discharge and vaginal pain.   Musculoskeletal: Negative for arthralgias, back pain, gait problem, joint swelling, myalgias, neck pain and neck stiffness.   Skin: Negative for color change, pallor and rash.   Allergic/Immunologic: Negative for environmental allergies, food allergies and immunocompromised state.   Neurological: Negative for dizziness, tremors, seizures, syncope, facial asymmetry, speech difficulty, weakness, light-headedness, numbness and headaches.   Hematological: Negative for adenopathy. Does not bruise/bleed easily.   Psychiatric/Behavioral: Positive for dysphoric mood. Negative for agitation, behavioral problems, confusion, decreased concentration, hallucinations, self-injury, sleep disturbance and suicidal ideas. The patient is nervous/anxious. The patient is not  hyperactive.        Objective:      Physical Exam   Constitutional: She is oriented to person, place, and time. She appears well-developed and well-nourished. She appears distressed.   HENT:   Head: Normocephalic and atraumatic.   Right Ear: External ear normal.   Left Ear: External ear normal.   Nose: Nose normal. Right sinus exhibits no maxillary sinus tenderness and no frontal sinus tenderness. Left sinus exhibits no maxillary sinus tenderness and no frontal sinus tenderness.   Mouth/Throat: Oropharynx is clear and moist. No oropharyngeal exudate.   Eyes: Pupils are equal, round, and reactive to light. Conjunctivae, EOM and lids are normal. Right eye exhibits no discharge. Left eye exhibits no discharge. Right conjunctiva is not injected. Right conjunctiva has no hemorrhage. Left conjunctiva is not injected. Left conjunctiva has no hemorrhage. No scleral icterus.   Neck: Normal range of motion. Neck supple. No JVD present. No tracheal deviation present. No thyromegaly present.   Cardiovascular: Normal rate and regular rhythm.   Pulmonary/Chest: Effort normal. No stridor. No respiratory distress. She exhibits no tenderness.   Abdominal: Soft. She exhibits no distension and no mass. There is no splenomegaly or hepatomegaly. There is no tenderness. There is no rebound.   Musculoskeletal: Normal range of motion. She exhibits no edema or tenderness.   Lymphadenopathy:     She has no cervical adenopathy.     She has no axillary adenopathy.        Right: No supraclavicular adenopathy present.        Left: No supraclavicular adenopathy present.   Neurological: She is alert and oriented to person, place, and time. No cranial nerve deficit. Coordination normal.   Skin: Skin is dry. No rash noted. She is not diaphoretic. No erythema.   Psychiatric: She has a normal mood and affect. Her behavior is normal. Judgment and thought content normal.   Vitals reviewed.          Assessment:      1. Secondary neuroendocrine tumor of  liver    2. Iron deficiency anemia due to chronic blood loss           Plan:     Reviewed imaging studies no significant change.  Reassurance given flush port today again in 6 weeks scheduled for stent replacement in later August.  Ultrasound of parotid later this month with follow-up ENT no clear evidence of progression discussed implications with patient        Kalyan Flores Jr, MD FACP

## 2019-08-16 ENCOUNTER — TELEPHONE (OUTPATIENT)
Dept: RADIOLOGY | Facility: HOSPITAL | Age: 59
End: 2019-08-16

## 2019-08-19 ENCOUNTER — HOSPITAL ENCOUNTER (OUTPATIENT)
Dept: RADIOLOGY | Facility: HOSPITAL | Age: 59
Discharge: HOME | End: 2019-08-19
Attending: OTOLARYNGOLOGY
Payer: MEDICARE

## 2019-08-19 DIAGNOSIS — D11.9 WARTHIN'S TUMOR: ICD-10-CM

## 2019-08-19 PROCEDURE — 76536 PR US, HEAD/NECK/THYROID TISSUE: ICD-10-PCS | Mod: 26,,, | Performed by: RADIOLOGY

## 2019-08-19 PROCEDURE — 76999 ECHO EXAMINATION PROCEDURE: CPT | Mod: TC

## 2019-08-19 PROCEDURE — 76536 US EXAM OF HEAD AND NECK: CPT | Mod: 26,,, | Performed by: RADIOLOGY

## 2019-08-21 ENCOUNTER — PATIENT MESSAGE (OUTPATIENT)
Dept: OTOLARYNGOLOGY | Facility: CLINIC | Age: 59
End: 2019-08-21

## 2019-08-23 ENCOUNTER — PATIENT MESSAGE (OUTPATIENT)
Dept: OTOLARYNGOLOGY | Facility: CLINIC | Age: 59
End: 2019-08-23

## 2019-08-27 ENCOUNTER — ANESTHESIA (OUTPATIENT)
Dept: ENDOSCOPY | Facility: HOSPITAL | Age: 59
End: 2019-08-27
Payer: MEDICARE

## 2019-08-27 ENCOUNTER — ANESTHESIA EVENT (OUTPATIENT)
Dept: ENDOSCOPY | Facility: HOSPITAL | Age: 59
End: 2019-08-27
Payer: MEDICARE

## 2019-08-27 ENCOUNTER — HOSPITAL ENCOUNTER (OUTPATIENT)
Facility: HOSPITAL | Age: 59
Discharge: HOME | End: 2019-08-27
Attending: INTERNAL MEDICINE | Admitting: INTERNAL MEDICINE
Payer: MEDICARE

## 2019-08-27 VITALS
WEIGHT: 218 LBS | DIASTOLIC BLOOD PRESSURE: 71 MMHG | HEIGHT: 66 IN | SYSTOLIC BLOOD PRESSURE: 115 MMHG | OXYGEN SATURATION: 96 % | HEART RATE: 60 BPM | TEMPERATURE: 98 F | RESPIRATION RATE: 17 BRPM | BODY MASS INDEX: 35.03 KG/M2

## 2019-08-27 DIAGNOSIS — C7B.8 SECONDARY NEUROENDOCRINE TUMOR OF LIVER: Primary | ICD-10-CM

## 2019-08-27 DIAGNOSIS — K83.1 BILIARY STRICTURE: ICD-10-CM

## 2019-08-27 LAB
POCT GLUCOSE: 164 MG/DL (ref 70–110)
POCT GLUCOSE: 244 MG/DL (ref 70–110)
POCT GLUCOSE: 287 MG/DL (ref 70–110)
POCT GLUCOSE: 303 MG/DL (ref 70–110)
POCT GLUCOSE: 315 MG/DL (ref 70–110)
POCT GLUCOSE: 334 MG/DL (ref 70–110)

## 2019-08-27 PROCEDURE — 27201089 HC SNARE, DISP (ANY): Performed by: INTERNAL MEDICINE

## 2019-08-27 PROCEDURE — 94761 N-INVAS EAR/PLS OXIMETRY MLT: CPT

## 2019-08-27 PROCEDURE — 82962 GLUCOSE BLOOD TEST: CPT | Performed by: INTERNAL MEDICINE

## 2019-08-27 PROCEDURE — D9220A PRA ANESTHESIA: Mod: ANES,,, | Performed by: ANESTHESIOLOGY

## 2019-08-27 PROCEDURE — 37000009 HC ANESTHESIA EA ADD 15 MINS: Performed by: INTERNAL MEDICINE

## 2019-08-27 PROCEDURE — 63600175 PHARM REV CODE 636 W HCPCS: Performed by: ANESTHESIOLOGY

## 2019-08-27 PROCEDURE — D9220A PRA ANESTHESIA: Mod: CRNA,,, | Performed by: NURSE ANESTHETIST, CERTIFIED REGISTERED

## 2019-08-27 PROCEDURE — 25500020 PHARM REV CODE 255: Performed by: INTERNAL MEDICINE

## 2019-08-27 PROCEDURE — 43275 PR ERCP W/REMOVAL FOREIGN BODY/STENT FROM BILIARY/PANCREATIC DUCT: ICD-10-PCS | Mod: ,,, | Performed by: INTERNAL MEDICINE

## 2019-08-27 PROCEDURE — 27202125 HC BALLOON, EXTRACTION (ANY): Performed by: INTERNAL MEDICINE

## 2019-08-27 PROCEDURE — 25000003 PHARM REV CODE 250: Performed by: NURSE ANESTHETIST, CERTIFIED REGISTERED

## 2019-08-27 PROCEDURE — 74328 X-RAY BILE DUCT ENDOSCOPY: CPT | Performed by: INTERNAL MEDICINE

## 2019-08-27 PROCEDURE — C1769 GUIDE WIRE: HCPCS | Performed by: INTERNAL MEDICINE

## 2019-08-27 PROCEDURE — 37000008 HC ANESTHESIA 1ST 15 MINUTES: Performed by: INTERNAL MEDICINE

## 2019-08-27 PROCEDURE — 82962 GLUCOSE BLOOD TEST: CPT | Mod: 91 | Performed by: INTERNAL MEDICINE

## 2019-08-27 PROCEDURE — 27201014 HC GRASPER DEVICE: Performed by: INTERNAL MEDICINE

## 2019-08-27 PROCEDURE — D9220A PRA ANESTHESIA: ICD-10-PCS | Mod: ANES,,, | Performed by: ANESTHESIOLOGY

## 2019-08-27 PROCEDURE — D9220A PRA ANESTHESIA: ICD-10-PCS | Mod: CRNA,,, | Performed by: NURSE ANESTHETIST, CERTIFIED REGISTERED

## 2019-08-27 PROCEDURE — 63600175 PHARM REV CODE 636 W HCPCS: Performed by: NURSE ANESTHETIST, CERTIFIED REGISTERED

## 2019-08-27 PROCEDURE — 43275 ERCP REMOVE FORGN BODY DUCT: CPT | Performed by: INTERNAL MEDICINE

## 2019-08-27 PROCEDURE — 43264 ERCP REMOVE DUCT CALCULI: CPT | Mod: 51,,, | Performed by: INTERNAL MEDICINE

## 2019-08-27 PROCEDURE — 63600175 PHARM REV CODE 636 W HCPCS: Performed by: STUDENT IN AN ORGANIZED HEALTH CARE EDUCATION/TRAINING PROGRAM

## 2019-08-27 PROCEDURE — 74328 X-RAY BILE DUCT ENDOSCOPY: CPT | Mod: 26,,, | Performed by: INTERNAL MEDICINE

## 2019-08-27 PROCEDURE — 43275 ERCP REMOVE FORGN BODY DUCT: CPT | Mod: ,,, | Performed by: INTERNAL MEDICINE

## 2019-08-27 PROCEDURE — 43264 ERCP REMOVE DUCT CALCULI: CPT | Performed by: INTERNAL MEDICINE

## 2019-08-27 PROCEDURE — 74328 PR  X-RAY FOR BILE DUCT ENDOSCOPY: ICD-10-PCS | Mod: 26,,, | Performed by: INTERNAL MEDICINE

## 2019-08-27 PROCEDURE — 43264 PR ERCP,W/REMOVAL STONE,BIL/PANCR DUCTS: ICD-10-PCS | Mod: 51,,, | Performed by: INTERNAL MEDICINE

## 2019-08-27 RX ORDER — PHENYLEPHRINE HYDROCHLORIDE 10 MG/ML
INJECTION INTRAVENOUS
Status: DISCONTINUED | OUTPATIENT
Start: 2019-08-27 | End: 2019-08-27

## 2019-08-27 RX ORDER — FENTANYL CITRATE 50 UG/ML
INJECTION, SOLUTION INTRAMUSCULAR; INTRAVENOUS
Status: DISCONTINUED | OUTPATIENT
Start: 2019-08-27 | End: 2019-08-27

## 2019-08-27 RX ORDER — ROCURONIUM BROMIDE 10 MG/ML
INJECTION, SOLUTION INTRAVENOUS
Status: DISCONTINUED | OUTPATIENT
Start: 2019-08-27 | End: 2019-08-27

## 2019-08-27 RX ORDER — ONDANSETRON 2 MG/ML
8 INJECTION INTRAMUSCULAR; INTRAVENOUS ONCE
Status: DISCONTINUED | OUTPATIENT
Start: 2019-08-27 | End: 2019-08-27 | Stop reason: HOSPADM

## 2019-08-27 RX ORDER — GLUCAGON 1 MG
1 KIT INJECTION
Status: DISCONTINUED | OUTPATIENT
Start: 2019-08-27 | End: 2019-08-27 | Stop reason: HOSPADM

## 2019-08-27 RX ORDER — SODIUM CHLORIDE 0.9 % (FLUSH) 0.9 %
10 SYRINGE (ML) INJECTION
Status: DISCONTINUED | OUTPATIENT
Start: 2019-08-27 | End: 2019-08-27 | Stop reason: HOSPADM

## 2019-08-27 RX ORDER — LIDOCAINE HCL/PF 100 MG/5ML
SYRINGE (ML) INTRAVENOUS
Status: DISCONTINUED | OUTPATIENT
Start: 2019-08-27 | End: 2019-08-27

## 2019-08-27 RX ORDER — SUCCINYLCHOLINE CHLORIDE 20 MG/ML
INJECTION INTRAMUSCULAR; INTRAVENOUS
Status: DISCONTINUED | OUTPATIENT
Start: 2019-08-27 | End: 2019-08-27

## 2019-08-27 RX ORDER — FENTANYL CITRATE 50 UG/ML
25 INJECTION, SOLUTION INTRAMUSCULAR; INTRAVENOUS EVERY 5 MIN PRN
Status: DISCONTINUED | OUTPATIENT
Start: 2019-08-27 | End: 2019-08-27 | Stop reason: HOSPADM

## 2019-08-27 RX ORDER — PROPOFOL 10 MG/ML
VIAL (ML) INTRAVENOUS
Status: DISCONTINUED | OUTPATIENT
Start: 2019-08-27 | End: 2019-08-27

## 2019-08-27 RX ORDER — HEPARIN 100 UNIT/ML
5 SYRINGE INTRAVENOUS ONCE
Status: COMPLETED | OUTPATIENT
Start: 2019-08-27 | End: 2019-08-27

## 2019-08-27 RX ORDER — SODIUM CHLORIDE 0.9 % (FLUSH) 0.9 %
3 SYRINGE (ML) INJECTION
Status: DISCONTINUED | OUTPATIENT
Start: 2019-08-27 | End: 2019-08-27 | Stop reason: HOSPADM

## 2019-08-27 RX ORDER — ONDANSETRON 2 MG/ML
INJECTION INTRAMUSCULAR; INTRAVENOUS
Status: DISCONTINUED | OUTPATIENT
Start: 2019-08-27 | End: 2019-08-27

## 2019-08-27 RX ORDER — SODIUM CHLORIDE 9 MG/ML
INJECTION, SOLUTION INTRAVENOUS CONTINUOUS
Status: DISCONTINUED | OUTPATIENT
Start: 2019-08-27 | End: 2019-08-27 | Stop reason: HOSPADM

## 2019-08-27 RX ORDER — OCTREOTIDE ACETATE 500 UG/ML
500 INJECTION, SOLUTION INTRAVENOUS; SUBCUTANEOUS ONCE
Status: DISCONTINUED | OUTPATIENT
Start: 2019-08-27 | End: 2019-08-27 | Stop reason: HOSPADM

## 2019-08-27 RX ORDER — MIDAZOLAM HYDROCHLORIDE 1 MG/ML
INJECTION, SOLUTION INTRAMUSCULAR; INTRAVENOUS
Status: DISCONTINUED | OUTPATIENT
Start: 2019-08-27 | End: 2019-08-27

## 2019-08-27 RX ORDER — INSULIN ASPART 100 [IU]/ML
8 INJECTION, SOLUTION INTRAVENOUS; SUBCUTANEOUS ONCE
Status: COMPLETED | OUTPATIENT
Start: 2019-08-27 | End: 2019-08-27

## 2019-08-27 RX ORDER — HEPARIN SODIUM,PORCINE/PF 1 UNIT/ML
5 SYRINGE (ML) INTRAVENOUS ONCE
Status: DISCONTINUED | OUTPATIENT
Start: 2019-08-27 | End: 2019-08-27 | Stop reason: HOSPADM

## 2019-08-27 RX ORDER — INSULIN ASPART 100 [IU]/ML
1-10 INJECTION, SOLUTION INTRAVENOUS; SUBCUTANEOUS EVERY 6 HOURS PRN
Status: DISCONTINUED | OUTPATIENT
Start: 2019-08-27 | End: 2019-08-27 | Stop reason: HOSPADM

## 2019-08-27 RX ADMIN — ONDANSETRON 8 MG: 2 INJECTION INTRAMUSCULAR; INTRAVENOUS at 10:08

## 2019-08-27 RX ADMIN — PROPOFOL 100 MG: 10 INJECTION, EMULSION INTRAVENOUS at 11:08

## 2019-08-27 RX ADMIN — PHENYLEPHRINE HYDROCHLORIDE 200 MCG: 10 INJECTION INTRAVENOUS at 11:08

## 2019-08-27 RX ADMIN — FENTANYL CITRATE 100 MCG: 50 INJECTION, SOLUTION INTRAMUSCULAR; INTRAVENOUS at 11:08

## 2019-08-27 RX ADMIN — SUCCINYLCHOLINE CHLORIDE 140 MG: 20 INJECTION, SOLUTION INTRAMUSCULAR; INTRAVENOUS at 11:08

## 2019-08-27 RX ADMIN — LIDOCAINE HYDROCHLORIDE 100 MG: 20 INJECTION, SOLUTION INTRAVENOUS at 11:08

## 2019-08-27 RX ADMIN — ROCURONIUM BROMIDE 5 MG: 10 INJECTION, SOLUTION INTRAVENOUS at 11:08

## 2019-08-27 RX ADMIN — INSULIN ASPART 8 UNITS: 100 INJECTION, SOLUTION INTRAVENOUS; SUBCUTANEOUS at 01:08

## 2019-08-27 RX ADMIN — PHENYLEPHRINE HYDROCHLORIDE 100 MCG: 10 INJECTION INTRAVENOUS at 11:08

## 2019-08-27 RX ADMIN — INSULIN ASPART 8 UNITS: 100 INJECTION, SOLUTION INTRAVENOUS; SUBCUTANEOUS at 10:08

## 2019-08-27 RX ADMIN — MIDAZOLAM HYDROCHLORIDE 2 MG: 1 INJECTION, SOLUTION INTRAMUSCULAR; INTRAVENOUS at 10:08

## 2019-08-27 RX ADMIN — PROPOFOL 50 MG: 10 INJECTION, EMULSION INTRAVENOUS at 11:08

## 2019-08-27 RX ADMIN — IOHEXOL 15 ML: 300 INJECTION, SOLUTION INTRAVENOUS at 11:08

## 2019-08-27 RX ADMIN — HEPARIN 500 UNITS: 100 SYRINGE at 03:08

## 2019-08-27 NOTE — PLAN OF CARE
Pt ready for discharge. Pt verbalized understanding of d/c instruction. Pt denies any pain or nausea.     Pt wob unlabored. Pt sinus on monitor.     Consent forms from procedure in chart

## 2019-08-27 NOTE — ANESTHESIA PREPROCEDURE EVALUATION
08/27/2019  Janine Souza is a 59 y.o., female.  Pre-operative evaluation for Procedure(s) (LRB):  ERCP (ENDOSCOPIC RETROGRADE CHOLANGIOPANCREATOGRAPHY) (N/A)    Janine Souza is a 59 y.o. 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  Has not taken since  As a child had an unknown reaction.  Has not taken since       Current Facility-Administered Medications on File Prior to Encounter   Medication Dose Route Frequency Provider Last Rate Last Dose    ferumoxytol (FERAHEME) 510 mg in dextrose 5 % 100 mL IVPB  510 mg Intravenous Once Kalyan Flores MD        heparin, porcine (PF) 100 unit/mL injection flush 500 Units  500 Units Intravenous PRN Kalyan Flores MD        sodium chloride 0.9% flush 10 mL  10 mL Intravenous PRN Kalyan Flores MD         Current Outpatient Medications on File Prior to Encounter   Medication Sig Dispense Refill    zolpidem (AMBIEN) 10 mg Tab Take 1 tablet (10 mg total) by mouth nightly as needed. 90 tablet 1    blood sugar diagnostic, disc Strp USE AS DIRECTED EVERY DAY *THANK YOU*      escitalopram oxalate (LEXAPRO) 10 MG tablet Take 1 tablet (10 mg total) by mouth once daily. 90 tablet 3    fluticasone (FLOVENT HFA) 110 mcg/actuation inhaler Inhale 2 puffs into the lungs once daily.      furosemide (LASIX) 40 MG tablet  Take 40 mg by mouth once daily.       glipiZIDE (GLUCOTROL) 10 MG tablet TAKE 1 TABLET TWICE A  tablet 3    KLOR-CON M20 20 mEq tablet 20 mEq once daily.       lancets Misc Use as directed      linagliptin (TRADJENTA) 5 mg Tab tablet Take 1 tablet (5 mg total) by mouth once daily. 30 tablet 11    metFORMIN (GLUCOPHAGE) 500 MG tablet Take 2 tablets (1000mg) in am and 3 tablets (1500 mg)in the pm 450 tablet 3    metoprolol tartrate (LOPRESSOR) 25 MG tablet 50 mg 2 (two) times daily.       metoprolol tartrate (LOPRESSOR) 50 MG tablet Take 50 mg by mouth nightly.       rivaroxaban (XARELTO) 20 mg Tab Take 20 mg by mouth.         Past Surgical History:   Procedure Laterality Date    ADENOIDECTOMY      CARDIAC ELECTROPHYSIOLOGY MAPPING AND ABLATION      CARDIAC SURGERY      CARDIOVERSION N/A 2018    Performed by [facility]re [facility] at [facility] CATH LAB     SECTION      ECHOCARDIOGRAM,TRANSESOPHAGEAL N/A 2018    Performed by [facility] Diagnostic Provider at [facility] CATH LAB    ERCP      ERCP (ENDOSCOPIC RETROGRADE CHOLANGIOPANCREATOGRAPHY) N/A 2018    Performed by Tang Tanner MD at [facility] ENDO (2ND FLR)    INSERTION, CARDIAC PACEMAKER, DUAL CHAMBER Left 2018    Performed by [facility] Carlos Onofre MD at [facility] CATH LAB    [id number]-CATH N/A 2016    Performed by Rajinder Rubio MD at [facility] OR    TONSILLECTOMY      ULTRASOUND-ENDOSCOPIC-UPPER N/A 2015    Performed by Misael Treadwell MD at [facility] ENDO (2ND FLR)    uvulopalatopharygoplasty         Social History     Socioeconomic History    Marital status:      Spouse name: Not on file    Number of children: Not on file    Years of education: Not on file    Highest education level: Not on file   Occupational History    Not on file   Social Needs    Financial resource strain: Not on file    Food insecurity:     Worry: Not on file     Inability: Not on file    Transportation needs:     Medical: Not on file      Non-medical: Not on file   Tobacco Use    Smoking status: Never Smoker    Smokeless tobacco: Never Used   Substance and Sexual Activity    Alcohol use: No     Alcohol/week: 0.0 oz    Drug use: No    Sexual activity: Not on file   Lifestyle    Physical activity:     Days per week: Not on file     Minutes per session: Not on file    Stress: Not on file   Relationships    Social connections:     Talks on phone: Not on file     Gets together: Not on file     Attends Jainism service: Not on file     Active member of club or organization: Not on file     Attends meetings of clubs or organizations: Not on file     Relationship status: Not on file   Other Topics Concern    Not on file   Social History Narrative    Not on file         CBC: No results for input(s): WBC, RBC, HGB, HCT, PLT, MCV, MCH, MCHC in the last 72 hours.    CMP: No results for input(s): NA, K, CL, CO2, BUN, CREATININE, GLU, MG, PHOS, CALCIUM, ALBUMIN, PROT, ALKPHOS, ALT, AST, BILITOT in the last 72 hours.    INR  No results for input(s): PT, INR, PROTIME, APTT in the last 72 hours.        Diagnostic Studies:      EKD Echo:  Results for orders placed or performed during the hospital encounter of 16   2D echo with color flow doppler   Result Value Ref Range    QEF 60 55 - 65    Mitral Valve Regurgitation MILD     Est. PA Systolic Pressure 62.6 (A)     Mitral Valve Mobility NORMAL     Tricuspid Valve Regurgitation MODERATE (A)          Anesthesia Evaluation    I have reviewed the Patient Summary Reports.     I have reviewed the Medications.     Review of Systems  Anesthesia Hx:  No problems with previous Anesthesia  History of prior surgery of interest to airway management or planning: Previous anesthesia: General Denies Family Hx of Anesthesia complications.   Denies Personal Hx of Anesthesia complications.   Hematology/Oncology:        Oncology Comments: Neuroendocrine tumor in remission   Cardiovascular:   Exercise tolerance:  poor Pacemaker Hypertension Dysrhythmias atrial fibrillation CHF PA pressure 60s   Endocrine:   Diabetes, poorly controlled        Physical Exam  General:  Obesity    Airway/Jaw/Neck:  Airway Findings: Mouth Opening: Normal Tongue: Normal  General Airway Assessment: Adult  Mallampati: II  Improves to II with phonation.  TM Distance: Normal, at least 6 cm  Jaw/Neck Findings:  Neck ROM: Normal ROM      Dental:  Dental Findings: In tact   Chest/Lungs:  Chest/Lungs Findings: Clear to auscultation, Normal Respiratory Rate     Heart/Vascular:  Heart Findings: Rate: Tachycardia  Sounds: Normal        Mental Status:  Mental Status Findings:  Cooperative, Alert and Oriented         Anesthesia Plan  Type of Anesthesia, risks & benefits discussed:  Anesthesia Type:  general  Patient's Preference:   Intra-op Monitoring Plan: standard ASA monitors  Intra-op Monitoring Plan Comments:   Post Op Pain Control Plan: multimodal analgesia  Post Op Pain Control Plan Comments:   Induction:   IV  Beta Blocker:         Informed Consent: Patient understands risks and agrees with Anesthesia plan.  Questions answered. Anesthesia consent signed with patient.  ASA Score: 3     Day of Surgery Review of History & Physical:    H&P update referred to the surgeon.         Ready For Surgery From Anesthesia Perspective.

## 2019-08-27 NOTE — TRANSFER OF CARE
"Anesthesia Transfer of Care Note    Patient: Janine Souza    Procedure(s) Performed: Procedure(s) (LRB):  ERCP (ENDOSCOPIC RETROGRADE CHOLANGIOPANCREATOGRAPHY) (N/A)    Patient location: PACU    Anesthesia Type: general    Transport from OR: Transported from OR on 6-10 L/min O2 by face mask with adequate spontaneous ventilation    Post pain: adequate analgesia    Post assessment: no apparent anesthetic complications and tolerated procedure well    Post vital signs: stable    Level of consciousness: awake, oriented and alert    Nausea/Vomiting: no nausea/vomiting    Complications: none    Transfer of care protocol was followed      Last vitals:   Visit Vitals  BP (!) 95/48 (BP Location: Left arm, Patient Position: Lying)   Pulse 60   Temp 36 °C (96.8 °F) (Axillary)   Resp 14   Ht 5' 6" (1.676 m)   Wt 98.9 kg (218 lb)   LMP  (LMP Unknown)   SpO2 100%   Breastfeeding? No   BMI 35.19 kg/m²     "

## 2019-08-27 NOTE — H&P
History & Physical - Short Stay  Gastroenterology      SUBJECTIVE:     Procedure: ERCP    Chief Complaint/Indication for Procedure: stent removal    History of Present Illness:  Patient is a 59 y.o. female presents for biliary stent removal. She have history of NET and biliary stricture retted with a biliary stent. No sign of obstruction. Here for stent removal.    PTA Medications   Medication Sig    sotalol (BETAPACE) 80 MG tablet Take 80 mg by mouth once daily.     zolpidem (AMBIEN) 10 mg Tab Take 1 tablet (10 mg total) by mouth nightly as needed.    blood sugar diagnostic, disc Strp USE AS DIRECTED EVERY DAY *THANK YOU*    escitalopram oxalate (LEXAPRO) 10 MG tablet Take 1 tablet (10 mg total) by mouth once daily.    fluticasone (FLOVENT HFA) 110 mcg/actuation inhaler Inhale 2 puffs into the lungs once daily.    furosemide (LASIX) 40 MG tablet Take 40 mg by mouth once daily.     glipiZIDE (GLUCOTROL) 10 MG tablet TAKE 1 TABLET TWICE A DAY    glipiZIDE (GLUCOTROL) 10 MG tablet TAKE 1 TABLET TWICE A DAY    KLOR-CON M20 20 mEq tablet 20 mEq once daily.     lancets Misc Use as directed    linagliptin (TRADJENTA) 5 mg Tab tablet Take 1 tablet (5 mg total) by mouth once daily.    metFORMIN (GLUCOPHAGE) 500 MG tablet Take 2 tablets (1000mg) in am and 3 tablets (1500 mg)in the pm    metoprolol tartrate (LOPRESSOR) 25 MG tablet 50 mg 2 (two) times daily.     metoprolol tartrate (LOPRESSOR) 50 MG tablet Take 50 mg by mouth nightly.     rivaroxaban (XARELTO) 20 mg Tab Take 20 mg by mouth.       Review of patient's allergies indicates:   Allergen Reactions    Pcn [penicillins] Other (See Comments), Hives and Swelling     As a child had an unknown reaction.  Has not taken since  As a child had an unknown reaction.  Has not taken since        Past Medical History:   Diagnosis Date    Acute headache 1/8/2016    Anemia     Atrial fibrillation, persistent     Bronchitis     CHF (congestive heart failure)      Diabetes mellitus, type 2     Hypertension     Malignant poorly differentiated neuroendocrine carcinoma 2016    Malignant poorly differentiated neuroendocrine carcinoma 2016    Secondary neuroendocrine tumor of liver 2016     Past Surgical History:   Procedure Laterality Date    ADENOIDECTOMY      CARDIAC ELECTROPHYSIOLOGY MAPPING AND ABLATION      CARDIAC SURGERY      CARDIOVERSION N/A 2018    Performed by [facility] at [facility] CATH LAB     SECTION      ECHOCARDIOGRAM,TRANSESOPHAGEAL N/A 2018    Performed by [facility] Diagnostic Provider at [facility] CATH LAB    ERCP      ERCP (ENDOSCOPIC RETROGRADE CHOLANGIOPANCREATOGRAPHY) N/A 2018    Performed by Tang Tanner MD at [facility] ENDO (2ND FLR)    INSERTION, CARDIAC PACEMAKER, DUAL CHAMBER Left 2018    Performed by [facility] Onofre MD at [facility] CATH LAB    [id number] N/A 2016    Per[facility] by [facility] at [facility] OR    TONSILLECTOMY      ULTRASOUND-ENDOSCOPIC-UPPER N/A 2015    Performed by Misael Treadwell MD at [facility] ENDO (2ND FLR)    uvulopalatopharygoplasty       Family History   Problem Relation Age of Onset    Diabetes type II Mother     Prostate cancer Father     Breast cancer Paternal Aunt         breast    Breast cancer Maternal Aunt         breast     Social History     Tobacco Use    Smoking status: Never Smoker    Smokeless tobacco: Never Used   Substance Use Topics    Alcohol use: No     Alcohol/week: 0.0 oz    Drug use: No       Review of Systems:  Respiratory: no cough or shortness of breath  Cardiovascular: no chest pain or palpitations    OBJECTIVE:     Vital Signs (Most Recent)  Temp: 97.2 °F (36.2 °C) (19 1002)  Pulse: 60 (19 1002)  Resp: 16 (19 1002)  BP: 119/61 (19 1002)  SpO2: 96 % (19 1002)    Physical Exam:  General: well developed, well nourished  Lungs:  normal respiratory effort  Abdomen: soft, non-tender non-distented; bowel sounds  normal; no masses,  no organomegaly    Laboratory  CBC: No results for input(s): WBC, RBC, HGB, HCT, PLT, MCV, MCH, MCHC in the last 168 hours.  CMP: No results for input(s): GLU, CALCIUM, ALBUMIN, PROT, NA, K, CO2, CL, BUN, CREATININE, ALKPHOS, ALT, AST, BILITOT in the last 168 hours.  Coagulation: No results for input(s): LABPROT, INR, APTT in the last 168 hours.      Diagnostic Results:    ASSESSMENT/PLAN:     Biliary stent removal  History of NET on remission    Plan: ERCP    Anesthesia Plan: MAC    ASA Grade: ASA 3 - Patient with moderate systemic disease with functional limitations     The impression and plan was discussed in detail with the patient. All questions have been answered and the patient voices understanding of our plan at this point. The risk of the procedure was discussed in detail which includes but not limited to bleeding, infection, perforation in some cases requiring surgery with its spectrum of complications.

## 2019-08-27 NOTE — PROGRESS NOTES
"Patient  at this time. Patient previously received 16 units insulin sub q. Patient has not taken any home diabetic medications. Spoke with Anesthesia resident. MD Honeycutt. Per MD Honeycutt " okay for patient to next phase of recovery and have patient take home diabteic medications." Anesthesia release place by MD Honeycutt.   "

## 2019-08-27 NOTE — OR NURSING
Pacemaker card visualized. [employer] pacemaker. Per Anel in pacemaker clinic, ok to use magnet

## 2019-08-27 NOTE — DISCHARGE SUMMARY
Discharge Summary/Instructions after an Endoscopic Procedure    Patient Name: Janine Souza  Patient MRN: [id number]  Patient YOB: 1960 Tuesday, August 27, 2019  Sarah Ballesteros MD    RESTRICTIONS:  During your procedure today, you received medications for sedation.  These medications may affect your judgment, balance and coordination.  Therefore, for 24 hours, you have the following restrictions:     - DO NOT drive a car, operate machinery, make legal/financial decisions, sign important papers or drink alcohol.      ACTIVITY:  Today: no heavy lifting, straining or running due to procedural sedation/anesthesia.  The following day: return to full activity including work.    DIET:  Eat and drink normally unless instructed otherwise.     TREATMENT FOR COMMON SIDE EFFECTS:  - Mild abdominal pain, nausea, belching, bloating or excessive gas:  rest, eat lightly and use a heating pad.  - Sore Throat: treat with throat lozenges and/or gargle with warm salt water.  - Because air was used during the procedure, expelling large amounts of air from your rectum or belching is normal.  - If a bowel prep was taken, you may not have a bowel movement for 1-3 days.  This is normal.      SYMPTOMS TO WATCH FOR AND REPORT TO YOUR PHYSICIAN:  1. Abdominal pain or bloating, other than gas cramps.  2. Chest pain.  3. Back pain.  4. Signs of infection such as: chills or fever occurring within 24 hours after the procedure.  5. Rectal bleeding, which would show as bright red, maroon, or black stools. (A tablespoon of blood from the rectum is not serious, especially if hemorrhoids are present.)  6. Vomiting.  7. Weakness or dizziness.      GO DIRECTLY TO THE NEAREST EMERGENCY ROOM IF YOU HAVE ANY OF THE FOLLOWING:     Difficulty breathing              Chills and/or fever over 101 F   Persistent vomiting and/or vomiting blood   Severe abdominal pain   Severe chest pain   Black, tarry stools   Bleeding- more than one  tablespoon   Any other symptom or condition that you feel may need urgent attention    Your doctor recommends these additional instructions:  If any biopsies were taken, your doctors clinic will contact you in 1 to 2 weeks with any results.    - Discharge patient to home (ambulatory).   - Observe patient's clinical course.   - Watch for pancreatitis, bleeding, perforation, and cholangitis.    For questions, problems or results please call your physician - Sarah Ballesteros MD at Work:  [phone].    [facility], EMERGENCY ROOM PHONE NUMBER: [phone]    IF A COMPLICATION OR EMERGENCY SITUATION ARISES AND YOU ARE UNABLE TO REACH YOUR PHYSICIAN - GO DIRECTLY TO THE EMERGENCY ROOM.

## 2019-08-27 NOTE — PROGRESS NOTES
Called Dr. Henriquez who is on-call for anesthesia. Md states pt is cleared to be d/c with CBG of 244 and pt is to take all of home diabetic meds when get home and to continue to check sugar at home and seek medical advice for any symptoms related to hyperglycemia. Pt verbalized understanding

## 2019-08-27 NOTE — ANESTHESIA RELEASE NOTE
"Anesthesia Release from PACU Note    Patient: Janine Souza    Procedure(s) Performed: Procedure(s) (LRB):  ERCP (ENDOSCOPIC RETROGRADE CHOLANGIOPANCREATOGRAPHY) (N/A)    Anesthesia type: general    Post pain: Adequate analgesia    Post assessment: no apparent anesthetic complications    Hyperglycemic. DMT2 on metformin at home. Received 8u asp after >300. BG subsequently dropped to 150s. Soon thereafter major back to >300. Another 8u asp given. BG now 287.     Pt cleared for transition out of stage I. Stage II will need to monitor her BG q30 - 1 hour and treat hyperglycemia with insulin PRN.     Last Vitals:   Visit Vitals  BP (!) 101/55   Pulse 60   Temp 36 °C (96.8 °F) (Axillary)   Resp (!) 22   Ht 5' 6" (1.676 m)   Wt 98.9 kg (218 lb)   LMP  (LMP Unknown)   SpO2 95%   Breastfeeding? No   BMI 35.19 kg/m²       Post vital signs: stable    Level of consciousness: awake    Nausea/Vomiting: no nausea/no vomiting    Complications: none    Airway Patency: patent    Respiratory: unassisted    Cardiovascular: stable and blood pressure at baseline    Hydration: euvolemic       "

## 2019-08-27 NOTE — DISCHARGE INSTRUCTIONS
PATIENT INSTRUCTIONS  POST-ANESTHESIA    IMMEDIATELY FOLLOWING SURGERY:  Do not drive or operate machinery for the first twenty four hours after surgery.  Do not make any important decisions for twenty four hours after surgery or while taking narcotic pain medications or sedatives.  If you develop intractable nausea and vomiting or a severe headache please notify your doctor immediately.    FOLLOW-UP:  Please make an appointment with your surgeon as instructed. You do not need to follow up with anesthesia unless specifically instructed to do so.    WOUND CARE INSTRUCTIONS (if applicable):  Keep a dry clean dressing on the anesthesia/puncture wound site if there is drainage.  Once the wound has quit draining you may leave it open to air.  Generally you should leave the bandage intact for twenty four hours unless there is drainage.  If the epidural site drains for more than 36-48 hours please call the anesthesia department.    QUESTIONS?:  Please feel free to call your physician or the hospital  if you have any questions, and they will be happy to assist you.       [facility] Anesthesia Department  [address]  [phone]        Recovery After Procedural Sedation (Adult)  You have been given medicine by vein to make you sleep during your surgery. This may have included both a pain medicine and sleeping medicine. Most of the effects have worn off. But you may still have some drowsiness for the next 6 to 8 hours.  Home care  Follow these guidelines when you get home:  · For the next 8 hours, you should be watched by a responsible adult. This person should make sure your condition is not getting worse.  · Don't drink any alcohol for the next 24 hours.  · Don't drive, operate dangerous machinery, or make important business or personal decisions during the next 24 hours.  Note: Your healthcare provider may tell you not to take any medicine by mouth for pain or sleep in the next 4 hours.  These medicines may react with the medicines you were given in the hospital. This could cause a much stronger response than usual.  Follow-up care  Follow up with your healthcare provider if you are not alert and back to your usual level of activity within 12 hours.  When to seek medical advice  Call your healthcare provider right away if any of these occur:  · Drowsiness gets worse  · Weakness or dizziness gets worse  · Repeated vomiting  · You can't be awakened   Date Last Reviewed: 10/18/2016  © 6965-8031 [employer]. [address] [place] [address], [place] [address]. All rights reserved. This information is not intended as a substitute for professional medical care. Always follow your healthcare professional's instructions.

## 2019-08-28 NOTE — ANESTHESIA POSTPROCEDURE EVALUATION
Anesthesia Post Evaluation    Patient: Janine Souza    Procedure(s) Performed: Procedure(s) (LRB):  ERCP (ENDOSCOPIC RETROGRADE CHOLANGIOPANCREATOGRAPHY) (N/A)    Final Anesthesia Type: general  Patient [facility] during evaluation: [facility]  Patient participation: Yes- Able to Participate  Level of consciousness: awake and alert  Post-procedure vital signs: reviewed and stable  Pain management: adequate  Airway patency: patent  PONV status at discharge: No PONV  Anesthetic complications: no      Cardiovascular status: blood pressure returned to baseline  Respiratory status: unassisted  Hydration status: euvolemic  Follow-up not needed.          Vitals Value Taken Time   /71 8/27/2019  3:02 PM   Temp 36.8 °C (98.2 °F) 8/27/2019  3:00 PM   Pulse 60 8/27/2019  3:08 PM   Resp 17 8/27/2019  3:00 PM   SpO2 96 % 8/27/2019  3:08 PM   Vitals shown include unvalidated device data.      Event Time     Out of Recovery 13:55:00          Pain/Tiffany Score: Tiffany Score: 10 (8/27/2019  3:15 PM)

## 2019-09-03 NOTE — [employer] PATIENT INSTRUCTIONS
Discharge Summary/Instructions after an Endoscopic Procedure  Patient Name: Janine Souza  Patient MRN: [id number]  Patient YOB: 1960 Tuesday, August 27, 2019  Sarah Ballesteros MD  RESTRICTIONS:  During your procedure today, you received medications for sedation.  These   medications may affect your judgment, balance and coordination.  Therefore,   for 24 hours, you have the following restrictions:   - DO NOT drive a car, operate machinery, make legal/financial decisions,   sign important papers or drink alcohol.    ACTIVITY:  Today: no heavy lifting, straining or running due to procedural   sedation/anesthesia.  The following day: return to full activity including work.  DIET:  Eat and drink normally unless instructed otherwise.     TREATMENT FOR COMMON SIDE EFFECTS:  - Mild abdominal pain, nausea, belching, bloating or excessive gas:  rest,   eat lightly and use a heating pad.  - Sore Throat: treat with throat lozenges and/or gargle with warm salt   water.  - Because air was used during the procedure, expelling large amounts of air   from your rectum or belching is normal.  - If a bowel prep was taken, you may not have a bowel movement for 1-3 days.    This is normal.  SYMPTOMS TO WATCH FOR AND REPORT TO YOUR PHYSICIAN:  1. Abdominal pain or bloating, other than gas cramps.  2. Chest pain.  3. Back pain.  4. Signs of infection such as: chills or fever occurring within 24 hours   after the procedure.  5. Rectal bleeding, which would show as bright red, maroon, or black stools.   (A tablespoon of blood from the rectum is not serious, especially if   hemorrhoids are present.)  6. Vomiting.  7. Weakness or dizziness.  GO DIRECTLY TO THE NEAREST EMERGENCY ROOM IF YOU HAVE ANY OF THE FOLLOWING:      Difficulty breathing              Chills and/or fever over 101 F   Persistent vomiting and/or vomiting blood   Severe abdominal pain   Severe chest pain   Black, tarry stools   Bleeding- more than one  tablespoon   Any other symptom or condition that you feel may need urgent attention  Your doctor recommends these additional instructions:  If any biopsies were taken, your doctors clinic will contact you in 1 to 2   weeks with any results.  - Discharge patient to home (ambulatory).   - Observe patient's clinical course.   - Watch for pancreatitis, bleeding, perforation, and cholangitis.  For questions, problems or results please call your physician - Sarah Ballesteros MD at Work:  [phone].  [facility], EMERGENCY ROOM PHONE NUMBER: [phone]  IF A COMPLICATION OR EMERGENCY SITUATION ARISES AND YOU ARE UNABLE TO REACH   YOUR PHYSICIAN - GO DIRECTLY TO THE EMERGENCY ROOM.  Tang Tanner MD  8/27/2019 12:03:59 PM  [employer]

## 2019-09-24 ENCOUNTER — INFUSION (OUTPATIENT)
Dept: INFUSION THERAPY | Facility: HOSPITAL | Age: 59
End: 2019-09-24
Attending: INTERNAL MEDICINE
Payer: MEDICARE

## 2019-09-24 DIAGNOSIS — C7B.8 SECONDARY NEUROENDOCRINE TUMOR OF LIVER: Primary | ICD-10-CM

## 2019-09-24 PROCEDURE — A4216 STERILE WATER/SALINE, 10 ML: HCPCS | Performed by: INTERNAL MEDICINE

## 2019-09-24 PROCEDURE — 96523 IRRIG DRUG DELIVERY DEVICE: CPT

## 2019-09-24 PROCEDURE — 25000003 PHARM REV CODE 250: Performed by: INTERNAL MEDICINE

## 2019-09-24 PROCEDURE — 63600175 PHARM REV CODE 636 W HCPCS: Performed by: INTERNAL MEDICINE

## 2019-09-24 RX ORDER — HEPARIN 100 UNIT/ML
500 SYRINGE INTRAVENOUS
Status: COMPLETED | OUTPATIENT
Start: 2019-09-24 | End: 2019-09-24

## 2019-09-24 RX ORDER — HEPARIN 100 UNIT/ML
500 SYRINGE INTRAVENOUS
Status: CANCELLED | OUTPATIENT
Start: 2019-09-24

## 2019-09-24 RX ORDER — SODIUM CHLORIDE 0.9 % (FLUSH) 0.9 %
10 SYRINGE (ML) INJECTION
Status: COMPLETED | OUTPATIENT
Start: 2019-09-24 | End: 2019-09-24

## 2019-09-24 RX ORDER — SODIUM CHLORIDE 0.9 % (FLUSH) 0.9 %
10 SYRINGE (ML) INJECTION
Status: CANCELLED | OUTPATIENT
Start: 2019-09-24

## 2019-09-24 RX ADMIN — HEPARIN 500 UNITS: 100 SYRINGE at 03:09

## 2019-09-24 RX ADMIN — SODIUM CHLORIDE, PRESERVATIVE FREE 10 ML: 5 INJECTION INTRAVENOUS at 03:09

## 2019-09-24 NOTE — NURSING
"Right Mediport accessed via sterile technique with 20 gauge 1" walters needle without difficulty.  Positive blood return noted.  Flushed per protocol with 10 ml NS f/b 5 cc heparin flush.  Deaccessed without difficulty.  Bandaid pressure dressing applied.  Pt tolerated well.   "

## 2019-10-18 ENCOUNTER — PATIENT MESSAGE (OUTPATIENT)
Dept: OTOLARYNGOLOGY | Facility: CLINIC | Age: 59
End: 2019-10-18

## 2019-10-18 ENCOUNTER — PATIENT MESSAGE (OUTPATIENT)
Dept: INTERNAL MEDICINE | Facility: CLINIC | Age: 59
End: 2019-10-18

## 2019-10-18 ENCOUNTER — PATIENT MESSAGE (OUTPATIENT)
Dept: HEMATOLOGY/ONCOLOGY | Facility: CLINIC | Age: 59
End: 2019-10-18

## 2019-10-18 DIAGNOSIS — E11.9 TYPE 2 DIABETES MELLITUS WITHOUT COMPLICATION, WITHOUT LONG-TERM CURRENT USE OF INSULIN: Primary | ICD-10-CM

## 2019-10-21 DIAGNOSIS — C7B.8 SECONDARY NEUROENDOCRINE TUMOR OF LIVER: ICD-10-CM

## 2019-10-21 DIAGNOSIS — D50.0 IRON DEFICIENCY ANEMIA DUE TO CHRONIC BLOOD LOSS: Primary | ICD-10-CM

## 2019-10-25 ENCOUNTER — PATIENT OUTREACH (OUTPATIENT)
Dept: ADMINISTRATIVE | Facility: HOSPITAL | Age: 59
End: 2019-10-25

## 2019-10-28 ENCOUNTER — OFFICE VISIT (OUTPATIENT)
Dept: INTERNAL MEDICINE | Facility: CLINIC | Age: 59
End: 2019-10-28
Payer: MEDICARE

## 2019-10-28 VITALS
SYSTOLIC BLOOD PRESSURE: 118 MMHG | BODY MASS INDEX: 35.54 KG/M2 | OXYGEN SATURATION: 98 % | DIASTOLIC BLOOD PRESSURE: 72 MMHG | HEART RATE: 88 BPM | TEMPERATURE: 98 F | WEIGHT: 221.13 LBS | HEIGHT: 66 IN

## 2019-10-28 DIAGNOSIS — Z12.11 COLON CANCER SCREENING: ICD-10-CM

## 2019-10-28 DIAGNOSIS — D50.0 IRON DEFICIENCY ANEMIA DUE TO CHRONIC BLOOD LOSS: ICD-10-CM

## 2019-10-28 DIAGNOSIS — Z95.0 S/P PLACEMENT OF CARDIAC PACEMAKER: ICD-10-CM

## 2019-10-28 DIAGNOSIS — Z79.4 CONTROLLED TYPE 2 DIABETES MELLITUS WITHOUT COMPLICATION, WITH LONG-TERM CURRENT USE OF INSULIN: ICD-10-CM

## 2019-10-28 DIAGNOSIS — F41.9 ANXIETY: ICD-10-CM

## 2019-10-28 DIAGNOSIS — Z76.89 ENCOUNTER TO ESTABLISH CARE WITH NEW DOCTOR: Primary | ICD-10-CM

## 2019-10-28 DIAGNOSIS — E11.9 CONTROLLED TYPE 2 DIABETES MELLITUS WITHOUT COMPLICATION, WITH LONG-TERM CURRENT USE OF INSULIN: ICD-10-CM

## 2019-10-28 DIAGNOSIS — C7B.8 SECONDARY NEUROENDOCRINE TUMOR OF LIVER: ICD-10-CM

## 2019-10-28 DIAGNOSIS — I50.32 CHRONIC DIASTOLIC HEART FAILURE: ICD-10-CM

## 2019-10-28 LAB
ALBUMIN SERPL BCP-MCNC: 3.8 G/DL (ref 3.5–5.2)
ALBUMIN/CREAT UR: 20 UG/MG (ref 0–30)
ALP SERPL-CCNC: 71 U/L (ref 55–135)
ALT SERPL W/O P-5'-P-CCNC: 15 U/L (ref 10–44)
ANION GAP SERPL CALC-SCNC: 11 MMOL/L (ref 8–16)
AST SERPL-CCNC: 17 U/L (ref 10–40)
BASOPHILS # BLD AUTO: 0.06 K/UL (ref 0–0.2)
BASOPHILS NFR BLD: 0.6 % (ref 0–1.9)
BILIRUB SERPL-MCNC: 0.4 MG/DL (ref 0.1–1)
BILIRUB UR QL STRIP: NEGATIVE
BUN SERPL-MCNC: 34 MG/DL (ref 6–20)
CALCIUM SERPL-MCNC: 9.8 MG/DL (ref 8.7–10.5)
CHLORIDE SERPL-SCNC: 98 MMOL/L (ref 95–110)
CHOLEST SERPL-MCNC: 186 MG/DL (ref 120–199)
CHOLEST/HDLC SERPL: 3.4 {RATIO} (ref 2–5)
CLARITY UR REFRACT.AUTO: CLEAR
CO2 SERPL-SCNC: 27 MMOL/L (ref 23–29)
COLOR UR AUTO: YELLOW
CREAT SERPL-MCNC: 1.6 MG/DL (ref 0.5–1.4)
CREAT UR-MCNC: 75 MG/DL (ref 15–325)
DIFFERENTIAL METHOD: ABNORMAL
EOSINOPHIL # BLD AUTO: 0.2 K/UL (ref 0–0.5)
EOSINOPHIL NFR BLD: 1.6 % (ref 0–8)
ERYTHROCYTE [DISTWIDTH] IN BLOOD BY AUTOMATED COUNT: 13.1 % (ref 11.5–14.5)
EST. GFR  (AFRICAN AMERICAN): 40.7 ML/MIN/1.73 M^2
EST. GFR  (NON AFRICAN AMERICAN): 35.3 ML/MIN/1.73 M^2
ESTIMATED AVG GLUCOSE: 324 MG/DL (ref 68–131)
GLUCOSE SERPL-MCNC: 240 MG/DL (ref 70–110)
GLUCOSE UR QL STRIP: NEGATIVE
HBA1C MFR BLD HPLC: 12.9 % (ref 4–5.6)
HCT VFR BLD AUTO: 39.3 % (ref 37–48.5)
HDLC SERPL-MCNC: 55 MG/DL (ref 40–75)
HDLC SERPL: 29.6 % (ref 20–50)
HGB BLD-MCNC: 12.2 G/DL (ref 12–16)
HGB UR QL STRIP: NEGATIVE
IMM GRANULOCYTES # BLD AUTO: 0.05 K/UL (ref 0–0.04)
IMM GRANULOCYTES NFR BLD AUTO: 0.5 % (ref 0–0.5)
KETONES UR QL STRIP: NEGATIVE
LDLC SERPL CALC-MCNC: 96.8 MG/DL (ref 63–159)
LEUKOCYTE ESTERASE UR QL STRIP: NEGATIVE
LYMPHOCYTES # BLD AUTO: 1.8 K/UL (ref 1–4.8)
LYMPHOCYTES NFR BLD: 18.7 % (ref 18–48)
MCH RBC QN AUTO: 31.4 PG (ref 27–31)
MCHC RBC AUTO-ENTMCNC: 31 G/DL (ref 32–36)
MCV RBC AUTO: 101 FL (ref 82–98)
MICROALBUMIN UR DL<=1MG/L-MCNC: 15 UG/ML
MONOCYTES # BLD AUTO: 0.9 K/UL (ref 0.3–1)
MONOCYTES NFR BLD: 9.5 % (ref 4–15)
NEUTROPHILS # BLD AUTO: 6.8 K/UL (ref 1.8–7.7)
NEUTROPHILS NFR BLD: 69.1 % (ref 38–73)
NITRITE UR QL STRIP: NEGATIVE
NONHDLC SERPL-MCNC: 131 MG/DL
NRBC BLD-RTO: 0 /100 WBC
PH UR STRIP: 6 [PH] (ref 5–8)
PLATELET # BLD AUTO: 208 K/UL (ref 150–350)
PMV BLD AUTO: 11.7 FL (ref 9.2–12.9)
POTASSIUM SERPL-SCNC: 4.9 MMOL/L (ref 3.5–5.1)
PROT SERPL-MCNC: 9.5 G/DL (ref 6–8.4)
PROT UR QL STRIP: NEGATIVE
RBC # BLD AUTO: 3.88 M/UL (ref 4–5.4)
SODIUM SERPL-SCNC: 136 MMOL/L (ref 136–145)
SP GR UR STRIP: 1.01 (ref 1–1.03)
T4 FREE SERPL-MCNC: 1.03 NG/DL (ref 0.71–1.51)
TRIGL SERPL-MCNC: 171 MG/DL (ref 30–150)
TSH SERPL DL<=0.005 MIU/L-ACNC: 5.39 UIU/ML (ref 0.4–4)
URN SPEC COLLECT METH UR: NORMAL
WBC # BLD AUTO: 9.81 K/UL (ref 3.9–12.7)

## 2019-10-28 PROCEDURE — 80053 COMPREHEN METABOLIC PANEL: CPT

## 2019-10-28 PROCEDURE — 90686 FLU VACCINE (QUAD) GREATER THAN OR EQUAL TO 3YO PRESERVATIVE FREE IM: ICD-10-PCS | Mod: S$GLB,,, | Performed by: FAMILY MEDICINE

## 2019-10-28 PROCEDURE — 85025 COMPLETE CBC W/AUTO DIFF WBC: CPT

## 2019-10-28 PROCEDURE — 84443 ASSAY THYROID STIM HORMONE: CPT

## 2019-10-28 PROCEDURE — 99499 UNLISTED E&M SERVICE: CPT | Mod: S$GLB,,, | Performed by: FAMILY MEDICINE

## 2019-10-28 PROCEDURE — 84439 ASSAY OF FREE THYROXINE: CPT

## 2019-10-28 PROCEDURE — 99999 PR PBB SHADOW E&M-EST. PATIENT-LVL IV: CPT | Mod: PBBFAC,,, | Performed by: FAMILY MEDICINE

## 2019-10-28 PROCEDURE — 80061 LIPID PANEL: CPT

## 2019-10-28 PROCEDURE — G0008 FLU VACCINE (QUAD) GREATER THAN OR EQUAL TO 3YO PRESERVATIVE FREE IM: ICD-10-PCS | Mod: S$GLB,,, | Performed by: FAMILY MEDICINE

## 2019-10-28 PROCEDURE — 99214 PR OFFICE/OUTPT VISIT, EST, LEVL IV, 30-39 MIN: ICD-10-PCS | Mod: 25,S$GLB,, | Performed by: FAMILY MEDICINE

## 2019-10-28 PROCEDURE — 90686 IIV4 VACC NO PRSV 0.5 ML IM: CPT | Mod: S$GLB,,, | Performed by: FAMILY MEDICINE

## 2019-10-28 PROCEDURE — 3074F PR MOST RECENT SYSTOLIC BLOOD PRESSURE < 130 MM HG: ICD-10-PCS | Mod: CPTII,S$GLB,, | Performed by: FAMILY MEDICINE

## 2019-10-28 PROCEDURE — 3074F SYST BP LT 130 MM HG: CPT | Mod: CPTII,S$GLB,, | Performed by: FAMILY MEDICINE

## 2019-10-28 PROCEDURE — 81003 URINALYSIS AUTO W/O SCOPE: CPT

## 2019-10-28 PROCEDURE — 3008F PR BODY MASS INDEX (BMI) DOCUMENTED: ICD-10-PCS | Mod: CPTII,S$GLB,, | Performed by: FAMILY MEDICINE

## 2019-10-28 PROCEDURE — 3008F BODY MASS INDEX DOCD: CPT | Mod: CPTII,S$GLB,, | Performed by: FAMILY MEDICINE

## 2019-10-28 PROCEDURE — G0008 ADMIN INFLUENZA VIRUS VAC: HCPCS | Mod: S$GLB,,, | Performed by: FAMILY MEDICINE

## 2019-10-28 PROCEDURE — 3078F PR MOST RECENT DIASTOLIC BLOOD PRESSURE < 80 MM HG: ICD-10-PCS | Mod: CPTII,S$GLB,, | Performed by: FAMILY MEDICINE

## 2019-10-28 PROCEDURE — 99499 RISK ADDL DX/OHS AUDIT: ICD-10-PCS | Mod: S$GLB,,, | Performed by: FAMILY MEDICINE

## 2019-10-28 PROCEDURE — 83036 HEMOGLOBIN GLYCOSYLATED A1C: CPT

## 2019-10-28 PROCEDURE — 3078F DIAST BP <80 MM HG: CPT | Mod: CPTII,S$GLB,, | Performed by: FAMILY MEDICINE

## 2019-10-28 PROCEDURE — 99999 PR PBB SHADOW E&M-EST. PATIENT-LVL IV: ICD-10-PCS | Mod: PBBFAC,,, | Performed by: FAMILY MEDICINE

## 2019-10-28 PROCEDURE — 82043 UR ALBUMIN QUANTITATIVE: CPT

## 2019-10-28 PROCEDURE — 99214 OFFICE O/P EST MOD 30 MIN: CPT | Mod: 25,S$GLB,, | Performed by: FAMILY MEDICINE

## 2019-10-28 RX ORDER — ZOLPIDEM TARTRATE 10 MG/1
10 TABLET ORAL NIGHTLY PRN
Qty: 90 TABLET | Refills: 1 | Status: SHIPPED | OUTPATIENT
Start: 2019-10-28 | End: 2020-01-31 | Stop reason: SDUPTHER

## 2019-10-28 RX ORDER — ESCITALOPRAM OXALATE 10 MG/1
10 TABLET ORAL DAILY
Qty: 90 TABLET | Refills: 3 | Status: SHIPPED | OUTPATIENT
Start: 2019-10-28 | End: 2020-06-26 | Stop reason: SDUPTHER

## 2019-10-28 RX ORDER — GLIPIZIDE 10 MG/1
10 TABLET ORAL 2 TIMES DAILY
Qty: 180 TABLET | Refills: 3 | Status: SHIPPED | OUTPATIENT
Start: 2019-10-28 | End: 2020-06-26 | Stop reason: SDUPTHER

## 2019-10-28 NOTE — PROGRESS NOTES
FitKit was given to patient on 10/28/2019 11:44 AM   Injection received today, informed to sit in clinic 15 minutes, verbalized understanding.

## 2019-10-28 NOTE — PROGRESS NOTES
Subjective:       Patient ID: Janine Souza is a 59 y.o. female.    Chief Complaint: Establish Care    New patient establish care.  Medical history includes type 2 diabetes, neuro endocrine tumor of the liver, chronic diastolic heart failure with atrial flutter, are deficiency anemia with chronic blood loss, cardiac pacemaker, biliary stricture with stent which is been removed.  She has CT liver 10 months ago that was negative.  She received chemotherapy.  Oncology is following her.  She also has a scheduled eye exam will get of copied report for record.  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  She denies headache chest pain palpitations shortness of breath or edema. She denies polyuria polydipsia hypoglycemic symptoms.    Review of Systems   Constitutional: Negative for appetite change, chills, diaphoresis, fatigue, fever and unexpected weight change.   Respiratory: Negative for cough, chest tightness, shortness of breath and wheezing.    Cardiovascular: Negative for chest pain, palpitations and leg swelling.        Denies lightheadedness   Gastrointestinal: Negative for abdominal distention, abdominal pain, blood in stool, nausea and rectal pain.   Genitourinary: Negative for difficulty urinating, dysuria, hematuria and urgency.   Neurological: Negative for dizziness, syncope, light-headedness and headaches.   Psychiatric/Behavioral: The patient is nervous/anxious.        Objective:      Physical Exam   Constitutional: She is oriented to person, place, and time. She appears well-developed and well-nourished. No distress.   HENT:   Right Ear: External ear normal.   Left Ear: External ear normal.   Nose: Nose normal.   Mouth/Throat: Oropharynx is clear and moist.   No definite right parotid tumor felt   Neck: No JVD present. No tracheal deviation present. No thyromegaly present.   Cardiovascular: Normal rate and regular rhythm. Exam reveals no gallop.   No murmur heard.  Pulmonary/Chest:  Effort normal and breath sounds normal. She has no wheezes. She has no rales.   Abdominal: Soft. Bowel sounds are normal. She exhibits no distension and no mass. There is no tenderness. There is no guarding.   Lymphadenopathy:     She has no cervical adenopathy.   Neurological: She is alert and oriented to person, place, and time.   Skin: Skin is warm and dry. She is not diaphoretic. No pallor.   Psychiatric: She has a normal mood and affect. Her behavior is normal. Judgment and thought content normal.       Admission on 08/27/2019, Discharged on 08/27/2019   Component Date Value Ref Range Status    POCT Glucose 08/27/2019 334* 70 - 110 mg/dL Final    POCT Glucose 08/27/2019 164* 70 - 110 mg/dL Final    POCT Glucose 08/27/2019 303* 70 - 110 mg/dL Final    POCT Glucose 08/27/2019 315* 70 - 110 mg/dL Final    POCT Glucose 08/27/2019 287* 70 - 110 mg/dL Final    POCT Glucose 08/27/2019 244* 70 - 110 mg/dL Final     Assessment:       1. Controlled type 2 diabetes mellitus without complication, with long-term current use of insulin    2. Colon cancer screening        Plan:     Blood pressure 118/72.  Pulse 88 regular.  Lab was ordered to include CBC CMP lipids TSH A1c urinalysis microalbumin creatinine ratio.  Fecal immunoassay test ordered glipizide Ambien was ordered.  Celexa was ordered.  Flu immunization given today.  Follow-up is planned.    Controlled type 2 diabetes mellitus without complication, with long-term current use of insulin  -     CBC auto differential  -     Comprehensive metabolic panel  -     Lipid panel  -     TSH  -     Hemoglobin A1c  -     Microalbumin/creatinine urine ratio  -     Urinalysis    Colon cancer screening  -     Fecal Immunochemical Test (iFOBT); Future; Expected date: 10/28/2019    Other orders  -     glipiZIDE (GLUCOTROL) 10 MG tablet; Take 1 tablet (10 mg total) by mouth 2 (two) times daily.  Dispense: 180 tablet; Refill: 3  -     zolpidem (AMBIEN) 10 mg Tab; Take 1 tablet  (10 mg total) by mouth nightly as needed.  Dispense: 90 tablet; Refill: 1  -     escitalopram oxalate (LEXAPRO) 10 MG tablet; Take 1 tablet (10 mg total) by mouth once daily.  Dispense: 90 tablet; Refill: 3  -     Influenza - Quadrivalent (6 months+) (PF)

## 2019-10-29 ENCOUNTER — TELEPHONE (OUTPATIENT)
Dept: INTERNAL MEDICINE | Facility: CLINIC | Age: 59
End: 2019-10-29

## 2019-10-29 NOTE — TELEPHONE ENCOUNTER
----- Message from Walker Lazo sent at 10/29/2019  4:17 PM CDT -----  Contact: self  Type:  Patient Returning Call    Who Called:pt  Who Left Message for Patient:susan  Does the patient know what this is regarding?:no  Would the patient rather a call back or a response via [employer]? Call back  Best Call Back Number:[phone]  Additional Information: none    Thanks,  Walker Lazo

## 2019-10-29 NOTE — TELEPHONE ENCOUNTER
Spoke with the patient appointment been scheduled for blood work. Verbalized understanding of the test result.

## 2019-11-14 ENCOUNTER — CLINICAL SUPPORT (OUTPATIENT)
Dept: INTERNAL MEDICINE | Facility: CLINIC | Age: 59
End: 2019-11-14
Payer: MEDICARE

## 2019-11-14 ENCOUNTER — APPOINTMENT (OUTPATIENT)
Dept: LAB | Facility: HOSPITAL | Age: 59
End: 2019-11-14
Attending: FAMILY MEDICINE
Payer: MEDICARE

## 2019-11-14 DIAGNOSIS — E11.9 TYPE 2 DIABETES MELLITUS WITHOUT COMPLICATION, WITHOUT LONG-TERM CURRENT USE OF INSULIN: ICD-10-CM

## 2019-11-14 DIAGNOSIS — Z12.11 COLON CANCER SCREENING: ICD-10-CM

## 2019-11-14 PROCEDURE — 36415 COLL VENOUS BLD VENIPUNCTURE: CPT | Mod: S$GLB,,, | Performed by: FAMILY MEDICINE

## 2019-11-14 PROCEDURE — 36415 PR COLLECTION VENOUS BLOOD,VENIPUNCTURE: ICD-10-PCS | Mod: S$GLB,,, | Performed by: FAMILY MEDICINE

## 2019-11-14 PROCEDURE — 82043 UR ALBUMIN QUANTITATIVE: CPT

## 2019-11-14 PROCEDURE — 80061 LIPID PANEL: CPT

## 2019-11-14 PROCEDURE — 82274 ASSAY TEST FOR BLOOD FECAL: CPT

## 2019-11-14 PROCEDURE — 83036 HEMOGLOBIN GLYCOSYLATED A1C: CPT

## 2019-11-14 NOTE — PROGRESS NOTES
Patient here in the office to complete her labs, urine, and to run in her fitkit.  Fitkit, labs, and urine collected successfully.

## 2019-11-15 LAB
ALBUMIN/CREAT UR: 7 UG/MG (ref 0–30)
CHOLEST SERPL-MCNC: 163 MG/DL (ref 120–199)
CHOLEST/HDLC SERPL: 3.3 {RATIO} (ref 2–5)
CREAT UR-MCNC: 71 MG/DL (ref 15–325)
ESTIMATED AVG GLUCOSE: 292 MG/DL (ref 68–131)
HBA1C MFR BLD HPLC: 11.8 % (ref 4–5.6)
HDLC SERPL-MCNC: 49 MG/DL (ref 40–75)
HDLC SERPL: 30.1 % (ref 20–50)
HEMOCCULT STL QL IA: NEGATIVE
LDLC SERPL CALC-MCNC: 94 MG/DL (ref 63–159)
MICROALBUMIN UR DL<=1MG/L-MCNC: 5 UG/ML
NONHDLC SERPL-MCNC: 114 MG/DL
TRIGL SERPL-MCNC: 100 MG/DL (ref 30–150)

## 2019-11-20 ENCOUNTER — PATIENT MESSAGE (OUTPATIENT)
Dept: HEMATOLOGY/ONCOLOGY | Facility: CLINIC | Age: 59
End: 2019-11-20

## 2019-11-20 ENCOUNTER — TELEPHONE (OUTPATIENT)
Dept: HEMATOLOGY/ONCOLOGY | Facility: CLINIC | Age: 59
End: 2019-11-20

## 2019-11-20 ENCOUNTER — LAB VISIT (OUTPATIENT)
Dept: LAB | Facility: HOSPITAL | Age: 59
End: 2019-11-20
Attending: INTERNAL MEDICINE
Payer: MEDICARE

## 2019-11-20 DIAGNOSIS — C7B.8 SECONDARY NEUROENDOCRINE TUMOR OF LIVER: ICD-10-CM

## 2019-11-20 DIAGNOSIS — D50.0 IRON DEFICIENCY ANEMIA DUE TO CHRONIC BLOOD LOSS: ICD-10-CM

## 2019-11-20 LAB
ALBUMIN SERPL BCP-MCNC: 3.5 G/DL (ref 3.5–5.2)
ALP SERPL-CCNC: 71 U/L (ref 55–135)
ALT SERPL W/O P-5'-P-CCNC: 12 U/L (ref 10–44)
ANION GAP SERPL CALC-SCNC: 11 MMOL/L (ref 8–16)
AST SERPL-CCNC: 10 U/L (ref 10–40)
BASOPHILS # BLD AUTO: 0.04 K/UL (ref 0–0.2)
BASOPHILS NFR BLD: 0.3 % (ref 0–1.9)
BILIRUB SERPL-MCNC: 0.6 MG/DL (ref 0.1–1)
BUN SERPL-MCNC: 24 MG/DL (ref 6–20)
CALCIUM SERPL-MCNC: 9.7 MG/DL (ref 8.7–10.5)
CHLORIDE SERPL-SCNC: 97 MMOL/L (ref 95–110)
CO2 SERPL-SCNC: 28 MMOL/L (ref 23–29)
CREAT SERPL-MCNC: 1.5 MG/DL (ref 0.5–1.4)
DIFFERENTIAL METHOD: ABNORMAL
EOSINOPHIL # BLD AUTO: 0.1 K/UL (ref 0–0.5)
EOSINOPHIL NFR BLD: 0.8 % (ref 0–8)
ERYTHROCYTE [DISTWIDTH] IN BLOOD BY AUTOMATED COUNT: 13.1 % (ref 11.5–14.5)
EST. GFR  (AFRICAN AMERICAN): 44 ML/MIN/1.73 M^2
EST. GFR  (NON AFRICAN AMERICAN): 38 ML/MIN/1.73 M^2
FERRITIN SERPL-MCNC: 1398 NG/ML (ref 20–300)
GLUCOSE SERPL-MCNC: 282 MG/DL (ref 70–110)
HCT VFR BLD AUTO: 34.1 % (ref 37–48.5)
HGB BLD-MCNC: 10.8 G/DL (ref 12–16)
IMM GRANULOCYTES # BLD AUTO: 0.1 K/UL (ref 0–0.04)
IMM GRANULOCYTES NFR BLD AUTO: 0.7 % (ref 0–0.5)
IRON SERPL-MCNC: 20 UG/DL (ref 30–160)
LYMPHOCYTES # BLD AUTO: 1.7 K/UL (ref 1–4.8)
LYMPHOCYTES NFR BLD: 12.2 % (ref 18–48)
MCH RBC QN AUTO: 31.7 PG (ref 27–31)
MCHC RBC AUTO-ENTMCNC: 31.7 G/DL (ref 32–36)
MCV RBC AUTO: 100 FL (ref 82–98)
MONOCYTES # BLD AUTO: 1.2 K/UL (ref 0.3–1)
MONOCYTES NFR BLD: 8.6 % (ref 4–15)
NEUTROPHILS # BLD AUTO: 11.1 K/UL (ref 1.8–7.7)
NEUTROPHILS NFR BLD: 77.4 % (ref 38–73)
NRBC BLD-RTO: 0 /100 WBC
PLATELET # BLD AUTO: 210 K/UL (ref 150–350)
PMV BLD AUTO: 9.2 FL (ref 9.2–12.9)
POTASSIUM SERPL-SCNC: 4.4 MMOL/L (ref 3.5–5.1)
PROT SERPL-MCNC: 9.6 G/DL (ref 6–8.4)
RBC # BLD AUTO: 3.41 M/UL (ref 4–5.4)
SATURATED IRON: 6 % (ref 20–50)
SODIUM SERPL-SCNC: 136 MMOL/L (ref 136–145)
TOTAL IRON BINDING CAPACITY: 352 UG/DL (ref 250–450)
TRANSFERRIN SERPL-MCNC: 238 MG/DL (ref 200–375)
WBC # BLD AUTO: 14.27 K/UL (ref 3.9–12.7)

## 2019-11-20 PROCEDURE — 83540 ASSAY OF IRON: CPT

## 2019-11-20 PROCEDURE — 80053 COMPREHEN METABOLIC PANEL: CPT

## 2019-11-20 PROCEDURE — 82728 ASSAY OF FERRITIN: CPT

## 2019-11-22 ENCOUNTER — OFFICE VISIT (OUTPATIENT)
Dept: HEMATOLOGY/ONCOLOGY | Facility: CLINIC | Age: 59
End: 2019-11-22
Payer: MEDICARE

## 2019-11-22 ENCOUNTER — INFUSION (OUTPATIENT)
Dept: INFUSION THERAPY | Facility: HOSPITAL | Age: 59
End: 2019-11-22
Attending: INTERNAL MEDICINE
Payer: MEDICARE

## 2019-11-22 VITALS
RESPIRATION RATE: 18 BRPM | SYSTOLIC BLOOD PRESSURE: 104 MMHG | DIASTOLIC BLOOD PRESSURE: 74 MMHG | HEART RATE: 115 BPM | BODY MASS INDEX: 35.57 KG/M2 | TEMPERATURE: 98 F | WEIGHT: 221.31 LBS | OXYGEN SATURATION: 96 % | HEIGHT: 66 IN

## 2019-11-22 DIAGNOSIS — C7B.8 SECONDARY NEUROENDOCRINE TUMOR OF LIVER: ICD-10-CM

## 2019-11-22 DIAGNOSIS — C78.6 METASTATIC CANCER TO RETROPERITONEUM: ICD-10-CM

## 2019-11-22 DIAGNOSIS — D49.89 NEOPLASM OF ABDOMEN: ICD-10-CM

## 2019-11-22 DIAGNOSIS — E07.89 THYROID FULLNESS: ICD-10-CM

## 2019-11-22 DIAGNOSIS — C7B.8 SECONDARY NEUROENDOCRINE TUMOR OF LIVER: Primary | ICD-10-CM

## 2019-11-22 DIAGNOSIS — D50.0 IRON DEFICIENCY ANEMIA DUE TO CHRONIC BLOOD LOSS: Primary | ICD-10-CM

## 2019-11-22 PROCEDURE — 63600175 PHARM REV CODE 636 W HCPCS: Performed by: INTERNAL MEDICINE

## 2019-11-22 PROCEDURE — 3008F PR BODY MASS INDEX (BMI) DOCUMENTED: ICD-10-PCS | Mod: CPTII,S$GLB,, | Performed by: INTERNAL MEDICINE

## 2019-11-22 PROCEDURE — 3074F PR MOST RECENT SYSTOLIC BLOOD PRESSURE < 130 MM HG: ICD-10-PCS | Mod: CPTII,S$GLB,, | Performed by: INTERNAL MEDICINE

## 2019-11-22 PROCEDURE — 96523 IRRIG DRUG DELIVERY DEVICE: CPT

## 2019-11-22 PROCEDURE — 25000003 PHARM REV CODE 250: Performed by: INTERNAL MEDICINE

## 2019-11-22 PROCEDURE — 3074F SYST BP LT 130 MM HG: CPT | Mod: CPTII,S$GLB,, | Performed by: INTERNAL MEDICINE

## 2019-11-22 PROCEDURE — 3008F BODY MASS INDEX DOCD: CPT | Mod: CPTII,S$GLB,, | Performed by: INTERNAL MEDICINE

## 2019-11-22 PROCEDURE — 99214 OFFICE O/P EST MOD 30 MIN: CPT | Mod: S$GLB,,, | Performed by: INTERNAL MEDICINE

## 2019-11-22 PROCEDURE — 99999 PR PBB SHADOW E&M-EST. PATIENT-LVL III: CPT | Mod: PBBFAC,,, | Performed by: INTERNAL MEDICINE

## 2019-11-22 PROCEDURE — 99214 PR OFFICE/OUTPT VISIT, EST, LEVL IV, 30-39 MIN: ICD-10-PCS | Mod: S$GLB,,, | Performed by: INTERNAL MEDICINE

## 2019-11-22 PROCEDURE — A4216 STERILE WATER/SALINE, 10 ML: HCPCS | Performed by: INTERNAL MEDICINE

## 2019-11-22 PROCEDURE — 99999 PR PBB SHADOW E&M-EST. PATIENT-LVL III: ICD-10-PCS | Mod: PBBFAC,,, | Performed by: INTERNAL MEDICINE

## 2019-11-22 PROCEDURE — 3078F DIAST BP <80 MM HG: CPT | Mod: CPTII,S$GLB,, | Performed by: INTERNAL MEDICINE

## 2019-11-22 PROCEDURE — 3078F PR MOST RECENT DIASTOLIC BLOOD PRESSURE < 80 MM HG: ICD-10-PCS | Mod: CPTII,S$GLB,, | Performed by: INTERNAL MEDICINE

## 2019-11-22 RX ORDER — SODIUM CHLORIDE 0.9 % (FLUSH) 0.9 %
10 SYRINGE (ML) INJECTION
Status: CANCELLED | OUTPATIENT
Start: 2019-11-22

## 2019-11-22 RX ORDER — HEPARIN 100 UNIT/ML
500 SYRINGE INTRAVENOUS
Status: CANCELLED | OUTPATIENT
Start: 2019-12-11

## 2019-11-22 RX ORDER — SODIUM CHLORIDE 0.9 % (FLUSH) 0.9 %
10 SYRINGE (ML) INJECTION
Status: CANCELLED | OUTPATIENT
Start: 2019-12-11

## 2019-11-22 RX ORDER — HEPARIN 100 UNIT/ML
500 SYRINGE INTRAVENOUS
Status: CANCELLED | OUTPATIENT
Start: 2019-11-22

## 2019-11-22 RX ORDER — SODIUM CHLORIDE 0.9 % (FLUSH) 0.9 %
10 SYRINGE (ML) INJECTION
Status: COMPLETED | OUTPATIENT
Start: 2019-11-22 | End: 2019-11-22

## 2019-11-22 RX ORDER — HEPARIN 100 UNIT/ML
500 SYRINGE INTRAVENOUS
Status: COMPLETED | OUTPATIENT
Start: 2019-11-22 | End: 2019-11-22

## 2019-11-22 RX ADMIN — SODIUM CHLORIDE, PRESERVATIVE FREE 10 ML: 5 INJECTION INTRAVENOUS at 11:11

## 2019-11-22 RX ADMIN — HEPARIN 500 UNITS: 100 SYRINGE at 11:11

## 2019-11-22 NOTE — PROGRESS NOTES
Subjective:       Patient ID: Janine Souza is a 59 y.o. female.    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     Family History   Problem Relation Age of Onset    Diabetes type II Mother     Prostate cancer Father     Breast cancer Paternal Aunt         breast    Breast cancer Maternal Aunt         breast     Social History     Socioeconomic History    Marital status:      Spouse name: Not on file    Number of children: Not on file    Years of education: Not on file    Highest education level: Not on file   Occupational History    Not on file   Social Needs    Financial resource strain: Not on file    Food insecurity:     Worry: Not on file     Inability: Not on file    Transportation needs:     Medical: Not on file     Non-medical: Not on file   Tobacco Use    Smoking status: Never Smoker    Smokeless tobacco: Never Used   Substance and Sexual Activity    Alcohol use: No     Alcohol/week: 0.0 standard drinks    Drug use: No    Sexual activity: Not on file   Lifestyle    Physical activity:     Days per week: Not on file     Minutes per session: Not on file    Stress: Not on file   Relationships    Social connections:     Talks on phone: Not on file     Gets together: Not on file     Attends [employer] service: Not on file     Active member of club or  organization: Not on file     Attends meetings of clubs or organizations: Not on file     Relationship status: Not on file   Other Topics Concern    Not on file   Social History Narrative    Not on file     Past Surgical History:   Procedure Laterality Date    A-V CARDIAC PACEMAKER INSERTION Left 2018    Procedure: INSERTION, CARDIAC PACEMAKER, DUAL CHAMBER;  Surgeon: Jean Carlos Onofre MD;  Location: [facility] CATH LAB;  Service: Cardiology;  Laterality: Left;  sinus pause, dPPM, BIO, DM, 323A    ADENOIDECTOMY      CARDIAC ELECTROPHYSIOLOGY MAPPING AND ABLATION      CARDIAC SURGERY      CARDIOVERSION N/A 2018    Procedure: CARDIOVERSION;  Surgeon: Jean Carlos Onofre MD;  Location: [facility] CATH LAB;  Service: Cardiology;  Laterality: N/A;     SECTION      ERCP      ERCP N/A 2018    Procedure: ERCP (ENDOSCOPIC RETROGRADE CHOLANGIOPANCREATOGRAPHY);  Surgeon: Tang Tanner MD;  [facility]  Service: Endoscopy;  Laterality: N/A;  2 day hold Dr Kalyan Whittaker    ERCP N/A 2019    Procedure: ERCP (ENDOSCOPIC RETROGRADE CHOLANGIOPANCREATOGRAPHY);  Surgeon: Tang Tanner MD;  [facility]  Service: Endoscopy;  Laterality: N/A;  2 day hold Dr Kalyan Whittaker    TONSILLECTOMY      uvulopalatopharygoplasty         Labs:  Lab Results   Component Value Date    WBC 14.27 (H) 2019    HGB 10.8 (L) 2019    HCT 34.1 (L) 2019     (H) 2019     2019     BMP  Lab Results   Component Value Date     2019    K 4.4 2019    CL 97 2019    CO2 28 2019    BUN 24 (H) 2019    CREATININE 1.5 (H) 2019    CALCIUM 9.7 2019    ANIONGAP 11 2019    ESTGFRAFRICA 44 (A) 2019    EGFRNONAA 38 (A) 2019     Lab Results   Component Value Date    ALT 12 2019    AST 10 2019    ALKPHOS 71 2019    BILITOT 0.6 2019       Lab Results   Component Value  Date    IRON 20 (L) 11/20/2019    TIBC 352 11/20/2019    FERRITIN 1,398 (H) 11/20/2019     No results found for: [id number]  No results found for: FOLATE  Lab Results   Component Value Date    TSH 5.389 (H) 10/28/2019         Review of Systems   Constitutional: Positive for activity change, appetite change, fatigue and fever. Negative for chills, diaphoresis and unexpected weight change.   HENT: Negative for congestion, dental problem, drooling, ear discharge, ear pain, facial swelling, hearing loss, mouth sores, nosebleeds, postnasal drip, rhinorrhea, sinus pressure, sneezing, sore throat, tinnitus, trouble swallowing and voice change.    Eyes: Negative for photophobia, pain, discharge, redness, itching and visual disturbance.   Respiratory: Negative for cough, choking, chest tightness, shortness of breath, wheezing and stridor.    Cardiovascular: Negative for chest pain, palpitations and leg swelling.   Gastrointestinal: Negative for abdominal distention, abdominal pain, anal bleeding, blood in stool, constipation, diarrhea, nausea, rectal pain and vomiting.   Endocrine: Negative for cold intolerance, heat intolerance, polydipsia, polyphagia and polyuria.   Genitourinary: Negative for decreased urine volume, difficulty urinating, dyspareunia, dysuria, enuresis, flank pain, frequency, genital sores, hematuria, menstrual problem, pelvic pain, urgency, vaginal bleeding, vaginal discharge and vaginal pain.   Musculoskeletal: Negative for arthralgias, back pain, gait problem, joint swelling, myalgias, neck pain and neck stiffness.   Skin: Negative for color change, pallor and rash.   Allergic/Immunologic: Negative for environmental allergies, food allergies and immunocompromised state.   Neurological: Positive for weakness. Negative for dizziness, tremors, seizures, syncope, facial asymmetry, speech difficulty, light-headedness, numbness and headaches.   Hematological: Negative for adenopathy. Does not bruise/bleed  easily.   Psychiatric/Behavioral: Positive for dysphoric mood. Negative for agitation, behavioral problems, confusion, decreased concentration, hallucinations, self-injury, sleep disturbance and suicidal ideas. The patient is nervous/anxious. The patient is not hyperactive.        Objective:      Physical Exam   Constitutional: She is oriented to person, place, and time. She appears well-developed and well-nourished. She appears distressed.   HENT:   Head: Normocephalic and atraumatic.   Right Ear: External ear normal.   Left Ear: External ear normal.   Nose: Nose normal. Right sinus exhibits no maxillary sinus tenderness and no frontal sinus tenderness. Left sinus exhibits no maxillary sinus tenderness and no frontal sinus tenderness.   Mouth/Throat: Oropharynx is clear and moist. No oropharyngeal exudate.   Eyes: Pupils are equal, round, and reactive to light. Conjunctivae, EOM and lids are normal. Right eye exhibits no discharge. Left eye exhibits no discharge. Right conjunctiva is not injected. Right conjunctiva has no hemorrhage. Left conjunctiva is not injected. Left conjunctiva has no hemorrhage. No scleral icterus.   Neck: Normal range of motion. Neck supple. No JVD present. No tracheal deviation present. No thyromegaly present.   Cardiovascular: Normal rate and regular rhythm.   Pulmonary/Chest: Effort normal. No stridor. No respiratory distress. She exhibits no tenderness.   Abdominal: Soft. She exhibits no distension and no mass. There is no splenomegaly or hepatomegaly. There is no tenderness. There is no rebound.   Musculoskeletal: Normal range of motion. She exhibits no edema or tenderness.   Lymphadenopathy:     She has no cervical adenopathy.     She has no axillary adenopathy.        Right: No supraclavicular adenopathy present.        Left: No supraclavicular adenopathy present.   Neurological: She is alert and oriented to person, place, and time. No cranial nerve deficit. Coordination normal.    Skin: Skin is dry. No rash noted. She is not diaphoretic. No erythema.   Psychiatric: She has a normal mood and affect. Her behavior is normal. Judgment and thought content normal.   Vitals reviewed.          Assessment:      1. Iron deficiency anemia due to chronic blood loss    2. Neoplasm of abdomen    3. 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 Secondary neuroendocrine tumor of liver    5. Thyroid fullness           Plan:     Recurrent iron deficiency anemia patient will return for review intravenous iron ultrasound of parotid gland will be accomplished prior to next visit in also I will have a CT chest abdomen pelvis to see whether not progressive disease discussed implications with her reviewed imaging studies that have been done previously 25 min face-to-face time coordination of care greater than 50% of visit face-to-face        Kalyan Flores Jr, MD FACP

## 2019-11-22 NOTE — DISCHARGE INSTRUCTIONS
[facility]  [address] [facility]  [address]  [phone] phone     [phone] fax  Hours of Operation: Monday- Friday 8:00am- 5:00pm  After hours phone  [phone]  Hematology / Oncology Physicians on call      TIARRA Marcos Dr., Dr., Dr., Dr., NP Sydney Prescott, NP Tyesha Taylor, NP    Please call with any concerns regarding your appointment today.

## 2019-11-22 NOTE — NURSING
"Pt here for Mediport Flush. Right chestwall mediport accessed with a 20g 1" walters via sterile technique.  Excellent blood return noted.  Flushed with 10ml NS and 5 ml heparin solution.  Needle D/C, site without redness, swelling, or drainage noted.  Dressing applied.  Patient tolerated well.    "

## 2019-11-25 ENCOUNTER — TELEPHONE (OUTPATIENT)
Dept: RADIOLOGY | Facility: HOSPITAL | Age: 59
End: 2019-11-25

## 2019-11-26 ENCOUNTER — HOSPITAL ENCOUNTER (OUTPATIENT)
Dept: RADIOLOGY | Facility: HOSPITAL | Age: 59
Discharge: HOME | End: 2019-11-26
Attending: INTERNAL MEDICINE
Payer: MEDICARE

## 2019-11-26 ENCOUNTER — INFUSION (OUTPATIENT)
Dept: INFUSION THERAPY | Facility: HOSPITAL | Age: 59
End: 2019-11-26
Attending: RADIOLOGY
Payer: MEDICARE

## 2019-11-26 ENCOUNTER — OFFICE VISIT (OUTPATIENT)
Dept: HEMATOLOGY/ONCOLOGY | Facility: CLINIC | Age: 59
End: 2019-11-26
Payer: MEDICARE

## 2019-11-26 VITALS
RESPIRATION RATE: 18 BRPM | BODY MASS INDEX: 35.61 KG/M2 | TEMPERATURE: 97 F | WEIGHT: 221.56 LBS | DIASTOLIC BLOOD PRESSURE: 70 MMHG | HEART RATE: 123 BPM | SYSTOLIC BLOOD PRESSURE: 119 MMHG | HEIGHT: 66 IN | OXYGEN SATURATION: 93 %

## 2019-11-26 VITALS
HEART RATE: 115 BPM | SYSTOLIC BLOOD PRESSURE: 116 MMHG | OXYGEN SATURATION: 97 % | RESPIRATION RATE: 20 BRPM | TEMPERATURE: 98 F | DIASTOLIC BLOOD PRESSURE: 77 MMHG

## 2019-11-26 DIAGNOSIS — J13 PNEUMONIA OF LEFT LOWER LOBE DUE TO STREPTOCOCCUS PNEUMONIAE: Primary | ICD-10-CM

## 2019-11-26 DIAGNOSIS — D49.89 NEOPLASM OF ABDOMEN: ICD-10-CM

## 2019-11-26 DIAGNOSIS — C7B.8 SECONDARY NEUROENDOCRINE TUMOR OF LIVER: ICD-10-CM

## 2019-11-26 DIAGNOSIS — C7B.8 SECONDARY NEUROENDOCRINE TUMOR OF LIVER: Primary | ICD-10-CM

## 2019-11-26 DIAGNOSIS — R93.89 ABNORMAL THYROID ULTRASOUND: ICD-10-CM

## 2019-11-26 DIAGNOSIS — D50.0 IRON DEFICIENCY ANEMIA DUE TO CHRONIC BLOOD LOSS: ICD-10-CM

## 2019-11-26 DIAGNOSIS — C78.6 METASTATIC CANCER TO RETROPERITONEUM: ICD-10-CM

## 2019-11-26 DIAGNOSIS — E07.89 THYROID FULLNESS: ICD-10-CM

## 2019-11-26 PROCEDURE — 3074F SYST BP LT 130 MM HG: CPT | Mod: CPTII,S$GLB,, | Performed by: INTERNAL MEDICINE

## 2019-11-26 PROCEDURE — 63600175 PHARM REV CODE 636 W HCPCS: Performed by: INTERNAL MEDICINE

## 2019-11-26 PROCEDURE — 25000003 PHARM REV CODE 250: Performed by: INTERNAL MEDICINE

## 2019-11-26 PROCEDURE — 3078F PR MOST RECENT DIASTOLIC BLOOD PRESSURE < 80 MM HG: ICD-10-PCS | Mod: CPTII,S$GLB,, | Performed by: INTERNAL MEDICINE

## 2019-11-26 PROCEDURE — 3074F PR MOST RECENT SYSTOLIC BLOOD PRESSURE < 130 MM HG: ICD-10-PCS | Mod: CPTII,S$GLB,, | Performed by: INTERNAL MEDICINE

## 2019-11-26 PROCEDURE — 71260 CT CHEST ABDOMEN PELVIS WITH CONTRAST (XPD): ICD-10-PCS | Mod: 26,,, | Performed by: RADIOLOGY

## 2019-11-26 PROCEDURE — 3078F DIAST BP <80 MM HG: CPT | Mod: CPTII,S$GLB,, | Performed by: INTERNAL MEDICINE

## 2019-11-26 PROCEDURE — 74177 CT ABD & PELVIS W/CONTRAST: CPT | Mod: TC

## 2019-11-26 PROCEDURE — 74177 CT ABD & PELVIS W/CONTRAST: CPT | Mod: 26,,, | Performed by: RADIOLOGY

## 2019-11-26 PROCEDURE — 3008F BODY MASS INDEX DOCD: CPT | Mod: CPTII,S$GLB,, | Performed by: INTERNAL MEDICINE

## 2019-11-26 PROCEDURE — 96523 IRRIG DRUG DELIVERY DEVICE: CPT

## 2019-11-26 PROCEDURE — 96365 THER/PROPH/DIAG IV INF INIT: CPT

## 2019-11-26 PROCEDURE — 99999 PR PBB SHADOW E&M-EST. PATIENT-LVL III: CPT | Mod: PBBFAC,,, | Performed by: INTERNAL MEDICINE

## 2019-11-26 PROCEDURE — 99215 PR OFFICE/OUTPT VISIT, EST, LEVL V, 40-54 MIN: ICD-10-PCS | Mod: 25,S$GLB,, | Performed by: INTERNAL MEDICINE

## 2019-11-26 PROCEDURE — 74177 CT CHEST ABDOMEN PELVIS WITH CONTRAST (XPD): ICD-10-PCS | Mod: 26,,, | Performed by: RADIOLOGY

## 2019-11-26 PROCEDURE — 25500020 PHARM REV CODE 255: Performed by: INTERNAL MEDICINE

## 2019-11-26 PROCEDURE — A4216 STERILE WATER/SALINE, 10 ML: HCPCS | Performed by: INTERNAL MEDICINE

## 2019-11-26 PROCEDURE — 71260 CT THORAX DX C+: CPT | Mod: 26,,, | Performed by: RADIOLOGY

## 2019-11-26 PROCEDURE — 3008F PR BODY MASS INDEX (BMI) DOCUMENTED: ICD-10-PCS | Mod: CPTII,S$GLB,, | Performed by: INTERNAL MEDICINE

## 2019-11-26 PROCEDURE — 99215 OFFICE O/P EST HI 40 MIN: CPT | Mod: 25,S$GLB,, | Performed by: INTERNAL MEDICINE

## 2019-11-26 PROCEDURE — 76536 US EXAM OF HEAD AND NECK: CPT | Mod: TC

## 2019-11-26 PROCEDURE — 99999 PR PBB SHADOW E&M-EST. PATIENT-LVL III: ICD-10-PCS | Mod: PBBFAC,,, | Performed by: INTERNAL MEDICINE

## 2019-11-26 RX ORDER — LEVOFLOXACIN 500 MG/1
500 TABLET, FILM COATED ORAL DAILY
Qty: 10 TABLET | Refills: 0 | Status: SHIPPED | OUTPATIENT
Start: 2019-11-26 | End: 2019-12-06

## 2019-11-26 RX ORDER — SODIUM CHLORIDE 0.9 % (FLUSH) 0.9 %
10 SYRINGE (ML) INJECTION
Status: CANCELLED | OUTPATIENT
Start: 2019-11-26

## 2019-11-26 RX ORDER — HEPARIN 100 UNIT/ML
500 SYRINGE INTRAVENOUS
Status: COMPLETED | OUTPATIENT
Start: 2019-11-26 | End: 2019-11-26

## 2019-11-26 RX ORDER — HEPARIN 100 UNIT/ML
500 SYRINGE INTRAVENOUS
Status: CANCELLED | OUTPATIENT
Start: 2019-11-26

## 2019-11-26 RX ORDER — CEFTRIAXONE 1 G/50ML
1 INJECTION, SOLUTION INTRAVENOUS
Status: DISCONTINUED | OUTPATIENT
Start: 2019-11-26 | End: 2019-11-26

## 2019-11-26 RX ORDER — SODIUM CHLORIDE 0.9 % (FLUSH) 0.9 %
10 SYRINGE (ML) INJECTION
Status: COMPLETED | OUTPATIENT
Start: 2019-11-26 | End: 2019-11-26

## 2019-11-26 RX ORDER — CEFTRIAXONE 1 G/50ML
1 INJECTION, SOLUTION INTRAVENOUS
Status: CANCELLED
Start: 2019-11-26

## 2019-11-26 RX ADMIN — Medication 10 ML: at 12:11

## 2019-11-26 RX ADMIN — CEFTRIAXONE SODIUM 1 G: 1 INJECTION, POWDER, FOR SOLUTION INTRAMUSCULAR; INTRAVENOUS at 03:11

## 2019-11-26 RX ADMIN — HEPARIN 500 UNITS: 100 SYRINGE at 12:11

## 2019-11-26 RX ADMIN — HEPARIN 500 UNITS: 100 SYRINGE at 01:11

## 2019-11-26 RX ADMIN — HEPARIN 500 UNITS: 100 SYRINGE at 03:11

## 2019-11-26 RX ADMIN — IOHEXOL 75 ML: 350 INJECTION, SOLUTION INTRAVENOUS at 12:11

## 2019-11-26 NOTE — PATIENT INSTRUCTIONS
Pneumonia (Adult)  Pneumonia is an infection deep within the lungs. It is in the small air sacs (alveoli). Pneumonia may be caused by a virus or bacteria. Pneumonia caused by bacteria is usually treated with an antibiotic. Severe cases may need to be treated in the hospital. Milder cases can be treated at home. Symptoms usually start to get better during the first 2 days of treatment.    Home care  Follow these guidelines when caring for yourself at home:  · Rest at home for the first 2 to 3 days, or until you feel stronger. Dont let yourself get overly tired when you go back to your activities.  · Stay away from cigarette smoke - yours or other peoples.  · You may use acetaminophen or ibuprofen to control fever or pain, unless another medicine was prescribed. If you have chronic liver or kidney disease, talk with your healthcare provider before using these medicines. Also talk with your provider if youve had a stomach ulcer or gastrointestinal bleeding. Dont give aspirin to anyone younger than 18 years of age who is ill with a fever. It may cause severe liver damage.  · Your appetite may be poor, so a light diet is fine.  · Drink 6 to 8 glasses of fluids every day to make sure you are getting enough fluids. Beverages can include water, sport drinks, sodas without caffeine, juices, tea, or soup. Fluids will help loosen secretions in the lung. This will make it easier for you to cough up the phlegm (sputum). If you also have heart or kidney disease, check with your healthcare provider before you drink extra fluids.  · Take antibiotic medicine prescribed until it is all gone, even if you are feeling better after a few days.  Follow-up care  Follow up with your healthcare provider in the next 2 to 3 days, or as advised. This is to be sure the medicine is helping you get better.  If you are 65 or older, you should get a pneumococcal vaccine and a yearly flu (influenza) shot. You should also get these vaccines if  you have chronic lung disease like asthma, emphysema, or COPD. Recently, a second type of pneumonia vaccine has become available for everyone over 65 years old. This is in addition to the previous vaccine. Ask your provider about this.  When to seek medical advice  Call your healthcare provider right away if any of these occur:  · You dont get better within the first 48 hours of treatment  · Shortness of breath gets worse  · Rapid breathing (more than 25 breaths per minute)  · Coughing up blood  · Chest pain gets worse with breathing  · Fever of 100.4°F (38°C) or higher that doesnt get better with fever medicine  · Weakness, dizziness, or fainting that gets worse  · Thirst or dry mouth that gets worse  · Sinus pain, headache, or a stiff neck  · Chest pain not caused by coughing  Date Last Reviewed: 1/1/2017  © 5330-2870 [employer]. [address] [place] [address]. All rights reserved. This information is not intended as a substitute for professional medical care. Always follow your healthcare professional's instructions.

## 2019-11-26 NOTE — DISCHARGE INSTRUCTIONS
[facility]  [address] [facility]  [address]  [phone] phone     [phone] fax  Hours of Operation: Monday- Friday 8:00am- 5:00pm  After hours phone  [phone]  Hematology / Oncology Physicians on call      TIARRA Marcos Dr., Dr., Dr., Dr., NP Sydney Prescott, NP Tyesha Taylor, NP    Please call with any concerns regarding your appointment today.

## 2019-11-26 NOTE — NURSING
Allergy warning appeared regarding medication to be administered. Pt states that she spoke to MD regarding allergies. RN spoke to Korin Han regarding allergy and potential reaction. Per pharmacist ok to administer. Patient educated and advised to tell RN immediately should any S/S of allergy occur (itching, back pain, swelling, etc.). Patient verbalized understanding. Will monitor patient throughout infusion.

## 2019-11-26 NOTE — PROGRESS NOTES
Subjective:       Patient ID: Janine Souza is a 59 y.o. female.    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     Family History   Problem Relation Age of Onset    Diabetes type II Mother     Prostate cancer Father     Breast cancer Paternal Aunt         breast    Breast cancer Maternal Aunt         breast     Social History     Socioeconomic History    Marital status:      Spouse name: Not on file    Number of children: Not on file    Years of education: Not on file    Highest education level: Not on file   Occupational History    Not on file   Social Needs    Financial resource strain: Not on file    Food insecurity:     Worry: Not on file     Inability: Not on file    Transportation needs:     Medical: Not on file     Non-medical: Not on file   Tobacco Use    Smoking status: Never Smoker    Smokeless tobacco: Never Used   Substance and Sexual Activity    Alcohol use: No     Alcohol/week: 0.0 standard drinks    Drug use: No    Sexual activity: Not on file   Lifestyle    Physical activity:     Days per week: Not on file     Minutes per session: Not on file    Stress: Not on file   Relationships    Social connections:     Talks on phone: Not on file     Gets together: Not on file     Attends [employer] service: Not on file     Active member of club or  organization: Not on file     Attends meetings of clubs or organizations: Not on file     Relationship status: Not on file   Other Topics Concern    Not on file   Social History Narrative    Not on file     Past Surgical History:   Procedure Laterality Date    A-V CARDIAC PACEMAKER INSERTION Left 2018    Procedure: INSERTION, CARDIAC PACEMAKER, DUAL CHAMBER;  Surgeon: Jean Carlos Onofre MD;  Location: [facility] CATH LAB;  Service: Cardiology;  Laterality: Left;  sinus pause, dPPM, BIO, DM, 323A    ADENOIDECTOMY      CARDIAC ELECTROPHYSIOLOGY MAPPING AND ABLATION      CARDIAC SURGERY      CARDIOVERSION N/A 2018    Procedure: CARDIOVERSION;  Surgeon: Jean Carlos Onofre MD;  Location: [facility] CATH LAB;  Service: Cardiology;  Laterality: N/A;     SECTION      ERCP      ERCP N/A 2018    Procedure: ERCP (ENDOSCOPIC RETROGRADE CHOLANGIOPANCREATOGRAPHY);  Surgeon: Tang Tanner MD;  [facility]  Service: Endoscopy;  Laterality: N/A;  2 day hold Dr Kalyan Whittaker    ERCP N/A 2019    Procedure: ERCP (ENDOSCOPIC RETROGRADE CHOLANGIOPANCREATOGRAPHY);  Surgeon: Tang Tanner MD;  [facility]  Service: Endoscopy;  Laterality: N/A;  2 day hold Dr Kalyan Whittaker    TONSILLECTOMY      uvulopalatopharygoplasty         Labs:  Lab Results   Component Value Date    WBC 14.27 (H) 2019    HGB 10.8 (L) 2019    HCT 34.1 (L) 2019     (H) 2019     2019     BMP  Lab Results   Component Value Date     2019    K 4.4 2019    CL 97 2019    CO2 28 2019    BUN 24 (H) 2019    CREATININE 1.5 (H) 2019    CALCIUM 9.7 2019    ANIONGAP 11 2019    ESTGFRAFRICA 44 (A) 2019    EGFRNONAA 38 (A) 2019     Lab Results   Component Value Date    ALT 12 2019    AST 10 2019    ALKPHOS 71 2019    BILITOT 0.6 2019       Lab Results   Component Value  Date    IRON 20 (L) 11/20/2019    TIBC 352 11/20/2019    FERRITIN 1,398 (H) 11/20/2019     No results found for: [id number]  No results found for: FOLATE  Lab Results   Component Value Date    TSH 5.389 (H) 10/28/2019         Review of Systems   Constitutional: Positive for activity change, appetite change and fatigue. Negative for chills, diaphoresis, fever and unexpected weight change.   HENT: Negative for congestion, dental problem, drooling, ear discharge, ear pain, facial swelling, hearing loss, mouth sores, nosebleeds, postnasal drip, rhinorrhea, sinus pressure, sneezing, sore throat, tinnitus, trouble swallowing and voice change.    Eyes: Negative for photophobia, pain, discharge, redness, itching and visual disturbance.   Respiratory: Positive for cough. Negative for choking, chest tightness, shortness of breath, wheezing and stridor.    Cardiovascular: Negative for chest pain, palpitations and leg swelling.   Gastrointestinal: Negative for abdominal distention, abdominal pain, anal bleeding, blood in stool, constipation, diarrhea, nausea, rectal pain and vomiting.   Endocrine: Negative for cold intolerance, heat intolerance, polydipsia, polyphagia and polyuria.   Genitourinary: Negative for decreased urine volume, difficulty urinating, dyspareunia, dysuria, enuresis, flank pain, frequency, genital sores, hematuria, menstrual problem, pelvic pain, urgency, vaginal bleeding, vaginal discharge and vaginal pain.   Musculoskeletal: Negative for arthralgias, back pain, gait problem, joint swelling, myalgias, neck pain and neck stiffness.   Skin: Negative for color change, pallor and rash.   Allergic/Immunologic: Negative for environmental allergies, food allergies and immunocompromised state.   Neurological: Positive for weakness. Negative for dizziness, tremors, seizures, syncope, facial asymmetry, speech difficulty, light-headedness, numbness and headaches.   Hematological: Negative for adenopathy. Does not  bruise/bleed easily.   Psychiatric/Behavioral: Positive for dysphoric mood. Negative for agitation, behavioral problems, confusion, decreased concentration, hallucinations, self-injury, sleep disturbance and suicidal ideas. The patient is nervous/anxious. The patient is not hyperactive.        Objective:      Physical Exam   Constitutional: She is oriented to person, place, and time. She appears well-developed and well-nourished. She appears distressed.   HENT:   Head: Normocephalic and atraumatic.   Right Ear: External ear normal.   Left Ear: External ear normal.   Nose: Nose normal. Right sinus exhibits no maxillary sinus tenderness and no frontal sinus tenderness. Left sinus exhibits no maxillary sinus tenderness and no frontal sinus tenderness.   Mouth/Throat: Oropharynx is clear and moist. No oropharyngeal exudate.   Eyes: Pupils are equal, round, and reactive to light. Conjunctivae, EOM and lids are normal. Right eye exhibits no discharge. Left eye exhibits no discharge. Right conjunctiva is not injected. Right conjunctiva has no hemorrhage. Left conjunctiva is not injected. Left conjunctiva has no hemorrhage. No scleral icterus.   Neck: Normal range of motion. Neck supple. No JVD present. No tracheal deviation present. No thyromegaly present.   Cardiovascular: Normal rate, regular rhythm and normal heart sounds.   Pulmonary/Chest: Effort normal. No stridor. No respiratory distress. She has rales. She exhibits no tenderness.   Abdominal: Soft. Bowel sounds are normal. She exhibits no distension and no mass. There is no splenomegaly or hepatomegaly. There is no tenderness. There is no rebound.   Musculoskeletal: Normal range of motion. She exhibits no edema or tenderness.   Lymphadenopathy:     She has no cervical adenopathy.     She has no axillary adenopathy.        Right: No supraclavicular adenopathy present.        Left: No supraclavicular adenopathy present.   Neurological: She is alert and oriented to  person, place, and time. No cranial nerve deficit. Coordination normal.   Skin: Skin is dry. No rash noted. She is not diaphoretic. No erythema.   Psychiatric: She has a normal mood and affect. Her behavior is normal. Judgment and thought content normal.   Vitals reviewed.          Assessment:      1. Pneumonia of left lower lobe due to Streptococcus pneumoniae    2. Abnormal thyroid ultrasound    3. Secondary neuroendocrine tumor of liver    4. 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 Iron deficiency anemia due to chronic blood loss           Plan:       Results of thyroid ultrasound demonstrate abnormality will refer to ENT for review CT chest abdomen pelvis demonstrated appears to be pneumonia in right lower lobe patient be treated with 1 murmur Rocephin and Levaquin 500 mg q.day times 10 days.  Patient will return in 3 weeks for review results of CT chest abdomen pelvis weight radiologic interpretation for review and discussion.  1 g of Rocephin given IV piggyback to patient after MediPort accessed 40 min face-to-face time coordination of care greater than 50% of time face-to-face with patient      Kalyan Flores Jr, MD FACP

## 2019-11-26 NOTE — NURSING
Right Mediport accessed via sterile technique with 20 gauge 1 inch power port walters needle without difficulty.  Positive blood return noted.  Flushed per protocol with 10 ml NS f/b 5 cc heparin flush.  Deaccessed without difficulty.  Bandaid pressure dressing applied.  Pt tolerated well.

## 2019-12-03 ENCOUNTER — OFFICE VISIT (OUTPATIENT)
Dept: OTOLARYNGOLOGY | Facility: CLINIC | Age: 59
End: 2019-12-03
Payer: MEDICARE

## 2019-12-03 ENCOUNTER — LAB VISIT (OUTPATIENT)
Dept: LAB | Facility: HOSPITAL | Age: 59
End: 2019-12-03
Attending: OTOLARYNGOLOGY
Payer: MEDICARE

## 2019-12-03 VITALS — WEIGHT: 218 LBS | TEMPERATURE: 98 F | HEIGHT: 65 IN | BODY MASS INDEX: 36.32 KG/M2

## 2019-12-03 DIAGNOSIS — E83.52 HYPERCALCEMIA: ICD-10-CM

## 2019-12-03 DIAGNOSIS — E04.1 THYROID NODULE: ICD-10-CM

## 2019-12-03 DIAGNOSIS — E21.5 PARATHYROID ABNORMALITY: ICD-10-CM

## 2019-12-03 DIAGNOSIS — E04.1 THYROID NODULE: Primary | ICD-10-CM

## 2019-12-03 LAB
25(OH)D3+25(OH)D2 SERPL-MCNC: 31 NG/ML (ref 30–96)
CA-I BLDV-SCNC: 1.29 MMOL/L (ref 1.06–1.42)
CALCIUM SERPL-MCNC: 9.9 MG/DL (ref 8.7–10.5)
PTH-INTACT SERPL-MCNC: 82 PG/ML (ref 9–77)
T4 FREE SERPL-MCNC: 1.04 NG/DL (ref 0.71–1.51)
TSH SERPL DL<=0.005 MIU/L-ACNC: 4.89 UIU/ML (ref 0.4–4)

## 2019-12-03 PROCEDURE — 99499 RISK ADDL DX/OHS AUDIT: ICD-10-PCS | Mod: S$GLB,,, | Performed by: OTOLARYNGOLOGY

## 2019-12-03 PROCEDURE — 84443 ASSAY THYROID STIM HORMONE: CPT

## 2019-12-03 PROCEDURE — 84439 ASSAY OF FREE THYROXINE: CPT

## 2019-12-03 PROCEDURE — 3008F PR BODY MASS INDEX (BMI) DOCUMENTED: ICD-10-PCS | Mod: CPTII,S$GLB,, | Performed by: OTOLARYNGOLOGY

## 2019-12-03 PROCEDURE — 83970 ASSAY OF PARATHORMONE: CPT

## 2019-12-03 PROCEDURE — 99214 OFFICE O/P EST MOD 30 MIN: CPT | Mod: S$GLB,,, | Performed by: OTOLARYNGOLOGY

## 2019-12-03 PROCEDURE — 99999 PR PBB SHADOW E&M-EST. PATIENT-LVL III: ICD-10-PCS | Mod: PBBFAC,,, | Performed by: OTOLARYNGOLOGY

## 2019-12-03 PROCEDURE — 36415 COLL VENOUS BLD VENIPUNCTURE: CPT

## 2019-12-03 PROCEDURE — 99499 UNLISTED E&M SERVICE: CPT | Mod: S$GLB,,, | Performed by: OTOLARYNGOLOGY

## 2019-12-03 PROCEDURE — 82310 ASSAY OF CALCIUM: CPT

## 2019-12-03 PROCEDURE — 99999 PR PBB SHADOW E&M-EST. PATIENT-LVL III: CPT | Mod: PBBFAC,,, | Performed by: OTOLARYNGOLOGY

## 2019-12-03 PROCEDURE — 99214 PR OFFICE/OUTPT VISIT, EST, LEVL IV, 30-39 MIN: ICD-10-PCS | Mod: S$GLB,,, | Performed by: OTOLARYNGOLOGY

## 2019-12-03 PROCEDURE — 82330 ASSAY OF CALCIUM: CPT

## 2019-12-03 PROCEDURE — 3008F BODY MASS INDEX DOCD: CPT | Mod: CPTII,S$GLB,, | Performed by: OTOLARYNGOLOGY

## 2019-12-03 PROCEDURE — 82306 VITAMIN D 25 HYDROXY: CPT

## 2019-12-03 NOTE — LETTER
December 3, 2019      Kalyan Flores MD  [address] [facility]  [place] [address]           [facility]  [address] [place] [address]  [place] [address]  Phone: [phone]  Fax: [phone]          Patient: Janine Souza   MR Number: [id number]   YOB: 1960   Date of Visit: 12/3/2019       Dear Dr. Kalyan Flores:    Thank you for referring Janine Souza to me for evaluation. Attached you will find relevant portions of my assessment and plan of care.    If you have questions, please do not hesitate to call me. I look forward to following Janine Souza along with you.    Sincerely,    Hamlet Hansen MD    Enclosure  CC:  No Recipients    If you would like to receive this communication electronically, please contact [email or web address] or [phone] to request more information on [employer] access.    For providers and/or their staff who would like to refer a patient to [facility], please contact us through our one-stop-shop provider referral line, [facility] , at [phone].    If you feel you have received this communication in error or would no longer like to receive these types of communications, please e-mail [email or web address]

## 2019-12-10 ENCOUNTER — OFFICE VISIT (OUTPATIENT)
Dept: INTERNAL MEDICINE | Facility: CLINIC | Age: 59
End: 2019-12-10
Payer: MEDICARE

## 2019-12-10 ENCOUNTER — TELEPHONE (OUTPATIENT)
Dept: INTERNAL MEDICINE | Facility: CLINIC | Age: 59
End: 2019-12-10

## 2019-12-10 ENCOUNTER — INFUSION (OUTPATIENT)
Dept: INFUSION THERAPY | Facility: HOSPITAL | Age: 59
End: 2019-12-10
Attending: INTERNAL MEDICINE
Payer: MEDICARE

## 2019-12-10 VITALS
HEART RATE: 115 BPM | TEMPERATURE: 98 F | DIASTOLIC BLOOD PRESSURE: 79 MMHG | OXYGEN SATURATION: 97 % | SYSTOLIC BLOOD PRESSURE: 105 MMHG | RESPIRATION RATE: 18 BRPM

## 2019-12-10 VITALS
HEART RATE: 119 BPM | OXYGEN SATURATION: 98 % | SYSTOLIC BLOOD PRESSURE: 100 MMHG | TEMPERATURE: 98 F | DIASTOLIC BLOOD PRESSURE: 68 MMHG | BODY MASS INDEX: 36.68 KG/M2 | WEIGHT: 220.13 LBS | HEIGHT: 65 IN

## 2019-12-10 DIAGNOSIS — N18.30 CHRONIC KIDNEY DISEASE (CKD), STAGE III (MODERATE): ICD-10-CM

## 2019-12-10 DIAGNOSIS — E11.65 UNCONTROLLED TYPE 2 DIABETES MELLITUS WITH HYPERGLYCEMIA: Primary | ICD-10-CM

## 2019-12-10 DIAGNOSIS — J13 PNEUMONIA OF LEFT LOWER LOBE DUE TO STREPTOCOCCUS PNEUMONIAE: ICD-10-CM

## 2019-12-10 DIAGNOSIS — D50.0 IRON DEFICIENCY ANEMIA DUE TO CHRONIC BLOOD LOSS: Primary | ICD-10-CM

## 2019-12-10 DIAGNOSIS — I50.32 CHRONIC DIASTOLIC HEART FAILURE: ICD-10-CM

## 2019-12-10 DIAGNOSIS — Z95.0 S/P PLACEMENT OF CARDIAC PACEMAKER: ICD-10-CM

## 2019-12-10 DIAGNOSIS — I48.92 ATRIAL FLUTTER, UNSPECIFIED TYPE: ICD-10-CM

## 2019-12-10 PROCEDURE — 99214 PR OFFICE/OUTPT VISIT, EST, LEVL IV, 30-39 MIN: ICD-10-PCS | Mod: S$GLB,,, | Performed by: FAMILY MEDICINE

## 2019-12-10 PROCEDURE — 3046F HEMOGLOBIN A1C LEVEL >9.0%: CPT | Mod: CPTII,S$GLB,, | Performed by: FAMILY MEDICINE

## 2019-12-10 PROCEDURE — 96365 THER/PROPH/DIAG IV INF INIT: CPT

## 2019-12-10 PROCEDURE — 3008F BODY MASS INDEX DOCD: CPT | Mod: CPTII,S$GLB,, | Performed by: FAMILY MEDICINE

## 2019-12-10 PROCEDURE — 99214 OFFICE O/P EST MOD 30 MIN: CPT | Mod: S$GLB,,, | Performed by: FAMILY MEDICINE

## 2019-12-10 PROCEDURE — 3074F PR MOST RECENT SYSTOLIC BLOOD PRESSURE < 130 MM HG: ICD-10-PCS | Mod: CPTII,S$GLB,, | Performed by: FAMILY MEDICINE

## 2019-12-10 PROCEDURE — 99499 RISK ADDL DX/OHS AUDIT: ICD-10-PCS | Mod: S$GLB,,, | Performed by: FAMILY MEDICINE

## 2019-12-10 PROCEDURE — 3074F SYST BP LT 130 MM HG: CPT | Mod: CPTII,S$GLB,, | Performed by: FAMILY MEDICINE

## 2019-12-10 PROCEDURE — 63600175 PHARM REV CODE 636 W HCPCS: Performed by: INTERNAL MEDICINE

## 2019-12-10 PROCEDURE — 3078F DIAST BP <80 MM HG: CPT | Mod: CPTII,S$GLB,, | Performed by: FAMILY MEDICINE

## 2019-12-10 PROCEDURE — 3008F PR BODY MASS INDEX (BMI) DOCUMENTED: ICD-10-PCS | Mod: CPTII,S$GLB,, | Performed by: FAMILY MEDICINE

## 2019-12-10 PROCEDURE — 99999 PR PBB SHADOW E&M-EST. PATIENT-LVL III: CPT | Mod: PBBFAC,,, | Performed by: FAMILY MEDICINE

## 2019-12-10 PROCEDURE — 99499 UNLISTED E&M SERVICE: CPT | Mod: S$GLB,,, | Performed by: FAMILY MEDICINE

## 2019-12-10 PROCEDURE — 3078F PR MOST RECENT DIASTOLIC BLOOD PRESSURE < 80 MM HG: ICD-10-PCS | Mod: CPTII,S$GLB,, | Performed by: FAMILY MEDICINE

## 2019-12-10 PROCEDURE — 3046F PR MOST RECENT HEMOGLOBIN A1C LEVEL > 9.0%: ICD-10-PCS | Mod: CPTII,S$GLB,, | Performed by: FAMILY MEDICINE

## 2019-12-10 PROCEDURE — 99999 PR PBB SHADOW E&M-EST. PATIENT-LVL III: ICD-10-PCS | Mod: PBBFAC,,, | Performed by: FAMILY MEDICINE

## 2019-12-10 RX ORDER — PEN NEEDLE, DIABETIC 30 GX3/16"
1 NEEDLE, DISPOSABLE MISCELLANEOUS DAILY
Qty: 100 EACH | Refills: 3 | Status: SHIPPED | OUTPATIENT
Start: 2019-12-10 | End: 2022-01-01

## 2019-12-10 RX ORDER — HEPARIN 100 UNIT/ML
500 SYRINGE INTRAVENOUS
Status: DISCONTINUED | OUTPATIENT
Start: 2019-12-10 | End: 2019-12-10 | Stop reason: HOSPADM

## 2019-12-10 RX ORDER — INSULIN GLARGINE 100 [IU]/ML
15 INJECTION, SOLUTION SUBCUTANEOUS NIGHTLY
Qty: 1 BOX | Refills: 0 | Status: SHIPPED | OUTPATIENT
Start: 2019-12-10 | End: 2020-10-21 | Stop reason: SDUPTHER

## 2019-12-10 RX ORDER — METFORMIN HYDROCHLORIDE 500 MG/1
TABLET ORAL
Qty: 180 TABLET | Refills: 3
Start: 2019-12-10 | End: 2020-10-15 | Stop reason: SDUPTHER

## 2019-12-10 RX ADMIN — FERRIC CARBOXYMALTOSE INJECTION 750 MG: 50 INJECTION, SOLUTION INTRAVENOUS at 01:12

## 2019-12-10 RX ADMIN — HEPARIN 500 UNITS: 100 SYRINGE at 02:12

## 2019-12-10 NOTE — PROGRESS NOTES
Subjective:       Patient ID: Janine Souza is a 59 y.o. female.    Chief Complaint: Follow-up    Follow-up uncontrolled diabetes hypertension.  Medical history includes atrial flutter chronic diastolic heart failure placement of pacemaker.  Recent CT chest with possible pneumonia.  This was treated with IV and then oral antibiotics.  She reports she has an occasional dry cough but feels much better.  She denies hypoglycemic symptoms.  She denies headache chest pain palpitations shortness of breath.  Medications include Tradjenta metformin taking 2 in the morning 3 in evening and glucotrol twice a day.  Recent CMP with chronic kidney disease stage 3.  Last A1c was 11+ with previous of 12+.  She is followed by Oncology Hematology, ENT, Cardiology.    Review of Systems   Constitutional: Negative for appetite change, chills, diaphoresis, fatigue, fever and unexpected weight change.   HENT: Negative for congestion.    Respiratory: Positive for cough. Negative for chest tightness, shortness of breath and wheezing.         Occasional dry cough but feels much better   Cardiovascular: Negative for chest pain, palpitations and leg swelling.        Denies lightheadedness.  She reports she has chronic tachycardia.  Her pulse today is 119.  She has a pacemaker in place.   Gastrointestinal: Negative for abdominal distention, abdominal pain, nausea and vomiting.   Genitourinary: Negative for difficulty urinating.   Neurological: Negative for dizziness, weakness, light-headedness and headaches.       Objective:      Physical Exam   Constitutional: She is oriented to person, place, and time. She appears well-developed and well-nourished. No distress.   Cardiovascular: Regular rhythm. Exam reveals no gallop and no friction rub.   No murmur heard.  Tachycardia  blood pressure 100/68   Pulmonary/Chest: Effort normal and breath sounds normal. No respiratory distress. She has no wheezes. She has no rales.   Neurological: She is alert  and oriented to person, place, and time.   Skin: Skin is warm and dry. She is not diaphoretic.   Psychiatric: She has a normal mood and affect. Her behavior is normal. Judgment and thought content normal.       Lab Visit on 12/03/2019   Component Date Value Ref Range Status    TSH 12/03/2019 4.891* 0.400 - 4.000 uIU/mL Final    Free T4 12/03/2019 1.04  0.71 - 1.51 ng/dL Final    Vit D, 25-Hydroxy 12/03/2019 31  30 - 96 ng/mL Final    Calcium 12/03/2019 9.9  8.7 - 10.5 mg/dL Final    Calcium, Ion 12/03/2019 1.29  1.06 - 1.42 mmol/L Final    PTH, Intact 12/03/2019 82.0* 9.0 - 77.0 pg/mL Final     Assessment:       No diagnosis found.    Plan:   Due to chronic kidney disease stage 3 will reduce metformin 5 a day start taking 1 with breakfast 1 with supper.  Continue Tradjenta continue Glucotrol.  Start Lantus 15 units at bedtime.  Discussed diabetic diet increase water intake and walking.  Follow-up in 1 week with fasting blood sugar as well as A1c.  She has health maintenance issues that she wants to defer.  She did have a fecal immunoassay test is negative.  She is due for diabetic eye exam foot exam mammogram etc      There are no diagnoses linked to this encounter.

## 2019-12-13 ENCOUNTER — PATIENT MESSAGE (OUTPATIENT)
Dept: INTERNAL MEDICINE | Facility: CLINIC | Age: 59
End: 2019-12-13

## 2019-12-16 NOTE — TELEPHONE ENCOUNTER
If she is referring to Lantus she needs to ask her pharmacy what injectable insulin is covered and I will prescribe that instead

## 2019-12-17 ENCOUNTER — OFFICE VISIT (OUTPATIENT)
Dept: HEMATOLOGY/ONCOLOGY | Facility: CLINIC | Age: 59
End: 2019-12-17
Payer: MEDICARE

## 2019-12-17 ENCOUNTER — INFUSION (OUTPATIENT)
Dept: INFUSION THERAPY | Facility: HOSPITAL | Age: 59
End: 2019-12-17
Attending: INTERNAL MEDICINE
Payer: MEDICARE

## 2019-12-17 ENCOUNTER — PATIENT MESSAGE (OUTPATIENT)
Dept: OTOLARYNGOLOGY | Facility: CLINIC | Age: 59
End: 2019-12-17

## 2019-12-17 ENCOUNTER — PATIENT OUTREACH (OUTPATIENT)
Dept: ADMINISTRATIVE | Facility: HOSPITAL | Age: 59
End: 2019-12-17

## 2019-12-17 VITALS
TEMPERATURE: 97 F | HEART RATE: 88 BPM | WEIGHT: 223.56 LBS | BODY MASS INDEX: 35.93 KG/M2 | RESPIRATION RATE: 18 BRPM | DIASTOLIC BLOOD PRESSURE: 57 MMHG | HEIGHT: 66 IN | OXYGEN SATURATION: 98 % | SYSTOLIC BLOOD PRESSURE: 111 MMHG

## 2019-12-17 VITALS — HEART RATE: 54 BPM | DIASTOLIC BLOOD PRESSURE: 73 MMHG | SYSTOLIC BLOOD PRESSURE: 119 MMHG

## 2019-12-17 DIAGNOSIS — D49.89 NEOPLASM OF ABDOMEN: ICD-10-CM

## 2019-12-17 DIAGNOSIS — D50.0 IRON DEFICIENCY ANEMIA DUE TO CHRONIC BLOOD LOSS: Primary | ICD-10-CM

## 2019-12-17 DIAGNOSIS — C7B.8 SECONDARY NEUROENDOCRINE TUMOR OF LIVER: ICD-10-CM

## 2019-12-17 DIAGNOSIS — D49.7 NEOPLASM OF THYROID: ICD-10-CM

## 2019-12-17 DIAGNOSIS — E21.0 PRIMARY HYPERPARATHYROIDISM: Primary | ICD-10-CM

## 2019-12-17 PROCEDURE — 99999 PR PBB SHADOW E&M-EST. PATIENT-LVL III: ICD-10-PCS | Mod: PBBFAC,,, | Performed by: INTERNAL MEDICINE

## 2019-12-17 PROCEDURE — 63600175 PHARM REV CODE 636 W HCPCS: Performed by: INTERNAL MEDICINE

## 2019-12-17 PROCEDURE — 3078F DIAST BP <80 MM HG: CPT | Mod: CPTII,S$GLB,, | Performed by: INTERNAL MEDICINE

## 2019-12-17 PROCEDURE — 3008F PR BODY MASS INDEX (BMI) DOCUMENTED: ICD-10-PCS | Mod: CPTII,S$GLB,, | Performed by: INTERNAL MEDICINE

## 2019-12-17 PROCEDURE — 3074F PR MOST RECENT SYSTOLIC BLOOD PRESSURE < 130 MM HG: ICD-10-PCS | Mod: CPTII,S$GLB,, | Performed by: INTERNAL MEDICINE

## 2019-12-17 PROCEDURE — 99214 PR OFFICE/OUTPT VISIT, EST, LEVL IV, 30-39 MIN: ICD-10-PCS | Mod: 25,S$GLB,, | Performed by: INTERNAL MEDICINE

## 2019-12-17 PROCEDURE — 3078F PR MOST RECENT DIASTOLIC BLOOD PRESSURE < 80 MM HG: ICD-10-PCS | Mod: CPTII,S$GLB,, | Performed by: INTERNAL MEDICINE

## 2019-12-17 PROCEDURE — 3008F BODY MASS INDEX DOCD: CPT | Mod: CPTII,S$GLB,, | Performed by: INTERNAL MEDICINE

## 2019-12-17 PROCEDURE — 99999 PR PBB SHADOW E&M-EST. PATIENT-LVL III: CPT | Mod: PBBFAC,,, | Performed by: INTERNAL MEDICINE

## 2019-12-17 PROCEDURE — 3074F SYST BP LT 130 MM HG: CPT | Mod: CPTII,S$GLB,, | Performed by: INTERNAL MEDICINE

## 2019-12-17 PROCEDURE — 99214 OFFICE O/P EST MOD 30 MIN: CPT | Mod: 25,S$GLB,, | Performed by: INTERNAL MEDICINE

## 2019-12-17 PROCEDURE — 96365 THER/PROPH/DIAG IV INF INIT: CPT

## 2019-12-17 RX ORDER — HEPARIN 100 UNIT/ML
500 SYRINGE INTRAVENOUS
Status: DISCONTINUED | OUTPATIENT
Start: 2019-12-17 | End: 2019-12-17 | Stop reason: HOSPADM

## 2019-12-17 RX ADMIN — SODIUM CHLORIDE: 9 INJECTION, SOLUTION INTRAVENOUS at 08:12

## 2019-12-17 RX ADMIN — HEPARIN 500 UNITS: 100 SYRINGE at 10:12

## 2019-12-17 RX ADMIN — FERRIC CARBOXYMALTOSE INJECTION 750 MG: 50 INJECTION, SOLUTION INTRAVENOUS at 08:12

## 2019-12-17 NOTE — DISCHARGE INSTRUCTIONS
[facility]  [address] [facility]  [address] [address]  [phone] phone     [phone] fax  Hours of Operation: Monday- Friday 8:00am- 5:00pm  After hours phone  [phone]  Hematology / Oncology Physicians on call      TIARRA Marcos Dr., Dr., Dr., Dr., NP Sydney Prescott, NP Tyesha Taylor, NP    Please call with any concerns regarding your appointment today.IRON DEFICIENCY ANEMIA:  Anemia is a condition where the size or number of red blood cells in the body is reduced. Iron is needed in the diet to make red cells. The red blood cells carry oxygen to all parts of the body. Anemia limits the delivery of oxygen to where it is needed. This causes a feeling of being tired and run down. When anemia becomes severe, the skin becomes pale and there is shortness of breath with exertion, headaches, dizziness, drowsiness and fatigue.  The cause of your anemia is lack of iron in your body. This may occur due to blood loss (for example, heavy menstrual periods or bleeding from the stomach or intestines) or a poor diet (not eating enough iron-containing foods), inability to absorb iron from your diet, or pregnancy.  If the blood count is low enough, an IRON SUPPLEMENT will be prescribed. It usually takes about 2-3 months of treatment with iron supplements to correct an anemia. Severe cases of anemia requires a blood transfusion to rapidly correct symptoms and deliver more oxygen to the cells.  HOME CARE:  1) Increase the iron stores in your body by eating foods high in iron content. This is a natural way of building your blood cells back up again. Beef, liver, spinach and other dark green leafy vegetables, whole grain products, beans and nuts are all natural sources of iron.  2) If you are having symptoms of anemia listed above:  -- Do not overexert yourself.  -- Talk to your doctor before flying on an airplane or  travelling to high altitudes.  FOLLOW UP with your doctor in 2 months for a repeat red blood cell count, or as recommended by our staff, to be sure that the anemia has been corrected.  GET PROMPT MEDICAL ATTENTION if any of the following occur or worsen:  -- Shortness of breath or chest pain  -- Dizziness or fainting  -- Vomiting blood or passing red or black-colored stool  © 9291-1623 [facility], [address] [place] [address], [place] [address]. All rights reserved. This information is not intended as a substitute for professional medical care. Always follow your healthcare professional's instructions.

## 2019-12-17 NOTE — TELEPHONE ENCOUNTER
Maximum normal PTH based on calcium, vitamin D and Age    maxPTH = 120 - [6*calcium] - [½*VitD25] + [¼*age]    Surgery. 2012 Dec;152(6):1184-92      Actual PTH= 82.0    Maximal Normal PTH= 60.1

## 2019-12-17 NOTE — PROGRESS NOTES
Subjective:       Patient ID: Janine Souza is a 59 y.o. female.    Chief Complaint: Anemia; Results; and Cancer    HPI 59-year-old female history of recurrent iron deficiency anemia neuroendocrine tumor status post induction chemotherapy patient has done remarkably well. 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     Family History   Problem Relation Age of Onset    Diabetes type II Mother     Prostate cancer Father     Breast cancer Paternal Aunt         breast    Breast cancer Maternal Aunt         breast     Social History     Socioeconomic History    Marital status:      Spouse name: Not on file    Number of children: Not on file    Years of education: Not on file    Highest education level: Not on file   Occupational History    Not on file   Social Needs    Financial resource strain: Not on file    Food insecurity:     Worry: Not on file     Inability: Not on file    Transportation needs:     Medical: Not on file     Non-medical: Not on file   Tobacco Use    Smoking status: Never Smoker    Smokeless tobacco: Never Used   Substance and Sexual Activity    Alcohol use: No     Alcohol/week: 0.0 standard drinks    Drug use: No    Sexual activity: Not on file   Lifestyle    Physical activity:     Days per week: Not on file     Minutes per session: Not on file    Stress: Not on file   Relationships    Social connections:      Talks on phone: Not on file     Gets together: Not on file     Attends Latter day service: Not on file     Active member of club or organization: Not on file     Attends meetings of clubs or organizations: Not on file     Relationship status: Not on file   Other Topics Concern    Not on file   Social History Narrative    Not on file     Past Surgical History:   Procedure Laterality Date    A-V CARDIAC PACEMAKER INSERTION Left 2018    Procedure: INSERTION, CARDIAC PACEMAKER, DUAL CHAMBER;  Surgeon: Jean Carlos Onofre MD;  Location: [facility] CATH LAB;  Service: Cardiology;  Laterality: Left;  sinus pause, dPPM, BIO, DM, 323A    ADENOIDECTOMY      CARDIAC ELECTROPHYSIOLOGY MAPPING AND ABLATION      CARDIAC SURGERY      CARDIOVERSION N/A 2018    Procedure: CARDIOVERSION;  Surgeon: Jean Carlos Onofre MD;  Location: [facility] CATH LAB;  Service: Cardiology;  Laterality: N/A;     SECTION      ERCP      ERCP N/A 2018    Procedure: ERCP (ENDOSCOPIC RETROGRADE CHOLANGIOPANCREATOGRAPHY);  Surgeon: Tang Tanner MD;  Location: [facility] ([address] [place] [address]);  Service: Endoscopy;  Laterality: N/A;  2 day hold Dr Kalyan Whittaker    ERCP N/A 2019    Procedure: ERCP (ENDOSCOPIC RETROGRADE CHOLANGIOPANCREATOGRAPHY);  Surgeon: Tang Tanner MD;  Location: [facility] ([address] [place] [address]);  Service: Endoscopy;  Laterality: N/A;  2 day hold Dr Kalyan Whittaker    TONSILLECTOMY      uvulopalatopharygoplasty         Labs:  Lab Results   Component Value Date    WBC 14.27 (H) 2019    HGB 10.8 (L) 2019    HCT 34.1 (L) 2019     (H) 2019     2019     BMP  Lab Results   Component Value Date     2019    K 4.4 2019    CL 97 2019    CO2 28 2019    BUN 24 (H) 2019    CREATININE 1.5 (H) 2019    CALCIUM 9.9 2019    ANIONGAP 11 2019    ESTGFRAFRICA 44 (A) 2019    EGFRNONAA 38 (A) 2019     Lab Results   Component Value Date     ALT 12 11/20/2019    AST 10 11/20/2019    ALKPHOS 71 11/20/2019    BILITOT 0.6 11/20/2019       Lab Results   Component Value Date    IRON 20 (L) 11/20/2019    TIBC 352 11/20/2019    FERRITIN 1,398 (H) 11/20/2019     No results found for: [id number]  No results found for: FOLATE  Lab Results   Component Value Date    TSH 4.891 (H) 12/03/2019         Review of Systems   Constitutional: Positive for activity change, appetite change and fatigue. Negative for chills, diaphoresis, fever and unexpected weight change.   HENT: Negative for congestion, dental problem, drooling, ear discharge, ear pain, facial swelling, hearing loss, mouth sores, nosebleeds, postnasal drip, rhinorrhea, sinus pressure, sneezing, sore throat, tinnitus, trouble swallowing and voice change.    Eyes: Negative for photophobia, pain, discharge, redness, itching and visual disturbance.   Respiratory: Negative for cough, choking, chest tightness, shortness of breath, wheezing and stridor.    Cardiovascular: Negative for chest pain, palpitations and leg swelling.   Gastrointestinal: Negative for abdominal distention, abdominal pain, anal bleeding, blood in stool, constipation, diarrhea, nausea, rectal pain and vomiting.   Endocrine: Negative for cold intolerance, heat intolerance, polydipsia, polyphagia and polyuria.   Genitourinary: Negative for decreased urine volume, difficulty urinating, dyspareunia, dysuria, enuresis, flank pain, frequency, genital sores, hematuria, menstrual problem, pelvic pain, urgency, vaginal bleeding, vaginal discharge and vaginal pain.   Musculoskeletal: Negative for arthralgias, back pain, gait problem, joint swelling, myalgias, neck pain and neck stiffness.   Skin: Negative for color change, pallor and rash.   Allergic/Immunologic: Negative for environmental allergies, food allergies and immunocompromised state.   Neurological: Positive for weakness. Negative for dizziness, tremors, seizures, syncope, facial asymmetry,  speech difficulty, light-headedness, numbness and headaches.   Hematological: Negative for adenopathy. Does not bruise/bleed easily.   Psychiatric/Behavioral: Positive for dysphoric mood. Negative for agitation, behavioral problems, confusion, decreased concentration, hallucinations, self-injury, sleep disturbance and suicidal ideas. The patient is nervous/anxious. The patient is not hyperactive.        Objective:      Physical Exam   Constitutional: She is oriented to person, place, and time. She appears well-developed and well-nourished. She appears distressed.   HENT:   Head: Normocephalic and atraumatic.   Right Ear: External ear normal.   Left Ear: External ear normal.   Nose: Nose normal. Right sinus exhibits no maxillary sinus tenderness and no frontal sinus tenderness. Left sinus exhibits no maxillary sinus tenderness and no frontal sinus tenderness.   Mouth/Throat: Oropharynx is clear and moist. No oropharyngeal exudate.   Eyes: Pupils are equal, round, and reactive to light. Conjunctivae, EOM and lids are normal. Right eye exhibits no discharge. Left eye exhibits no discharge. Right conjunctiva is not injected. Right conjunctiva has no hemorrhage. Left conjunctiva is not injected. Left conjunctiva has no hemorrhage. No scleral icterus.   Neck: Normal range of motion. Neck supple. No JVD present. No tracheal deviation present. No thyromegaly present.   Cardiovascular: Normal rate and regular rhythm.   Pulmonary/Chest: Effort normal. No stridor. No respiratory distress. She exhibits no tenderness.   Abdominal: Soft. She exhibits no distension and no mass. There is no splenomegaly or hepatomegaly. There is no tenderness. There is no rebound.   Musculoskeletal: Normal range of motion. She exhibits no edema or tenderness.   Lymphadenopathy:     She has no cervical adenopathy.     She has no axillary adenopathy.        Right: No supraclavicular adenopathy present.        Left: No supraclavicular adenopathy  present.   Neurological: She is alert and oriented to person, place, and time. No cranial nerve deficit. Coordination normal.   Skin: Skin is dry. No rash noted. She is not diaphoretic. No erythema.   Psychiatric: She has a normal mood and affect. Her behavior is normal. Judgment and thought content normal.   Vitals reviewed.          Assessment:      1. Iron deficiency anemia due to chronic blood loss    2. Neoplasm of abdomen    3. Neoplasm of thyroid    4. Secondary neuroendocrine tumor of liver           Plan:     Recurrent iron deficiency anemia patient will be treated with intravenous iron today.  Will proceed with repeat imaging in 2-3 months with laboratory studies as well as CT neck chest abdomen pelvis reviewed information with her answered questions 25 min face-to-face time coordination of care face-to-face 50% visit        Kalyan Flores Jr, MD FACP

## 2019-12-17 NOTE — TELEPHONE ENCOUNTER
Called pt and informed her that Dr Hansen would like to have some scans and blood work done and the orders were in. Pt verbalized understanding and I also informed her that the radiology dept was gone for the day but if she would like I could give her the number to schedule with them directly. She said that would be fine to just send her a message through the patient portal because she wasn't home at the moment and couldn't write anything down. I sent pt a portal message with detailed information.

## 2019-12-18 ENCOUNTER — PATIENT OUTREACH (OUTPATIENT)
Dept: ADMINISTRATIVE | Facility: HOSPITAL | Age: 59
End: 2019-12-18

## 2020-01-13 DIAGNOSIS — E21.0 PRIMARY HYPERPARATHYROIDISM: Primary | ICD-10-CM

## 2020-01-14 ENCOUNTER — HOSPITAL ENCOUNTER (OUTPATIENT)
Dept: RADIOLOGY | Facility: HOSPITAL | Age: 60
Discharge: HOME | End: 2020-01-14
Attending: OTOLARYNGOLOGY
Payer: MEDICARE

## 2020-01-14 ENCOUNTER — INFUSION (OUTPATIENT)
Dept: INFUSION THERAPY | Facility: HOSPITAL | Age: 60
End: 2020-01-14
Attending: INTERNAL MEDICINE
Payer: MEDICARE

## 2020-01-14 DIAGNOSIS — E21.0 PRIMARY HYPERPARATHYROIDISM: ICD-10-CM

## 2020-01-14 DIAGNOSIS — C7B.8 SECONDARY NEUROENDOCRINE TUMOR OF LIVER: ICD-10-CM

## 2020-01-14 DIAGNOSIS — E21.0 PRIMARY HYPERPARATHYROIDISM: Primary | ICD-10-CM

## 2020-01-14 DIAGNOSIS — J13 PNEUMONIA OF LEFT LOWER LOBE DUE TO STREPTOCOCCUS PNEUMONIAE: Primary | ICD-10-CM

## 2020-01-14 PROCEDURE — 25000003 PHARM REV CODE 250: Performed by: INTERNAL MEDICINE

## 2020-01-14 PROCEDURE — 25500020 PHARM REV CODE 255: Performed by: OTOLARYNGOLOGY

## 2020-01-14 PROCEDURE — 70492 CT SFT TSUE NCK W/O & W/DYE: CPT | Mod: TC

## 2020-01-14 PROCEDURE — 36591 DRAW BLOOD OFF VENOUS DEVICE: CPT

## 2020-01-14 PROCEDURE — A4216 STERILE WATER/SALINE, 10 ML: HCPCS | Performed by: INTERNAL MEDICINE

## 2020-01-14 PROCEDURE — 63600175 PHARM REV CODE 636 W HCPCS: Performed by: INTERNAL MEDICINE

## 2020-01-14 PROCEDURE — A9500 TC99M SESTAMIBI: HCPCS

## 2020-01-14 RX ORDER — HEPARIN 100 UNIT/ML
500 SYRINGE INTRAVENOUS
Status: CANCELLED | OUTPATIENT
Start: 2020-01-14

## 2020-01-14 RX ORDER — SODIUM CHLORIDE 0.9 % (FLUSH) 0.9 %
10 SYRINGE (ML) INJECTION
Status: CANCELLED | OUTPATIENT
Start: 2020-01-14

## 2020-01-14 RX ORDER — SODIUM CHLORIDE 0.9 % (FLUSH) 0.9 %
10 SYRINGE (ML) INJECTION
Status: COMPLETED | OUTPATIENT
Start: 2020-01-14 | End: 2020-01-14

## 2020-01-14 RX ORDER — HEPARIN 100 UNIT/ML
500 SYRINGE INTRAVENOUS
Status: COMPLETED | OUTPATIENT
Start: 2020-01-14 | End: 2020-01-14

## 2020-01-14 RX ADMIN — IOHEXOL 100 ML: 350 INJECTION, SOLUTION INTRAVENOUS at 11:01

## 2020-01-14 RX ADMIN — HEPARIN 500 UNITS: 100 SYRINGE at 02:01

## 2020-01-14 RX ADMIN — Medication 10 ML: at 02:01

## 2020-01-16 ENCOUNTER — PATIENT MESSAGE (OUTPATIENT)
Dept: OTOLARYNGOLOGY | Facility: CLINIC | Age: 60
End: 2020-01-16

## 2020-01-16 DIAGNOSIS — E04.1 THYROID NODULE: Primary | ICD-10-CM

## 2020-01-16 NOTE — TELEPHONE ENCOUNTER
Attempted call to patient to discuss results sent to patient via my chart from Dr. Hansen and to schedule the recommended FNA in the radiology department. Asked to return our call back.

## 2020-02-04 RX ORDER — ZOLPIDEM TARTRATE 10 MG/1
10 TABLET ORAL NIGHTLY PRN
Qty: 90 TABLET | Refills: 1 | Status: SHIPPED | OUTPATIENT
Start: 2020-02-04 | End: 2021-08-03 | Stop reason: SDUPTHER

## 2020-02-07 ENCOUNTER — PATIENT MESSAGE (OUTPATIENT)
Dept: HEMATOLOGY/ONCOLOGY | Facility: CLINIC | Age: 60
End: 2020-02-07

## 2020-02-10 ENCOUNTER — PATIENT MESSAGE (OUTPATIENT)
Dept: OTOLARYNGOLOGY | Facility: CLINIC | Age: 60
End: 2020-02-10

## 2020-02-14 ENCOUNTER — PATIENT OUTREACH (OUTPATIENT)
Dept: ADMINISTRATIVE | Facility: HOSPITAL | Age: 60
End: 2020-02-14

## 2020-02-19 ENCOUNTER — PATIENT OUTREACH (OUTPATIENT)
Dept: ADMINISTRATIVE | Facility: HOSPITAL | Age: 60
End: 2020-02-19

## 2020-02-26 ENCOUNTER — TELEPHONE (OUTPATIENT)
Dept: RADIOLOGY | Facility: HOSPITAL | Age: 60
End: 2020-02-26

## 2020-02-27 ENCOUNTER — INFUSION (OUTPATIENT)
Dept: INFUSION THERAPY | Facility: HOSPITAL | Age: 60
End: 2020-02-27
Attending: FAMILY MEDICINE
Payer: MEDICARE

## 2020-02-27 ENCOUNTER — HOSPITAL ENCOUNTER (OUTPATIENT)
Dept: RADIOLOGY | Facility: HOSPITAL | Age: 60
Discharge: HOME | End: 2020-02-27
Attending: INTERNAL MEDICINE
Payer: MEDICARE

## 2020-02-27 ENCOUNTER — OFFICE VISIT (OUTPATIENT)
Dept: HEMATOLOGY/ONCOLOGY | Facility: CLINIC | Age: 60
End: 2020-02-27
Payer: MEDICARE

## 2020-02-27 VITALS
BODY MASS INDEX: 36.53 KG/M2 | HEIGHT: 66 IN | OXYGEN SATURATION: 95 % | DIASTOLIC BLOOD PRESSURE: 85 MMHG | HEART RATE: 112 BPM | WEIGHT: 227.31 LBS | SYSTOLIC BLOOD PRESSURE: 125 MMHG | TEMPERATURE: 98 F

## 2020-02-27 DIAGNOSIS — D49.89 NEOPLASM OF ABDOMEN: ICD-10-CM

## 2020-02-27 DIAGNOSIS — N18.30 CHRONIC KIDNEY DISEASE (CKD), STAGE III (MODERATE): ICD-10-CM

## 2020-02-27 DIAGNOSIS — D50.0 IRON DEFICIENCY ANEMIA DUE TO CHRONIC BLOOD LOSS: Primary | ICD-10-CM

## 2020-02-27 DIAGNOSIS — D50.0 IRON DEFICIENCY ANEMIA DUE TO CHRONIC BLOOD LOSS: ICD-10-CM

## 2020-02-27 DIAGNOSIS — C7B.8 SECONDARY NEUROENDOCRINE TUMOR OF LIVER: Primary | ICD-10-CM

## 2020-02-27 DIAGNOSIS — D49.7 NEOPLASM OF THYROID: ICD-10-CM

## 2020-02-27 DIAGNOSIS — C7B.8 SECONDARY NEUROENDOCRINE TUMOR OF LIVER: ICD-10-CM

## 2020-02-27 DIAGNOSIS — J13 PNEUMONIA OF LEFT LOWER LOBE DUE TO STREPTOCOCCUS PNEUMONIAE: ICD-10-CM

## 2020-02-27 DIAGNOSIS — C78.6 METASTATIC CANCER TO RETROPERITONEUM: ICD-10-CM

## 2020-02-27 LAB
ALBUMIN SERPL BCP-MCNC: 3.6 G/DL (ref 3.5–5.2)
ALP SERPL-CCNC: 70 U/L (ref 55–135)
ALT SERPL W/O P-5'-P-CCNC: 13 U/L (ref 10–44)
ANION GAP SERPL CALC-SCNC: 13 MMOL/L (ref 8–16)
AST SERPL-CCNC: 12 U/L (ref 10–40)
BASOPHILS # BLD AUTO: 0.05 K/UL (ref 0–0.2)
BASOPHILS NFR BLD: 0.6 % (ref 0–1.9)
BILIRUB SERPL-MCNC: 0.3 MG/DL (ref 0.1–1)
BUN SERPL-MCNC: 28 MG/DL (ref 6–20)
CALCIUM SERPL-MCNC: 9.7 MG/DL (ref 8.7–10.5)
CHLORIDE SERPL-SCNC: 101 MMOL/L (ref 95–110)
CO2 SERPL-SCNC: 25 MMOL/L (ref 23–29)
CREAT SERPL-MCNC: 1.4 MG/DL (ref 0.5–1.4)
DIFFERENTIAL METHOD: ABNORMAL
EOSINOPHIL # BLD AUTO: 0.2 K/UL (ref 0–0.5)
EOSINOPHIL NFR BLD: 2.7 % (ref 0–8)
ERYTHROCYTE [DISTWIDTH] IN BLOOD BY AUTOMATED COUNT: 13.5 % (ref 11.5–14.5)
EST. GFR  (AFRICAN AMERICAN): 47 ML/MIN/1.73 M^2
EST. GFR  (NON AFRICAN AMERICAN): 41 ML/MIN/1.73 M^2
FERRITIN SERPL-MCNC: 1493 NG/ML (ref 20–300)
GLUCOSE SERPL-MCNC: 285 MG/DL (ref 70–110)
HCT VFR BLD AUTO: 33 % (ref 37–48.5)
HGB BLD-MCNC: 10.9 G/DL (ref 12–16)
IMM GRANULOCYTES # BLD AUTO: 0.03 K/UL (ref 0–0.04)
IMM GRANULOCYTES NFR BLD AUTO: 0.4 % (ref 0–0.5)
IRON SERPL-MCNC: 60 UG/DL (ref 30–160)
LDH SERPL L TO P-CCNC: 140 U/L (ref 110–260)
LYMPHOCYTES # BLD AUTO: 1.5 K/UL (ref 1–4.8)
LYMPHOCYTES NFR BLD: 18.3 % (ref 18–48)
MCH RBC QN AUTO: 33.7 PG (ref 27–31)
MCHC RBC AUTO-ENTMCNC: 33 G/DL (ref 32–36)
MCV RBC AUTO: 102 FL (ref 82–98)
MONOCYTES # BLD AUTO: 0.9 K/UL (ref 0.3–1)
MONOCYTES NFR BLD: 10.8 % (ref 4–15)
NEUTROPHILS # BLD AUTO: 5.5 K/UL (ref 1.8–7.7)
NEUTROPHILS NFR BLD: 67.6 % (ref 38–73)
NRBC BLD-RTO: 0 /100 WBC
PLATELET # BLD AUTO: 152 K/UL (ref 150–350)
PMV BLD AUTO: 9.8 FL (ref 9.2–12.9)
POTASSIUM SERPL-SCNC: 4.2 MMOL/L (ref 3.5–5.1)
PROT SERPL-MCNC: 9 G/DL (ref 6–8.4)
RBC # BLD AUTO: 3.23 M/UL (ref 4–5.4)
SATURATED IRON: 18 % (ref 20–50)
SODIUM SERPL-SCNC: 139 MMOL/L (ref 136–145)
TOTAL IRON BINDING CAPACITY: 340 UG/DL (ref 250–450)
TRANSFERRIN SERPL-MCNC: 230 MG/DL (ref 200–375)
WBC # BLD AUTO: 8.14 K/UL (ref 3.9–12.7)

## 2020-02-27 PROCEDURE — 3074F PR MOST RECENT SYSTOLIC BLOOD PRESSURE < 130 MM HG: ICD-10-PCS | Mod: CPTII,S$GLB,, | Performed by: INTERNAL MEDICINE

## 2020-02-27 PROCEDURE — 3079F DIAST BP 80-89 MM HG: CPT | Mod: CPTII,S$GLB,, | Performed by: INTERNAL MEDICINE

## 2020-02-27 PROCEDURE — 83540 ASSAY OF IRON: CPT

## 2020-02-27 PROCEDURE — 36591 DRAW BLOOD OFF VENOUS DEVICE: CPT

## 2020-02-27 PROCEDURE — 3008F BODY MASS INDEX DOCD: CPT | Mod: CPTII,S$GLB,, | Performed by: INTERNAL MEDICINE

## 2020-02-27 PROCEDURE — 99499 RISK ADDL DX/OHS AUDIT: ICD-10-PCS | Mod: S$GLB,,, | Performed by: INTERNAL MEDICINE

## 2020-02-27 PROCEDURE — 85025 COMPLETE CBC W/AUTO DIFF WBC: CPT

## 2020-02-27 PROCEDURE — 25000003 PHARM REV CODE 250: Performed by: INTERNAL MEDICINE

## 2020-02-27 PROCEDURE — 63600175 PHARM REV CODE 636 W HCPCS: Performed by: INTERNAL MEDICINE

## 2020-02-27 PROCEDURE — A4216 STERILE WATER/SALINE, 10 ML: HCPCS | Performed by: INTERNAL MEDICINE

## 2020-02-27 PROCEDURE — 3079F PR MOST RECENT DIASTOLIC BLOOD PRESSURE 80-89 MM HG: ICD-10-PCS | Mod: CPTII,S$GLB,, | Performed by: INTERNAL MEDICINE

## 2020-02-27 PROCEDURE — 99214 OFFICE O/P EST MOD 30 MIN: CPT | Mod: 25,S$GLB,, | Performed by: INTERNAL MEDICINE

## 2020-02-27 PROCEDURE — 99214 PR OFFICE/OUTPT VISIT, EST, LEVL IV, 30-39 MIN: ICD-10-PCS | Mod: 25,S$GLB,, | Performed by: INTERNAL MEDICINE

## 2020-02-27 PROCEDURE — 74177 CT CHEST ABDOMEN PELVIS WITH CONTRAST (XPD): ICD-10-PCS | Mod: 26,,, | Performed by: RADIOLOGY

## 2020-02-27 PROCEDURE — 83615 LACTATE (LD) (LDH) ENZYME: CPT

## 2020-02-27 PROCEDURE — 70492 CT SOFT TISSUE NECK W WO CONTRAST: ICD-10-PCS | Mod: 26,,, | Performed by: RADIOLOGY

## 2020-02-27 PROCEDURE — 99499 UNLISTED E&M SERVICE: CPT | Mod: S$GLB,,, | Performed by: INTERNAL MEDICINE

## 2020-02-27 PROCEDURE — 70492 CT SFT TSUE NCK W/O & W/DYE: CPT | Mod: TC

## 2020-02-27 PROCEDURE — 71260 CT CHEST ABDOMEN PELVIS WITH CONTRAST (XPD): ICD-10-PCS | Mod: 26,,, | Performed by: RADIOLOGY

## 2020-02-27 PROCEDURE — 74177 CT ABD & PELVIS W/CONTRAST: CPT | Mod: 26,,, | Performed by: RADIOLOGY

## 2020-02-27 PROCEDURE — 3008F PR BODY MASS INDEX (BMI) DOCUMENTED: ICD-10-PCS | Mod: CPTII,S$GLB,, | Performed by: INTERNAL MEDICINE

## 2020-02-27 PROCEDURE — 25500020 PHARM REV CODE 255: Performed by: INTERNAL MEDICINE

## 2020-02-27 PROCEDURE — 70492 CT SFT TSUE NCK W/O & W/DYE: CPT | Mod: 26,,, | Performed by: RADIOLOGY

## 2020-02-27 PROCEDURE — 82728 ASSAY OF FERRITIN: CPT

## 2020-02-27 PROCEDURE — 74177 CT ABD & PELVIS W/CONTRAST: CPT | Mod: TC

## 2020-02-27 PROCEDURE — 99999 PR PBB SHADOW E&M-EST. PATIENT-LVL III: CPT | Mod: PBBFAC,,, | Performed by: INTERNAL MEDICINE

## 2020-02-27 PROCEDURE — 71260 CT THORAX DX C+: CPT | Mod: 26,,, | Performed by: RADIOLOGY

## 2020-02-27 PROCEDURE — 3074F SYST BP LT 130 MM HG: CPT | Mod: CPTII,S$GLB,, | Performed by: INTERNAL MEDICINE

## 2020-02-27 PROCEDURE — 80053 COMPREHEN METABOLIC PANEL: CPT

## 2020-02-27 PROCEDURE — 99999 PR PBB SHADOW E&M-EST. PATIENT-LVL III: ICD-10-PCS | Mod: PBBFAC,,, | Performed by: INTERNAL MEDICINE

## 2020-02-27 RX ORDER — HEPARIN 100 UNIT/ML
500 SYRINGE INTRAVENOUS
Status: COMPLETED | OUTPATIENT
Start: 2020-02-27 | End: 2020-02-27

## 2020-02-27 RX ORDER — SODIUM CHLORIDE 0.9 % (FLUSH) 0.9 %
10 SYRINGE (ML) INJECTION
Status: COMPLETED | OUTPATIENT
Start: 2020-02-27 | End: 2020-02-27

## 2020-02-27 RX ORDER — SODIUM CHLORIDE 0.9 % (FLUSH) 0.9 %
10 SYRINGE (ML) INJECTION
Status: CANCELLED | OUTPATIENT
Start: 2020-02-27

## 2020-02-27 RX ORDER — HEPARIN 100 UNIT/ML
500 SYRINGE INTRAVENOUS
Status: CANCELLED | OUTPATIENT
Start: 2020-02-27

## 2020-02-27 RX ADMIN — IOHEXOL 125 ML: 350 INJECTION, SOLUTION INTRAVENOUS at 10:02

## 2020-02-27 RX ADMIN — IOHEXOL 30 ML: 350 INJECTION, SOLUTION INTRAVENOUS at 09:02

## 2020-02-27 RX ADMIN — HEPARIN 500 UNITS: 100 SYRINGE at 11:02

## 2020-02-27 RX ADMIN — Medication 10 ML: at 11:02

## 2020-02-27 NOTE — NURSING
"Pt here for Mediport flush and lab draw. Right chestwall mediport accessed with a 20g 1" walters via sterile technique. Excellent blood return noted. Blood collected and sent to lab. Flushed with 10ml NS and 5 ml heparin solution. Needle D/C, site without redness, swelling, or drainage noted. Dressing applied. Patient tolerated well.    "

## 2020-02-27 NOTE — PROGRESS NOTES
Subjective:       Patient ID: Janine Souza is a 60 y.o. female.    Chief Complaint: Results; Anemia; and Cancer    HPI 60-year-old female returns for review after CT is.  History of neuroendocrine tumor status post chemotherapy with observation patient has done remarkably well no evidence of progression on CT chest abdomen pelvis.  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     Family History   Problem Relation Age of Onset    Diabetes type II Mother     Prostate cancer Father     Breast cancer Paternal Aunt         breast    Breast cancer Maternal Aunt         breast     Social History     Socioeconomic History    Marital status:      Spouse name: Not on file    Number of children: Not on file    Years of education: Not on file    Highest education level: Not on file   Occupational History    Not on file   Social Needs    Financial resource strain: Not on file    Food insecurity:     Worry: Not on file     Inability: Not on file    Transportation needs:     Medical: Not on file     Non-medical: Not on file   Tobacco Use    Smoking status: Never Smoker    Smokeless tobacco: Never Used   Substance and Sexual Activity    Alcohol use: No     Alcohol/week: 0.0 standard drinks    Drug use: No    Sexual activity: Not on file   Lifestyle    Physical activity:     Days per week: Not on file     Minutes per session: Not on file    Stress: Not on file   Relationships    Social connections:     Talks on phone: Not  on file     Gets together: Not on file     Attends Jew service: Not on file     Active member of club or organization: Not on file     Attends meetings of clubs or organizations: Not on file     Relationship status: Not on file   Other Topics Concern    Not on file   Social History Narrative    Not on file     Past Surgical History:   Procedure Laterality Date    A-V CARDIAC PACEMAKER INSERTION Left 2018    Procedure: INSERTION, CARDIAC PACEMAKER, DUAL CHAMBER;  Surgeon: Jean Carlos Onofre MD;  Location: [facility] CATH LAB;  Service: Cardiology;  Laterality: Left;  sinus pause, dPPM, BIO, DM, 323A    ADENOIDECTOMY      CARDIAC ELECTROPHYSIOLOGY MAPPING AND ABLATION      CARDIAC SURGERY      CARDIOVERSION N/A 2018    Procedure: CARDIOVERSION;  Surgeon: Jean Carlos Onofre MD;  Location: [facility] CATH LAB;  Service: Cardiology;  Laterality: N/A;     SECTION      ERCP      ERCP N/A 2018    Procedure: ERCP (ENDOSCOPIC RETROGRADE CHOLANGIOPANCREATOGRAPHY);  Surgeon: Tang aTnner MD;  Location: [facility] ENDO ([address] [place] [address]);  Service: Endoscopy;  Laterality: N/A;  2 day hold Dr Kalyan Whittaker    ERCP N/A 2019    Procedure: ERCP (ENDOSCOPIC RETROGRADE CHOLANGIOPANCREATOGRAPHY);  Surgeon: Tang Tanner MD;  Location: [facility] ([address] [place] [address]);  Service: Endoscopy;  Laterality: N/A;  2 day hold Dr Kalyan Whittaker    TONSILLECTOMY      uvulopalatopharygoplasty         Labs:  Lab Results   Component Value Date    WBC 8.14 2020    HGB 10.9 (L) 2020    HCT 33.0 (L) 2020     (H) 2020     2020     BMP  Lab Results   Component Value Date     2020    K 4.2 2020     2020    CO2 25 2020    BUN 28 (H) 2020    CREATININE 1.4 2020    CALCIUM 9.7 2020    ANIONGAP 13 2020    ESTGFRAFRICA 47 (A) 2020    EGFRNONAA 41 (A) 2020     Lab Results   Component Value Date    ALT 13 2020    AST  12 02/27/2020    ALKPHOS 70 02/27/2020    BILITOT 0.3 02/27/2020       Lab Results   Component Value Date    IRON 20 (L) 11/20/2019    TIBC 352 11/20/2019    FERRITIN 1,398 (H) 11/20/2019     No results found for: [id number]  No results found for: FOLATE  Lab Results   Component Value Date    TSH 4.891 (H) 12/03/2019         Review of Systems   Constitutional: Negative for activity change, appetite change, chills, diaphoresis, fatigue, fever and unexpected weight change.   HENT: Positive for postnasal drip and rhinorrhea. Negative for congestion, dental problem, drooling, ear discharge, ear pain, facial swelling, hearing loss, mouth sores, nosebleeds, sinus pressure, sneezing, sore throat, tinnitus, trouble swallowing and voice change.    Eyes: Negative for photophobia, pain, discharge, redness, itching and visual disturbance.   Respiratory: Negative for cough, choking, chest tightness, shortness of breath, wheezing and stridor.    Cardiovascular: Negative for chest pain, palpitations and leg swelling.   Gastrointestinal: Negative for abdominal distention, abdominal pain, anal bleeding, blood in stool, constipation, diarrhea, nausea, rectal pain and vomiting.   Endocrine: Negative for cold intolerance, heat intolerance, polydipsia, polyphagia and polyuria.   Genitourinary: Negative for decreased urine volume, difficulty urinating, dyspareunia, dysuria, enuresis, flank pain, frequency, genital sores, hematuria, menstrual problem, pelvic pain, urgency, vaginal bleeding, vaginal discharge and vaginal pain.   Musculoskeletal: Negative for arthralgias, back pain, gait problem, joint swelling, myalgias, neck pain and neck stiffness.   Skin: Negative for color change, pallor and rash.   Allergic/Immunologic: Negative for environmental allergies, food allergies and immunocompromised state.   Neurological: Negative for dizziness, tremors, seizures, syncope, facial asymmetry, speech difficulty, weakness, light-headedness,  numbness and headaches.   Hematological: Negative for adenopathy. Does not bruise/bleed easily.   Psychiatric/Behavioral: Positive for dysphoric mood. Negative for agitation, behavioral problems, confusion, decreased concentration, hallucinations, self-injury, sleep disturbance and suicidal ideas. The patient is nervous/anxious. The patient is not hyperactive.        Objective:      Physical Exam   Constitutional: She is oriented to person, place, and time. She appears well-developed and well-nourished. She appears distressed.   HENT:   Head: Normocephalic and atraumatic.   Right Ear: External ear normal.   Left Ear: External ear normal.   Nose: Nose normal. Right sinus exhibits no maxillary sinus tenderness and no frontal sinus tenderness. Left sinus exhibits no maxillary sinus tenderness and no frontal sinus tenderness.   Mouth/Throat: Oropharynx is clear and moist. No oropharyngeal exudate.   Eyes: Pupils are equal, round, and reactive to light. Conjunctivae, EOM and lids are normal. Right eye exhibits no discharge. Left eye exhibits no discharge. Right conjunctiva is not injected. Right conjunctiva has no hemorrhage. Left conjunctiva is not injected. Left conjunctiva has no hemorrhage. No scleral icterus.   Neck: Normal range of motion. Neck supple. No JVD present. No tracheal deviation present. No thyromegaly present.   Cardiovascular: Normal rate and regular rhythm.   Pulmonary/Chest: Effort normal. No stridor. No respiratory distress. She exhibits no tenderness.   Abdominal: Soft. She exhibits no distension and no mass. There is no splenomegaly or hepatomegaly. There is no tenderness. There is no rebound.   Musculoskeletal: Normal range of motion. She exhibits no edema or tenderness.   Lymphadenopathy:     She has no cervical adenopathy.     She has no axillary adenopathy.        Right: No supraclavicular adenopathy present.        Left: No supraclavicular adenopathy present.   Neurological: She is alert and  oriented to person, place, and time. No cranial nerve deficit. Coordination normal.   Skin: Skin is dry. No rash noted. She is not diaphoretic. No erythema.   Psychiatric: She has a normal mood and affect. Her behavior is normal. Judgment and thought content normal.   Vitals reviewed.          Assessment:      1. Secondary neuroendocrine tumor of liver    2. 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 Chronic kidney disease (CKD), stage III (moderate)    4. Iron deficiency anemia due to chronic blood loss           Plan:     Extensive conversation with patient flush port today reviewed images personally no evidence of progression await radiologic call interpretation defer to ENT parotid mass stable as well as thyroid mass.  With biopsy pending flush port today again in 2 months and return in 4 months with port flush in laboratory studies communicate results through portal 45 min face-to-face time coordination care greater 50% time face-to-face patient        Kalyan Flores Jr, MD FACP

## 2020-02-27 NOTE — DISCHARGE INSTRUCTIONS
[facility]  [address] [address]  [address]  [phone] phone     [phone] fax  Hours of Operation: Monday- Friday 8:00am- 5:00pm  After hours phone  [phone]  Hematology / Oncology Physicians on call      TIARRA Marcos Dr., Dr., Dr., Dr., NP Sydney Prescott, NP Tyesha Taylor, NP    Please call with any concerns regarding your appointment today.

## 2020-03-06 ENCOUNTER — PROCEDURE VISIT (OUTPATIENT)
Dept: OTOLARYNGOLOGY | Facility: CLINIC | Age: 60
End: 2020-03-06
Payer: MEDICARE

## 2020-03-06 VITALS
WEIGHT: 227.5 LBS | TEMPERATURE: 98 F | HEART RATE: 72 BPM | SYSTOLIC BLOOD PRESSURE: 135 MMHG | BODY MASS INDEX: 36.72 KG/M2 | DIASTOLIC BLOOD PRESSURE: 95 MMHG

## 2020-03-06 DIAGNOSIS — E04.1 THYROID NODULE: ICD-10-CM

## 2020-03-06 PROCEDURE — 88173 PR  INTERPRETATION OF FNA SMEAR: ICD-10-PCS | Mod: 26,,, | Performed by: PATHOLOGY

## 2020-03-06 PROCEDURE — 10006 FNA BX W/US GDN EA ADDL: CPT | Mod: S$GLB,,, | Performed by: OTOLARYNGOLOGY

## 2020-03-06 PROCEDURE — 88173 CYTOPATH EVAL FNA REPORT: CPT | Mod: 26,,, | Performed by: PATHOLOGY

## 2020-03-06 PROCEDURE — 88173 CYTOPATH EVAL FNA REPORT: CPT | Performed by: PATHOLOGY

## 2020-03-06 PROCEDURE — 10005 PR FINE NEEDLE ASP BIOPSY, W/US GUIDANCE, 1ST LESION: ICD-10-PCS | Mod: S$GLB,,, | Performed by: OTOLARYNGOLOGY

## 2020-03-06 PROCEDURE — 10005 FNA BX W/US GDN 1ST LES: CPT | Mod: S$GLB,,, | Performed by: OTOLARYNGOLOGY

## 2020-03-06 PROCEDURE — 10006 PR FINE NEEDLE ASP BIOPSY, W/US GUIDANCE, EA ADDTL LESION: ICD-10-PCS | Mod: S$GLB,,, | Performed by: OTOLARYNGOLOGY

## 2020-03-06 NOTE — PROCEDURES
Procedures     Procedure: Ultrasound-guided FNA of right mid/inferior lobe thyroid nodule and posterior right thyroid nodule    Clinical History:    thyroid nodule(s)    Technique/findings: After the risks, benefits and options of the planned procedure were explained to the patient in full detail, the patient expressed complete understanding and gave signed informed consent.  The skin overlying this area was prepped and draped in the usual sterile fashion. A timeout was performed. 1% lidocaine was used as a local anesthetic.  The mass/nodule was visualized with ultrasound and each needle was visualized to enter the intended biopsy site. Each nodule had 3 passes with 25 gauge needles and 1 22 gauge needle.  These were placed separately onto slides.    Locations biopsied:  1. Mid inferior right  2. Posterior right      Post procedure ultrasound fail to demonstrate evidence for a post procedure hemorrhage or other complication. A sterile dressing was applied.  The patient tolerated the procedure well without complication comment departing the ultrasound suite in unchanged condition.   Impression       1. Successful ultrasound-guided FNA.      Hamlet Hansen    03/06/2020   12:41 PM

## 2020-03-10 LAB — FINAL PATHOLOGIC DIAGNOSIS: NORMAL

## 2020-03-11 ENCOUNTER — PATIENT MESSAGE (OUTPATIENT)
Dept: OTOLARYNGOLOGY | Facility: CLINIC | Age: 60
End: 2020-03-11

## 2020-03-11 DIAGNOSIS — E04.1 THYROID NODULE: Primary | ICD-10-CM

## 2020-03-11 NOTE — TELEPHONE ENCOUNTER
Spoke to pt and informed her with biopsy results. Scheduled pt for 6 month follow up with US. Pt verbalized understanding.

## 2020-04-20 ENCOUNTER — INFUSION (OUTPATIENT)
Dept: INFUSION THERAPY | Facility: HOSPITAL | Age: 60
End: 2020-04-20
Attending: INTERNAL MEDICINE
Payer: MEDICARE

## 2020-04-20 DIAGNOSIS — C7B.8 SECONDARY NEUROENDOCRINE TUMOR OF LIVER: Primary | ICD-10-CM

## 2020-04-20 PROCEDURE — 63600175 PHARM REV CODE 636 W HCPCS: Performed by: INTERNAL MEDICINE

## 2020-04-20 PROCEDURE — 96523 IRRIG DRUG DELIVERY DEVICE: CPT

## 2020-04-20 RX ORDER — SODIUM CHLORIDE 0.9 % (FLUSH) 0.9 %
10 SYRINGE (ML) INJECTION
Status: CANCELLED | OUTPATIENT
Start: 2020-04-20

## 2020-04-20 RX ORDER — HEPARIN 100 UNIT/ML
500 SYRINGE INTRAVENOUS
Status: CANCELLED | OUTPATIENT
Start: 2020-04-20

## 2020-04-20 RX ORDER — HEPARIN 100 UNIT/ML
500 SYRINGE INTRAVENOUS
Status: COMPLETED | OUTPATIENT
Start: 2020-04-20 | End: 2020-04-20

## 2020-04-20 RX ADMIN — HEPARIN SODIUM (PORCINE) LOCK FLUSH IV SOLN 100 UNIT/ML 500 UNITS: 100 SOLUTION at 10:04

## 2020-06-24 ENCOUNTER — INFUSION (OUTPATIENT)
Dept: INFUSION THERAPY | Facility: HOSPITAL | Age: 60
End: 2020-06-24
Attending: INTERNAL MEDICINE
Payer: MEDICARE

## 2020-06-24 ENCOUNTER — OFFICE VISIT (OUTPATIENT)
Dept: HEMATOLOGY/ONCOLOGY | Facility: CLINIC | Age: 60
End: 2020-06-24
Payer: MEDICARE

## 2020-06-24 VITALS
SYSTOLIC BLOOD PRESSURE: 122 MMHG | TEMPERATURE: 97 F | WEIGHT: 231.25 LBS | BODY MASS INDEX: 38.53 KG/M2 | DIASTOLIC BLOOD PRESSURE: 79 MMHG | OXYGEN SATURATION: 95 % | HEART RATE: 101 BPM | HEIGHT: 65 IN

## 2020-06-24 DIAGNOSIS — D50.0 IRON DEFICIENCY ANEMIA DUE TO CHRONIC BLOOD LOSS: ICD-10-CM

## 2020-06-24 DIAGNOSIS — Z95.0 S/P PLACEMENT OF CARDIAC PACEMAKER: ICD-10-CM

## 2020-06-24 DIAGNOSIS — Z12.89 ENCOUNTER FOR SCREENING FOR MALIGNANT NEOPLASM OF OTHER SITES: ICD-10-CM

## 2020-06-24 DIAGNOSIS — C7B.8 SECONDARY NEUROENDOCRINE TUMOR OF LIVER: Primary | ICD-10-CM

## 2020-06-24 DIAGNOSIS — C78.6 METASTATIC CANCER TO RETROPERITONEUM: ICD-10-CM

## 2020-06-24 DIAGNOSIS — C26.9 MALIGNANT NEOPLASM OF ILL-DEFINED SITES WITHIN THE DIGESTIVE SYSTEM: ICD-10-CM

## 2020-06-24 DIAGNOSIS — N18.30 CHRONIC KIDNEY DISEASE (CKD), STAGE III (MODERATE): ICD-10-CM

## 2020-06-24 LAB
ALBUMIN SERPL BCP-MCNC: 3.5 G/DL (ref 3.5–5.2)
ALP SERPL-CCNC: 67 U/L (ref 55–135)
ALT SERPL W/O P-5'-P-CCNC: 19 U/L (ref 10–44)
ANION GAP SERPL CALC-SCNC: 12 MMOL/L (ref 8–16)
AST SERPL-CCNC: 14 U/L (ref 10–40)
BASOPHILS # BLD AUTO: 0.03 K/UL (ref 0–0.2)
BASOPHILS NFR BLD: 0.4 % (ref 0–1.9)
BILIRUB SERPL-MCNC: 0.3 MG/DL (ref 0.1–1)
BUN SERPL-MCNC: 24 MG/DL (ref 6–20)
CALCIUM SERPL-MCNC: 9.4 MG/DL (ref 8.7–10.5)
CHLORIDE SERPL-SCNC: 101 MMOL/L (ref 95–110)
CO2 SERPL-SCNC: 26 MMOL/L (ref 23–29)
CREAT SERPL-MCNC: 1.2 MG/DL (ref 0.5–1.4)
DIFFERENTIAL METHOD: ABNORMAL
EOSINOPHIL # BLD AUTO: 0.2 K/UL (ref 0–0.5)
EOSINOPHIL NFR BLD: 2.1 % (ref 0–8)
ERYTHROCYTE [DISTWIDTH] IN BLOOD BY AUTOMATED COUNT: 13.1 % (ref 11.5–14.5)
EST. GFR  (AFRICAN AMERICAN): 57 ML/MIN/1.73 M^2
EST. GFR  (NON AFRICAN AMERICAN): 50 ML/MIN/1.73 M^2
GLUCOSE SERPL-MCNC: 221 MG/DL (ref 70–110)
HCT VFR BLD AUTO: 34.1 % (ref 37–48.5)
HGB BLD-MCNC: 10.6 G/DL (ref 12–16)
IMM GRANULOCYTES # BLD AUTO: 0.05 K/UL (ref 0–0.04)
IMM GRANULOCYTES NFR BLD AUTO: 0.7 % (ref 0–0.5)
LDH SERPL L TO P-CCNC: 188 U/L (ref 110–260)
LYMPHOCYTES # BLD AUTO: 1.5 K/UL (ref 1–4.8)
LYMPHOCYTES NFR BLD: 20.3 % (ref 18–48)
MCH RBC QN AUTO: 32.1 PG (ref 27–31)
MCHC RBC AUTO-ENTMCNC: 31.1 G/DL (ref 32–36)
MCV RBC AUTO: 103 FL (ref 82–98)
MONOCYTES # BLD AUTO: 0.7 K/UL (ref 0.3–1)
MONOCYTES NFR BLD: 9.5 % (ref 4–15)
NEUTROPHILS # BLD AUTO: 5 K/UL (ref 1.8–7.7)
NEUTROPHILS NFR BLD: 67 % (ref 38–73)
NRBC BLD-RTO: 0 /100 WBC
PLATELET # BLD AUTO: 147 K/UL (ref 150–350)
PMV BLD AUTO: 10 FL (ref 9.2–12.9)
POTASSIUM SERPL-SCNC: 4.5 MMOL/L (ref 3.5–5.1)
PROT SERPL-MCNC: 8.9 G/DL (ref 6–8.4)
RBC # BLD AUTO: 3.3 M/UL (ref 4–5.4)
SODIUM SERPL-SCNC: 139 MMOL/L (ref 136–145)
WBC # BLD AUTO: 7.49 K/UL (ref 3.9–12.7)

## 2020-06-24 PROCEDURE — 82728 ASSAY OF FERRITIN: CPT

## 2020-06-24 PROCEDURE — 3078F DIAST BP <80 MM HG: CPT | Mod: CPTII,S$GLB,, | Performed by: INTERNAL MEDICINE

## 2020-06-24 PROCEDURE — 99999 PR PBB SHADOW E&M-EST. PATIENT-LVL IV: CPT | Mod: PBBFAC,,, | Performed by: INTERNAL MEDICINE

## 2020-06-24 PROCEDURE — 99214 PR OFFICE/OUTPT VISIT, EST, LEVL IV, 30-39 MIN: ICD-10-PCS | Mod: 25,S$GLB,, | Performed by: INTERNAL MEDICINE

## 2020-06-24 PROCEDURE — 3074F PR MOST RECENT SYSTOLIC BLOOD PRESSURE < 130 MM HG: ICD-10-PCS | Mod: CPTII,S$GLB,, | Performed by: INTERNAL MEDICINE

## 2020-06-24 PROCEDURE — 3078F PR MOST RECENT DIASTOLIC BLOOD PRESSURE < 80 MM HG: ICD-10-PCS | Mod: CPTII,S$GLB,, | Performed by: INTERNAL MEDICINE

## 2020-06-24 PROCEDURE — 3074F SYST BP LT 130 MM HG: CPT | Mod: CPTII,S$GLB,, | Performed by: INTERNAL MEDICINE

## 2020-06-24 PROCEDURE — 36591 DRAW BLOOD OFF VENOUS DEVICE: CPT

## 2020-06-24 PROCEDURE — 99499 UNLISTED E&M SERVICE: CPT | Mod: S$GLB,,, | Performed by: INTERNAL MEDICINE

## 2020-06-24 PROCEDURE — A4216 STERILE WATER/SALINE, 10 ML: HCPCS | Performed by: INTERNAL MEDICINE

## 2020-06-24 PROCEDURE — 83540 ASSAY OF IRON: CPT

## 2020-06-24 PROCEDURE — 25000003 PHARM REV CODE 250: Performed by: INTERNAL MEDICINE

## 2020-06-24 PROCEDURE — 99999 PR PBB SHADOW E&M-EST. PATIENT-LVL IV: ICD-10-PCS | Mod: PBBFAC,,, | Performed by: INTERNAL MEDICINE

## 2020-06-24 PROCEDURE — 99499 RISK ADDL DX/OHS AUDIT: ICD-10-PCS | Mod: S$GLB,,, | Performed by: INTERNAL MEDICINE

## 2020-06-24 PROCEDURE — 3008F BODY MASS INDEX DOCD: CPT | Mod: CPTII,S$GLB,, | Performed by: INTERNAL MEDICINE

## 2020-06-24 PROCEDURE — 99214 OFFICE O/P EST MOD 30 MIN: CPT | Mod: 25,S$GLB,, | Performed by: INTERNAL MEDICINE

## 2020-06-24 PROCEDURE — 83615 LACTATE (LD) (LDH) ENZYME: CPT

## 2020-06-24 PROCEDURE — 85025 COMPLETE CBC W/AUTO DIFF WBC: CPT

## 2020-06-24 PROCEDURE — 80053 COMPREHEN METABOLIC PANEL: CPT

## 2020-06-24 PROCEDURE — 3008F PR BODY MASS INDEX (BMI) DOCUMENTED: ICD-10-PCS | Mod: CPTII,S$GLB,, | Performed by: INTERNAL MEDICINE

## 2020-06-24 PROCEDURE — 63600175 PHARM REV CODE 636 W HCPCS: Performed by: INTERNAL MEDICINE

## 2020-06-24 RX ORDER — HEPARIN 100 UNIT/ML
500 SYRINGE INTRAVENOUS
Status: COMPLETED | OUTPATIENT
Start: 2020-06-24 | End: 2020-06-24

## 2020-06-24 RX ORDER — SODIUM CHLORIDE 0.9 % (FLUSH) 0.9 %
10 SYRINGE (ML) INJECTION
Status: CANCELLED | OUTPATIENT
Start: 2020-06-24

## 2020-06-24 RX ORDER — SODIUM CHLORIDE 0.9 % (FLUSH) 0.9 %
10 SYRINGE (ML) INJECTION
Status: COMPLETED | OUTPATIENT
Start: 2020-06-24 | End: 2020-06-24

## 2020-06-24 RX ORDER — HEPARIN 100 UNIT/ML
500 SYRINGE INTRAVENOUS
Status: CANCELLED | OUTPATIENT
Start: 2020-06-24

## 2020-06-24 RX ADMIN — SODIUM CHLORIDE, PRESERVATIVE FREE 10 ML: 5 INJECTION INTRAVENOUS at 10:06

## 2020-06-24 RX ADMIN — HEPARIN 500 UNITS: 100 SYRINGE at 10:06

## 2020-06-24 NOTE — PROGRESS NOTES
Patient, Janine Souza (MRN #[id number]), presented with a recent Platelet count less than 150 K/uL consistent with the definition of thrombocytopenia (ICD10 - D69.6).    Platelets   Date Value Ref Range Status   06/24/2020 147 (L) 150 - 350 K/uL Final     The patient's thrombocytopenia was monitored, evaluated, addressed and/or treated. This addendum to the medical record is made on 06/24/2020.

## 2020-06-24 NOTE — NURSING
Right chest wall mediport accessed with 20g/1 inch walters needle via sterile technique. Positive blood return.  After 10ml blood waste, labs drawn, verified labels with 2 pt identifiers, and sent to lab.  Flushed with 10ml NS and 5ml Heparin per protocol.  Walters needle dc'd and bandaid applied.  Site healthy.

## 2020-06-24 NOTE — PROGRESS NOTES
Subjective:       Patient ID: Janine Souza is a 60 y.o. female.    Chief Complaint: Results, Cancer, and Hepatic Cancer    HPI 60-year-old female with high-grade neuroendocrine tumor.  Patient has been treated with systemic chemotherapy and has been observe state.  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     Family History   Problem Relation Age of Onset    Diabetes type II Mother     Prostate cancer Father     Breast cancer Paternal Aunt         breast    Breast cancer Maternal Aunt         breast     Social History     Socioeconomic History    Marital status:      Spouse name: Not on file    Number of children: Not on file    Years of education: Not on file    Highest education level: Not on file   Occupational History    Not on file   Social Needs    Financial resource strain: Not on file    Food insecurity     Worry: Not on file     Inability: Not on file    Transportation needs     Medical: Not on file     Non-medical: Not on file   Tobacco Use    Smoking status: Never Smoker    Smokeless tobacco: Never Used   Substance and Sexual Activity    Alcohol use: No     Alcohol/week: 0.0 standard drinks    Drug use: No    Sexual activity: Not on file   Lifestyle    Physical activity     Days per week: Not on file     Minutes per session: Not on file    Stress: Not on file   Relationships    Social connections     Talks on phone: Not on file      Gets together: Not on file     Attends Sikh service: Not on file     Active member of club or organization: Not on file     Attends meetings of clubs or organizations: Not on file     Relationship status: Not on file   Other Topics Concern    Not on file   Social History Narrative    Not on file     Past Surgical History:   Procedure Laterality Date    A-V CARDIAC PACEMAKER INSERTION Left 2018    Procedure: INSERTION, CARDIAC PACEMAKER, DUAL CHAMBER;  Surgeon: Jean Carlos Onofre MD;  Location: [facility] CATH LAB;  Service: Cardiology;  Laterality: Left;  sinus pause, dPPM, BIO, DM, 323A    ADENOIDECTOMY      CARDIAC ELECTROPHYSIOLOGY MAPPING AND ABLATION      CARDIAC SURGERY      CARDIOVERSION N/A 2018    Procedure: CARDIOVERSION;  Surgeon: Jean Carlos Onofre MD;  Location: [facility] CATH LAB;  Service: Cardiology;  Laterality: N/A;     SECTION      ERCP      ERCP N/A 2018    Procedure: ERCP (ENDOSCOPIC RETROGRADE CHOLANGIOPANCREATOGRAPHY);  Surgeon: Tang Tanner MD;  Location: [facility] ([address] [place] [address]);  Service: Endoscopy;  Laterality: N/A;  2 day hold Dr Kalyan Whittaker    ERCP N/A 2019    Procedure: ERCP (ENDOSCOPIC RETROGRADE CHOLANGIOPANCREATOGRAPHY);  Surgeon: Tang Tanner MD;  Location: [facility] ([address] [place] [address]);  Service: Endoscopy;  Laterality: N/A;  2 day hold Dr Kalyan Whittaker    TONSILLECTOMY      uvulopalatopharygoplasty         Labs:  Lab Results   Component Value Date    WBC 7.49 2020    HGB 10.6 (L) 2020    HCT 34.1 (L) 2020     (H) 2020     (L) 2020     BMP  Lab Results   Component Value Date     2020    K 4.2 2020     2020    CO2 25 2020    BUN 28 (H) 2020    CREATININE 1.4 2020    CALCIUM 9.7 2020    ANIONGAP 13 2020    ESTGFRAFRICA 47 (A) 2020    EGFRNONAA 41 (A) 2020     Lab Results   Component Value Date    ALT 13 2020    AST 12  02/27/2020    ALKPHOS 70 02/27/2020    BILITOT 0.3 02/27/2020       Lab Results   Component Value Date    IRON 60 02/27/2020    TIBC 340 02/27/2020    FERRITIN 1,493 (H) 02/27/2020     No results found for: [id number]  No results found for: FOLATE  Lab Results   Component Value Date    TSH 4.891 (H) 12/03/2019         Review of Systems   Constitutional: Negative for activity change, appetite change, chills, diaphoresis, fatigue, fever and unexpected weight change.   HENT: Negative for congestion, dental problem, drooling, ear discharge, ear pain, facial swelling, hearing loss, mouth sores, nosebleeds, postnasal drip, rhinorrhea, sinus pressure, sneezing, sore throat, tinnitus, trouble swallowing and voice change.    Eyes: Negative for photophobia, pain, discharge, redness, itching and visual disturbance.   Respiratory: Negative for cough, choking, chest tightness, shortness of breath, wheezing and stridor.    Cardiovascular: Negative for chest pain, palpitations and leg swelling.   Gastrointestinal: Negative for abdominal distention, abdominal pain, anal bleeding, blood in stool, constipation, diarrhea, nausea, rectal pain and vomiting.   Endocrine: Negative for cold intolerance, heat intolerance, polydipsia, polyphagia and polyuria.   Genitourinary: Negative for decreased urine volume, difficulty urinating, dyspareunia, dysuria, enuresis, flank pain, frequency, genital sores, hematuria, menstrual problem, pelvic pain, urgency, vaginal bleeding, vaginal discharge and vaginal pain.   Musculoskeletal: Negative for arthralgias, back pain, gait problem, joint swelling, myalgias, neck pain and neck stiffness.   Skin: Negative for color change, pallor and rash.   Allergic/Immunologic: Negative for environmental allergies, food allergies and immunocompromised state.   Neurological: Negative for dizziness, tremors, seizures, syncope, facial asymmetry, speech difficulty, weakness, light-headedness, numbness and headaches.    Hematological: Negative for adenopathy. Does not bruise/bleed easily.   Psychiatric/Behavioral: Positive for dysphoric mood. Negative for agitation, behavioral problems, confusion, decreased concentration, hallucinations, self-injury, sleep disturbance and suicidal ideas. The patient is nervous/anxious. The patient is not hyperactive.         Parents remain in nursing home have not seen since 2020.  Sister recently  of metastatic breast cancer       Objective:      Physical Exam  Vitals signs reviewed.   Constitutional:       General: She is not in acute distress.     Appearance: She is well-developed. She is not diaphoretic.   HENT:      Head: Normocephalic and atraumatic.      Right Ear: External ear normal.      Left Ear: External ear normal.      Nose: Nose normal.      Right Sinus: No maxillary sinus tenderness or frontal sinus tenderness.      Left Sinus: No maxillary sinus tenderness or frontal sinus tenderness.      Mouth/Throat:      Pharynx: No oropharyngeal exudate.   Eyes:      General: Lids are normal. No scleral icterus.        Right eye: No discharge.         Left eye: No discharge.      Conjunctiva/sclera: Conjunctivae normal.      Right eye: Right conjunctiva is not injected. No hemorrhage.     Left eye: Left conjunctiva is not injected. No hemorrhage.     Pupils: Pupils are equal, round, and reactive to light.   Neck:      Musculoskeletal: Normal range of motion and neck supple.      Thyroid: No thyromegaly.      Vascular: No JVD.      Trachea: No tracheal deviation.   Cardiovascular:      Rate and Rhythm: Normal rate.   Pulmonary:      Effort: Pulmonary effort is normal. No respiratory distress.      Breath sounds: No stridor.   Chest:      Chest wall: No tenderness.   Abdominal:      General: Bowel sounds are normal. There is no distension.      Palpations: Abdomen is soft. There is no hepatomegaly, splenomegaly or mass.      Tenderness: There is no abdominal tenderness. There is  no rebound.   Musculoskeletal: Normal range of motion.         General: No tenderness.   Lymphadenopathy:      Cervical: No cervical adenopathy.      Upper Body:      Right upper body: No supraclavicular adenopathy.      Left upper body: No supraclavicular adenopathy.   Skin:     General: Skin is dry.      Findings: No erythema or rash.   Neurological:      Mental Status: She is alert and oriented to person, place, and time.      Cranial Nerves: No cranial nerve deficit.      Coordination: Coordination normal.   Psychiatric:         Behavior: Behavior normal.         Thought Content: Thought content normal.         Judgment: Judgment normal.             Assessment:      1. Secondary neuroendocrine tumor of liver    2. 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 Iron deficiency anemia due to chronic blood loss    4. Chronic kidney disease (CKD), stage III (moderate)    5. S/P placement of cardiac pacemaker    6. 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 Encounter for screening for malignant neoplasm of other sites           Plan:     Extensive conversation with patient in regards to corona virus event.  Parents remain in nursing home has not seen in 3 months.  Sister recently  of metastatic breast carcinoma patient appears to be clinically stable at this point no clear evidence of progressive disease patient has done remarkably well and will reimage in 12 weeks        Kalyan Flores Jr, MD FACP

## 2020-06-25 LAB
FERRITIN SERPL-MCNC: 1614 NG/ML (ref 20–300)
IRON SERPL-MCNC: 57 UG/DL (ref 30–160)
SATURATED IRON: 16 % (ref 20–50)
TOTAL IRON BINDING CAPACITY: 366 UG/DL (ref 250–450)
TRANSFERRIN SERPL-MCNC: 247 MG/DL (ref 200–375)

## 2020-06-26 RX ORDER — GLIPIZIDE 10 MG/1
10 TABLET ORAL 2 TIMES DAILY
Qty: 180 TABLET | Refills: 3 | Status: SHIPPED | OUTPATIENT
Start: 2020-06-26 | End: 2020-10-15 | Stop reason: SDUPTHER

## 2020-06-26 RX ORDER — ESCITALOPRAM OXALATE 10 MG/1
10 TABLET ORAL DAILY
Qty: 90 TABLET | Refills: 3 | Status: SHIPPED | OUTPATIENT
Start: 2020-06-26 | End: 2021-08-03 | Stop reason: SDUPTHER

## 2020-06-29 NOTE — PROGRESS NOTES
Gisel Villalobos is a 26 year old female presenting to the walk-in clinic today for fatigue, SOB, diarrhea, chills x2 days.  Patient masked during visit. Writer wearing mask, gown, gloves and eye protection during visit.  Medications reviewed and updated.  Patient would like communication of their results via:        Cell Phone:   Telephone Information:   Mobile [phone]     Okay to leave a message containing results? Yes           Referring Provider:    Kalyan Flores Md  [id number] [facility]  [place] [address]  Subjective:   Patient: Janine Souza [id number], :1960   Visit date:12/3/2019 2:10 PM    Chief Complaint:  Other (review thyroid US)    Past treatment:  .  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 In mid , she noticed a small lump near head of right clavicle on anterior neck (has port nearby and thought maybe related to that).  She mentioned to Dr. Flores who then ordered imaging and was found to have inferior right parotid lesion.  She is on Xarelto and is followed by Cardiology (Dr. Sánchez at  Cardiology).  She has pacemaker. She has a common duct stent due to her neuroendocrine tumor.  This was attempted to be changed but she went into SVT.  This led to the pacemaker placement.  She was found to have a right parotid mass and I performed FNA in 2019.  This returned as consistent with Warthins.  Due to comorbidities, we have been following this with serial imaging rather than surgery.      HPI:  Janine is a 59 y.o. female who I was asked to see in consultation for evaluation of the following issue(s):    COMPRESSIVE SYMPTOMS OR MINOR RISK FACTORS FOR THYROID CANCER (Negative unless checked):  []  Increasing in size over the past 6 months  []  Dysphagia   []  Dyspnea on exertion  []  Orthopnea  []  Hemoptysis  []  Voice changes  []  Pain  [x]  AGE >45  Actual age   [x]  FEMALE     HIGH RISK HISTORY FOR THYROID CANCER:  []  Thyroid cancer in 1 or more first degree relatives  []  History of radiation exposure to the neck  []  Prior thyroidectomy with dx of thyroid cancer  []  PET positive nodule  []  Multiple Endocrine Neoplasia  []  Familial Medullary Thyroid Cancer    (2009 REVISED AMERICAN THYROID ASSOCIATION MANAGEMENT GUIDELINES FOR PATIENTS WITH THYROID NODULES AND DIFFERENTIATED THYROID CANCER)      Lab Results    Component Value Date    TSH 5.389 (H) 10/28/2019    TSH 3.802 11/13/2018    TSH 3.423 05/22/2018    TSH 2.916 12/20/2016    CALCIUM 9.7 11/20/2019    CALCIUM 9.8 10/28/2019    CALCIUM 9.7 08/14/2019    CALCIUM 9.5 08/12/2019    CALCIUM 10.2 06/07/2019           Review of Systems:  Negative unless checked off.  Gen:  []fever   []fatigue  HENT: []nosebleeds  []dental problem   Eyes:  []photophobia  []visual disturbance  Resp:  []chest tightness []wheezing  Card:  []chest pain  []leg swelling  GI:  []abdominal pain []blood in stool  :  []dysuria  []hematuria  Musc:  []joint swelling []gait problem  Skin:  []color change []pallor  Neuro: []seizures  []numbness  Hem:  []bruise/bleed easily  Psych: []hallucinations [] elicit substance abuse  Allergy/Imm: is allergic to pcn [penicillins].    Her meds, allergies, medical, surgical, social & family histories were reviewed & updated:  -     She has a current medication list which includes the following prescription(s): blood sugar diagnostic, disc, escitalopram oxalate, fluticasone propionate, furosemide, glipizide, glipizide, klor-con m20, lancets, levofloxacin, linagliptin, metformin, metoprolol tartrate, metoprolol tartrate, rivaroxaban, sotalol, and zolpidem, and the following Facility-Administered Medications: ferumoxytol (FERAHEME) 510 mg in dextrose 5 % 100 mL IVPB, heparin, porcine (pf), and sodium chloride 0.9%.  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   -     She does not have any pertinent problems on file.   -     She  has a past surgical history that includes Tonsillectomy; Adenoidectomy;  "uvulopalatopharygoplasty;  section; Cardiac electrophysiology mapping and ablation; ERCP; Cardiac surgery; ERCP (N/A, 2018); Cardioversion (N/A, 2018); A-V cardiac pacemaker insertion (Left, 2018); and ERCP (N/A, 2019).  -     She  reports that she has never smoked. She has never used smokeless tobacco. She reports that she does not drink alcohol or use drugs.  -     Her family history includes Breast cancer in her maternal aunt and paternal aunt; Diabetes type II in her mother; Prostate cancer in her father.  -     She is allergic to pcn [penicillins].    Objective:   Physical Exam:  Vitals:  Temp 97.6 °F (36.4 °C) (Tympanic)   Ht 5' 5" (1.651 m)   Wt 98.9 kg (218 lb)   LMP  (LMP Unknown)   BMI 36.28 kg/m²   General appearance:  Well developed, well nourished    Eyes:  Extraocular motions intact, PERRL    Communication:  no hoarseness, no dysphonia    Ears:  Otoscopy of external auditory canals and tympanic membranes was normal, clinical speech reception thresholds grossly intact, no mass/lesion of auricle.  Nose:  No masses/lesions of external nose, nasal mucosa, septum, and turbinates were within normal limits.  Mouth:  No mass/lesion of lips, teeth, gums, hard/soft palate, tongue, tonsils, or oropharynx.    Cardiovascular:  No pedal edema; Radial Pulses +2     Neck & Lymphatics:  No cervical lymphadenopathy, no neck mass/crepitus/ asymmetry, trachea is midline.  THYROID: no palpable nodules  Psych: Oriented x3,  Alert with normal mood and affect.     Respiration/Chest:  Symmetric expansion during respiration, normal respiratory effort.    Skin:  Warm and intact. No ulcerations of face, scalp, neck.      ULTRASOUND:  Results for orders placed during the hospital encounter of 19   US Soft Tissue Head Neck Thyroid    Narrative EXAMINATION:  US SOFT TISSUE HEAD NECK THYROID    CLINICAL HISTORY:  .  Other specified disorders of thyroid    TECHNIQUE:  Ultrasound of the thyroid and " cervical lymph nodes was performed.    COMPARISON:  CT soft tissue neck 08/12/2019    FINDINGS:  The thyroid is diffusely heterogeneous in echotexture with normal vascularity.  Right lobe of the thyroid is enlarged and measures 6.5 x 3.3 x 2.9 cm.  Left lobe of the thyroid measures 4.6 x 1.9 x 1.7 cm.  Diffusely heterogeneous thyroid parenchyma.    Multiple colloid cysts are seen bilaterally measuring up to 8 mm.  Complex heterogeneous lesion is seen within the inferior aspect the right thyroid lobe measuring up to 12 mm.  Additional measuring 1.5 cm suspected posteriorly within the mid right thyroid lobe which correlates with CT findings.  Consider FNA.    Cervical lymph nodes demonstrate normal morphology and size.      Impression Complex heterogeneous lesion is seen within the inferior aspect the right thyroid lobe measuring up to 12 mm. Additional measuring 1.5 cm suspected posteriorly within the mid right thyroid lobe which correlates with CT findings. Consider FNA for both lesions.      Electronically signed by: Rikki Madera MD  Date:    11/26/2019  Time:    10:43           PATHOLOGY:  none      Assessment & Plan:   Janine was seen today for other.    Diagnoses and all orders for this visit:    Thyroid nodule  -     TSH; Future  -     T4, free; Future  -     VITAMIN D; Future  -     Calcium; Future  -     CALCIUM, IONIZED; Future  -     PTH, intact; Future    Parathyroid abnormality  -     TSH; Future  -     T4, free; Future  -     VITAMIN D; Future  -     Calcium; Future  -     CALCIUM, IONIZED; Future  -     PTH, intact; Future    Hypercalcemia   -     VITAMIN D; Future      Janine has presents with a complex thyroid problem.  The imaging and laboratory values were reviewed at length.  Janine does not have compressive symptoms or cosmetic deformity from the size of the mass.  Janine does not have a HIGH RISK HISTORY for thyroid cancer.      Multiinstitutional Analysis of Thyroid Nodule Risk Stratification Using  the American College of Radiology Thyroid Imaging Reporting and Data System    []  TR1 (0 POINTS): BENIGN  o no FNA required; risk of malignancy 0.3%  [] TR2  (2 POINTS): NOT SUSPICIOUS  o No FNA required; risk of malignancy 1.5%  [x] TR3  (3 POINTS): MILDLY SUSPICIOUS; risk of malignancy 4.8%-  Heterogeneous, primarily isoechoic 12mm nodule  o ?2.5 cm FNA  o ?1.5 cm follow up, follow up: 1, 3 and 5 years  [] TR4  (4-6 POINTS): MODERATELY SUSPICIOUS; risk of malignancy 9.1%  o ?1.5 cm FNA  o ?1.0 cm follow up, follow up: 1, 2, 3 and 5 years  [] TR5  (7 OR MORE POINTS):  HIGHLY SUSPICIOUS; risk of malignancy 35%  o ?1.0 cm FNA  o ?0.5 cm follow up, annual follow up for up to 5 years      Janine has not had a prior biopsy    Bilateral nodules: Yes  Nodules 3cm or greater: No    Based on a lengthy discussion of treatment options and the above information, my recommendation is labs prior to further treatment.  I believe the hypoechoic nodule posterior to the gland may actually be a parathyroid adenoma.  The smaller nodule is below threshold based on TIRADS for biopsy.  She did have an elevated TSH in October.  Will recheck TSH in addition to fT4.

## 2020-06-30 DIAGNOSIS — E11.9 TYPE 2 DIABETES MELLITUS WITHOUT COMPLICATION: ICD-10-CM

## 2020-08-20 DIAGNOSIS — I10 ESSENTIAL HYPERTENSION: ICD-10-CM

## 2020-08-20 DIAGNOSIS — D50.0 IRON DEFICIENCY ANEMIA DUE TO CHRONIC BLOOD LOSS: ICD-10-CM

## 2020-08-20 DIAGNOSIS — N18.30 CHRONIC KIDNEY DISEASE (CKD), STAGE III (MODERATE): ICD-10-CM

## 2020-08-20 DIAGNOSIS — E11.65 UNCONTROLLED TYPE 2 DIABETES MELLITUS WITH HYPERGLYCEMIA: Primary | ICD-10-CM

## 2020-08-31 ENCOUNTER — PATIENT OUTREACH (OUTPATIENT)
Dept: ADMINISTRATIVE | Facility: HOSPITAL | Age: 60
End: 2020-08-31

## 2020-08-31 NOTE — PROGRESS NOTES
Working DM Eye report: Pt due for eye exam and annual. Spoke with pt, she declined scheduling today but states she will call back to do so.

## 2020-09-15 ENCOUNTER — PATIENT MESSAGE (OUTPATIENT)
Dept: OTOLARYNGOLOGY | Facility: CLINIC | Age: 60
End: 2020-09-15

## 2020-09-21 ENCOUNTER — TELEPHONE (OUTPATIENT)
Dept: RADIOLOGY | Facility: HOSPITAL | Age: 60
End: 2020-09-21

## 2020-09-22 ENCOUNTER — HOSPITAL ENCOUNTER (OUTPATIENT)
Dept: RADIOLOGY | Facility: HOSPITAL | Age: 60
Discharge: HOME | End: 2020-09-22
Attending: INTERNAL MEDICINE
Payer: MEDICARE

## 2020-09-22 ENCOUNTER — OFFICE VISIT (OUTPATIENT)
Dept: HEMATOLOGY/ONCOLOGY | Facility: CLINIC | Age: 60
End: 2020-09-22
Payer: MEDICARE

## 2020-09-22 ENCOUNTER — INFUSION (OUTPATIENT)
Dept: INFUSION THERAPY | Facility: HOSPITAL | Age: 60
End: 2020-09-22
Attending: INTERNAL MEDICINE
Payer: MEDICARE

## 2020-09-22 VITALS
HEART RATE: 95 BPM | WEIGHT: 230.38 LBS | OXYGEN SATURATION: 97 % | SYSTOLIC BLOOD PRESSURE: 135 MMHG | BODY MASS INDEX: 38.38 KG/M2 | DIASTOLIC BLOOD PRESSURE: 89 MMHG | HEIGHT: 65 IN | TEMPERATURE: 99 F

## 2020-09-22 DIAGNOSIS — C26.9 MALIGNANT NEOPLASM OF ILL-DEFINED SITES WITHIN THE DIGESTIVE SYSTEM: ICD-10-CM

## 2020-09-22 DIAGNOSIS — D50.0 IRON DEFICIENCY ANEMIA DUE TO CHRONIC BLOOD LOSS: ICD-10-CM

## 2020-09-22 DIAGNOSIS — Z12.89 ENCOUNTER FOR SCREENING FOR MALIGNANT NEOPLASM OF OTHER SITES: ICD-10-CM

## 2020-09-22 DIAGNOSIS — C7B.8 SECONDARY NEUROENDOCRINE TUMOR OF LIVER: ICD-10-CM

## 2020-09-22 DIAGNOSIS — C78.6 METASTATIC CANCER TO RETROPERITONEUM: ICD-10-CM

## 2020-09-22 DIAGNOSIS — R91.1 SOLITARY PULMONARY NODULE: Primary | ICD-10-CM

## 2020-09-22 DIAGNOSIS — N18.30 CHRONIC KIDNEY DISEASE (CKD), STAGE III (MODERATE): ICD-10-CM

## 2020-09-22 DIAGNOSIS — Z95.0 S/P PLACEMENT OF CARDIAC PACEMAKER: ICD-10-CM

## 2020-09-22 DIAGNOSIS — C7B.8 SECONDARY NEUROENDOCRINE TUMOR OF LIVER: Primary | ICD-10-CM

## 2020-09-22 LAB
ALBUMIN SERPL BCP-MCNC: 3.8 G/DL (ref 3.5–5.2)
ALP SERPL-CCNC: 61 U/L (ref 55–135)
ALT SERPL W/O P-5'-P-CCNC: 21 U/L (ref 10–44)
ANION GAP SERPL CALC-SCNC: 13 MMOL/L (ref 8–16)
AST SERPL-CCNC: 16 U/L (ref 10–40)
BASOPHILS # BLD AUTO: 0.04 K/UL (ref 0–0.2)
BASOPHILS NFR BLD: 0.5 % (ref 0–1.9)
BILIRUB SERPL-MCNC: 0.4 MG/DL (ref 0.1–1)
BUN SERPL-MCNC: 31 MG/DL (ref 6–20)
CALCIUM SERPL-MCNC: 9.1 MG/DL (ref 8.7–10.5)
CHLORIDE SERPL-SCNC: 101 MMOL/L (ref 95–110)
CO2 SERPL-SCNC: 24 MMOL/L (ref 23–29)
CREAT SERPL-MCNC: 1.4 MG/DL (ref 0.5–1.4)
DIFFERENTIAL METHOD: ABNORMAL
EOSINOPHIL # BLD AUTO: 0.2 K/UL (ref 0–0.5)
EOSINOPHIL NFR BLD: 3 % (ref 0–8)
ERYTHROCYTE [DISTWIDTH] IN BLOOD BY AUTOMATED COUNT: 13.4 % (ref 11.5–14.5)
EST. GFR  (AFRICAN AMERICAN): 47 ML/MIN/1.73 M^2
EST. GFR  (NON AFRICAN AMERICAN): 41 ML/MIN/1.73 M^2
GLUCOSE SERPL-MCNC: 254 MG/DL (ref 70–110)
HCT VFR BLD AUTO: 34.6 % (ref 37–48.5)
HGB BLD-MCNC: 10.9 G/DL (ref 12–16)
IMM GRANULOCYTES # BLD AUTO: 0.05 K/UL (ref 0–0.04)
IMM GRANULOCYTES NFR BLD AUTO: 0.7 % (ref 0–0.5)
LDH SERPL L TO P-CCNC: 163 U/L (ref 110–260)
LYMPHOCYTES # BLD AUTO: 1.7 K/UL (ref 1–4.8)
LYMPHOCYTES NFR BLD: 22.2 % (ref 18–48)
MCH RBC QN AUTO: 32.2 PG (ref 27–31)
MCHC RBC AUTO-ENTMCNC: 31.5 G/DL (ref 32–36)
MCV RBC AUTO: 102 FL (ref 82–98)
MONOCYTES # BLD AUTO: 0.7 K/UL (ref 0.3–1)
MONOCYTES NFR BLD: 9.7 % (ref 4–15)
NEUTROPHILS # BLD AUTO: 4.9 K/UL (ref 1.8–7.7)
NEUTROPHILS NFR BLD: 63.9 % (ref 38–73)
NRBC BLD-RTO: 0 /100 WBC
PLATELET # BLD AUTO: 156 K/UL (ref 150–350)
PMV BLD AUTO: 10.1 FL (ref 9.2–12.9)
POTASSIUM SERPL-SCNC: 4.2 MMOL/L (ref 3.5–5.1)
PROT SERPL-MCNC: 8.9 G/DL (ref 6–8.4)
RBC # BLD AUTO: 3.39 M/UL (ref 4–5.4)
SODIUM SERPL-SCNC: 138 MMOL/L (ref 136–145)
WBC # BLD AUTO: 7.66 K/UL (ref 3.9–12.7)

## 2020-09-22 PROCEDURE — 85025 COMPLETE CBC W/AUTO DIFF WBC: CPT

## 2020-09-22 PROCEDURE — 99499 UNLISTED E&M SERVICE: CPT | Mod: S$GLB,,, | Performed by: INTERNAL MEDICINE

## 2020-09-22 PROCEDURE — 74177 CT ABD & PELVIS W/CONTRAST: CPT | Mod: TC

## 2020-09-22 PROCEDURE — 80053 COMPREHEN METABOLIC PANEL: CPT

## 2020-09-22 PROCEDURE — 3079F PR MOST RECENT DIASTOLIC BLOOD PRESSURE 80-89 MM HG: ICD-10-PCS | Mod: CPTII,S$GLB,, | Performed by: INTERNAL MEDICINE

## 2020-09-22 PROCEDURE — 36591 DRAW BLOOD OFF VENOUS DEVICE: CPT

## 2020-09-22 PROCEDURE — 74177 CT CHEST ABDOMEN PELVIS WITH CONTRAST (XPD): ICD-10-PCS | Mod: 26,,, | Performed by: RADIOLOGY

## 2020-09-22 PROCEDURE — 82728 ASSAY OF FERRITIN: CPT

## 2020-09-22 PROCEDURE — 3075F SYST BP GE 130 - 139MM HG: CPT | Mod: CPTII,S$GLB,, | Performed by: INTERNAL MEDICINE

## 2020-09-22 PROCEDURE — 99214 OFFICE O/P EST MOD 30 MIN: CPT | Mod: S$GLB,,, | Performed by: INTERNAL MEDICINE

## 2020-09-22 PROCEDURE — 83540 ASSAY OF IRON: CPT

## 2020-09-22 PROCEDURE — 3008F PR BODY MASS INDEX (BMI) DOCUMENTED: ICD-10-PCS | Mod: CPTII,S$GLB,, | Performed by: INTERNAL MEDICINE

## 2020-09-22 PROCEDURE — 25500020 PHARM REV CODE 255: Performed by: INTERNAL MEDICINE

## 2020-09-22 PROCEDURE — 83615 LACTATE (LD) (LDH) ENZYME: CPT

## 2020-09-22 PROCEDURE — 3075F PR MOST RECENT SYSTOLIC BLOOD PRESS GE 130-139MM HG: ICD-10-PCS | Mod: CPTII,S$GLB,, | Performed by: INTERNAL MEDICINE

## 2020-09-22 PROCEDURE — 25000003 PHARM REV CODE 250: Performed by: INTERNAL MEDICINE

## 2020-09-22 PROCEDURE — 99999 PR PBB SHADOW E&M-EST. PATIENT-LVL IV: ICD-10-PCS | Mod: PBBFAC,,, | Performed by: INTERNAL MEDICINE

## 2020-09-22 PROCEDURE — A4216 STERILE WATER/SALINE, 10 ML: HCPCS | Performed by: INTERNAL MEDICINE

## 2020-09-22 PROCEDURE — 99999 PR PBB SHADOW E&M-EST. PATIENT-LVL IV: CPT | Mod: PBBFAC,,, | Performed by: INTERNAL MEDICINE

## 2020-09-22 PROCEDURE — 99214 PR OFFICE/OUTPT VISIT, EST, LEVL IV, 30-39 MIN: ICD-10-PCS | Mod: S$GLB,,, | Performed by: INTERNAL MEDICINE

## 2020-09-22 PROCEDURE — 71260 CT THORAX DX C+: CPT | Mod: 26,,, | Performed by: RADIOLOGY

## 2020-09-22 PROCEDURE — 3079F DIAST BP 80-89 MM HG: CPT | Mod: CPTII,S$GLB,, | Performed by: INTERNAL MEDICINE

## 2020-09-22 PROCEDURE — 71260 CT CHEST ABDOMEN PELVIS WITH CONTRAST (XPD): ICD-10-PCS | Mod: 26,,, | Performed by: RADIOLOGY

## 2020-09-22 PROCEDURE — 63600175 PHARM REV CODE 636 W HCPCS: Performed by: INTERNAL MEDICINE

## 2020-09-22 PROCEDURE — 74177 CT ABD & PELVIS W/CONTRAST: CPT | Mod: 26,,, | Performed by: RADIOLOGY

## 2020-09-22 PROCEDURE — 99499 RISK ADDL DX/OHS AUDIT: ICD-10-PCS | Mod: S$GLB,,, | Performed by: INTERNAL MEDICINE

## 2020-09-22 PROCEDURE — 3008F BODY MASS INDEX DOCD: CPT | Mod: CPTII,S$GLB,, | Performed by: INTERNAL MEDICINE

## 2020-09-22 RX ORDER — SODIUM CHLORIDE 0.9 % (FLUSH) 0.9 %
10 SYRINGE (ML) INJECTION
Status: CANCELLED | OUTPATIENT
Start: 2020-09-22

## 2020-09-22 RX ORDER — HEPARIN 100 UNIT/ML
500 SYRINGE INTRAVENOUS
Status: COMPLETED | OUTPATIENT
Start: 2020-09-22 | End: 2020-09-22

## 2020-09-22 RX ORDER — HEPARIN 100 UNIT/ML
500 SYRINGE INTRAVENOUS
Status: CANCELLED | OUTPATIENT
Start: 2020-09-22

## 2020-09-22 RX ORDER — SODIUM CHLORIDE 0.9 % (FLUSH) 0.9 %
10 SYRINGE (ML) INJECTION
Status: COMPLETED | OUTPATIENT
Start: 2020-09-22 | End: 2020-09-22

## 2020-09-22 RX ADMIN — Medication 10 ML: at 11:09

## 2020-09-22 RX ADMIN — IOHEXOL 100 ML: 350 INJECTION, SOLUTION INTRAVENOUS at 09:09

## 2020-09-22 RX ADMIN — HEPARIN 500 UNITS: 100 SYRINGE at 11:09

## 2020-09-22 RX ADMIN — IOHEXOL 30 ML: 350 INJECTION, SOLUTION INTRAVENOUS at 09:09

## 2020-09-22 NOTE — PROGRESS NOTES
Subjective:       Patient ID: Janine Souza is a 60 y.o. female.    Chief Complaint: Results and Cancer    HPI 60-year-old female history of poorly differentiated neuroendocrine carcinoma patient returns for clinical follow-up in review    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     Family History   Problem Relation Age of Onset    Diabetes type II Mother     Prostate cancer Father     Breast cancer Paternal Aunt         breast    Breast cancer Maternal Aunt         breast     Social History     Socioeconomic History    Marital status:      Spouse name: Not on file    Number of children: Not on file    Years of education: Not on file    Highest education level: Not on file   Occupational History    Not on file   Social Needs    Financial resource strain: Not on file    Food insecurity     Worry: Not on file     Inability: Not on file    Transportation needs     Medical: Not on file     Non-medical: Not on file   Tobacco Use    Smoking status: Never Smoker    Smokeless tobacco: Never Used   Substance and Sexual Activity    Alcohol use: No     Alcohol/week: 0.0 standard drinks    Drug use: No    Sexual activity: Not on file   Lifestyle    Physical activity     Days per week: Not on file     Minutes per session: Not on file    Stress: Not on file   Relationships    Social connections     Talks on phone: Not on file     Gets together: Not on file     Attends Mosque service: Not on file     Active member of club or organization: Not on file     Attends meetings of clubs or organizations: Not on file      Relationship status: Not on file   Other Topics Concern    Not on file   Social History Narrative    Not on file     Past Surgical History:   Procedure Laterality Date    A-V CARDIAC PACEMAKER INSERTION Left 2018    Procedure: INSERTION, CARDIAC PACEMAKER, DUAL CHAMBER;  Surgeon: Jean Carlos Onofre MD;  Location: [facility] CATH LAB;  Service: Cardiology;  Laterality: Left;  sinus pause, dPPM, BIO, DM, 323A    ADENOIDECTOMY      CARDIAC ELECTROPHYSIOLOGY MAPPING AND ABLATION      CARDIAC SURGERY      CARDIOVERSION N/A 2018    Procedure: CARDIOVERSION;  Surgeon: Jean Carlos Onofre MD;  Location: [facility] CATH LAB;  Service: Cardiology;  Laterality: N/A;     SECTION      ERCP      ERCP N/A 2018    Procedure: ERCP (ENDOSCOPIC RETROGRADE CHOLANGIOPANCREATOGRAPHY);  Surgeon: Tang Tanner MD;  Location: [facility] ([address] [place] [address]);  Service: Endoscopy;  Laterality: N/A;  2 day hold Dr Kalyan Whittaker    ERCP N/A 2019    Procedure: ERCP (ENDOSCOPIC RETROGRADE CHOLANGIOPANCREATOGRAPHY);  Surgeon: Tang Tanner MD;  Location: [facility] ([address] [place] [address]);  Service: Endoscopy;  Laterality: N/A;  2 day hold Dr Kalyan Whittaker    TONSILLECTOMY      uvulopalatopharygoplasty         Labs:  Lab Results   Component Value Date    WBC 7.66 2020    HGB 10.9 (L) 2020    HCT 34.6 (L) 2020     (H) 2020     2020     BMP  Lab Results   Component Value Date     2020    K 4.2 2020     2020    CO2 24 2020    BUN 31 (H) 2020    CREATININE 1.4 2020    CALCIUM 9.1 2020    ANIONGAP 13 2020    ESTGFRAFRICA 47 (A) 2020    EGFRNONAA 41 (A) 2020     Lab Results   Component Value Date    ALT 21 2020    AST 16 2020    ALKPHOS 61 2020    BILITOT 0.4 2020       Lab Results   Component Value Date    IRON 57 2020    TIBC 366 2020    FERRITIN 1,614 (H) 2020     No  results found for: [id number]  No results found for: FOLATE  Lab Results   Component Value Date    TSH 4.891 (H) 12/03/2019         Review of Systems   Constitutional: Negative for activity change, appetite change, chills, diaphoresis, fatigue, fever and unexpected weight change.   HENT: Negative for congestion, dental problem, drooling, ear discharge, ear pain, facial swelling, hearing loss, mouth sores, nosebleeds, postnasal drip, rhinorrhea, sinus pressure, sneezing, sore throat, tinnitus, trouble swallowing and voice change.    Eyes: Negative for photophobia, pain, discharge, redness, itching and visual disturbance.   Respiratory: Negative for cough, choking, chest tightness, shortness of breath, wheezing and stridor.    Cardiovascular: Negative for chest pain, palpitations and leg swelling.   Gastrointestinal: Negative for abdominal distention, abdominal pain, anal bleeding, blood in stool, constipation, diarrhea, nausea, rectal pain and vomiting.   Endocrine: Negative for cold intolerance, heat intolerance, polydipsia, polyphagia and polyuria.   Genitourinary: Negative for decreased urine volume, difficulty urinating, dyspareunia, dysuria, enuresis, flank pain, frequency, genital sores, hematuria, menstrual problem, pelvic pain, urgency, vaginal bleeding, vaginal discharge and vaginal pain.   Musculoskeletal: Negative for arthralgias, back pain, gait problem, joint swelling, myalgias, neck pain and neck stiffness.   Skin: Negative for color change, pallor and rash.   Allergic/Immunologic: Negative for environmental allergies, food allergies and immunocompromised state.   Neurological: Negative for dizziness, tremors, seizures, syncope, facial asymmetry, speech difficulty, weakness, light-headedness, numbness and headaches.   Hematological: Negative for adenopathy. Does not bruise/bleed easily.   Psychiatric/Behavioral: Positive for dysphoric mood. Negative for agitation, behavioral problems, confusion,  decreased concentration, hallucinations, self-injury, sleep disturbance and suicidal ideas. The patient is nervous/anxious. The patient is not hyperactive.        Objective:      Physical Exam  Vitals signs reviewed.   Constitutional:       General: She is not in acute distress.     Appearance: She is well-developed. She is not diaphoretic.   HENT:      Head: Normocephalic and atraumatic.      Right Ear: External ear normal.      Left Ear: External ear normal.      Nose: Nose normal.      Right Sinus: No maxillary sinus tenderness or frontal sinus tenderness.      Left Sinus: No maxillary sinus tenderness or frontal sinus tenderness.      Mouth/Throat:      Pharynx: No oropharyngeal exudate.   Eyes:      General: Lids are normal. No scleral icterus.        Right eye: No discharge.         Left eye: No discharge.      Conjunctiva/sclera: Conjunctivae normal.      Right eye: Right conjunctiva is not injected. No hemorrhage.     Left eye: Left conjunctiva is not injected. No hemorrhage.     Pupils: Pupils are equal, round, and reactive to light.   Neck:      Musculoskeletal: Normal range of motion and neck supple.      Thyroid: No thyromegaly.      Vascular: No JVD.      Trachea: No tracheal deviation.   Cardiovascular:      Rate and Rhythm: Normal rate.   Pulmonary:      Effort: Pulmonary effort is normal. No respiratory distress.      Breath sounds: No stridor.   Chest:      Chest wall: No tenderness.   Abdominal:      General: Bowel sounds are normal. There is no distension.      Palpations: Abdomen is soft. There is no hepatomegaly, splenomegaly or mass.      Tenderness: There is no abdominal tenderness. There is no rebound.   Musculoskeletal: Normal range of motion.         General: No tenderness.   Lymphadenopathy:      Cervical: No cervical adenopathy.      Upper Body:      Right upper body: No supraclavicular adenopathy.      Left upper body: No supraclavicular adenopathy.   Skin:     General: Skin is dry.       Findings: No erythema or rash.   Neurological:      Mental Status: She is alert and oriented to person, place, and time.      Cranial Nerves: No cranial nerve deficit.      Coordination: Coordination normal.   Psychiatric:         Behavior: Behavior normal.         Thought Content: Thought content normal.         Judgment: Judgment normal.             Assessment:      1. Solitary pulmonary nodule    2. Secondary neuroendocrine tumor of liver    3. 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 Chronic kidney disease (CKD), stage III (moderate)           Plan:     Reviewed imaging studies.  At this particular point my recommendations are that the patient's have a CT chest in 8-12 weeks as recommended by Radiology to see if there is infiltrative process resolves.  Reviewed images personally photographs taken 25 min face-to-face time coordination of care laboratory studies [facility]        Kalyan Flores Jr, MD FACP

## 2020-09-23 LAB
FERRITIN SERPL-MCNC: 1597 NG/ML (ref 20–300)
IRON SERPL-MCNC: 64 UG/DL (ref 30–160)
SATURATED IRON: 18 % (ref 20–50)
TOTAL IRON BINDING CAPACITY: 358 UG/DL (ref 250–450)
TRANSFERRIN SERPL-MCNC: 242 MG/DL (ref 200–375)

## 2020-10-06 ENCOUNTER — PATIENT MESSAGE (OUTPATIENT)
Dept: ADMINISTRATIVE | Facility: HOSPITAL | Age: 60
End: 2020-10-06

## 2020-10-06 ENCOUNTER — HOSPITAL ENCOUNTER (OUTPATIENT)
Dept: RADIOLOGY | Facility: HOSPITAL | Age: 60
Discharge: HOME | End: 2020-10-06
Attending: OTOLARYNGOLOGY
Payer: MEDICARE

## 2020-10-06 DIAGNOSIS — E04.1 THYROID NODULE: ICD-10-CM

## 2020-10-06 PROCEDURE — 76536 US EXAM OF HEAD AND NECK: CPT | Mod: TC

## 2020-10-06 PROCEDURE — 76536 US EXAM OF HEAD AND NECK: CPT | Mod: 26,,, | Performed by: RADIOLOGY

## 2020-10-06 PROCEDURE — 76536 US SOFT TISSUE HEAD NECK THYROID: ICD-10-PCS | Mod: 26,,, | Performed by: RADIOLOGY

## 2020-10-07 ENCOUNTER — PATIENT MESSAGE (OUTPATIENT)
Dept: INTERNAL MEDICINE | Facility: CLINIC | Age: 60
End: 2020-10-07

## 2020-10-13 ENCOUNTER — PATIENT MESSAGE (OUTPATIENT)
Dept: OTOLARYNGOLOGY | Facility: CLINIC | Age: 60
End: 2020-10-13

## 2020-10-13 DIAGNOSIS — E04.1 THYROID NODULE: Primary | ICD-10-CM

## 2020-10-15 ENCOUNTER — OFFICE VISIT (OUTPATIENT)
Dept: INTERNAL MEDICINE | Facility: CLINIC | Age: 60
End: 2020-10-15
Payer: MEDICARE

## 2020-10-15 ENCOUNTER — TELEPHONE (OUTPATIENT)
Dept: INTERNAL MEDICINE | Facility: CLINIC | Age: 60
End: 2020-10-15

## 2020-10-15 VITALS
OXYGEN SATURATION: 96 % | DIASTOLIC BLOOD PRESSURE: 80 MMHG | HEART RATE: 117 BPM | BODY MASS INDEX: 38.02 KG/M2 | TEMPERATURE: 97 F | HEIGHT: 65 IN | SYSTOLIC BLOOD PRESSURE: 132 MMHG | WEIGHT: 228.19 LBS

## 2020-10-15 DIAGNOSIS — Z95.0 S/P PLACEMENT OF CARDIAC PACEMAKER: ICD-10-CM

## 2020-10-15 DIAGNOSIS — Z28.39 IMMUNIZATION DEFICIENCY: ICD-10-CM

## 2020-10-15 DIAGNOSIS — Z12.31 ENCOUNTER FOR SCREENING MAMMOGRAM FOR MALIGNANT NEOPLASM OF BREAST: ICD-10-CM

## 2020-10-15 DIAGNOSIS — Z12.39 BREAST SCREENING: ICD-10-CM

## 2020-10-15 DIAGNOSIS — I50.32 CHRONIC DIASTOLIC HEART FAILURE: ICD-10-CM

## 2020-10-15 DIAGNOSIS — I10 ESSENTIAL HYPERTENSION: ICD-10-CM

## 2020-10-15 DIAGNOSIS — Z01.419 WELL FEMALE EXAM WITH ROUTINE GYNECOLOGICAL EXAM: ICD-10-CM

## 2020-10-15 DIAGNOSIS — N18.30 STAGE 3 CHRONIC KIDNEY DISEASE, UNSPECIFIED WHETHER STAGE 3A OR 3B CKD: ICD-10-CM

## 2020-10-15 DIAGNOSIS — F41.9 ANXIETY: ICD-10-CM

## 2020-10-15 DIAGNOSIS — I48.92 ATRIAL FLUTTER, UNSPECIFIED TYPE: ICD-10-CM

## 2020-10-15 DIAGNOSIS — E11.65 UNCONTROLLED TYPE 2 DIABETES MELLITUS WITH HYPERGLYCEMIA: Primary | ICD-10-CM

## 2020-10-15 PROCEDURE — 90686 IIV4 VACC NO PRSV 0.5 ML IM: CPT | Mod: S$GLB,,, | Performed by: FAMILY MEDICINE

## 2020-10-15 PROCEDURE — G0008 FLU VACCINE (QUAD) GREATER THAN OR EQUAL TO 3YO PRESERVATIVE FREE IM: ICD-10-PCS | Mod: S$GLB,,, | Performed by: FAMILY MEDICINE

## 2020-10-15 PROCEDURE — 3046F PR MOST RECENT HEMOGLOBIN A1C LEVEL > 9.0%: ICD-10-PCS | Mod: CPTII,S$GLB,, | Performed by: FAMILY MEDICINE

## 2020-10-15 PROCEDURE — 3008F PR BODY MASS INDEX (BMI) DOCUMENTED: ICD-10-PCS | Mod: CPTII,S$GLB,, | Performed by: FAMILY MEDICINE

## 2020-10-15 PROCEDURE — 3046F HEMOGLOBIN A1C LEVEL >9.0%: CPT | Mod: CPTII,S$GLB,, | Performed by: FAMILY MEDICINE

## 2020-10-15 PROCEDURE — 90686 FLU VACCINE (QUAD) GREATER THAN OR EQUAL TO 3YO PRESERVATIVE FREE IM: ICD-10-PCS | Mod: S$GLB,,, | Performed by: FAMILY MEDICINE

## 2020-10-15 PROCEDURE — G0008 ADMIN INFLUENZA VIRUS VAC: HCPCS | Mod: S$GLB,,, | Performed by: FAMILY MEDICINE

## 2020-10-15 PROCEDURE — 99214 PR OFFICE/OUTPT VISIT, EST, LEVL IV, 30-39 MIN: ICD-10-PCS | Mod: 25,S$GLB,, | Performed by: FAMILY MEDICINE

## 2020-10-15 PROCEDURE — 3075F PR MOST RECENT SYSTOLIC BLOOD PRESS GE 130-139MM HG: ICD-10-PCS | Mod: CPTII,S$GLB,, | Performed by: FAMILY MEDICINE

## 2020-10-15 PROCEDURE — 3008F BODY MASS INDEX DOCD: CPT | Mod: CPTII,S$GLB,, | Performed by: FAMILY MEDICINE

## 2020-10-15 PROCEDURE — 99999 PR PBB SHADOW E&M-EST. PATIENT-LVL V: CPT | Mod: PBBFAC,,, | Performed by: FAMILY MEDICINE

## 2020-10-15 PROCEDURE — 99499 RISK ADDL DX/OHS AUDIT: ICD-10-PCS | Mod: S$GLB,,, | Performed by: FAMILY MEDICINE

## 2020-10-15 PROCEDURE — 99999 PR PBB SHADOW E&M-EST. PATIENT-LVL V: ICD-10-PCS | Mod: PBBFAC,,, | Performed by: FAMILY MEDICINE

## 2020-10-15 PROCEDURE — 3079F PR MOST RECENT DIASTOLIC BLOOD PRESSURE 80-89 MM HG: ICD-10-PCS | Mod: CPTII,S$GLB,, | Performed by: FAMILY MEDICINE

## 2020-10-15 PROCEDURE — 99499 UNLISTED E&M SERVICE: CPT | Mod: S$GLB,,, | Performed by: FAMILY MEDICINE

## 2020-10-15 PROCEDURE — 3079F DIAST BP 80-89 MM HG: CPT | Mod: CPTII,S$GLB,, | Performed by: FAMILY MEDICINE

## 2020-10-15 PROCEDURE — 99214 OFFICE O/P EST MOD 30 MIN: CPT | Mod: 25,S$GLB,, | Performed by: FAMILY MEDICINE

## 2020-10-15 PROCEDURE — 3075F SYST BP GE 130 - 139MM HG: CPT | Mod: CPTII,S$GLB,, | Performed by: FAMILY MEDICINE

## 2020-10-15 RX ORDER — LINAGLIPTIN 5 MG/1
5 TABLET, FILM COATED ORAL DAILY
Qty: 30 TABLET | Refills: 11 | Status: SHIPPED | OUTPATIENT
Start: 2020-10-15 | End: 2021-01-01 | Stop reason: SDUPTHER

## 2020-10-15 RX ORDER — ATORVASTATIN CALCIUM 10 MG/1
10 TABLET, FILM COATED ORAL DAILY
Qty: 90 TABLET | Refills: 3 | Status: SHIPPED | OUTPATIENT
Start: 2020-10-15 | End: 2022-01-01 | Stop reason: SDUPTHER

## 2020-10-15 RX ORDER — FLUTICASONE PROPIONATE 110 UG/1
2 AEROSOL, METERED RESPIRATORY (INHALATION) DAILY
Qty: 12 G | Refills: 2 | Status: SHIPPED | OUTPATIENT
Start: 2020-10-15

## 2020-10-15 RX ORDER — GLIPIZIDE 10 MG/1
10 TABLET ORAL 2 TIMES DAILY
Qty: 180 TABLET | Refills: 3 | Status: SHIPPED | OUTPATIENT
Start: 2020-10-15 | End: 2022-01-01 | Stop reason: SDUPTHER

## 2020-10-15 RX ORDER — METFORMIN HYDROCHLORIDE 500 MG/1
TABLET ORAL
Qty: 180 TABLET | Refills: 3 | Status: SHIPPED | OUTPATIENT
Start: 2020-10-15 | End: 2021-01-01 | Stop reason: SDUPTHER

## 2020-10-15 NOTE — TELEPHONE ENCOUNTER
----- Message from Torres aJcob sent at 10/15/2020  9:22 AM CDT -----  Pt running late, will arrive by 10:40am. Perfecto call back at [phone].  Md Kevin

## 2020-10-15 NOTE — TELEPHONE ENCOUNTER
Spoke with pt, states that she is 10 minutes away that she is not running an hour behind. Informed her to come in the lobby when she gets  Here. Verbalized understanding.

## 2020-10-15 NOTE — PROGRESS NOTES
Subjective:       Patient ID: Janine Souza is a 60 y.o. female.    Chief Complaint: Medication Refill    Annual exam.  Medical history includes uncontrolled diabetes her deficiency anemia status place cardiac pacemaker chronic kidney disease neuroendocrine tumor of the liver atrial flutter anxiety.  She is also followed by Cardiology and Oncology.  She reports her blood sugars home fasting today is 153.  She is requesting refill on Flovent that she uses occasionally for wheezing.  She also has and bites of right foot having occurred several days ago.  Review over record with glucose fasting of 254 and a GFR 41.    Review of Systems   Constitutional: Negative for appetite change, chills, diaphoresis and fever.   HENT: Negative for congestion.    Respiratory: Positive for wheezing. Negative for cough, chest tightness and shortness of breath.         Occasional wheeze controlled with Flovent   Cardiovascular: Negative for chest pain, palpitations and leg swelling.        Denies lightheadedness   Gastrointestinal: Negative for abdominal distention, abdominal pain, constipation, diarrhea and nausea.   Genitourinary: Negative for difficulty urinating, dysuria, frequency, hematuria and urgency.   Neurological: Negative for dizziness, weakness, light-headedness and headaches.   Hematological: Does not bruise/bleed easily.   Psychiatric/Behavioral: The patient is nervous/anxious.        Objective:      Physical Exam  Constitutional:       General: She is not in acute distress.     Appearance: Normal appearance. She is not ill-appearing.   HENT:      Right Ear: Tympanic membrane normal.      Left Ear: Tympanic membrane normal.      Nose: Nose normal.   Eyes:      Conjunctiva/sclera: Conjunctivae normal.   Neck:      Musculoskeletal: No muscular tenderness.      Comments: 2+ carotid pulses thyroid is normal  Cardiovascular:      Rate and Rhythm: Normal rate and regular rhythm.      Pulses:           Dorsalis pedis pulses are  2+ on the right side and 2+ on the left side.      Heart sounds: No murmur. No gallop.    Pulmonary:      Effort: Pulmonary effort is normal. No respiratory distress.      Breath sounds: Wheezing present. No rhonchi or rales.      Comments: Minimal end expiratory wheeze in general  Abdominal:      General: Abdomen is flat. Bowel sounds are normal. There is no distension.      Palpations: There is no mass.      Tenderness: There is no abdominal tenderness.   Musculoskeletal:      Right foot: No deformity.      Left foot: No deformity.   Feet:      Right foot:      Protective Sensation: 10 sites tested. 10 sites sensed.      Skin integrity: Skin integrity normal.      Toenail Condition: Right toenails are normal.      Left foot:      Protective Sensation: 10 sites tested. 10 sites sensed.      Skin integrity: Skin integrity normal.      Toenail Condition: Left toenails are normal.   Lymphadenopathy:      Cervical: No cervical adenopathy.   Skin:     General: Skin is warm and dry.      Coloration: Skin is not pale.      Findings: No erythema.      Comments: Multiple insect bites of her right foot neck   Neurological:      Mental Status: She is alert.   Psychiatric:         Mood and Affect: Mood normal.         Behavior: Behavior normal.         Thought Content: Thought content normal.         Judgment: Judgment normal.         Infusion on 09/22/2020   Component Date Value Ref Range Status    Iron 09/22/2020 64  30 - 160 ug/dL Final    Transferrin 09/22/2020 242  200 - 375 mg/dL Final    TIBC 09/22/2020 358  250 - 450 ug/dL Final    Saturated Iron 09/22/2020 18* 20 - 50 % Final    LD 09/22/2020 163  110 - 260 U/L Final    WBC 09/22/2020 7.66  3.90 - 12.70 K/uL Final    RBC 09/22/2020 3.39* 4.00 - 5.40 M/uL Final    Hemoglobin 09/22/2020 10.9* 12.0 - 16.0 g/dL Final    Hematocrit 09/22/2020 34.6* 37.0 - 48.5 % Final    Mean Corpuscular Volume 09/22/2020 102* 82 - 98 fL Final    Mean Corpuscular Hemoglobin  09/22/2020 32.2* 27.0 - 31.0 pg Final    Mean Corpuscular Hemoglobin Conc 09/22/2020 31.5* 32.0 - 36.0 g/dL Final    RDW 09/22/2020 13.4  11.5 - 14.5 % Final    Platelets 09/22/2020 156  150 - 350 K/uL Final    MPV 09/22/2020 10.1  9.2 - 12.9 fL Final    Immature Granulocytes 09/22/2020 0.7* 0.0 - 0.5 % Final    Gran # (ANC) 09/22/2020 4.9  1.8 - 7.7 K/uL Final    Immature Grans (Abs) 09/22/2020 0.05* 0.00 - 0.04 K/uL Final    Lymph # 09/22/2020 1.7  1.0 - 4.8 K/uL Final    Mono # 09/22/2020 0.7  0.3 - 1.0 K/uL Final    Eos # 09/22/2020 0.2  0.0 - 0.5 K/uL Final    Baso # 09/22/2020 0.04  0.00 - 0.20 K/uL Final    nRBC 09/22/2020 0  0 /100 WBC Final    Gran% 09/22/2020 63.9  38.0 - 73.0 % Final    Lymph% 09/22/2020 22.2  18.0 - 48.0 % Final    Mono% 09/22/2020 9.7  4.0 - 15.0 % Final    Eosinophil% 09/22/2020 3.0  0.0 - 8.0 % Final    Basophil% 09/22/2020 0.5  0.0 - 1.9 % Final    Differential Method 09/22/2020 Automated   Final    Sodium 09/22/2020 138  136 - 145 mmol/L Final    Potassium 09/22/2020 4.2  3.5 - 5.1 mmol/L Final    Chloride 09/22/2020 101  95 - 110 mmol/L Final    CO2 09/22/2020 24  23 - 29 mmol/L Final    Glucose 09/22/2020 254* 70 - 110 mg/dL Final    BUN, Bld 09/22/2020 31* 6 - 20 mg/dL Final    Creatinine 09/22/2020 1.4  0.5 - 1.4 mg/dL Final    Calcium 09/22/2020 9.1  8.7 - 10.5 mg/dL Final    Total Protein 09/22/2020 8.9* 6.0 - 8.4 g/dL Final    Albumin 09/22/2020 3.8  3.5 - 5.2 g/dL Final    Total Bilirubin 09/22/2020 0.4  0.1 - 1.0 mg/dL Final    Alkaline Phosphatase 09/22/2020 61  55 - 135 U/L Final    AST 09/22/2020 16  10 - 40 U/L Final    ALT 09/22/2020 21  10 - 44 U/L Final    Anion Gap 09/22/2020 13  8 - 16 mmol/L Final    eGFR if African American 09/22/2020 47* >60 mL/min/1.73 m^2 Final    eGFR if non African American 09/22/2020 41* >60 mL/min/1.73 m^2 Final    Ferritin 09/22/2020 1,597* 20.0 - 300.0 ng/mL Final     Assessment:       1. Uncontrolled  type 2 diabetes mellitus with hyperglycemia    2. Stage 3 chronic kidney disease, unspecified whether stage 3a or 3b CKD    3. Essential hypertension    4. Atrial flutter, unspecified type    5. Chronic diastolic heart failure    6. S/P placement of cardiac pacemaker    7. Anxiety    8. Breast screening    9. Well female exam with routine gynecological exam    10. Encounter for screening mammogram for malignant neoplasm of breast         Plan:     Blood pressure controlled 132/80.  Flovent refilled.  Lab was ordered to include CMP lipids A1c urinalysis microalbumin creatinine ratio.  Referred to optometry for routine diabetic exam.  Diabetic foot exam was done today.  Routine medications were refilled mammogram was ordered.  Immunization given today.  Gyn referral for routine exam.  Follow-up in 1 month    Uncontrolled type 2 diabetes mellitus with hyperglycemia  -     Comprehensive Metabolic Panel; Future; Expected date: 10/15/2020  -     Lipid Panel; Future; Expected date: 10/15/2020  -     Hemoglobin A1C; Future; Expected date: 10/15/2020  -     Urinalysis; Future; Expected date: 10/15/2020  -     Microalbumin/Creatinine Ratio, Urine; Future; Expected date: 10/15/2020  -     Ambulatory referral/consult to Optometry; Future; Expected date: 10/22/2020    Stage 3 chronic kidney disease, unspecified whether stage 3a or 3b CKD    Essential hypertension    Atrial flutter, unspecified type    Chronic diastolic heart failure    S/P placement of cardiac pacemaker    Anxiety    Breast screening  -     Mammo Digital Screening Bilat w/ Fredo; Future; Expected date: 10/15/2020    Well female exam with routine gynecological exam  -     Ambulatory referral/consult to Gynecology; Future; Expected date: 10/22/2020    Encounter for screening mammogram for malignant neoplasm of breast   -     Mammo Digital Screening Bilat w/ Fredo; Future; Expected date: 10/15/2020    Other orders  -     fluticasone propionate (FLOVENT HFA) 110  mcg/actuation inhaler; Inhale 2 puffs into the lungs once daily.  Dispense: 12 g; Refill: 2  -     glipiZIDE (GLUCOTROL) 10 MG tablet; Take 1 tablet (10 mg total) by mouth 2 (two) times daily.  Dispense: 180 tablet; Refill: 3  -     linaGLIPtin (TRADJENTA) 5 mg Tab tablet; Take 1 tablet (5 mg total) by mouth once daily.  Dispense: 30 tablet; Refill: 11  -     metFORMIN (GLUCOPHAGE) 500 MG tablet; Take 1 twice a day with meals  Dispense: 180 tablet; Refill: 3  -     Influenza - Quadrivalent *Preferred* (6 months+) (PF)  -     atorvastatin (LIPITOR) 10 MG tablet; Take 1 tablet (10 mg total) by mouth once daily.  Dispense: 90 tablet; Refill: 3

## 2020-10-21 RX ORDER — INSULIN GLARGINE 100 [IU]/ML
15 INJECTION, SOLUTION SUBCUTANEOUS NIGHTLY
Qty: 1 BOX | Refills: 2 | Status: SHIPPED | OUTPATIENT
Start: 2020-10-21 | End: 2022-01-01 | Stop reason: SDUPTHER

## 2020-11-12 ENCOUNTER — PATIENT MESSAGE (OUTPATIENT)
Dept: INTERNAL MEDICINE | Facility: CLINIC | Age: 60
End: 2020-11-12

## 2020-11-16 ENCOUNTER — PATIENT MESSAGE (OUTPATIENT)
Dept: HEMATOLOGY/ONCOLOGY | Facility: CLINIC | Age: 60
End: 2020-11-16

## 2020-11-16 ENCOUNTER — TELEPHONE (OUTPATIENT)
Dept: HEMATOLOGY/ONCOLOGY | Facility: CLINIC | Age: 60
End: 2020-11-16

## 2020-11-16 ENCOUNTER — HOSPITAL ENCOUNTER (EMERGENCY)
Facility: HOSPITAL | Age: 60
Discharge: HOME | End: 2020-11-16
Attending: EMERGENCY MEDICINE
Payer: MEDICARE

## 2020-11-16 VITALS
TEMPERATURE: 98 F | SYSTOLIC BLOOD PRESSURE: 116 MMHG | WEIGHT: 223.69 LBS | OXYGEN SATURATION: 96 % | RESPIRATION RATE: 22 BRPM | HEART RATE: 61 BPM | DIASTOLIC BLOOD PRESSURE: 58 MMHG | BODY MASS INDEX: 37.27 KG/M2 | HEIGHT: 65 IN

## 2020-11-16 DIAGNOSIS — R06.02 SHORTNESS OF BREATH: ICD-10-CM

## 2020-11-16 DIAGNOSIS — U07.1 COVID-19 VIRUS INFECTION: Primary | ICD-10-CM

## 2020-11-16 LAB
ALBUMIN SERPL BCP-MCNC: 3.2 G/DL (ref 3.5–5.2)
ALLENS TEST: ABNORMAL
ALP SERPL-CCNC: 73 U/L (ref 55–135)
ALT SERPL W/O P-5'-P-CCNC: 19 U/L (ref 10–44)
ANION GAP SERPL CALC-SCNC: 14 MMOL/L (ref 8–16)
APTT BLDCRRT: 32.8 SEC (ref 21–32)
AST SERPL-CCNC: 27 U/L (ref 10–40)
BASOPHILS # BLD AUTO: 0.02 K/UL (ref 0–0.2)
BASOPHILS NFR BLD: 0.3 % (ref 0–1.9)
BILIRUB SERPL-MCNC: 0.5 MG/DL (ref 0.1–1)
BNP SERPL-MCNC: 59 PG/ML (ref 0–99)
BUN SERPL-MCNC: 19 MG/DL (ref 6–20)
CALCIUM SERPL-MCNC: 9.2 MG/DL (ref 8.7–10.5)
CHLORIDE SERPL-SCNC: 103 MMOL/L (ref 95–110)
CO2 SERPL-SCNC: 22 MMOL/L (ref 23–29)
CREAT SERPL-MCNC: 1.3 MG/DL (ref 0.5–1.4)
CRP SERPL-MCNC: 85 MG/L (ref 0–8.2)
DELSYS: ABNORMAL
DIFFERENTIAL METHOD: ABNORMAL
EOSINOPHIL # BLD AUTO: 0.1 K/UL (ref 0–0.5)
EOSINOPHIL NFR BLD: 0.8 % (ref 0–8)
ERYTHROCYTE [DISTWIDTH] IN BLOOD BY AUTOMATED COUNT: 13.3 % (ref 11.5–14.5)
EST. GFR  (AFRICAN AMERICAN): 52 ML/MIN/1.73 M^2
EST. GFR  (NON AFRICAN AMERICAN): 45 ML/MIN/1.73 M^2
FIO2: 21
GLUCOSE SERPL-MCNC: 134 MG/DL (ref 70–110)
HCO3 UR-SCNC: 23.2 MMOL/L (ref 24–28)
HCT VFR BLD AUTO: 30.5 % (ref 37–48.5)
HCV AB SERPL QL IA: NEGATIVE
HGB BLD-MCNC: 9.7 G/DL (ref 12–16)
HIV 1+2 AB+HIV1 P24 AG SERPL QL IA: NEGATIVE
IMM GRANULOCYTES # BLD AUTO: 0.07 K/UL (ref 0–0.04)
IMM GRANULOCYTES NFR BLD AUTO: 1.1 % (ref 0–0.5)
INR PPP: 1 (ref 0.8–1.2)
LDH SERPL L TO P-CCNC: 323 U/L (ref 110–260)
LYMPHOCYTES # BLD AUTO: 1.3 K/UL (ref 1–4.8)
LYMPHOCYTES NFR BLD: 20.5 % (ref 18–48)
MCH RBC QN AUTO: 31.9 PG (ref 27–31)
MCHC RBC AUTO-ENTMCNC: 31.8 G/DL (ref 32–36)
MCV RBC AUTO: 100 FL (ref 82–98)
MODE: ABNORMAL
MONOCYTES # BLD AUTO: 0.6 K/UL (ref 0.3–1)
MONOCYTES NFR BLD: 9.2 % (ref 4–15)
NEUTROPHILS # BLD AUTO: 4.2 K/UL (ref 1.8–7.7)
NEUTROPHILS NFR BLD: 68.1 % (ref 38–73)
NRBC BLD-RTO: 0 /100 WBC
PCO2 BLDA: 33.7 MMHG (ref 35–45)
PH SMN: 7.45 [PH] (ref 7.35–7.45)
PLATELET # BLD AUTO: 191 K/UL (ref 150–350)
PMV BLD AUTO: 9.6 FL (ref 9.2–12.9)
PO2 BLDA: 75 MMHG (ref 80–100)
POC BE: -1 MMOL/L
POC SATURATED O2: 96 % (ref 95–100)
POTASSIUM SERPL-SCNC: 4.4 MMOL/L (ref 3.5–5.1)
PROT SERPL-MCNC: 9.2 G/DL (ref 6–8.4)
PROTHROMBIN TIME: 11.1 SEC (ref 9–12.5)
RBC # BLD AUTO: 3.04 M/UL (ref 4–5.4)
SAMPLE: ABNORMAL
SARS-COV-2 RDRP RESP QL NAA+PROBE: POSITIVE
SITE: ABNORMAL
SODIUM SERPL-SCNC: 139 MMOL/L (ref 136–145)
TROPONIN I SERPL DL<=0.01 NG/ML-MCNC: 0.01 NG/ML (ref 0–0.03)
WBC # BLD AUTO: 6.1 K/UL (ref 3.9–12.7)

## 2020-11-16 PROCEDURE — 82728 ASSAY OF FERRITIN: CPT

## 2020-11-16 PROCEDURE — U0002 COVID-19 LAB TEST NON-CDC: HCPCS

## 2020-11-16 PROCEDURE — 86703 HIV-1/HIV-2 1 RESULT ANTBDY: CPT

## 2020-11-16 PROCEDURE — 36600 WITHDRAWAL OF ARTERIAL BLOOD: CPT

## 2020-11-16 PROCEDURE — 85610 PROTHROMBIN TIME: CPT

## 2020-11-16 PROCEDURE — 84484 ASSAY OF TROPONIN QUANT: CPT

## 2020-11-16 PROCEDURE — 85730 THROMBOPLASTIN TIME PARTIAL: CPT

## 2020-11-16 PROCEDURE — 99285 EMERGENCY DEPT VISIT HI MDM: CPT | Mod: 25

## 2020-11-16 PROCEDURE — 63600175 PHARM REV CODE 636 W HCPCS: Performed by: EMERGENCY MEDICINE

## 2020-11-16 PROCEDURE — 93010 EKG 12-LEAD: ICD-10-PCS | Mod: ,,, | Performed by: INTERNAL MEDICINE

## 2020-11-16 PROCEDURE — 87040 BLOOD CULTURE FOR BACTERIA: CPT | Mod: 59

## 2020-11-16 PROCEDURE — 83615 LACTATE (LD) (LDH) ENZYME: CPT

## 2020-11-16 PROCEDURE — 93010 ELECTROCARDIOGRAM REPORT: CPT | Mod: ,,, | Performed by: INTERNAL MEDICINE

## 2020-11-16 PROCEDURE — 99900035 HC TECH TIME PER 15 MIN (STAT)

## 2020-11-16 PROCEDURE — 86140 C-REACTIVE PROTEIN: CPT

## 2020-11-16 PROCEDURE — 86803 HEPATITIS C AB TEST: CPT

## 2020-11-16 PROCEDURE — 36415 COLL VENOUS BLD VENIPUNCTURE: CPT

## 2020-11-16 PROCEDURE — 83880 ASSAY OF NATRIURETIC PEPTIDE: CPT

## 2020-11-16 PROCEDURE — 96374 THER/PROPH/DIAG INJ IV PUSH: CPT

## 2020-11-16 PROCEDURE — 93005 ELECTROCARDIOGRAM TRACING: CPT

## 2020-11-16 PROCEDURE — 85025 COMPLETE CBC W/AUTO DIFF WBC: CPT

## 2020-11-16 PROCEDURE — 80053 COMPREHEN METABOLIC PANEL: CPT

## 2020-11-16 PROCEDURE — 82803 BLOOD GASES ANY COMBINATION: CPT

## 2020-11-16 RX ORDER — HEPARIN 100 UNIT/ML
5 SYRINGE INTRAVENOUS
Status: COMPLETED | OUTPATIENT
Start: 2020-11-16 | End: 2020-11-16

## 2020-11-16 RX ORDER — DOXYCYCLINE 100 MG/1
100 CAPSULE ORAL 2 TIMES DAILY
Qty: 20 CAPSULE | Refills: 0 | Status: SHIPPED | OUTPATIENT
Start: 2020-11-16 | End: 2020-11-26

## 2020-11-16 RX ADMIN — HEPARIN 500 UNITS: 100 SYRINGE at 08:11

## 2020-11-16 NOTE — TELEPHONE ENCOUNTER
Spoke to the patient informed her that she is not due for an CT scan and f/u with labs until Dec. I offered to schedule her appt. Patient verbalized that she is not feeling well and that she is going to be Covid tested for her cough, and shortness of breath.

## 2020-11-17 LAB — FERRITIN SERPL-MCNC: 3293 NG/ML (ref 20–300)

## 2020-11-17 NOTE — ED PROVIDER NOTES
SCRIBE #1 NOTE: I, Rogers Connelly, am scribing for, and in the presence of, Carmen Gusman MD. I have scribed the entire note.      History      Chief Complaint   Patient presents with    Shortness of Breath     Since the weekend.  States also having chills       Review of patient's allergies indicates:   Allergen Reactions    Pcn [penicillins] Other (See Comments), Hives and Swelling     As a child had an unknown reaction.  Has not taken since  As a child had an unknown reaction.  Has not taken since        HPI   HPI    11/16/2020, 6:29 PM   History obtained from the patient      History of Present Illness: Janine Souza is a 60 y.o. female patient with a PMHx of Afib, HTN, DM2, CHF who presents to the Emergency Department for SOB, onset 2 days PTA. Pt is not on O2 at home. Symptoms are constant and moderate in severity. No mitigating or exacerbating factors reported. Associated sxs include chills. Patient denies any fever, n/v/d, CP, weakness, numbness, dizziness, headache, and all other sxs at this time. No prior Tx reported. No further complaints or concerns at this time.     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       Past Surgical History:  Past Surgical History:   Procedure Laterality Date    A-V CARDIAC PACEMAKER INSERTION Left 6/7/2018    Procedure: INSERTION, CARDIAC PACEMAKER, DUAL CHAMBER;  Surgeon: Jean Carlos Onofre MD;  Location: [facility] CATH LAB;  Service: Cardiology;  Laterality: Left;  sinus pause, dPPM, BIO, DM, 323A     ADENOIDECTOMY      CARDIAC ELECTROPHYSIOLOGY MAPPING AND ABLATION      CARDIAC SURGERY      CARDIOVERSION N/A 2018    Procedure: CARDIOVERSION;  Surgeon: Jean Carlos Onofre MD;  Location: [facility] CATH LAB;  Service: Cardiology;  Laterality: N/A;     SECTION      ERCP      ERCP N/A 2018    Procedure: ERCP (ENDOSCOPIC RETROGRADE CHOLANGIOPANCREATOGRAPHY);  Surgeon: Tang Tanner MD;  Location: [facility] ENDO (2ND FLR);  Service: Endoscopy;  Laterality: N/A;  2 day hold Dr Kalyan Whittaker    ERCP N/A 2019    Procedure: ERCP (ENDOSCOPIC RETROGRADE CHOLANGIOPANCREATOGRAPHY);  Surgeon: Tang Tanner MD;  Location: [facility] ENDO (2ND FLR);  Service: Endoscopy;  Laterality: N/A;  2 day hold Dr Kalyan Whittaker    TONSILLECTOMY      uvulopalatopharygoplasty           Family History:  Family History   Problem Relation Age of Onset    Diabetes type II Mother     Prostate cancer Father     Breast cancer Paternal Aunt         breast    Breast cancer Maternal Aunt         breast       Social History:  Social History     Tobacco Use    Smoking status: Never Smoker    Smokeless tobacco: Never Used   Substance and Sexual Activity    Alcohol use: No     Alcohol/week: 0.0 standard drinks    Drug use: No    Sexual activity: Not on file       ROS   Review of Systems   Constitutional: Positive for chills. Negative for fever.   HENT: Negative for sore throat.    Respiratory: Positive for shortness of breath.    Cardiovascular: Negative for chest pain.   Gastrointestinal: Negative for diarrhea, nausea and vomiting.   Genitourinary: Negative for dysuria.   Musculoskeletal: Negative for back pain.   Skin: Negative for rash.   Neurological: Negative for dizziness, seizures, weakness, light-headedness, numbness and headaches.   Hematological: Does not bruise/bleed easily.   All other systems reviewed and are negative.    Physical Exam      Initial Vitals [20 1721]   BP Pulse Resp Temp SpO2  "  (!) 145/66 85 (!) 22 98.1 °F (36.7 °C) (!) 92 %      MAP       --          Physical Exam  Nursing Notes and Vital Signs Reviewed.  Constitutional: Patient is in no acute distress. Well-developed and well-nourished.  Head: Atraumatic. Normocephalic.  Eyes: PERRL. EOM intact. Conjunctivae are not pale. No scleral icterus.  ENT: Mucous membranes are moist. Oropharynx is clear and symmetric.    Neck: Supple. Full ROM. No lymphadenopathy.  Cardiovascular: Regular rate. Regular rhythm. No murmurs, rubs, or gallops. Distal pulses are 2+ and symmetric.  Pulmonary/Chest: No respiratory distress. Clear to auscultation bilaterally. No wheezing or rales.  Abdominal: Soft and non-distended.  There is no tenderness.  No rebound, guarding, or rigidity.   Musculoskeletal: Moves all extremities. No obvious deformities. No edema.  Skin: Warm and dry.  Neurological:  Alert, awake, and appropriate.  Normal speech.  No acute focal neurological deficits are appreciated.  Psychiatric: Normal affect. Good eye contact. Appropriate in content.    ED Course    Procedures  ED Vital Signs:  Vitals:    11/16/20 1721 11/16/20 1723 11/16/20 1735 11/16/20 1831   BP: (!) 145/66  (!) 142/68 122/64   Pulse: 85  60 60   Resp: (!) 22  13    Temp: 98.1 °F (36.7 °C)      TempSrc: Oral      SpO2: (!) 92% (!) 94%  98%   Weight: 101.5 kg (223 lb 10.5 oz)      Height: 5' 5" (1.651 m)       11/16/20 1858 11/16/20 1902 11/16/20 1921 11/16/20 2003   BP:  (!) 112/57  (!) 148/67   Pulse:  60 60 60   Resp: (!) 22      Temp:       TempSrc:       SpO2:  95% 96% 98%   Weight:       Height:        11/16/20 2024   BP: (!) 116/58   Pulse: 61   Resp:    Temp:    TempSrc:    SpO2: 96%   Weight:    Height:        Abnormal Lab Results:  Labs Reviewed   CBC W/ AUTO DIFFERENTIAL - Abnormal; Notable for the following components:       Result Value    RBC 3.04 (*)     Hemoglobin 9.7 (*)     Hematocrit 30.5 (*)      (*)     MCH 31.9 (*)     MCHC 31.8 (*)     Immature " Granulocytes 1.1 (*)     Immature Grans (Abs) 0.07 (*)     All other components within normal limits   COMPREHENSIVE METABOLIC PANEL - Abnormal; Notable for the following components:    CO2 22 (*)     Glucose 134 (*)     Total Protein 9.2 (*)     Albumin 3.2 (*)     eGFR if  52 (*)     eGFR if non  45 (*)     All other components within normal limits   APTT - Abnormal; Notable for the following components:    aPTT 32.8 (*)     All other components within normal limits   SARS-COV-2 RNA AMPLIFICATION, QUAL - Abnormal; Notable for the following components:    SARS-CoV-2 RNA, Amplification, Qual Positive (*)     All other components within normal limits   LACTATE DEHYDROGENASE - Abnormal; Notable for the following components:     (*)     All other components within normal limits   FERRITIN - Abnormal; Notable for the following components:    Ferritin 3,293 (*)     All other components within normal limits   C-REACTIVE PROTEIN - Abnormal; Notable for the following components:    CRP 85.0 (*)     All other components within normal limits   ISTAT PROCEDURE - Abnormal; Notable for the following components:    POC PCO2 33.7 (*)     POC PO2 75 (*)     POC HCO3 23.2 (*)     All other components within normal limits   CULTURE, BLOOD   CULTURE, BLOOD   CULTURE, BLOOD   HIV 1 / 2 ANTIBODY   HEPATITIS C ANTIBODY   TROPONIN I   B-TYPE NATRIURETIC PEPTIDE   PROTIME-INR   LACTATE DEHYDROGENASE   C-REACTIVE PROTEIN      All Lab Results:  Results for orders placed or performed during the hospital encounter of 11/16/20   Blood Culture #2 **CANNOT BE ORDERED STAT**    Specimen: Peripheral, Antecubital, Right; Blood   Result Value Ref Range    Blood Culture, Routine No Growth to date    Blood culture    Specimen: Blood   Result Value Ref Range    Blood Culture, Routine No Growth to date    HIV 1/2 Ag/Ab (4th Gen)   Result Value Ref Range    HIV 1/2 Ag/Ab Negative Negative   Hepatitis C antibody    Result Value Ref Range    Hepatitis C Ab Negative Negative   CBC auto differential   Result Value Ref Range    WBC 6.10 3.90 - 12.70 K/uL    RBC 3.04 (L) 4.00 - 5.40 M/uL    Hemoglobin 9.7 (L) 12.0 - 16.0 g/dL    Hematocrit 30.5 (L) 37.0 - 48.5 %     (H) 82 - 98 fL    MCH 31.9 (H) 27.0 - 31.0 pg    MCHC 31.8 (L) 32.0 - 36.0 g/dL    RDW 13.3 11.5 - 14.5 %    Platelets 191 150 - 350 K/uL    MPV 9.6 9.2 - 12.9 fL    Immature Granulocytes 1.1 (H) 0.0 - 0.5 %    Gran # (ANC) 4.2 1.8 - 7.7 K/uL    Immature Grans (Abs) 0.07 (H) 0.00 - 0.04 K/uL    Lymph # 1.3 1.0 - 4.8 K/uL    Mono # 0.6 0.3 - 1.0 K/uL    Eos # 0.1 0.0 - 0.5 K/uL    Baso # 0.02 0.00 - 0.20 K/uL    nRBC 0 0 /100 WBC    Gran % 68.1 38.0 - 73.0 %    Lymph % 20.5 18.0 - 48.0 %    Mono % 9.2 4.0 - 15.0 %    Eosinophil % 0.8 0.0 - 8.0 %    Basophil % 0.3 0.0 - 1.9 %    Differential Method Automated    Comprehensive metabolic panel   Result Value Ref Range    Sodium 139 136 - 145 mmol/L    Potassium 4.4 3.5 - 5.1 mmol/L    Chloride 103 95 - 110 mmol/L    CO2 22 (L) 23 - 29 mmol/L    Glucose 134 (H) 70 - 110 mg/dL    BUN 19 6 - 20 mg/dL    Creatinine 1.3 0.5 - 1.4 mg/dL    Calcium 9.2 8.7 - 10.5 mg/dL    Total Protein 9.2 (H) 6.0 - 8.4 g/dL    Albumin 3.2 (L) 3.5 - 5.2 g/dL    Total Bilirubin 0.5 0.1 - 1.0 mg/dL    Alkaline Phosphatase 73 55 - 135 U/L    AST 27 10 - 40 U/L    ALT 19 10 - 44 U/L    Anion Gap 14 8 - 16 mmol/L    eGFR if African American 52 (A) >60 mL/min/1.73 m^2    eGFR if non African American 45 (A) >60 mL/min/1.73 m^2   Troponin I #1   Result Value Ref Range    Troponin I 0.015 0.000 - 0.026 ng/mL   BNP   Result Value Ref Range    BNP 59 0 - 99 pg/mL   Protime-INR   Result Value Ref Range    Prothrombin Time 11.1 9.0 - 12.5 sec    INR 1.0 0.8 - 1.2   APTT   Result Value Ref Range    aPTT 32.8 (H) 21.0 - 32.0 sec   COVID-19 Rapid Screening   Result Value Ref Range    SARS-CoV-2 RNA, Amplification, Qual Positive (A) Negative   Lactate  Dehydrogenase   Result Value Ref Range     (H) 110 - 260 U/L   Ferritin   Result Value Ref Range    Ferritin 3,293 (H) 20.0 - 300.0 ng/mL   C-Reactive Protein   Result Value Ref Range    CRP 85.0 (H) 0.0 - 8.2 mg/L   ISTAT PROCEDURE   Result Value Ref Range    POC PH 7.446 7.35 - 7.45    POC PCO2 33.7 (L) 35 - 45 mmHg    POC PO2 75 (L) 80 - 100 mmHg    POC HCO3 23.2 (L) 24 - 28 mmol/L    POC BE -1 -2 to 2 mmol/L    POC SATURATED O2 96 95 - 100 %    Sample ARTERIAL     Site LR     Allens Test Pass     DelSys Room Air     Mode SPONT     FiO2 21      Imaging Results:  Imaging Results          X-Ray Chest AP Portable (Final result)  Result time 11/16/20 18:02:21    Final result by Armando Jain MD (11/16/20 18:02:21)                 Impression:      Minimal left lung subsolid opacities slightly peripheral. These are nonspecific and could be related to pulmonary edema or viral pneumonia.  Clinical correlation advised      Electronically signed by: Armando Jain  Date:    11/16/2020  Time:    18:02             Narrative:    EXAMINATION:  XR CHEST AP PORTABLE    CLINICAL HISTORY:  shortness of breath;    TECHNIQUE:  Single frontal view of the chest was performed.    COMPARISON:  06/07/2018    FINDINGS:  Left chest wall cardiac pacing device stable.  Right chest port catheter stable.  Median sternotomy wires well aligned.    Minimal left lung subsolid opacities slightly peripheral.  These are nonspecific and could be related to pulmonary edema or viral pneumonia.    The cardiac silhouette is enlarged possibly exaggerated by AP technique.    Osseous structures grossly intact.                               The EKG was ordered, reviewed, and independently interpreted by the ED provider.  Interpretation time: 17:45  Rate: 63 BPM  Rhythm: AV dual-paced rhythm with prolonged AV conduction with occasional premature ventricular complexes.  Interpretation: No acute ST changes. No STEMI.           The Emergency Provider  reviewed the vital signs and test results, which are outlined above.    ED Discussion     7:34 PM: Reassessed pt at this time. Discussed with pt all pertinent ED information and results. Discussed pt dx and plan of tx. Gave pt all f/u and return to the ED instructions. All questions and concerns were addressed at this time. Pt expresses understanding of information and instructions, and is comfortable with plan to discharge. Pt is stable for discharge.    [place] [facility] and [facility]  Preventing the Spread of Coronavirus Disease 2019 in Homes and Residential Communities      Prevention steps for people with confirmed or suspected COVID-19 (including persons under investigation) who do not need to be hospitalized and people with confirmed COVID-19 who were hospitalized and determined to be medically stable to go home.    Your healthcare provider and public health staff will evaluate whether you can be cared for at home.   Stay home except to get medical care.   Separate yourself from other people and animals in your home   Call ahead before visiting your doctor.   Wear a facemask.   Cover your coughs and sneezes.   Clean your hands often.   Avoid sharing personal household items.   Clean all high-touch surfaces every day.   Monitor your symptoms. Seek prompt medical attention if your illness is worsening (e.g., difficulty breathing). Before seeking care, call your healthcare provider.   If you have a medical emergency and need to call 911, notify the dispatch personnel that you have, or are being evaluated for COVID-19. If possible, put on a facemask before emergency medical services arrive.   Discontinuing home isolation. Call your provider about guidance to discontinue home isolation.    Recommended precautions for household members, intimate partners, and caregivers in a nonhealthcare setting of a patient with symptomatic laboratory-confirmed COVID-19 or a patient under  investigation.  Household members, intimate partners, and caregivers in a nonhealthcare setting may have close contact with a person with symptomatic, laboratory-confirmed COVID-19 or a person under investigation. Close contacts should monitor their health; they should call their healthcare provider right away if they develop symptoms suggestive of COVID-19 (e.g., fever, cough, shortness of breath).    Close contacts should also follow these recommendations:   Make sure that you understand and can help the patient follow their healthcare provider's instructions for medication(s) and care. You should help the patient with basic needs in the home and provide support for getting groceries, prescriptions, and other personal needs.   Monitor the patient's symptoms. If the patient is getting sicker, call his or her healthcare provider and tell them that the patient has laboratory-confirmed COVID-19. This will help the healthcare provider's office take steps to keep other people in the office or waiting room from getting infected. Ask the healthcare provider to call the local or [facility] health department for additional guidance. If the patient has a medical emergency and you need to call 911, notify the dispatch personnel that the patient has, or is being evaluated for COVID-19.   Household members should stay in another room or be  from the patient as much as possible. Household members should use a separate bedroom and bathroom, if available.   Prohibit visitors who do not have an essential need to be in the home.   Household members should care for any pets in the home. Do not handle pets or other animals while sick.   Make sure that shared spaces in the home have good air flow, such as by an air conditioner or an opened window, weather permitting.   Perform hand hygiene frequently. Wash your hands often with soap and water for at least 20 seconds or use an alcohol-based hand  that contains 60 to  95% alcohol, covering all surfaces of your hands and rubbing them together until they feel dry. Soap and water should be used preferentially if hands are visibly dirty.   Avoid touching your eyes, nose, and mouth with unwashed hands.   The patient should wear a facemask when you are around other people. If the patient is not able to wear a facemask (for example, because it causes trouble breathing), you, as the caregiver should wear a mask when you are in the same room as the patient.   Wear a disposable facemask and gloves when you touch or have contact with the patient's blood, stool, or body fluids, such as saliva, sputum, nasal mucus, vomit, urine.  o Throw out disposable facemasks and gloves after using them. Do not reuse.  o When removing personal protective equipment, first remove and dispose of gloves. Then, immediately clean your hands with soap and water or alcohol-based hand . Next, remove and dispose of facemask, and immediately clean your hands again with soap and water or alcohol-based hand .   Avoid sharing household items with the patient. You should not share dishes, drinking glasses, cups, eating utensils, towels, bedding, or other items. After the patient uses these items, you should wash them thoroughly (see below Wash laundry thoroughly).   Clean all high-touch surfaces, such as counters, tabletops, doorknobs, bathroom fixtures, toilets, phones, keyboards, tablets, and bedside tables, every day. Also, clean any surfaces that may have blood, stool, or body fluids on them.   Use a household cleaning spray or wipe, according to the label instructions. Labels contain instructions for safe and effective use of the cleaning product including precautions you should take when applying the product, such as wearing gloves and making sure you have good ventilation during use of the product.   Wash laundry thoroughly.  o Immediately remove and wash clothes or bedding that have  blood, stool, or body fluids on them.  o Wear disposable gloves while handling soiled items and keep soiled items away from your body. Clean your hands (with soap and water or an alcohol-based hand ) immediately after removing your gloves.  o Read and follow directions on labels of laundry or clothing items and detergent. In general, using a normal laundry detergent according to washing machine instructions and dry thoroughly using the warmest temperatures recommended on the clothing label.   Place all used disposable gloves, facemasks, and other contaminated items in a lined container before disposing of them with other household waste. Clean your hands (with soap and water or an alcohol-based hand ) immediately after handling these items. Soap and water should be used preferentially if hands are visibly dirty.   Discuss any additional questions with your state or local health department or healthcare provider. Check available hours when contacting your local health department.    For more information see CDC link below.      https://www.cdc.gov/coronavirus/2019-ncov/hcp/guidance-prevent-spread.html#precautions                    ED Medication(s):  Medications   heparin, porcine (PF) 100 unit/mL injection flush 500 Units (500 Units Intravenous Given 11/16/20 2026)       Follow-up Information     Mirza Gamble MD. Schedule an appointment as soon as possible for a visit in 2 days.    Specialty: Family Medicine  Why: Return to the Emergency Room, If symptoms worsen  Contact information:  [address] [facility] [address]  [phone]                  Discharge Medication List as of 11/16/2020  7:44 PM      START taking these medications    Details   doxycycline (VIBRAMYCIN) 100 MG Cap Take 1 capsule (100 mg total) by mouth 2 (two) times daily. for 10 days, Starting Mon 11/16/2020, Until Thu 11/26/2020, Print                 Medical Decision Making    Medical Decision Making:    Clinical Tests:   Lab Tests: Ordered and Reviewed  Radiological Study: Ordered and Reviewed  Medical Tests: Ordered and Reviewed           Scribe Attestation:   Scribe #1: I performed the above scribed service and the documentation accurately describes the services I performed. I attest to the accuracy of the note.    Attending:   Physician Attestation Statement for Scribe #1: I, Carmen Gusman MD, personally performed the services described in this documentation, as scribed by Rogers Connelly, in my presence, and it is both accurate and complete.          Clinical Impression       ICD-10-CM ICD-9-CM   1. COVID-19 virus infection  U07.1 079.89   2. Shortness of breath  R06.02 786.05       Disposition:   Disposition: Discharged  Condition: Stable         Carmen Gusman MD  11/17/20 2211

## 2020-11-22 LAB
BACTERIA BLD CULT: NORMAL
BACTERIA BLD CULT: NORMAL

## 2020-12-04 ENCOUNTER — PATIENT OUTREACH (OUTPATIENT)
Dept: ADMINISTRATIVE | Facility: OTHER | Age: 60
End: 2020-12-04

## 2020-12-04 NOTE — PROGRESS NOTES
PATIENT CALL BACK.  PATIENT HAS COVID 19 AND WILL SEE THE DRAlyson IN A FEW WEEKS FOR CLEARANCE TO MAKE OTHER APPOINTMENTS.

## 2021-01-01 ENCOUNTER — TELEPHONE (OUTPATIENT)
Dept: ADMINISTRATIVE | Facility: HOSPITAL | Age: 61
End: 2021-01-01
Payer: MEDICARE

## 2021-01-01 ENCOUNTER — LAB VISIT (OUTPATIENT)
Dept: LAB | Facility: HOSPITAL | Age: 61
End: 2021-01-01
Attending: FAMILY MEDICINE
Payer: MEDICARE

## 2021-01-01 ENCOUNTER — PATIENT MESSAGE (OUTPATIENT)
Dept: HEMATOLOGY/ONCOLOGY | Facility: CLINIC | Age: 61
End: 2021-01-01
Payer: MEDICARE

## 2021-01-01 ENCOUNTER — INFUSION (OUTPATIENT)
Dept: INFUSION THERAPY | Facility: HOSPITAL | Age: 61
End: 2021-01-01
Attending: INTERNAL MEDICINE
Payer: MEDICARE

## 2021-01-01 ENCOUNTER — PATIENT MESSAGE (OUTPATIENT)
Dept: INTERNAL MEDICINE | Facility: CLINIC | Age: 61
End: 2021-01-01
Payer: MEDICARE

## 2021-01-01 VITALS
TEMPERATURE: 98 F | OXYGEN SATURATION: 94 % | DIASTOLIC BLOOD PRESSURE: 72 MMHG | HEART RATE: 69 BPM | RESPIRATION RATE: 18 BRPM | SYSTOLIC BLOOD PRESSURE: 149 MMHG

## 2021-01-01 DIAGNOSIS — C7B.8 SECONDARY NEUROENDOCRINE TUMOR OF LIVER: Primary | ICD-10-CM

## 2021-01-01 DIAGNOSIS — R94.6 ABNORMAL RESULTS OF THYROID FUNCTION STUDIES: ICD-10-CM

## 2021-01-01 DIAGNOSIS — R79.89 ABNORMAL TSH: ICD-10-CM

## 2021-01-01 DIAGNOSIS — E11.9 CONTROLLED TYPE 2 DIABETES MELLITUS WITHOUT COMPLICATION, WITHOUT LONG-TERM CURRENT USE OF INSULIN: ICD-10-CM

## 2021-01-01 DIAGNOSIS — E11.9 CONTROLLED TYPE 2 DIABETES MELLITUS WITHOUT COMPLICATION, WITHOUT LONG-TERM CURRENT USE OF INSULIN: Primary | ICD-10-CM

## 2021-01-01 LAB
ESTIMATED AVG GLUCOSE: 223 MG/DL (ref 68–131)
GLUCOSE SERPL-MCNC: 113 MG/DL (ref 70–110)
HBA1C MFR BLD: 9.4 % (ref 4–5.6)
T4 FREE SERPL-MCNC: 1.09 NG/DL (ref 0.71–1.51)
TSH SERPL DL<=0.005 MIU/L-ACNC: 6.57 UIU/ML (ref 0.4–4)

## 2021-01-01 PROCEDURE — A4216 STERILE WATER/SALINE, 10 ML: HCPCS | Performed by: INTERNAL MEDICINE

## 2021-01-01 PROCEDURE — 25000003 PHARM REV CODE 250: Performed by: INTERNAL MEDICINE

## 2021-01-01 PROCEDURE — 36415 COLL VENOUS BLD VENIPUNCTURE: CPT | Mod: PO | Performed by: FAMILY MEDICINE

## 2021-01-01 PROCEDURE — 84443 ASSAY THYROID STIM HORMONE: CPT | Performed by: FAMILY MEDICINE

## 2021-01-01 PROCEDURE — 82947 ASSAY GLUCOSE BLOOD QUANT: CPT | Performed by: FAMILY MEDICINE

## 2021-01-01 PROCEDURE — 84439 ASSAY OF FREE THYROXINE: CPT | Performed by: FAMILY MEDICINE

## 2021-01-01 PROCEDURE — 96523 IRRIG DRUG DELIVERY DEVICE: CPT

## 2021-01-01 PROCEDURE — 63600175 PHARM REV CODE 636 W HCPCS: Performed by: INTERNAL MEDICINE

## 2021-01-01 PROCEDURE — 83036 HEMOGLOBIN GLYCOSYLATED A1C: CPT | Performed by: FAMILY MEDICINE

## 2021-01-01 RX ORDER — HEPARIN 100 UNIT/ML
500 SYRINGE INTRAVENOUS
Status: COMPLETED | OUTPATIENT
Start: 2021-01-01 | End: 2021-01-01

## 2021-01-01 RX ORDER — SODIUM CHLORIDE 0.9 % (FLUSH) 0.9 %
10 SYRINGE (ML) INJECTION
Status: CANCELLED | OUTPATIENT
Start: 2021-01-01

## 2021-01-01 RX ORDER — HEPARIN 100 UNIT/ML
500 SYRINGE INTRAVENOUS
Status: CANCELLED | OUTPATIENT
Start: 2021-01-01

## 2021-01-01 RX ORDER — METFORMIN HYDROCHLORIDE 500 MG/1
TABLET ORAL
Qty: 180 TABLET | Refills: 3 | Status: ON HOLD | OUTPATIENT
Start: 2021-01-01 | End: 2022-01-01 | Stop reason: SDUPTHER

## 2021-01-01 RX ORDER — SODIUM CHLORIDE 0.9 % (FLUSH) 0.9 %
10 SYRINGE (ML) INJECTION
Status: COMPLETED | OUTPATIENT
Start: 2021-01-01 | End: 2021-01-01

## 2021-01-01 RX ORDER — LINAGLIPTIN 5 MG/1
5 TABLET, FILM COATED ORAL DAILY
Qty: 30 TABLET | Refills: 11 | Status: SHIPPED | OUTPATIENT
Start: 2021-01-01

## 2021-01-01 RX ADMIN — SODIUM CHLORIDE, PRESERVATIVE FREE 10 ML: 5 INJECTION INTRAVENOUS at 09:10

## 2021-01-01 RX ADMIN — HEPARIN 500 UNITS: 100 SYRINGE at 09:10

## 2021-01-01 RX ADMIN — SODIUM CHLORIDE, PRESERVATIVE FREE 10 ML: 5 INJECTION INTRAVENOUS at 10:11

## 2021-01-01 RX ADMIN — HEPARIN 500 UNITS: 100 SYRINGE at 10:11

## 2021-01-22 ENCOUNTER — INFUSION (OUTPATIENT)
Dept: INFUSION THERAPY | Facility: HOSPITAL | Age: 61
End: 2021-01-22
Attending: INTERNAL MEDICINE
Payer: MEDICARE

## 2021-01-22 VITALS — WEIGHT: 223.75 LBS | BODY MASS INDEX: 37.24 KG/M2

## 2021-01-22 DIAGNOSIS — C7B.8 SECONDARY NEUROENDOCRINE TUMOR OF LIVER: Primary | ICD-10-CM

## 2021-01-22 PROCEDURE — 96523 IRRIG DRUG DELIVERY DEVICE: CPT

## 2021-01-22 PROCEDURE — 25000003 PHARM REV CODE 250: Performed by: INTERNAL MEDICINE

## 2021-01-22 PROCEDURE — 63600175 PHARM REV CODE 636 W HCPCS: Performed by: INTERNAL MEDICINE

## 2021-01-22 PROCEDURE — A4216 STERILE WATER/SALINE, 10 ML: HCPCS | Performed by: INTERNAL MEDICINE

## 2021-01-22 RX ORDER — SODIUM CHLORIDE 0.9 % (FLUSH) 0.9 %
10 SYRINGE (ML) INJECTION
Status: COMPLETED | OUTPATIENT
Start: 2021-01-22 | End: 2021-01-22

## 2021-01-22 RX ORDER — HEPARIN 100 UNIT/ML
500 SYRINGE INTRAVENOUS
Status: COMPLETED | OUTPATIENT
Start: 2021-01-22 | End: 2021-01-22

## 2021-01-22 RX ORDER — HEPARIN 100 UNIT/ML
500 SYRINGE INTRAVENOUS
Status: CANCELLED | OUTPATIENT
Start: 2021-01-22

## 2021-01-22 RX ORDER — SODIUM CHLORIDE 0.9 % (FLUSH) 0.9 %
10 SYRINGE (ML) INJECTION
Status: CANCELLED | OUTPATIENT
Start: 2021-01-22

## 2021-01-22 RX ADMIN — HEPARIN 500 UNITS: 100 SYRINGE at 11:01

## 2021-01-22 RX ADMIN — SODIUM CHLORIDE, PRESERVATIVE FREE 10 ML: 5 INJECTION INTRAVENOUS at 10:01

## 2021-01-26 ENCOUNTER — PATIENT MESSAGE (OUTPATIENT)
Dept: HEMATOLOGY/ONCOLOGY | Facility: CLINIC | Age: 61
End: 2021-01-26

## 2021-02-11 ENCOUNTER — OFFICE VISIT (OUTPATIENT)
Dept: HEMATOLOGY/ONCOLOGY | Facility: CLINIC | Age: 61
End: 2021-02-11
Payer: MEDICARE

## 2021-02-11 VITALS
SYSTOLIC BLOOD PRESSURE: 128 MMHG | BODY MASS INDEX: 36.84 KG/M2 | HEART RATE: 62 BPM | OXYGEN SATURATION: 95 % | WEIGHT: 221.13 LBS | RESPIRATION RATE: 16 BRPM | HEIGHT: 65 IN | TEMPERATURE: 97 F | DIASTOLIC BLOOD PRESSURE: 66 MMHG

## 2021-02-11 DIAGNOSIS — Z12.89 ENCOUNTER FOR SCREENING FOR MALIGNANT NEOPLASM OF OTHER SITES: ICD-10-CM

## 2021-02-11 DIAGNOSIS — U07.1 COVID-19 VIRUS INFECTION: ICD-10-CM

## 2021-02-11 DIAGNOSIS — D50.0 IRON DEFICIENCY ANEMIA DUE TO CHRONIC BLOOD LOSS: ICD-10-CM

## 2021-02-11 DIAGNOSIS — N18.31 STAGE 3A CHRONIC KIDNEY DISEASE: ICD-10-CM

## 2021-02-11 DIAGNOSIS — R91.1 SOLITARY PULMONARY NODULE: ICD-10-CM

## 2021-02-11 DIAGNOSIS — C7B.8 SECONDARY NEUROENDOCRINE TUMOR OF LIVER: Primary | ICD-10-CM

## 2021-02-11 PROCEDURE — 3008F PR BODY MASS INDEX (BMI) DOCUMENTED: ICD-10-PCS | Mod: CPTII,S$GLB,, | Performed by: INTERNAL MEDICINE

## 2021-02-11 PROCEDURE — 99999 PR PBB SHADOW E&M-EST. PATIENT-LVL V: ICD-10-PCS | Mod: PBBFAC,,, | Performed by: INTERNAL MEDICINE

## 2021-02-11 PROCEDURE — 99214 PR OFFICE/OUTPT VISIT, EST, LEVL IV, 30-39 MIN: ICD-10-PCS | Mod: S$GLB,,, | Performed by: INTERNAL MEDICINE

## 2021-02-11 PROCEDURE — 1126F PR PAIN SEVERITY QUANTIFIED, NO PAIN PRESENT: ICD-10-PCS | Mod: S$GLB,,, | Performed by: INTERNAL MEDICINE

## 2021-02-11 PROCEDURE — 3078F PR MOST RECENT DIASTOLIC BLOOD PRESSURE < 80 MM HG: ICD-10-PCS | Mod: CPTII,S$GLB,, | Performed by: INTERNAL MEDICINE

## 2021-02-11 PROCEDURE — 99499 UNLISTED E&M SERVICE: CPT | Mod: S$GLB,,, | Performed by: INTERNAL MEDICINE

## 2021-02-11 PROCEDURE — 3074F PR MOST RECENT SYSTOLIC BLOOD PRESSURE < 130 MM HG: ICD-10-PCS | Mod: CPTII,S$GLB,, | Performed by: INTERNAL MEDICINE

## 2021-02-11 PROCEDURE — 3074F SYST BP LT 130 MM HG: CPT | Mod: CPTII,S$GLB,, | Performed by: INTERNAL MEDICINE

## 2021-02-11 PROCEDURE — 1126F AMNT PAIN NOTED NONE PRSNT: CPT | Mod: S$GLB,,, | Performed by: INTERNAL MEDICINE

## 2021-02-11 PROCEDURE — 99499 RISK ADDL DX/OHS AUDIT: ICD-10-PCS | Mod: S$GLB,,, | Performed by: INTERNAL MEDICINE

## 2021-02-11 PROCEDURE — 99214 OFFICE O/P EST MOD 30 MIN: CPT | Mod: S$GLB,,, | Performed by: INTERNAL MEDICINE

## 2021-02-11 PROCEDURE — 3008F BODY MASS INDEX DOCD: CPT | Mod: CPTII,S$GLB,, | Performed by: INTERNAL MEDICINE

## 2021-02-11 PROCEDURE — 99999 PR PBB SHADOW E&M-EST. PATIENT-LVL V: CPT | Mod: PBBFAC,,, | Performed by: INTERNAL MEDICINE

## 2021-02-11 PROCEDURE — 3078F DIAST BP <80 MM HG: CPT | Mod: CPTII,S$GLB,, | Performed by: INTERNAL MEDICINE

## 2021-02-22 ENCOUNTER — PATIENT MESSAGE (OUTPATIENT)
Dept: HEMATOLOGY/ONCOLOGY | Facility: CLINIC | Age: 61
End: 2021-02-22

## 2021-02-24 DIAGNOSIS — C7B.8 SECONDARY NEUROENDOCRINE TUMOR OF LIVER: Primary | ICD-10-CM

## 2021-03-08 ENCOUNTER — PATIENT MESSAGE (OUTPATIENT)
Dept: ADMINISTRATIVE | Facility: HOSPITAL | Age: 61
End: 2021-03-08

## 2021-03-10 ENCOUNTER — TELEPHONE (OUTPATIENT)
Dept: ADMINISTRATIVE | Facility: HOSPITAL | Age: 61
End: 2021-03-10

## 2021-03-10 RX ORDER — INSULIN PUMP SYRINGE, 3 ML
EACH MISCELLANEOUS
Status: ON HOLD | COMMUNITY
End: 2022-01-01 | Stop reason: HOSPADM

## 2021-03-10 RX ORDER — PEN NEEDLE, DIABETIC 30 GX3/16"
1 NEEDLE, DISPOSABLE MISCELLANEOUS DAILY
Qty: 100 EACH | Refills: 3 | Status: CANCELLED | OUTPATIENT
Start: 2021-03-10

## 2021-03-10 RX ORDER — LANCETS
1 EACH MISCELLANEOUS DAILY
Qty: 100 EACH | Refills: 3 | Status: CANCELLED | OUTPATIENT
Start: 2021-03-10

## 2021-03-10 RX ORDER — INSULIN PUMP SYRINGE, 3 ML
1 EACH MISCELLANEOUS DAILY
Qty: 1 EACH | Refills: 0 | Status: CANCELLED | OUTPATIENT
Start: 2021-03-10

## 2021-03-11 RX ORDER — INSULIN PUMP SYRINGE, 3 ML
EACH MISCELLANEOUS
Qty: 1 EACH | Refills: 0 | Status: SHIPPED | OUTPATIENT
Start: 2021-03-11 | End: 2022-01-01 | Stop reason: SDUPTHER

## 2021-03-11 RX ORDER — LANCETS
EACH MISCELLANEOUS
Qty: 100 EACH | Refills: 3 | Status: ON HOLD | OUTPATIENT
Start: 2021-03-11 | End: 2022-01-01 | Stop reason: HOSPADM

## 2021-04-08 ENCOUNTER — PATIENT MESSAGE (OUTPATIENT)
Dept: HEMATOLOGY/ONCOLOGY | Facility: CLINIC | Age: 61
End: 2021-04-08

## 2021-04-15 ENCOUNTER — TELEPHONE (OUTPATIENT)
Dept: RADIOLOGY | Facility: HOSPITAL | Age: 61
End: 2021-04-15

## 2021-04-16 ENCOUNTER — INFUSION (OUTPATIENT)
Dept: INFUSION THERAPY | Facility: HOSPITAL | Age: 61
End: 2021-04-16
Attending: INTERNAL MEDICINE
Payer: MEDICARE

## 2021-04-16 ENCOUNTER — HOSPITAL ENCOUNTER (OUTPATIENT)
Dept: RADIOLOGY | Facility: HOSPITAL | Age: 61
Discharge: HOME | End: 2021-04-16
Attending: INTERNAL MEDICINE
Payer: MEDICARE

## 2021-04-16 DIAGNOSIS — Z12.89 ENCOUNTER FOR SCREENING FOR MALIGNANT NEOPLASM OF OTHER SITES: ICD-10-CM

## 2021-04-16 DIAGNOSIS — R91.1 SOLITARY PULMONARY NODULE: ICD-10-CM

## 2021-04-16 DIAGNOSIS — C7B.8 SECONDARY NEUROENDOCRINE TUMOR OF LIVER: Primary | ICD-10-CM

## 2021-04-16 DIAGNOSIS — C7B.8 SECONDARY NEUROENDOCRINE TUMOR OF LIVER: ICD-10-CM

## 2021-04-16 LAB
ALBUMIN SERPL BCP-MCNC: 3.6 G/DL (ref 3.5–5.2)
ALP SERPL-CCNC: 77 U/L (ref 55–135)
ALT SERPL W/O P-5'-P-CCNC: 15 U/L (ref 10–44)
ANION GAP SERPL CALC-SCNC: 10 MMOL/L (ref 8–16)
AST SERPL-CCNC: 12 U/L (ref 10–40)
BASOPHILS # BLD AUTO: 0.05 K/UL (ref 0–0.2)
BASOPHILS NFR BLD: 0.7 % (ref 0–1.9)
BILIRUB SERPL-MCNC: 0.5 MG/DL (ref 0.1–1)
BUN SERPL-MCNC: 23 MG/DL (ref 6–20)
CALCIUM SERPL-MCNC: 9.3 MG/DL (ref 8.7–10.5)
CHLORIDE SERPL-SCNC: 95 MMOL/L (ref 95–110)
CO2 SERPL-SCNC: 27 MMOL/L (ref 23–29)
CREAT SERPL-MCNC: 1.3 MG/DL (ref 0.5–1.4)
DIFFERENTIAL METHOD: ABNORMAL
EOSINOPHIL # BLD AUTO: 0.2 K/UL (ref 0–0.5)
EOSINOPHIL NFR BLD: 2.8 % (ref 0–8)
ERYTHROCYTE [DISTWIDTH] IN BLOOD BY AUTOMATED COUNT: 13.3 % (ref 11.5–14.5)
EST. GFR  (AFRICAN AMERICAN): 52 ML/MIN/1.73 M^2
EST. GFR  (NON AFRICAN AMERICAN): 45 ML/MIN/1.73 M^2
GLUCOSE SERPL-MCNC: 358 MG/DL (ref 70–110)
HCT VFR BLD AUTO: 31.8 % (ref 37–48.5)
HGB BLD-MCNC: 10.5 G/DL (ref 12–16)
IMM GRANULOCYTES # BLD AUTO: 0.05 K/UL (ref 0–0.04)
IMM GRANULOCYTES NFR BLD AUTO: 0.7 % (ref 0–0.5)
LYMPHOCYTES # BLD AUTO: 1.2 K/UL (ref 1–4.8)
LYMPHOCYTES NFR BLD: 16.7 % (ref 18–48)
MCH RBC QN AUTO: 31.8 PG (ref 27–31)
MCHC RBC AUTO-ENTMCNC: 33 G/DL (ref 32–36)
MCV RBC AUTO: 96 FL (ref 82–98)
MONOCYTES # BLD AUTO: 0.9 K/UL (ref 0.3–1)
MONOCYTES NFR BLD: 12.3 % (ref 4–15)
NEUTROPHILS # BLD AUTO: 4.6 K/UL (ref 1.8–7.7)
NEUTROPHILS NFR BLD: 66.8 % (ref 38–73)
NRBC BLD-RTO: 0 /100 WBC
PLATELET # BLD AUTO: 165 K/UL (ref 150–450)
PMV BLD AUTO: 10 FL (ref 9.2–12.9)
POTASSIUM SERPL-SCNC: 4 MMOL/L (ref 3.5–5.1)
PROT SERPL-MCNC: 9.6 G/DL (ref 6–8.4)
RBC # BLD AUTO: 3.3 M/UL (ref 4–5.4)
SODIUM SERPL-SCNC: 132 MMOL/L (ref 136–145)
WBC # BLD AUTO: 6.89 K/UL (ref 3.9–12.7)

## 2021-04-16 PROCEDURE — 71260 CT THORAX DX C+: CPT | Mod: 26,,, | Performed by: RADIOLOGY

## 2021-04-16 PROCEDURE — 63600175 PHARM REV CODE 636 W HCPCS: Performed by: INTERNAL MEDICINE

## 2021-04-16 PROCEDURE — 25000003 PHARM REV CODE 250: Performed by: INTERNAL MEDICINE

## 2021-04-16 PROCEDURE — A9698 NON-RAD CONTRAST MATERIALNOC: HCPCS | Performed by: INTERNAL MEDICINE

## 2021-04-16 PROCEDURE — 74177 CT ABD & PELVIS W/CONTRAST: CPT | Mod: TC

## 2021-04-16 PROCEDURE — 74177 CT CHEST ABDOMEN PELVIS WITH CONTRAST (XPD): ICD-10-PCS | Mod: 26,,, | Performed by: RADIOLOGY

## 2021-04-16 PROCEDURE — 36591 DRAW BLOOD OFF VENOUS DEVICE: CPT

## 2021-04-16 PROCEDURE — 71260 CT CHEST ABDOMEN PELVIS WITH CONTRAST (XPD): ICD-10-PCS | Mod: 26,,, | Performed by: RADIOLOGY

## 2021-04-16 PROCEDURE — A4216 STERILE WATER/SALINE, 10 ML: HCPCS | Performed by: INTERNAL MEDICINE

## 2021-04-16 PROCEDURE — 25500020 PHARM REV CODE 255: Performed by: INTERNAL MEDICINE

## 2021-04-16 PROCEDURE — 71260 CT THORAX DX C+: CPT | Mod: TC

## 2021-04-16 PROCEDURE — 74177 CT ABD & PELVIS W/CONTRAST: CPT | Mod: 26,,, | Performed by: RADIOLOGY

## 2021-04-16 PROCEDURE — 85025 COMPLETE CBC W/AUTO DIFF WBC: CPT | Performed by: INTERNAL MEDICINE

## 2021-04-16 PROCEDURE — 80053 COMPREHEN METABOLIC PANEL: CPT | Performed by: INTERNAL MEDICINE

## 2021-04-16 RX ORDER — SODIUM CHLORIDE 0.9 % (FLUSH) 0.9 %
10 SYRINGE (ML) INJECTION
Status: CANCELLED | OUTPATIENT
Start: 2021-04-16

## 2021-04-16 RX ORDER — HEPARIN 100 UNIT/ML
500 SYRINGE INTRAVENOUS
Status: COMPLETED | OUTPATIENT
Start: 2021-04-16 | End: 2021-04-16

## 2021-04-16 RX ORDER — HEPARIN 100 UNIT/ML
500 SYRINGE INTRAVENOUS
Status: CANCELLED | OUTPATIENT
Start: 2021-04-16

## 2021-04-16 RX ORDER — SODIUM CHLORIDE 0.9 % (FLUSH) 0.9 %
10 SYRINGE (ML) INJECTION
Status: COMPLETED | OUTPATIENT
Start: 2021-04-16 | End: 2021-04-16

## 2021-04-16 RX ADMIN — IOHEXOL 1000 ML: 12 SOLUTION ORAL at 09:04

## 2021-04-16 RX ADMIN — IOHEXOL 100 ML: 350 INJECTION, SOLUTION INTRAVENOUS at 11:04

## 2021-04-16 RX ADMIN — Medication 10 ML: at 11:04

## 2021-04-16 RX ADMIN — HEPARIN SODIUM (PORCINE) LOCK FLUSH IV SOLN 100 UNIT/ML 500 UNITS: 100 SOLUTION at 11:04

## 2021-04-29 ENCOUNTER — OFFICE VISIT (OUTPATIENT)
Dept: HEMATOLOGY/ONCOLOGY | Facility: CLINIC | Age: 61
End: 2021-04-29
Payer: MEDICARE

## 2021-04-29 VITALS
TEMPERATURE: 98 F | SYSTOLIC BLOOD PRESSURE: 143 MMHG | RESPIRATION RATE: 16 BRPM | OXYGEN SATURATION: 97 % | DIASTOLIC BLOOD PRESSURE: 65 MMHG | HEIGHT: 65 IN | HEART RATE: 76 BPM | BODY MASS INDEX: 36.65 KG/M2 | WEIGHT: 220 LBS

## 2021-04-29 DIAGNOSIS — U07.1 COVID-19 VIRUS INFECTION: ICD-10-CM

## 2021-04-29 DIAGNOSIS — D50.0 IRON DEFICIENCY ANEMIA DUE TO CHRONIC BLOOD LOSS: ICD-10-CM

## 2021-04-29 DIAGNOSIS — Z12.89 ENCOUNTER FOR SCREENING FOR MALIGNANT NEOPLASM OF OTHER SITES: ICD-10-CM

## 2021-04-29 DIAGNOSIS — C78.6 METASTATIC CANCER TO RETROPERITONEUM: ICD-10-CM

## 2021-04-29 DIAGNOSIS — C7B.8 SECONDARY NEUROENDOCRINE TUMOR OF LIVER: Primary | ICD-10-CM

## 2021-04-29 PROCEDURE — 99999 PR PBB SHADOW E&M-EST. PATIENT-LVL V: CPT | Mod: PBBFAC,,, | Performed by: INTERNAL MEDICINE

## 2021-04-29 PROCEDURE — 1126F PR PAIN SEVERITY QUANTIFIED, NO PAIN PRESENT: ICD-10-PCS | Mod: S$GLB,,, | Performed by: INTERNAL MEDICINE

## 2021-04-29 PROCEDURE — 99214 PR OFFICE/OUTPT VISIT, EST, LEVL IV, 30-39 MIN: ICD-10-PCS | Mod: 25,S$GLB,, | Performed by: INTERNAL MEDICINE

## 2021-04-29 PROCEDURE — 3008F PR BODY MASS INDEX (BMI) DOCUMENTED: ICD-10-PCS | Mod: CPTII,S$GLB,, | Performed by: INTERNAL MEDICINE

## 2021-04-29 PROCEDURE — 1126F AMNT PAIN NOTED NONE PRSNT: CPT | Mod: S$GLB,,, | Performed by: INTERNAL MEDICINE

## 2021-04-29 PROCEDURE — 99214 OFFICE O/P EST MOD 30 MIN: CPT | Mod: 25,S$GLB,, | Performed by: INTERNAL MEDICINE

## 2021-04-29 PROCEDURE — 99999 PR PBB SHADOW E&M-EST. PATIENT-LVL V: ICD-10-PCS | Mod: PBBFAC,,, | Performed by: INTERNAL MEDICINE

## 2021-04-29 PROCEDURE — 3008F BODY MASS INDEX DOCD: CPT | Mod: CPTII,S$GLB,, | Performed by: INTERNAL MEDICINE

## 2021-05-10 ENCOUNTER — PATIENT MESSAGE (OUTPATIENT)
Dept: RESEARCH | Facility: HOSPITAL | Age: 61
End: 2021-05-10

## 2021-05-31 ENCOUNTER — TELEPHONE (OUTPATIENT)
Dept: ADMINISTRATIVE | Facility: HOSPITAL | Age: 61
End: 2021-05-31

## 2021-06-14 ENCOUNTER — PATIENT OUTREACH (OUTPATIENT)
Dept: ADMINISTRATIVE | Facility: HOSPITAL | Age: 61
End: 2021-06-14

## 2021-06-16 ENCOUNTER — TELEPHONE (OUTPATIENT)
Dept: ADMINISTRATIVE | Facility: HOSPITAL | Age: 61
End: 2021-06-16

## 2021-06-16 ENCOUNTER — PATIENT MESSAGE (OUTPATIENT)
Dept: HEMATOLOGY/ONCOLOGY | Facility: CLINIC | Age: 61
End: 2021-06-16

## 2021-06-18 ENCOUNTER — HOSPITAL ENCOUNTER (OUTPATIENT)
Dept: RADIOLOGY | Facility: HOSPITAL | Age: 61
Discharge: HOME | End: 2021-06-18
Attending: FAMILY MEDICINE
Payer: MEDICARE

## 2021-06-18 ENCOUNTER — INFUSION (OUTPATIENT)
Dept: INFUSION THERAPY | Facility: HOSPITAL | Age: 61
End: 2021-06-18
Attending: INTERNAL MEDICINE
Payer: MEDICARE

## 2021-06-18 VITALS
OXYGEN SATURATION: 97 % | SYSTOLIC BLOOD PRESSURE: 169 MMHG | HEART RATE: 61 BPM | TEMPERATURE: 98 F | RESPIRATION RATE: 18 BRPM | DIASTOLIC BLOOD PRESSURE: 76 MMHG

## 2021-06-18 VITALS — HEIGHT: 65 IN | BODY MASS INDEX: 36.65 KG/M2 | WEIGHT: 220 LBS

## 2021-06-18 DIAGNOSIS — Z12.31 ENCOUNTER FOR SCREENING MAMMOGRAM FOR MALIGNANT NEOPLASM OF BREAST: ICD-10-CM

## 2021-06-18 DIAGNOSIS — Z12.39 BREAST SCREENING: ICD-10-CM

## 2021-06-18 DIAGNOSIS — C7B.8 SECONDARY NEUROENDOCRINE TUMOR OF LIVER: Primary | ICD-10-CM

## 2021-06-18 PROCEDURE — 77067 SCR MAMMO BI INCL CAD: CPT | Mod: 26,,, | Performed by: RADIOLOGY

## 2021-06-18 PROCEDURE — 77067 SCR MAMMO BI INCL CAD: CPT | Mod: TC

## 2021-06-18 PROCEDURE — 77063 MAMMO DIGITAL SCREENING BILAT WITH TOMO: ICD-10-PCS | Mod: 26,,, | Performed by: RADIOLOGY

## 2021-06-18 PROCEDURE — 77063 BREAST TOMOSYNTHESIS BI: CPT | Mod: 26,,, | Performed by: RADIOLOGY

## 2021-06-18 PROCEDURE — 96523 IRRIG DRUG DELIVERY DEVICE: CPT

## 2021-06-18 PROCEDURE — 77067 MAMMO DIGITAL SCREENING BILAT WITH TOMO: ICD-10-PCS | Mod: 26,,, | Performed by: RADIOLOGY

## 2021-06-18 PROCEDURE — 63600175 PHARM REV CODE 636 W HCPCS: Performed by: INTERNAL MEDICINE

## 2021-06-18 RX ORDER — HEPARIN 100 UNIT/ML
500 SYRINGE INTRAVENOUS
Status: COMPLETED | OUTPATIENT
Start: 2021-06-18 | End: 2021-06-18

## 2021-06-18 RX ORDER — SODIUM CHLORIDE 0.9 % (FLUSH) 0.9 %
10 SYRINGE (ML) INJECTION
Status: CANCELLED | OUTPATIENT
Start: 2021-06-18

## 2021-06-18 RX ORDER — HEPARIN 100 UNIT/ML
500 SYRINGE INTRAVENOUS
Status: CANCELLED | OUTPATIENT
Start: 2021-06-18

## 2021-06-18 RX ORDER — SODIUM CHLORIDE 0.9 % (FLUSH) 0.9 %
10 SYRINGE (ML) INJECTION
Status: DISCONTINUED | OUTPATIENT
Start: 2021-06-18 | End: 2021-06-18 | Stop reason: HOSPADM

## 2021-06-18 RX ADMIN — HEPARIN 500 UNITS: 100 SYRINGE at 10:06

## 2021-06-25 ENCOUNTER — PATIENT MESSAGE (OUTPATIENT)
Dept: HEMATOLOGY/ONCOLOGY | Facility: CLINIC | Age: 61
End: 2021-06-25

## 2021-06-28 ENCOUNTER — PATIENT MESSAGE (OUTPATIENT)
Dept: PHARMACY | Facility: CLINIC | Age: 61
End: 2021-06-28

## 2021-06-29 ENCOUNTER — PATIENT MESSAGE (OUTPATIENT)
Dept: HEMATOLOGY/ONCOLOGY | Facility: CLINIC | Age: 61
End: 2021-06-29

## 2021-07-02 ENCOUNTER — HOSPITAL ENCOUNTER (OUTPATIENT)
Dept: RADIOLOGY | Facility: HOSPITAL | Age: 61
Discharge: HOME | End: 2021-07-02
Attending: INTERNAL MEDICINE
Payer: MEDICARE

## 2021-07-02 ENCOUNTER — INFUSION (OUTPATIENT)
Dept: INFUSION THERAPY | Facility: HOSPITAL | Age: 61
End: 2021-07-02
Attending: FAMILY MEDICINE
Payer: MEDICARE

## 2021-07-02 VITALS
RESPIRATION RATE: 16 BRPM | OXYGEN SATURATION: 94 % | SYSTOLIC BLOOD PRESSURE: 148 MMHG | HEART RATE: 71 BPM | TEMPERATURE: 98 F | DIASTOLIC BLOOD PRESSURE: 74 MMHG

## 2021-07-02 DIAGNOSIS — U07.1 COVID-19 VIRUS INFECTION: ICD-10-CM

## 2021-07-02 DIAGNOSIS — C78.6 METASTATIC CANCER TO RETROPERITONEUM: Primary | ICD-10-CM

## 2021-07-02 DIAGNOSIS — D50.0 IRON DEFICIENCY ANEMIA DUE TO CHRONIC BLOOD LOSS: ICD-10-CM

## 2021-07-02 DIAGNOSIS — C7B.8 SECONDARY NEUROENDOCRINE TUMOR OF LIVER: ICD-10-CM

## 2021-07-02 DIAGNOSIS — C7B.8 SECONDARY NEUROENDOCRINE TUMOR OF LIVER: Primary | ICD-10-CM

## 2021-07-02 DIAGNOSIS — C78.6 METASTATIC CANCER TO RETROPERITONEUM: ICD-10-CM

## 2021-07-02 DIAGNOSIS — Z12.89 ENCOUNTER FOR SCREENING FOR MALIGNANT NEOPLASM OF OTHER SITES: ICD-10-CM

## 2021-07-02 DIAGNOSIS — R79.89 ABNORMAL TSH: ICD-10-CM

## 2021-07-02 DIAGNOSIS — E11.65 UNCONTROLLED TYPE 2 DIABETES MELLITUS WITH HYPERGLYCEMIA: ICD-10-CM

## 2021-07-02 DIAGNOSIS — R94.6 ABNORMAL RESULTS OF THYROID FUNCTION STUDIES: ICD-10-CM

## 2021-07-02 PROCEDURE — 25500020 PHARM REV CODE 255: Performed by: INTERNAL MEDICINE

## 2021-07-02 PROCEDURE — A4216 STERILE WATER/SALINE, 10 ML: HCPCS | Performed by: INTERNAL MEDICINE

## 2021-07-02 PROCEDURE — A9698 NON-RAD CONTRAST MATERIALNOC: HCPCS | Performed by: INTERNAL MEDICINE

## 2021-07-02 PROCEDURE — 63600175 PHARM REV CODE 636 W HCPCS: Performed by: INTERNAL MEDICINE

## 2021-07-02 PROCEDURE — 36591 DRAW BLOOD OFF VENOUS DEVICE: CPT

## 2021-07-02 PROCEDURE — 71260 CT THORAX DX C+: CPT | Mod: TC

## 2021-07-02 PROCEDURE — 25000003 PHARM REV CODE 250: Performed by: INTERNAL MEDICINE

## 2021-07-02 PROCEDURE — 74177 CT ABD & PELVIS W/CONTRAST: CPT | Mod: TC

## 2021-07-02 RX ORDER — SODIUM CHLORIDE 0.9 % (FLUSH) 0.9 %
10 SYRINGE (ML) INJECTION
Status: COMPLETED | OUTPATIENT
Start: 2021-07-02 | End: 2021-07-02

## 2021-07-02 RX ORDER — HEPARIN 100 UNIT/ML
500 SYRINGE INTRAVENOUS
Status: CANCELLED | OUTPATIENT
Start: 2021-07-02

## 2021-07-02 RX ORDER — SODIUM CHLORIDE 0.9 % (FLUSH) 0.9 %
10 SYRINGE (ML) INJECTION
Status: CANCELLED | OUTPATIENT
Start: 2021-07-02

## 2021-07-02 RX ORDER — HEPARIN 100 UNIT/ML
500 SYRINGE INTRAVENOUS
Status: COMPLETED | OUTPATIENT
Start: 2021-07-02 | End: 2021-07-02

## 2021-07-02 RX ADMIN — Medication 10 ML: at 10:07

## 2021-07-02 RX ADMIN — IOHEXOL 100 ML: 350 INJECTION, SOLUTION INTRAVENOUS at 12:07

## 2021-07-02 RX ADMIN — HEPARIN 500 UNITS: 100 SYRINGE at 01:07

## 2021-07-02 RX ADMIN — IOHEXOL 1000 ML: 9 SOLUTION ORAL at 12:07

## 2021-07-06 ENCOUNTER — PATIENT MESSAGE (OUTPATIENT)
Dept: INTERNAL MEDICINE | Facility: CLINIC | Age: 61
End: 2021-07-06

## 2021-07-11 ENCOUNTER — PATIENT MESSAGE (OUTPATIENT)
Dept: HEMATOLOGY/ONCOLOGY | Facility: CLINIC | Age: 61
End: 2021-07-11

## 2021-07-11 ENCOUNTER — PATIENT MESSAGE (OUTPATIENT)
Dept: INTERNAL MEDICINE | Facility: CLINIC | Age: 61
End: 2021-07-11

## 2021-07-14 ENCOUNTER — TELEPHONE (OUTPATIENT)
Dept: INTERNAL MEDICINE | Facility: CLINIC | Age: 61
End: 2021-07-14

## 2021-08-02 ENCOUNTER — LAB VISIT (OUTPATIENT)
Dept: LAB | Facility: HOSPITAL | Age: 61
End: 2021-08-02
Attending: FAMILY MEDICINE
Payer: MEDICARE

## 2021-08-02 DIAGNOSIS — E11.65 UNCONTROLLED TYPE 2 DIABETES MELLITUS WITH HYPERGLYCEMIA: ICD-10-CM

## 2021-08-02 DIAGNOSIS — R79.89 ABNORMAL TSH: ICD-10-CM

## 2021-08-02 DIAGNOSIS — R94.6 ABNORMAL RESULTS OF THYROID FUNCTION STUDIES: ICD-10-CM

## 2021-08-02 LAB
CHOLEST SERPL-MCNC: 167 MG/DL (ref 120–199)
CHOLEST/HDLC SERPL: 3.3 {RATIO} (ref 2–5)
GLUCOSE SERPL-MCNC: 218 MG/DL (ref 70–110)
HDLC SERPL-MCNC: 50 MG/DL (ref 40–75)
HDLC SERPL: 29.9 % (ref 20–50)
LDLC SERPL CALC-MCNC: 94.6 MG/DL (ref 63–159)
NONHDLC SERPL-MCNC: 117 MG/DL
T4 FREE SERPL-MCNC: 1.21 NG/DL (ref 0.71–1.51)
TRIGL SERPL-MCNC: 112 MG/DL (ref 30–150)
TSH SERPL DL<=0.005 MIU/L-ACNC: 4.32 UIU/ML (ref 0.4–4)

## 2021-08-02 PROCEDURE — 36415 COLL VENOUS BLD VENIPUNCTURE: CPT | Mod: PO | Performed by: FAMILY MEDICINE

## 2021-08-02 PROCEDURE — 83036 HEMOGLOBIN GLYCOSYLATED A1C: CPT | Performed by: FAMILY MEDICINE

## 2021-08-02 PROCEDURE — 80061 LIPID PANEL: CPT | Performed by: FAMILY MEDICINE

## 2021-08-02 PROCEDURE — 84439 ASSAY OF FREE THYROXINE: CPT | Performed by: FAMILY MEDICINE

## 2021-08-02 PROCEDURE — 84443 ASSAY THYROID STIM HORMONE: CPT | Performed by: FAMILY MEDICINE

## 2021-08-02 PROCEDURE — 82947 ASSAY GLUCOSE BLOOD QUANT: CPT | Performed by: FAMILY MEDICINE

## 2021-08-03 ENCOUNTER — OFFICE VISIT (OUTPATIENT)
Dept: INTERNAL MEDICINE | Facility: CLINIC | Age: 61
End: 2021-08-03
Payer: MEDICARE

## 2021-08-03 VITALS
DIASTOLIC BLOOD PRESSURE: 80 MMHG | SYSTOLIC BLOOD PRESSURE: 138 MMHG | HEIGHT: 65 IN | OXYGEN SATURATION: 97 % | BODY MASS INDEX: 35.71 KG/M2 | HEART RATE: 60 BPM | WEIGHT: 214.31 LBS | TEMPERATURE: 96 F | RESPIRATION RATE: 18 BRPM

## 2021-08-03 DIAGNOSIS — F41.9 ANXIETY: ICD-10-CM

## 2021-08-03 DIAGNOSIS — I10 ESSENTIAL HYPERTENSION: ICD-10-CM

## 2021-08-03 DIAGNOSIS — Z12.11 COLON CANCER SCREENING: ICD-10-CM

## 2021-08-03 DIAGNOSIS — E11.65 UNCONTROLLED TYPE 2 DIABETES MELLITUS WITH HYPERGLYCEMIA: Primary | ICD-10-CM

## 2021-08-03 DIAGNOSIS — N18.30 STAGE 3 CHRONIC KIDNEY DISEASE, UNSPECIFIED WHETHER STAGE 3A OR 3B CKD: ICD-10-CM

## 2021-08-03 DIAGNOSIS — G47.00 INSOMNIA, UNSPECIFIED TYPE: ICD-10-CM

## 2021-08-03 LAB
ESTIMATED AVG GLUCOSE: 301 MG/DL (ref 68–131)
HBA1C MFR BLD: 12.1 % (ref 4–5.6)

## 2021-08-03 PROCEDURE — 99214 OFFICE O/P EST MOD 30 MIN: CPT | Mod: 25,S$GLB,, | Performed by: FAMILY MEDICINE

## 2021-08-03 PROCEDURE — G0009 PNEUMOCOCCAL CONJUGATE VACCINE 13-VALENT LESS THAN 5YO & GREATER THAN: ICD-10-PCS | Mod: S$GLB,,, | Performed by: FAMILY MEDICINE

## 2021-08-03 PROCEDURE — 99499 RISK ADDL DX/OHS AUDIT: ICD-10-PCS | Mod: S$GLB,,, | Performed by: FAMILY MEDICINE

## 2021-08-03 PROCEDURE — 3079F DIAST BP 80-89 MM HG: CPT | Mod: CPTII,S$GLB,, | Performed by: FAMILY MEDICINE

## 2021-08-03 PROCEDURE — 99499 UNLISTED E&M SERVICE: CPT | Mod: S$GLB,,, | Performed by: FAMILY MEDICINE

## 2021-08-03 PROCEDURE — 99999 PR PBB SHADOW E&M-EST. PATIENT-LVL V: CPT | Mod: PBBFAC,,, | Performed by: FAMILY MEDICINE

## 2021-08-03 PROCEDURE — 1159F PR MEDICATION LIST DOCUMENTED IN MEDICAL RECORD: ICD-10-PCS | Mod: CPTII,S$GLB,, | Performed by: FAMILY MEDICINE

## 2021-08-03 PROCEDURE — 1160F PR REVIEW ALL MEDS BY PRESCRIBER/CLIN PHARMACIST DOCUMENTED: ICD-10-PCS | Mod: CPTII,S$GLB,, | Performed by: FAMILY MEDICINE

## 2021-08-03 PROCEDURE — 99999 PR PBB SHADOW E&M-EST. PATIENT-LVL V: ICD-10-PCS | Mod: PBBFAC,,, | Performed by: FAMILY MEDICINE

## 2021-08-03 PROCEDURE — 3075F PR MOST RECENT SYSTOLIC BLOOD PRESS GE 130-139MM HG: ICD-10-PCS | Mod: CPTII,S$GLB,, | Performed by: FAMILY MEDICINE

## 2021-08-03 PROCEDURE — 3008F PR BODY MASS INDEX (BMI) DOCUMENTED: ICD-10-PCS | Mod: CPTII,S$GLB,, | Performed by: FAMILY MEDICINE

## 2021-08-03 PROCEDURE — 3075F SYST BP GE 130 - 139MM HG: CPT | Mod: CPTII,S$GLB,, | Performed by: FAMILY MEDICINE

## 2021-08-03 PROCEDURE — 3008F BODY MASS INDEX DOCD: CPT | Mod: CPTII,S$GLB,, | Performed by: FAMILY MEDICINE

## 2021-08-03 PROCEDURE — 1159F MED LIST DOCD IN RCRD: CPT | Mod: CPTII,S$GLB,, | Performed by: FAMILY MEDICINE

## 2021-08-03 PROCEDURE — 90670 PNEUMOCOCCAL CONJUGATE VACCINE 13-VALENT LESS THAN 5YO & GREATER THAN: ICD-10-PCS | Mod: S$GLB,,, | Performed by: FAMILY MEDICINE

## 2021-08-03 PROCEDURE — 99214 PR OFFICE/OUTPT VISIT, EST, LEVL IV, 30-39 MIN: ICD-10-PCS | Mod: 25,S$GLB,, | Performed by: FAMILY MEDICINE

## 2021-08-03 PROCEDURE — 1160F RVW MEDS BY RX/DR IN RCRD: CPT | Mod: CPTII,S$GLB,, | Performed by: FAMILY MEDICINE

## 2021-08-03 PROCEDURE — 90670 PCV13 VACCINE IM: CPT | Mod: S$GLB,,, | Performed by: FAMILY MEDICINE

## 2021-08-03 PROCEDURE — 1126F AMNT PAIN NOTED NONE PRSNT: CPT | Mod: CPTII,S$GLB,, | Performed by: FAMILY MEDICINE

## 2021-08-03 PROCEDURE — 1126F PR PAIN SEVERITY QUANTIFIED, NO PAIN PRESENT: ICD-10-PCS | Mod: CPTII,S$GLB,, | Performed by: FAMILY MEDICINE

## 2021-08-03 PROCEDURE — 3079F PR MOST RECENT DIASTOLIC BLOOD PRESSURE 80-89 MM HG: ICD-10-PCS | Mod: CPTII,S$GLB,, | Performed by: FAMILY MEDICINE

## 2021-08-03 PROCEDURE — G0009 ADMIN PNEUMOCOCCAL VACCINE: HCPCS | Mod: S$GLB,,, | Performed by: FAMILY MEDICINE

## 2021-08-03 RX ORDER — ESCITALOPRAM OXALATE 10 MG/1
10 TABLET ORAL DAILY
Qty: 90 TABLET | Refills: 3 | Status: SHIPPED | OUTPATIENT
Start: 2021-08-03 | End: 2022-09-26

## 2021-08-03 RX ORDER — ZOLPIDEM TARTRATE 10 MG/1
10 TABLET ORAL NIGHTLY PRN
Qty: 30 TABLET | Refills: 2 | Status: SHIPPED | OUTPATIENT
Start: 2021-08-03 | End: 2022-01-01 | Stop reason: SDUPTHER

## 2021-08-10 ENCOUNTER — INFUSION (OUTPATIENT)
Dept: INFUSION THERAPY | Facility: HOSPITAL | Age: 61
End: 2021-08-10
Attending: INTERNAL MEDICINE
Payer: MEDICARE

## 2021-08-10 ENCOUNTER — OFFICE VISIT (OUTPATIENT)
Dept: HEMATOLOGY/ONCOLOGY | Facility: CLINIC | Age: 61
End: 2021-08-10
Payer: MEDICARE

## 2021-08-10 VITALS
RESPIRATION RATE: 18 BRPM | SYSTOLIC BLOOD PRESSURE: 143 MMHG | HEART RATE: 60 BPM | DIASTOLIC BLOOD PRESSURE: 77 MMHG | TEMPERATURE: 98 F | OXYGEN SATURATION: 96 %

## 2021-08-10 VITALS
WEIGHT: 216.06 LBS | TEMPERATURE: 97 F | OXYGEN SATURATION: 97 % | HEART RATE: 69 BPM | DIASTOLIC BLOOD PRESSURE: 81 MMHG | BODY MASS INDEX: 34.72 KG/M2 | HEIGHT: 66 IN | SYSTOLIC BLOOD PRESSURE: 154 MMHG

## 2021-08-10 DIAGNOSIS — C78.6 METASTATIC CANCER TO RETROPERITONEUM: ICD-10-CM

## 2021-08-10 DIAGNOSIS — N18.30 STAGE 3 CHRONIC KIDNEY DISEASE, UNSPECIFIED WHETHER STAGE 3A OR 3B CKD: ICD-10-CM

## 2021-08-10 DIAGNOSIS — I10 ESSENTIAL HYPERTENSION: ICD-10-CM

## 2021-08-10 DIAGNOSIS — C7B.8 SECONDARY NEUROENDOCRINE TUMOR OF LIVER: Primary | ICD-10-CM

## 2021-08-10 DIAGNOSIS — D50.0 IRON DEFICIENCY ANEMIA DUE TO CHRONIC BLOOD LOSS: ICD-10-CM

## 2021-08-10 PROCEDURE — 99499 RISK ADDL DX/OHS AUDIT: ICD-10-PCS | Mod: S$GLB,,, | Performed by: INTERNAL MEDICINE

## 2021-08-10 PROCEDURE — 3077F PR MOST RECENT SYSTOLIC BLOOD PRESSURE >= 140 MM HG: ICD-10-PCS | Mod: CPTII,S$GLB,, | Performed by: INTERNAL MEDICINE

## 2021-08-10 PROCEDURE — 3079F PR MOST RECENT DIASTOLIC BLOOD PRESSURE 80-89 MM HG: ICD-10-PCS | Mod: CPTII,S$GLB,, | Performed by: INTERNAL MEDICINE

## 2021-08-10 PROCEDURE — 1159F PR MEDICATION LIST DOCUMENTED IN MEDICAL RECORD: ICD-10-PCS | Mod: CPTII,S$GLB,, | Performed by: INTERNAL MEDICINE

## 2021-08-10 PROCEDURE — 3008F PR BODY MASS INDEX (BMI) DOCUMENTED: ICD-10-PCS | Mod: CPTII,S$GLB,, | Performed by: INTERNAL MEDICINE

## 2021-08-10 PROCEDURE — 3046F PR MOST RECENT HEMOGLOBIN A1C LEVEL > 9.0%: ICD-10-PCS | Mod: CPTII,S$GLB,, | Performed by: INTERNAL MEDICINE

## 2021-08-10 PROCEDURE — A4216 STERILE WATER/SALINE, 10 ML: HCPCS | Performed by: INTERNAL MEDICINE

## 2021-08-10 PROCEDURE — 1159F MED LIST DOCD IN RCRD: CPT | Mod: CPTII,S$GLB,, | Performed by: INTERNAL MEDICINE

## 2021-08-10 PROCEDURE — 99214 PR OFFICE/OUTPT VISIT, EST, LEVL IV, 30-39 MIN: ICD-10-PCS | Mod: S$GLB,,, | Performed by: INTERNAL MEDICINE

## 2021-08-10 PROCEDURE — 3077F SYST BP >= 140 MM HG: CPT | Mod: CPTII,S$GLB,, | Performed by: INTERNAL MEDICINE

## 2021-08-10 PROCEDURE — 25000003 PHARM REV CODE 250: Performed by: INTERNAL MEDICINE

## 2021-08-10 PROCEDURE — 99999 PR PBB SHADOW E&M-EST. PATIENT-LVL IV: CPT | Mod: PBBFAC,,, | Performed by: INTERNAL MEDICINE

## 2021-08-10 PROCEDURE — 3008F BODY MASS INDEX DOCD: CPT | Mod: CPTII,S$GLB,, | Performed by: INTERNAL MEDICINE

## 2021-08-10 PROCEDURE — 1160F PR REVIEW ALL MEDS BY PRESCRIBER/CLIN PHARMACIST DOCUMENTED: ICD-10-PCS | Mod: CPTII,S$GLB,, | Performed by: INTERNAL MEDICINE

## 2021-08-10 PROCEDURE — 1126F AMNT PAIN NOTED NONE PRSNT: CPT | Mod: CPTII,S$GLB,, | Performed by: INTERNAL MEDICINE

## 2021-08-10 PROCEDURE — 63600175 PHARM REV CODE 636 W HCPCS: Performed by: INTERNAL MEDICINE

## 2021-08-10 PROCEDURE — 1126F PR PAIN SEVERITY QUANTIFIED, NO PAIN PRESENT: ICD-10-PCS | Mod: CPTII,S$GLB,, | Performed by: INTERNAL MEDICINE

## 2021-08-10 PROCEDURE — 1160F RVW MEDS BY RX/DR IN RCRD: CPT | Mod: CPTII,S$GLB,, | Performed by: INTERNAL MEDICINE

## 2021-08-10 PROCEDURE — 99214 OFFICE O/P EST MOD 30 MIN: CPT | Mod: S$GLB,,, | Performed by: INTERNAL MEDICINE

## 2021-08-10 PROCEDURE — 3079F DIAST BP 80-89 MM HG: CPT | Mod: CPTII,S$GLB,, | Performed by: INTERNAL MEDICINE

## 2021-08-10 PROCEDURE — 3046F HEMOGLOBIN A1C LEVEL >9.0%: CPT | Mod: CPTII,S$GLB,, | Performed by: INTERNAL MEDICINE

## 2021-08-10 PROCEDURE — 99499 UNLISTED E&M SERVICE: CPT | Mod: S$GLB,,, | Performed by: INTERNAL MEDICINE

## 2021-08-10 PROCEDURE — 96523 IRRIG DRUG DELIVERY DEVICE: CPT

## 2021-08-10 PROCEDURE — 99999 PR PBB SHADOW E&M-EST. PATIENT-LVL IV: ICD-10-PCS | Mod: PBBFAC,,, | Performed by: INTERNAL MEDICINE

## 2021-08-10 RX ORDER — SODIUM CHLORIDE 0.9 % (FLUSH) 0.9 %
10 SYRINGE (ML) INJECTION
Status: CANCELLED | OUTPATIENT
Start: 2021-08-10

## 2021-08-10 RX ORDER — HEPARIN 100 UNIT/ML
500 SYRINGE INTRAVENOUS
Status: CANCELLED | OUTPATIENT
Start: 2021-08-10

## 2021-08-10 RX ORDER — SODIUM CHLORIDE 0.9 % (FLUSH) 0.9 %
10 SYRINGE (ML) INJECTION
Status: COMPLETED | OUTPATIENT
Start: 2021-08-10 | End: 2021-08-10

## 2021-08-10 RX ORDER — HEPARIN 100 UNIT/ML
500 SYRINGE INTRAVENOUS
Status: COMPLETED | OUTPATIENT
Start: 2021-08-10 | End: 2021-08-10

## 2021-08-10 RX ADMIN — SODIUM CHLORIDE, PRESERVATIVE FREE 10 ML: 5 INJECTION INTRAVENOUS at 11:08

## 2021-08-10 RX ADMIN — HEPARIN 500 UNITS: 100 SYRINGE at 11:08

## 2022-01-01 ENCOUNTER — PATIENT MESSAGE (OUTPATIENT)
Dept: ADMINISTRATIVE | Facility: HOSPITAL | Age: 62
End: 2022-01-01
Payer: MEDICARE

## 2022-01-01 ENCOUNTER — PATIENT MESSAGE (OUTPATIENT)
Dept: INTERNAL MEDICINE | Facility: CLINIC | Age: 62
End: 2022-01-01
Payer: MEDICARE

## 2022-01-01 ENCOUNTER — PATIENT MESSAGE (OUTPATIENT)
Dept: HEMATOLOGY/ONCOLOGY | Facility: CLINIC | Age: 62
End: 2022-01-01
Payer: MEDICARE

## 2022-01-01 ENCOUNTER — TELEPHONE (OUTPATIENT)
Dept: HEPATOLOGY | Facility: HOSPITAL | Age: 62
End: 2022-01-01
Payer: MEDICARE

## 2022-01-01 ENCOUNTER — TELEPHONE (OUTPATIENT)
Dept: INFUSION THERAPY | Facility: HOSPITAL | Age: 62
End: 2022-01-01
Payer: MEDICARE

## 2022-01-01 ENCOUNTER — PATIENT OUTREACH (OUTPATIENT)
Dept: ADMINISTRATIVE | Facility: HOSPITAL | Age: 62
End: 2022-01-01
Payer: MEDICARE

## 2022-01-01 ENCOUNTER — OFFICE VISIT (OUTPATIENT)
Dept: INTERNAL MEDICINE | Facility: CLINIC | Age: 62
End: 2022-01-01
Payer: MEDICARE

## 2022-01-01 ENCOUNTER — PATIENT MESSAGE (OUTPATIENT)
Dept: SURGERY | Facility: CLINIC | Age: 62
End: 2022-01-01
Payer: MEDICARE

## 2022-01-01 ENCOUNTER — INFUSION (OUTPATIENT)
Dept: INFUSION THERAPY | Facility: HOSPITAL | Age: 62
End: 2022-01-01
Attending: INTERNAL MEDICINE
Payer: MEDICARE

## 2022-01-01 ENCOUNTER — LAB VISIT (OUTPATIENT)
Dept: LAB | Facility: HOSPITAL | Age: 62
End: 2022-01-01
Attending: INTERNAL MEDICINE
Payer: MEDICARE

## 2022-01-01 ENCOUNTER — PROCEDURE VISIT (OUTPATIENT)
Dept: SURGERY | Facility: CLINIC | Age: 62
End: 2022-01-01
Payer: MEDICARE

## 2022-01-01 ENCOUNTER — TELEPHONE (OUTPATIENT)
Dept: ADMINISTRATIVE | Facility: HOSPITAL | Age: 62
End: 2022-01-01
Payer: MEDICARE

## 2022-01-01 ENCOUNTER — PATIENT MESSAGE (OUTPATIENT)
Dept: OTOLARYNGOLOGY | Facility: CLINIC | Age: 62
End: 2022-01-01
Payer: MEDICARE

## 2022-01-01 ENCOUNTER — TELEPHONE (OUTPATIENT)
Dept: INTERNAL MEDICINE | Facility: CLINIC | Age: 62
End: 2022-01-01
Payer: MEDICARE

## 2022-01-01 ENCOUNTER — LAB VISIT (OUTPATIENT)
Dept: LAB | Facility: HOSPITAL | Age: 62
End: 2022-01-01
Attending: NURSE PRACTITIONER
Payer: MEDICARE

## 2022-01-01 ENCOUNTER — HOSPITAL ENCOUNTER (OUTPATIENT)
Dept: RADIOLOGY | Facility: HOSPITAL | Age: 62
Discharge: HOME | End: 2022-01-19
Attending: INTERNAL MEDICINE
Payer: MEDICARE

## 2022-01-01 ENCOUNTER — OFFICE VISIT (OUTPATIENT)
Dept: HEMATOLOGY/ONCOLOGY | Facility: CLINIC | Age: 62
End: 2022-01-01
Payer: MEDICARE

## 2022-01-01 ENCOUNTER — LAB VISIT (OUTPATIENT)
Dept: LAB | Facility: HOSPITAL | Age: 62
End: 2022-01-01
Attending: FAMILY MEDICINE
Payer: MEDICARE

## 2022-01-01 ENCOUNTER — HOSPITAL ENCOUNTER (OUTPATIENT)
Dept: RADIOLOGY | Facility: HOSPITAL | Age: 62
Discharge: HOME | End: 2022-04-29
Attending: INTERNAL MEDICINE
Payer: MEDICARE

## 2022-01-01 ENCOUNTER — HOSPITAL ENCOUNTER (INPATIENT)
Facility: HOSPITAL | Age: 62
LOS: 2 days | Discharge: LEFT BEFORE DISCHARGE | DRG: 392 | End: 2022-09-04
Attending: EMERGENCY MEDICINE | Admitting: INTERNAL MEDICINE
Payer: MEDICARE

## 2022-01-01 ENCOUNTER — HOSPITAL ENCOUNTER (OUTPATIENT)
Dept: RADIOLOGY | Facility: HOSPITAL | Age: 62
Discharge: HOME | End: 2022-03-07
Attending: OTOLARYNGOLOGY
Payer: MEDICARE

## 2022-01-01 VITALS
TEMPERATURE: 98 F | RESPIRATION RATE: 18 BRPM | HEART RATE: 64 BPM | SYSTOLIC BLOOD PRESSURE: 124 MMHG | DIASTOLIC BLOOD PRESSURE: 78 MMHG | DIASTOLIC BLOOD PRESSURE: 62 MMHG | HEART RATE: 60 BPM | OXYGEN SATURATION: 97 % | TEMPERATURE: 97 F | RESPIRATION RATE: 18 BRPM | OXYGEN SATURATION: 95 % | SYSTOLIC BLOOD PRESSURE: 115 MMHG

## 2022-01-01 VITALS
TEMPERATURE: 98 F | HEART RATE: 60 BPM | OXYGEN SATURATION: 95 % | SYSTOLIC BLOOD PRESSURE: 129 MMHG | RESPIRATION RATE: 18 BRPM | DIASTOLIC BLOOD PRESSURE: 66 MMHG

## 2022-01-01 VITALS
BODY MASS INDEX: 37.81 KG/M2 | RESPIRATION RATE: 18 BRPM | DIASTOLIC BLOOD PRESSURE: 59 MMHG | OXYGEN SATURATION: 98 % | HEIGHT: 66 IN | HEART RATE: 61 BPM | WEIGHT: 235.25 LBS | TEMPERATURE: 99 F | SYSTOLIC BLOOD PRESSURE: 94 MMHG

## 2022-01-01 VITALS
WEIGHT: 218.06 LBS | TEMPERATURE: 98 F | HEIGHT: 66 IN | DIASTOLIC BLOOD PRESSURE: 70 MMHG | RESPIRATION RATE: 18 BRPM | SYSTOLIC BLOOD PRESSURE: 138 MMHG | BODY MASS INDEX: 35.04 KG/M2 | OXYGEN SATURATION: 97 % | HEART RATE: 97 BPM

## 2022-01-01 VITALS
TEMPERATURE: 97 F | HEART RATE: 83 BPM | HEIGHT: 66 IN | OXYGEN SATURATION: 95 % | BODY MASS INDEX: 33.94 KG/M2 | DIASTOLIC BLOOD PRESSURE: 75 MMHG | WEIGHT: 211.19 LBS | SYSTOLIC BLOOD PRESSURE: 149 MMHG

## 2022-01-01 VITALS
TEMPERATURE: 97 F | SYSTOLIC BLOOD PRESSURE: 131 MMHG | HEART RATE: 80 BPM | OXYGEN SATURATION: 98 % | DIASTOLIC BLOOD PRESSURE: 72 MMHG | RESPIRATION RATE: 18 BRPM

## 2022-01-01 VITALS
DIASTOLIC BLOOD PRESSURE: 78 MMHG | HEART RATE: 80 BPM | HEIGHT: 66 IN | TEMPERATURE: 97 F | OXYGEN SATURATION: 98 % | BODY MASS INDEX: 31.89 KG/M2 | WEIGHT: 198.44 LBS | SYSTOLIC BLOOD PRESSURE: 118 MMHG

## 2022-01-01 VITALS
BODY MASS INDEX: 33.94 KG/M2 | WEIGHT: 211.19 LBS | RESPIRATION RATE: 18 BRPM | TEMPERATURE: 98 F | HEART RATE: 80 BPM | DIASTOLIC BLOOD PRESSURE: 76 MMHG | SYSTOLIC BLOOD PRESSURE: 138 MMHG | OXYGEN SATURATION: 96 % | HEIGHT: 66 IN

## 2022-01-01 VITALS
TEMPERATURE: 98 F | SYSTOLIC BLOOD PRESSURE: 122 MMHG | OXYGEN SATURATION: 95 % | RESPIRATION RATE: 18 BRPM | DIASTOLIC BLOOD PRESSURE: 77 MMHG | HEART RATE: 60 BPM

## 2022-01-01 VITALS
HEART RATE: 138 BPM | TEMPERATURE: 97 F | DIASTOLIC BLOOD PRESSURE: 79 MMHG | SYSTOLIC BLOOD PRESSURE: 140 MMHG | BODY MASS INDEX: 34.16 KG/M2 | WEIGHT: 211.63 LBS

## 2022-01-01 VITALS
OXYGEN SATURATION: 97 % | BODY MASS INDEX: 33.55 KG/M2 | WEIGHT: 208.75 LBS | SYSTOLIC BLOOD PRESSURE: 136 MMHG | HEART RATE: 60 BPM | DIASTOLIC BLOOD PRESSURE: 72 MMHG | HEIGHT: 66 IN

## 2022-01-01 DIAGNOSIS — C7B.8 SECONDARY NEUROENDOCRINE TUMOR OF LIVER: Primary | ICD-10-CM

## 2022-01-01 DIAGNOSIS — E11.9 TYPE 2 DIABETES MELLITUS WITHOUT COMPLICATION: ICD-10-CM

## 2022-01-01 DIAGNOSIS — C7B.8 SECONDARY NEUROENDOCRINE TUMOR OF LIVER: ICD-10-CM

## 2022-01-01 DIAGNOSIS — I65.29 CAROTID ATHEROSCLEROSIS, UNSPECIFIED LATERALITY: ICD-10-CM

## 2022-01-01 DIAGNOSIS — N18.31 STAGE 3A CHRONIC KIDNEY DISEASE: ICD-10-CM

## 2022-01-01 DIAGNOSIS — Z12.39 BREAST SCREENING: ICD-10-CM

## 2022-01-01 DIAGNOSIS — C78.6 METASTATIC CANCER TO RETROPERITONEUM: ICD-10-CM

## 2022-01-01 DIAGNOSIS — I70.0 ATHEROSCLEROSIS OF AORTA: ICD-10-CM

## 2022-01-01 DIAGNOSIS — G47.00 INSOMNIA, UNSPECIFIED TYPE: ICD-10-CM

## 2022-01-01 DIAGNOSIS — D50.0 IRON DEFICIENCY ANEMIA DUE TO CHRONIC BLOOD LOSS: Primary | ICD-10-CM

## 2022-01-01 DIAGNOSIS — R10.9 ABDOMINAL PAIN, UNSPECIFIED ABDOMINAL LOCATION: ICD-10-CM

## 2022-01-01 DIAGNOSIS — E66.9 OBESITY (BMI 30.0-34.9): ICD-10-CM

## 2022-01-01 DIAGNOSIS — Z95.0 PACEMAKER: ICD-10-CM

## 2022-01-01 DIAGNOSIS — I10 ESSENTIAL HYPERTENSION: ICD-10-CM

## 2022-01-01 DIAGNOSIS — F41.9 ANXIETY: ICD-10-CM

## 2022-01-01 DIAGNOSIS — D50.0 IRON DEFICIENCY ANEMIA DUE TO CHRONIC BLOOD LOSS: ICD-10-CM

## 2022-01-01 DIAGNOSIS — I50.32 CHRONIC DIASTOLIC HEART FAILURE: ICD-10-CM

## 2022-01-01 DIAGNOSIS — Z95.828 PORT-A-CATH IN PLACE: Primary | ICD-10-CM

## 2022-01-01 DIAGNOSIS — E11.9 CONTROLLED TYPE 2 DIABETES MELLITUS WITHOUT COMPLICATION, WITH LONG-TERM CURRENT USE OF INSULIN: Primary | ICD-10-CM

## 2022-01-01 DIAGNOSIS — E04.1 THYROID NODULE: ICD-10-CM

## 2022-01-01 DIAGNOSIS — Z95.0 S/P PLACEMENT OF CARDIAC PACEMAKER: ICD-10-CM

## 2022-01-01 DIAGNOSIS — C78.6 METASTATIC CANCER TO RETROPERITONEUM: Primary | ICD-10-CM

## 2022-01-01 DIAGNOSIS — N18.30 DIABETES MELLITUS WITH STAGE 3 CHRONIC KIDNEY DISEASE: ICD-10-CM

## 2022-01-01 DIAGNOSIS — I48.92 ATRIAL FLUTTER, UNSPECIFIED TYPE: ICD-10-CM

## 2022-01-01 DIAGNOSIS — G47.30 SLEEP APNEA, UNSPECIFIED TYPE: ICD-10-CM

## 2022-01-01 DIAGNOSIS — I25.10 CORONARY ARTERY CALCIFICATION: ICD-10-CM

## 2022-01-01 DIAGNOSIS — R94.6 ABNORMAL RESULTS OF THYROID FUNCTION STUDIES: ICD-10-CM

## 2022-01-01 DIAGNOSIS — K11.8 PAROTID NODULE: ICD-10-CM

## 2022-01-01 DIAGNOSIS — R79.89 ELEVATED PARATHYROID HORMONE: ICD-10-CM

## 2022-01-01 DIAGNOSIS — N18.30 DIABETES MELLITUS WITH STAGE 3 CHRONIC KIDNEY DISEASE: Primary | ICD-10-CM

## 2022-01-01 DIAGNOSIS — Z00.00 ENCOUNTER FOR PREVENTIVE HEALTH EXAMINATION: Primary | ICD-10-CM

## 2022-01-01 DIAGNOSIS — E11.22 DIABETES MELLITUS WITH STAGE 3 CHRONIC KIDNEY DISEASE: Primary | ICD-10-CM

## 2022-01-01 DIAGNOSIS — R79.89 ABNORMAL TSH: ICD-10-CM

## 2022-01-01 DIAGNOSIS — E11.22 DIABETES MELLITUS WITH STAGE 3 CHRONIC KIDNEY DISEASE: ICD-10-CM

## 2022-01-01 DIAGNOSIS — N18.9 CHRONIC KIDNEY DISEASE, UNSPECIFIED CKD STAGE: ICD-10-CM

## 2022-01-01 DIAGNOSIS — I47.10 SVT (SUPRAVENTRICULAR TACHYCARDIA): ICD-10-CM

## 2022-01-01 DIAGNOSIS — E04.2 MULTIPLE THYROID NODULES: ICD-10-CM

## 2022-01-01 DIAGNOSIS — Z79.4 CONTROLLED TYPE 2 DIABETES MELLITUS WITHOUT COMPLICATION, WITH LONG-TERM CURRENT USE OF INSULIN: Primary | ICD-10-CM

## 2022-01-01 DIAGNOSIS — J47.9 BRONCHIECTASIS WITHOUT COMPLICATION: ICD-10-CM

## 2022-01-01 DIAGNOSIS — I48.91 ATRIAL FIBRILLATION: ICD-10-CM

## 2022-01-01 DIAGNOSIS — D64.9 ANEMIA, UNSPECIFIED TYPE: ICD-10-CM

## 2022-01-01 DIAGNOSIS — Z12.31 ENCOUNTER FOR SCREENING MAMMOGRAM FOR MALIGNANT NEOPLASM OF BREAST: ICD-10-CM

## 2022-01-01 DIAGNOSIS — I25.84 CORONARY ARTERY CALCIFICATION: ICD-10-CM

## 2022-01-01 DIAGNOSIS — R09.02 HYPOXIA: Primary | ICD-10-CM

## 2022-01-01 DIAGNOSIS — R07.9 CHEST PAIN, UNSPECIFIED TYPE: ICD-10-CM

## 2022-01-01 DIAGNOSIS — E11.9 TYPE 2 DIABETES MELLITUS WITHOUT COMPLICATION, UNSPECIFIED WHETHER LONG TERM INSULIN USE: Primary | ICD-10-CM

## 2022-01-01 DIAGNOSIS — R91.1 LUNG NODULE: ICD-10-CM

## 2022-01-01 LAB
ALBUMIN SERPL BCP-MCNC: 2.6 G/DL (ref 3.5–5.2)
ALBUMIN SERPL BCP-MCNC: 2.7 G/DL (ref 3.5–5.2)
ALBUMIN SERPL BCP-MCNC: 3.2 G/DL (ref 3.5–5.2)
ALBUMIN SERPL BCP-MCNC: 3.4 G/DL (ref 3.5–5.2)
ALBUMIN SERPL BCP-MCNC: 3.5 G/DL (ref 3.5–5.2)
ALP SERPL-CCNC: 106 U/L (ref 55–135)
ALP SERPL-CCNC: 82 U/L (ref 55–135)
ALP SERPL-CCNC: 88 U/L (ref 55–135)
ALP SERPL-CCNC: 89 U/L (ref 55–135)
ALP SERPL-CCNC: 90 U/L (ref 55–135)
ALT SERPL W/O P-5'-P-CCNC: 11 U/L (ref 10–44)
ALT SERPL W/O P-5'-P-CCNC: 13 U/L (ref 10–44)
ALT SERPL W/O P-5'-P-CCNC: 6 U/L (ref 10–44)
ALT SERPL W/O P-5'-P-CCNC: 7 U/L (ref 10–44)
ALT SERPL W/O P-5'-P-CCNC: 8 U/L (ref 10–44)
ANION GAP SERPL CALC-SCNC: 10 MMOL/L (ref 8–16)
ANION GAP SERPL CALC-SCNC: 11 MMOL/L (ref 8–16)
ANION GAP SERPL CALC-SCNC: 11 MMOL/L (ref 8–16)
ANION GAP SERPL CALC-SCNC: 12 MMOL/L (ref 8–16)
ANION GAP SERPL CALC-SCNC: 16 MMOL/L (ref 8–16)
ANION GAP SERPL CALC-SCNC: 7 MMOL/L (ref 8–16)
ANISOCYTOSIS BLD QL SMEAR: SLIGHT
ANISOCYTOSIS BLD QL SMEAR: SLIGHT
AORTIC ROOT ANNULUS: 2.98 CM
APTT BLDCRRT: 36.7 SEC (ref 21–32)
APTT BLDCRRT: 46.1 SEC (ref 21–32)
APTT BLDCRRT: 47.7 SEC (ref 21–32)
ASCENDING AORTA: 3.01 CM
AST SERPL-CCNC: 13 U/L (ref 10–40)
AST SERPL-CCNC: 14 U/L (ref 10–40)
AST SERPL-CCNC: 15 U/L (ref 10–40)
AST SERPL-CCNC: 15 U/L (ref 10–40)
AST SERPL-CCNC: 16 U/L (ref 10–40)
AV INDEX (PROSTH): 0.78
AV MEAN GRADIENT: 8 MMHG
AV PEAK GRADIENT: 13 MMHG
AV VALVE AREA: 1.75 CM2
AV VELOCITY RATIO: 0.71
BACTERIA #/AREA URNS HPF: ABNORMAL /HPF
BASOPHILS # BLD AUTO: 0 K/UL (ref 0–0.2)
BASOPHILS # BLD AUTO: 0.03 K/UL (ref 0–0.2)
BASOPHILS # BLD AUTO: 0.04 K/UL (ref 0–0.2)
BASOPHILS # BLD AUTO: 0.05 K/UL (ref 0–0.2)
BASOPHILS # BLD AUTO: ABNORMAL K/UL (ref 0–0.2)
BASOPHILS NFR BLD: 0 % (ref 0–1.9)
BASOPHILS NFR BLD: 0 % (ref 0–1.9)
BASOPHILS NFR BLD: 0.4 % (ref 0–1.9)
BASOPHILS NFR BLD: 0.4 % (ref 0–1.9)
BASOPHILS NFR BLD: 0.5 % (ref 0–1.9)
BASOPHILS NFR BLD: 0.6 % (ref 0–1.9)
BASOPHILS NFR BLD: 0.8 % (ref 0–1.9)
BILIRUB SERPL-MCNC: 0.6 MG/DL (ref 0.1–1)
BILIRUB SERPL-MCNC: 0.6 MG/DL (ref 0.1–1)
BILIRUB SERPL-MCNC: 0.7 MG/DL (ref 0.1–1)
BILIRUB SERPL-MCNC: 1.5 MG/DL (ref 0.1–1)
BILIRUB SERPL-MCNC: 2.3 MG/DL (ref 0.1–1)
BILIRUB UR QL STRIP: ABNORMAL
BNP SERPL-MCNC: 286 PG/ML (ref 0–99)
BSA FOR ECHO PROCEDURE: 2.15 M2
BUN SERPL-MCNC: 21 MG/DL (ref 8–23)
BUN SERPL-MCNC: 25 MG/DL (ref 8–23)
BUN SERPL-MCNC: 27 MG/DL (ref 8–23)
BUN SERPL-MCNC: 33 MG/DL (ref 8–23)
BUN SERPL-MCNC: 39 MG/DL (ref 8–23)
BUN SERPL-MCNC: 57 MG/DL (ref 8–23)
CALCIUM SERPL-MCNC: 8.2 MG/DL (ref 8.7–10.5)
CALCIUM SERPL-MCNC: 8.2 MG/DL (ref 8.7–10.5)
CALCIUM SERPL-MCNC: 9 MG/DL (ref 8.7–10.5)
CALCIUM SERPL-MCNC: 9.2 MG/DL (ref 8.7–10.5)
CALCIUM SERPL-MCNC: 9.5 MG/DL (ref 8.7–10.5)
CALCIUM SERPL-MCNC: 9.7 MG/DL (ref 8.7–10.5)
CGA SERPL-MCNC: 143 NG/ML
CHLORIDE SERPL-SCNC: 101 MMOL/L (ref 95–110)
CHLORIDE SERPL-SCNC: 102 MMOL/L (ref 95–110)
CHLORIDE SERPL-SCNC: 109 MMOL/L (ref 95–110)
CHLORIDE SERPL-SCNC: 94 MMOL/L (ref 95–110)
CHLORIDE SERPL-SCNC: 98 MMOL/L (ref 95–110)
CHLORIDE SERPL-SCNC: 99 MMOL/L (ref 95–110)
CLARITY UR: CLEAR
CO2 SERPL-SCNC: 17 MMOL/L (ref 23–29)
CO2 SERPL-SCNC: 20 MMOL/L (ref 23–29)
CO2 SERPL-SCNC: 21 MMOL/L (ref 23–29)
CO2 SERPL-SCNC: 22 MMOL/L (ref 23–29)
CO2 SERPL-SCNC: 24 MMOL/L (ref 23–29)
CO2 SERPL-SCNC: 27 MMOL/L (ref 23–29)
COLOR UR: YELLOW
CREAT SERPL-MCNC: 1.3 MG/DL (ref 0.5–1.4)
CREAT SERPL-MCNC: 1.4 MG/DL (ref 0.5–1.4)
CREAT SERPL-MCNC: 1.5 MG/DL (ref 0.5–1.4)
CREAT SERPL-MCNC: 1.8 MG/DL (ref 0.5–1.4)
CREAT SERPL-MCNC: 3.1 MG/DL (ref 0.5–1.4)
CTP QC/QA: YES
CV ECHO LV RWT: 0.57 CM
D DIMER PPP IA.FEU-MCNC: >33 MG/L FEU
DIFFERENTIAL METHOD: ABNORMAL
DOP CALC AO PEAK VEL: 1.8 M/S
DOP CALC AO VTI: 27.8 CM
DOP CALC LVOT AREA: 2.2 CM2
DOP CALC LVOT DIAMETER: 1.69 CM
DOP CALC LVOT PEAK VEL: 1.28 M/S
DOP CALC LVOT STROKE VOLUME: 48.65 CM3
DOP CALC RVOT PEAK VEL: 0.65 M/S
DOP CALC RVOT VTI: 12.8 CM
DOP CALCLVOT PEAK VEL VTI: 21.7 CM
E WAVE DECELERATION TIME: 125.25 MSEC
E/A RATIO: 2.76
ECHO LV POSTERIOR WALL: 1.48 CM (ref 0.6–1.1)
EJECTION FRACTION: 45 %
EOSINOPHIL # BLD AUTO: 0 K/UL (ref 0–0.5)
EOSINOPHIL # BLD AUTO: 0.2 K/UL (ref 0–0.5)
EOSINOPHIL # BLD AUTO: 0.2 K/UL (ref 0–0.5)
EOSINOPHIL # BLD AUTO: 0.5 K/UL (ref 0–0.5)
EOSINOPHIL # BLD AUTO: ABNORMAL K/UL (ref 0–0.5)
EOSINOPHIL NFR BLD: 0 % (ref 0–8)
EOSINOPHIL NFR BLD: 1 % (ref 0–8)
EOSINOPHIL NFR BLD: 2.2 % (ref 0–8)
EOSINOPHIL NFR BLD: 2.8 % (ref 0–8)
EOSINOPHIL NFR BLD: 7.7 % (ref 0–8)
ERYTHROCYTE [DISTWIDTH] IN BLOOD BY AUTOMATED COUNT: 14 % (ref 11.5–14.5)
ERYTHROCYTE [DISTWIDTH] IN BLOOD BY AUTOMATED COUNT: 14.1 % (ref 11.5–14.5)
ERYTHROCYTE [DISTWIDTH] IN BLOOD BY AUTOMATED COUNT: 14.1 % (ref 11.5–14.5)
ERYTHROCYTE [DISTWIDTH] IN BLOOD BY AUTOMATED COUNT: 14.3 % (ref 11.5–14.5)
ERYTHROCYTE [DISTWIDTH] IN BLOOD BY AUTOMATED COUNT: 14.4 % (ref 11.5–14.5)
ERYTHROCYTE [DISTWIDTH] IN BLOOD BY AUTOMATED COUNT: 14.6 % (ref 11.5–14.5)
ERYTHROCYTE [DISTWIDTH] IN BLOOD BY AUTOMATED COUNT: 14.7 % (ref 11.5–14.5)
EST. GFR  (AFRICAN AMERICAN): 43 ML/MIN/1.73 M^2
EST. GFR  (AFRICAN AMERICAN): 47 ML/MIN/1.73 M^2
EST. GFR  (AFRICAN AMERICAN): 47 ML/MIN/1.73 M^2
EST. GFR  (NO RACE VARIABLE): 16 ML/MIN/1.73 M^2
EST. GFR  (NO RACE VARIABLE): 32 ML/MIN/1.73 M^2
EST. GFR  (NO RACE VARIABLE): 43 ML/MIN/1.73 M^2
EST. GFR  (NO RACE VARIABLE): 47 ML/MIN/1.73 M^2
EST. GFR  (NON AFRICAN AMERICAN): 37 ML/MIN/1.73 M^2
EST. GFR  (NON AFRICAN AMERICAN): 41 ML/MIN/1.73 M^2
EST. GFR  (NON AFRICAN AMERICAN): 41 ML/MIN/1.73 M^2
ESTIMATED AVG GLUCOSE: 151 MG/DL (ref 68–131)
ESTIMATED AVG GLUCOSE: 349 MG/DL (ref 68–131)
FERRITIN SERPL-MCNC: 649 NG/ML (ref 20–300)
FERRITIN SERPL-MCNC: 780 NG/ML (ref 20–300)
FERRITIN SERPL-MCNC: 977 NG/ML (ref 20–300)
FRACTIONAL SHORTENING: 23 % (ref 28–44)
GIANT PLATELETS BLD QL SMEAR: PRESENT
GIANT PLATELETS BLD QL SMEAR: PRESENT
GLUCOSE SERPL-MCNC: 133 MG/DL (ref 70–110)
GLUCOSE SERPL-MCNC: 163 MG/DL (ref 70–110)
GLUCOSE SERPL-MCNC: 165 MG/DL (ref 70–110)
GLUCOSE SERPL-MCNC: 201 MG/DL (ref 70–110)
GLUCOSE SERPL-MCNC: 224 MG/DL (ref 70–110)
GLUCOSE SERPL-MCNC: 443 MG/DL (ref 70–110)
GLUCOSE UR QL STRIP: NEGATIVE
HBA1C MFR BLD: 13.8 % (ref 4–5.6)
HBA1C MFR BLD: 6.9 % (ref 4–5.6)
HCT VFR BLD AUTO: 29.4 % (ref 37–48.5)
HCT VFR BLD AUTO: 29.4 % (ref 37–48.5)
HCT VFR BLD AUTO: 29.9 % (ref 37–48.5)
HCT VFR BLD AUTO: 30.1 % (ref 37–48.5)
HCT VFR BLD AUTO: 31.7 % (ref 37–48.5)
HCT VFR BLD AUTO: 32.6 % (ref 37–48.5)
HCT VFR BLD AUTO: 32.7 % (ref 37–48.5)
HGB BLD-MCNC: 10.2 G/DL (ref 12–16)
HGB BLD-MCNC: 10.3 G/DL (ref 12–16)
HGB BLD-MCNC: 9.4 G/DL (ref 12–16)
HGB BLD-MCNC: 9.8 G/DL (ref 12–16)
HGB BLD-MCNC: 9.9 G/DL (ref 12–16)
HGB UR QL STRIP: NEGATIVE
HYALINE CASTS #/AREA URNS LPF: 9 /LPF
IMM GRANULOCYTES # BLD AUTO: 0.03 K/UL (ref 0–0.04)
IMM GRANULOCYTES # BLD AUTO: 0.04 K/UL (ref 0–0.04)
IMM GRANULOCYTES # BLD AUTO: 0.05 K/UL (ref 0–0.04)
IMM GRANULOCYTES # BLD AUTO: 0.05 K/UL (ref 0–0.04)
IMM GRANULOCYTES # BLD AUTO: 0.06 K/UL (ref 0–0.04)
IMM GRANULOCYTES # BLD AUTO: 0.07 K/UL (ref 0–0.04)
IMM GRANULOCYTES # BLD AUTO: ABNORMAL K/UL (ref 0–0.04)
IMM GRANULOCYTES NFR BLD AUTO: 0.5 % (ref 0–0.5)
IMM GRANULOCYTES NFR BLD AUTO: 0.6 % (ref 0–0.5)
IMM GRANULOCYTES NFR BLD AUTO: 0.8 % (ref 0–0.5)
IMM GRANULOCYTES NFR BLD AUTO: 1 % (ref 0–0.5)
IMM GRANULOCYTES NFR BLD AUTO: ABNORMAL % (ref 0–0.5)
INR PPP: 1.4 (ref 0.8–1.2)
INTERVENTRICULAR SEPTUM: 1.28 CM (ref 0.6–1.1)
IRON SERPL-MCNC: 52 UG/DL (ref 30–160)
IRON SERPL-MCNC: 57 UG/DL (ref 30–160)
IRON SERPL-MCNC: 616 UG/DL (ref 30–160)
IVC DIAMETER: 2.74 CM
IVRT: 32.35 MSEC
KETONES UR QL STRIP: NEGATIVE
LA MAJOR: 4.93 CM
LA MINOR: 4.63 CM
LACTATE SERPL-SCNC: 1.9 MMOL/L (ref 0.5–2.2)
LDH SERPL L TO P-CCNC: 202 U/L (ref 110–260)
LDH SERPL L TO P-CCNC: 232 U/L (ref 110–260)
LEFT ATRIUM SIZE: 3.94 CM
LEFT INTERNAL DIMENSION IN SYSTOLE: 4.03 CM (ref 2.1–4)
LEFT VENTRICLE DIASTOLIC VOLUME INDEX: 62.86 ML/M2
LEFT VENTRICLE DIASTOLIC VOLUME: 130.12 ML
LEFT VENTRICLE MASS INDEX: 147 G/M2
LEFT VENTRICLE SYSTOLIC VOLUME INDEX: 34.5 ML/M2
LEFT VENTRICLE SYSTOLIC VOLUME: 71.39 ML
LEFT VENTRICULAR INTERNAL DIMENSION IN DIASTOLE: 5.21 CM (ref 3.5–6)
LEFT VENTRICULAR MASS: 304.15 G
LEUKOCYTE ESTERASE UR QL STRIP: NEGATIVE
LIPASE SERPL-CCNC: 7 U/L (ref 4–60)
LVOT MG: 4.13 MMHG
LVOT MV: 0.97 CM/S
LYMPHOCYTES # BLD AUTO: 0.4 K/UL (ref 1–4.8)
LYMPHOCYTES # BLD AUTO: 0.4 K/UL (ref 1–4.8)
LYMPHOCYTES # BLD AUTO: 0.6 K/UL (ref 1–4.8)
LYMPHOCYTES # BLD AUTO: 0.8 K/UL (ref 1–4.8)
LYMPHOCYTES # BLD AUTO: 1 K/UL (ref 1–4.8)
LYMPHOCYTES # BLD AUTO: 1 K/UL (ref 1–4.8)
LYMPHOCYTES # BLD AUTO: ABNORMAL K/UL (ref 1–4.8)
LYMPHOCYTES NFR BLD: 12.8 % (ref 18–48)
LYMPHOCYTES NFR BLD: 14.1 % (ref 18–48)
LYMPHOCYTES NFR BLD: 16.6 % (ref 18–48)
LYMPHOCYTES NFR BLD: 3 % (ref 18–48)
LYMPHOCYTES NFR BLD: 5.4 % (ref 18–48)
LYMPHOCYTES NFR BLD: 5.4 % (ref 18–48)
LYMPHOCYTES NFR BLD: 7.3 % (ref 18–48)
MAGNESIUM SERPL-MCNC: 1.6 MG/DL (ref 1.6–2.6)
MCH RBC QN AUTO: 30.5 PG (ref 27–31)
MCH RBC QN AUTO: 30.9 PG (ref 27–31)
MCH RBC QN AUTO: 32.5 PG (ref 27–31)
MCH RBC QN AUTO: 33 PG (ref 27–31)
MCH RBC QN AUTO: 33 PG (ref 27–31)
MCH RBC QN AUTO: 33.1 PG (ref 27–31)
MCH RBC QN AUTO: 33.1 PG (ref 27–31)
MCHC RBC AUTO-ENTMCNC: 31.2 G/DL (ref 32–36)
MCHC RBC AUTO-ENTMCNC: 31.3 G/DL (ref 32–36)
MCHC RBC AUTO-ENTMCNC: 31.4 G/DL (ref 32–36)
MCHC RBC AUTO-ENTMCNC: 31.5 G/DL (ref 32–36)
MCHC RBC AUTO-ENTMCNC: 32.6 G/DL (ref 32–36)
MCHC RBC AUTO-ENTMCNC: 33.3 G/DL (ref 32–36)
MCHC RBC AUTO-ENTMCNC: 33.3 G/DL (ref 32–36)
MCV RBC AUTO: 101 FL (ref 82–98)
MCV RBC AUTO: 103 FL (ref 82–98)
MCV RBC AUTO: 106 FL (ref 82–98)
MCV RBC AUTO: 98 FL (ref 82–98)
MCV RBC AUTO: 98 FL (ref 82–98)
MCV RBC AUTO: 99 FL (ref 82–98)
MCV RBC AUTO: 99 FL (ref 82–98)
MICROSCOPIC COMMENT: ABNORMAL
MONOCYTES # BLD AUTO: 0.6 K/UL (ref 0.3–1)
MONOCYTES # BLD AUTO: 0.7 K/UL (ref 0.3–1)
MONOCYTES # BLD AUTO: 0.9 K/UL (ref 0.3–1)
MONOCYTES # BLD AUTO: 0.9 K/UL (ref 0.3–1)
MONOCYTES # BLD AUTO: ABNORMAL K/UL (ref 0.3–1)
MONOCYTES NFR BLD: 10.3 % (ref 4–15)
MONOCYTES NFR BLD: 10.9 % (ref 4–15)
MONOCYTES NFR BLD: 10.9 % (ref 4–15)
MONOCYTES NFR BLD: 12 % (ref 4–15)
MONOCYTES NFR BLD: 8.7 % (ref 4–15)
MONOCYTES NFR BLD: 8.9 % (ref 4–15)
MONOCYTES NFR BLD: 9.7 % (ref 4–15)
MV PEAK A VEL: 0.42 M/S
MV PEAK E VEL: 1.16 M/S
MV STENOSIS PRESSURE HALF TIME: 36.32 MS
MV VALVE AREA P 1/2 METHOD: 6.06 CM2
NEUTROPHILS # BLD AUTO: 4 K/UL (ref 1.8–7.7)
NEUTROPHILS # BLD AUTO: 4.1 K/UL (ref 1.8–7.7)
NEUTROPHILS # BLD AUTO: 5.1 K/UL (ref 1.8–7.7)
NEUTROPHILS # BLD AUTO: 6.7 K/UL (ref 1.8–7.7)
NEUTROPHILS # BLD AUTO: 6.7 K/UL (ref 1.8–7.7)
NEUTROPHILS # BLD AUTO: 6.9 K/UL (ref 1.8–7.7)
NEUTROPHILS NFR BLD: 68 % (ref 38–73)
NEUTROPHILS NFR BLD: 69.3 % (ref 38–73)
NEUTROPHILS NFR BLD: 73.6 % (ref 38–73)
NEUTROPHILS NFR BLD: 75 % (ref 38–73)
NEUTROPHILS NFR BLD: 82.7 % (ref 38–73)
NEUTROPHILS NFR BLD: 82.7 % (ref 38–73)
NEUTROPHILS NFR BLD: 83.2 % (ref 38–73)
NEUTS BAND NFR BLD MANUAL: 9 %
NITRITE UR QL STRIP: NEGATIVE
NRBC BLD-RTO: 0 /100 WBC
PH UR STRIP: 6 [PH] (ref 5–8)
PISA MRMAX VEL: 4.18 M/S
PISA TR MAX VEL: 3.86 M/S
PLATELET # BLD AUTO: 157 K/UL (ref 150–450)
PLATELET # BLD AUTO: 167 K/UL (ref 150–450)
PLATELET # BLD AUTO: 179 K/UL (ref 150–450)
PLATELET # BLD AUTO: 199 K/UL (ref 150–450)
PLATELET # BLD AUTO: 199 K/UL (ref 150–450)
PLATELET # BLD AUTO: 200 K/UL (ref 150–450)
PLATELET # BLD AUTO: 231 K/UL (ref 150–450)
PLATELET BLD QL SMEAR: ABNORMAL
PLATELET BLD QL SMEAR: ABNORMAL
PMV BLD AUTO: 10 FL (ref 9.2–12.9)
PMV BLD AUTO: 10 FL (ref 9.2–12.9)
PMV BLD AUTO: 10.1 FL (ref 9.2–12.9)
PMV BLD AUTO: 10.1 FL (ref 9.2–12.9)
PMV BLD AUTO: 10.2 FL (ref 9.2–12.9)
PMV BLD AUTO: 10.3 FL (ref 9.2–12.9)
PMV BLD AUTO: 9.8 FL (ref 9.2–12.9)
POCT GLUCOSE: 152 MG/DL (ref 70–110)
POCT GLUCOSE: 154 MG/DL (ref 70–110)
POCT GLUCOSE: 156 MG/DL (ref 70–110)
POCT GLUCOSE: 165 MG/DL (ref 70–110)
POCT GLUCOSE: 175 MG/DL (ref 70–110)
POCT GLUCOSE: 176 MG/DL (ref 70–110)
POCT GLUCOSE: 202 MG/DL (ref 70–110)
POCT GLUCOSE: 212 MG/DL (ref 70–110)
POCT GLUCOSE: 222 MG/DL (ref 70–110)
POTASSIUM SERPL-SCNC: 4 MMOL/L (ref 3.5–5.1)
POTASSIUM SERPL-SCNC: 4.1 MMOL/L (ref 3.5–5.1)
POTASSIUM SERPL-SCNC: 4.2 MMOL/L (ref 3.5–5.1)
POTASSIUM SERPL-SCNC: 4.2 MMOL/L (ref 3.5–5.1)
POTASSIUM SERPL-SCNC: 4.4 MMOL/L (ref 3.5–5.1)
POTASSIUM SERPL-SCNC: 4.7 MMOL/L (ref 3.5–5.1)
PROT SERPL-MCNC: 7.7 G/DL (ref 6–8.4)
PROT SERPL-MCNC: 8.5 G/DL (ref 6–8.4)
PROT SERPL-MCNC: 8.9 G/DL (ref 6–8.4)
PROT SERPL-MCNC: 9.1 G/DL (ref 6–8.4)
PROT SERPL-MCNC: 9.6 G/DL (ref 6–8.4)
PROT UR QL STRIP: ABNORMAL
PROTHROMBIN TIME: 14.3 SEC (ref 9–12.5)
PV MEAN GRADIENT: 1.28 MMHG
RA MAJOR: 4.85 CM
RA PRESSURE: 15 MMHG
RBC # BLD AUTO: 2.96 M/UL (ref 4–5.4)
RBC # BLD AUTO: 2.96 M/UL (ref 4–5.4)
RBC # BLD AUTO: 2.97 M/UL (ref 4–5.4)
RBC # BLD AUTO: 3 M/UL (ref 4–5.4)
RBC # BLD AUTO: 3.04 M/UL (ref 4–5.4)
RBC # BLD AUTO: 3.17 M/UL (ref 4–5.4)
RBC # BLD AUTO: 3.34 M/UL (ref 4–5.4)
RBC #/AREA URNS HPF: 0 /HPF (ref 0–4)
RIGHT VENTRICULAR END-DIASTOLIC DIMENSION: 3.48 CM
SARS-COV-2 RDRP RESP QL NAA+PROBE: NEGATIVE
SATURATED IRON: 14 % (ref 20–50)
SATURATED IRON: 14 % (ref 20–50)
SATURATED IRON: 177 % (ref 20–50)
SODIUM SERPL-SCNC: 127 MMOL/L (ref 136–145)
SODIUM SERPL-SCNC: 131 MMOL/L (ref 136–145)
SODIUM SERPL-SCNC: 132 MMOL/L (ref 136–145)
SODIUM SERPL-SCNC: 133 MMOL/L (ref 136–145)
SODIUM SERPL-SCNC: 138 MMOL/L (ref 136–145)
SODIUM SERPL-SCNC: 140 MMOL/L (ref 136–145)
SP GR UR STRIP: 1.02 (ref 1–1.03)
SQUAMOUS #/AREA URNS HPF: 5 /HPF
STJ: 2.66 CM
TOTAL IRON BINDING CAPACITY: 348 UG/DL (ref 250–450)
TOTAL IRON BINDING CAPACITY: 379 UG/DL (ref 250–450)
TOTAL IRON BINDING CAPACITY: 403 UG/DL (ref 250–450)
TR MAX PG: 60 MMHG
TR MEAN GRADIENT: 44 MMHG
TRANSFERRIN SERPL-MCNC: 235 MG/DL (ref 200–375)
TRANSFERRIN SERPL-MCNC: 256 MG/DL (ref 200–375)
TRANSFERRIN SERPL-MCNC: 272 MG/DL (ref 200–375)
TROPONIN I SERPL DL<=0.01 NG/ML-MCNC: 0.02 NG/ML (ref 0–0.03)
TROPONIN I SERPL DL<=0.01 NG/ML-MCNC: 0.02 NG/ML (ref 0–0.03)
TROPONIN I SERPL DL<=0.01 NG/ML-MCNC: 0.03 NG/ML (ref 0–0.03)
TV REST PULMONARY ARTERY PRESSURE: 75 MMHG
URN SPEC COLLECT METH UR: ABNORMAL
UROBILINOGEN UR STRIP-ACNC: NEGATIVE EU/DL
WBC # BLD AUTO: 5.8 K/UL (ref 3.9–12.7)
WBC # BLD AUTO: 6.08 K/UL (ref 3.9–12.7)
WBC # BLD AUTO: 6.96 K/UL (ref 3.9–12.7)
WBC # BLD AUTO: 8.08 K/UL (ref 3.9–12.7)
WBC # BLD AUTO: 8.08 K/UL (ref 3.9–12.7)
WBC # BLD AUTO: 8.19 K/UL (ref 3.9–12.7)
WBC # BLD AUTO: 8.31 K/UL (ref 3.9–12.7)
WBC #/AREA URNS HPF: 2 /HPF (ref 0–5)

## 2022-01-01 PROCEDURE — 36415 COLL VENOUS BLD VENIPUNCTURE: CPT | Performed by: NURSE PRACTITIONER

## 2022-01-01 PROCEDURE — 94761 N-INVAS EAR/PLS OXIMETRY MLT: CPT

## 2022-01-01 PROCEDURE — A4216 STERILE WATER/SALINE, 10 ML: HCPCS | Performed by: INTERNAL MEDICINE

## 2022-01-01 PROCEDURE — 96361 HYDRATE IV INFUSION ADD-ON: CPT

## 2022-01-01 PROCEDURE — 93010 EKG 12-LEAD: ICD-10-PCS | Mod: 76,,, | Performed by: INTERNAL MEDICINE

## 2022-01-01 PROCEDURE — 25000003 PHARM REV CODE 250: Performed by: NURSE PRACTITIONER

## 2022-01-01 PROCEDURE — 99214 OFFICE O/P EST MOD 30 MIN: CPT | Mod: S$GLB,,, | Performed by: INTERNAL MEDICINE

## 2022-01-01 PROCEDURE — 94640 AIRWAY INHALATION TREATMENT: CPT

## 2022-01-01 PROCEDURE — 3074F PR MOST RECENT SYSTOLIC BLOOD PRESSURE < 130 MM HG: ICD-10-PCS | Mod: CPTII,S$GLB,, | Performed by: FAMILY MEDICINE

## 2022-01-01 PROCEDURE — 25000003 PHARM REV CODE 250: Performed by: INTERNAL MEDICINE

## 2022-01-01 PROCEDURE — 84484 ASSAY OF TROPONIN QUANT: CPT | Mod: 91 | Performed by: NURSE PRACTITIONER

## 2022-01-01 PROCEDURE — 86316 IMMUNOASSAY TUMOR OTHER: CPT | Performed by: INTERNAL MEDICINE

## 2022-01-01 PROCEDURE — 3077F PR MOST RECENT SYSTOLIC BLOOD PRESSURE >= 140 MM HG: ICD-10-PCS | Mod: CPTII,S$GLB,, | Performed by: INTERNAL MEDICINE

## 2022-01-01 PROCEDURE — 99999 PR PBB SHADOW E&M-EST. PATIENT-LVL V: ICD-10-PCS | Mod: PBBFAC,,, | Performed by: NURSE PRACTITIONER

## 2022-01-01 PROCEDURE — 3075F SYST BP GE 130 - 139MM HG: CPT | Mod: CPTII,S$GLB,, | Performed by: FAMILY MEDICINE

## 2022-01-01 PROCEDURE — 99499 RISK ADDL DX/OHS AUDIT: ICD-10-PCS | Mod: ,,, | Performed by: FAMILY MEDICINE

## 2022-01-01 PROCEDURE — 99999 PR PBB SHADOW E&M-EST. PATIENT-LVL IV: CPT | Mod: PBBFAC,,, | Performed by: FAMILY MEDICINE

## 2022-01-01 PROCEDURE — 84466 ASSAY OF TRANSFERRIN: CPT | Performed by: INTERNAL MEDICINE

## 2022-01-01 PROCEDURE — 27000221 HC OXYGEN, UP TO 24 HOURS

## 2022-01-01 PROCEDURE — 96374 THER/PROPH/DIAG INJ IV PUSH: CPT

## 2022-01-01 PROCEDURE — 99222 1ST HOSP IP/OBS MODERATE 55: CPT | Mod: ,,, | Performed by: INTERNAL MEDICINE

## 2022-01-01 PROCEDURE — 76536 US EXAM OF HEAD AND NECK: CPT | Mod: TC

## 2022-01-01 PROCEDURE — 99999 PR PBB SHADOW E&M-EST. PATIENT-LVL V: CPT | Mod: PBBFAC,,, | Performed by: FAMILY MEDICINE

## 2022-01-01 PROCEDURE — 99900035 HC TECH TIME PER 15 MIN (STAT)

## 2022-01-01 PROCEDURE — 63600175 PHARM REV CODE 636 W HCPCS: Performed by: INTERNAL MEDICINE

## 2022-01-01 PROCEDURE — 99499 UNLISTED E&M SERVICE: CPT | Mod: ,,, | Performed by: FAMILY MEDICINE

## 2022-01-01 PROCEDURE — 25000003 PHARM REV CODE 250: Performed by: EMERGENCY MEDICINE

## 2022-01-01 PROCEDURE — 3078F DIAST BP <80 MM HG: CPT | Mod: CPTII,S$GLB,, | Performed by: INTERNAL MEDICINE

## 2022-01-01 PROCEDURE — 3074F SYST BP LT 130 MM HG: CPT | Mod: CPTII,S$GLB,, | Performed by: FAMILY MEDICINE

## 2022-01-01 PROCEDURE — 99499 RISK ADDL DX/OHS AUDIT: ICD-10-PCS | Mod: S$GLB,,, | Performed by: FAMILY MEDICINE

## 2022-01-01 PROCEDURE — 3078F PR MOST RECENT DIASTOLIC BLOOD PRESSURE < 80 MM HG: ICD-10-PCS | Mod: CPTII,S$GLB,, | Performed by: FAMILY MEDICINE

## 2022-01-01 PROCEDURE — 3078F PR MOST RECENT DIASTOLIC BLOOD PRESSURE < 80 MM HG: ICD-10-PCS | Mod: CPTII,S$GLB,, | Performed by: INTERNAL MEDICINE

## 2022-01-01 PROCEDURE — 3078F PR MOST RECENT DIASTOLIC BLOOD PRESSURE < 80 MM HG: ICD-10-PCS | Mod: CPTII,S$GLB,, | Performed by: NURSE PRACTITIONER

## 2022-01-01 PROCEDURE — G0009 PNEUMOCOCCAL POLYSACCHARIDE VACCINE 23-VALENT =>2YO SQ IM: ICD-10-PCS | Mod: S$GLB,,, | Performed by: FAMILY MEDICINE

## 2022-01-01 PROCEDURE — 99214 PR OFFICE/OUTPT VISIT, EST, LEVL IV, 30-39 MIN: ICD-10-PCS | Mod: S$GLB,,, | Performed by: FAMILY MEDICINE

## 2022-01-01 PROCEDURE — 83036 HEMOGLOBIN GLYCOSYLATED A1C: CPT | Performed by: FAMILY MEDICINE

## 2022-01-01 PROCEDURE — 3008F BODY MASS INDEX DOCD: CPT | Mod: CPTII,S$GLB,, | Performed by: FAMILY MEDICINE

## 2022-01-01 PROCEDURE — 1160F RVW MEDS BY RX/DR IN RCRD: CPT | Mod: CPTII,S$GLB,, | Performed by: INTERNAL MEDICINE

## 2022-01-01 PROCEDURE — 36415 COLL VENOUS BLD VENIPUNCTURE: CPT | Performed by: INTERNAL MEDICINE

## 2022-01-01 PROCEDURE — 76536 US SOFT TISSUE HEAD NECK THYROID: ICD-10-PCS | Mod: 26,,, | Performed by: RADIOLOGY

## 2022-01-01 PROCEDURE — 85379 FIBRIN DEGRADATION QUANT: CPT | Performed by: EMERGENCY MEDICINE

## 2022-01-01 PROCEDURE — 83690 ASSAY OF LIPASE: CPT | Performed by: EMERGENCY MEDICINE

## 2022-01-01 PROCEDURE — 3046F PR MOST RECENT HEMOGLOBIN A1C LEVEL > 9.0%: ICD-10-PCS | Mod: CPTII,S$GLB,, | Performed by: INTERNAL MEDICINE

## 2022-01-01 PROCEDURE — 21400001 HC TELEMETRY ROOM

## 2022-01-01 PROCEDURE — A9698 NON-RAD CONTRAST MATERIALNOC: HCPCS | Performed by: INTERNAL MEDICINE

## 2022-01-01 PROCEDURE — 93005 ELECTROCARDIOGRAM TRACING: CPT

## 2022-01-01 PROCEDURE — 1159F PR MEDICATION LIST DOCUMENTED IN MEDICAL RECORD: ICD-10-PCS | Mod: CPTII,S$GLB,, | Performed by: INTERNAL MEDICINE

## 2022-01-01 PROCEDURE — 96375 TX/PRO/DX INJ NEW DRUG ADDON: CPT

## 2022-01-01 PROCEDURE — 99214 OFFICE O/P EST MOD 30 MIN: CPT | Mod: S$GLB,,, | Performed by: FAMILY MEDICINE

## 2022-01-01 PROCEDURE — 25500020 PHARM REV CODE 255: Performed by: EMERGENCY MEDICINE

## 2022-01-01 PROCEDURE — 3008F PR BODY MASS INDEX (BMI) DOCUMENTED: ICD-10-PCS | Mod: CPTII,S$GLB,, | Performed by: NURSE PRACTITIONER

## 2022-01-01 PROCEDURE — 99499 RISK ADDL DX/OHS AUDIT: ICD-10-PCS | Mod: S$GLB,,, | Performed by: NURSE PRACTITIONER

## 2022-01-01 PROCEDURE — 96523 IRRIG DRUG DELIVERY DEVICE: CPT

## 2022-01-01 PROCEDURE — 82728 ASSAY OF FERRITIN: CPT | Performed by: INTERNAL MEDICINE

## 2022-01-01 PROCEDURE — 25500020 PHARM REV CODE 255: Performed by: INTERNAL MEDICINE

## 2022-01-01 PROCEDURE — G0439 PR MEDICARE ANNUAL WELLNESS SUBSEQUENT VISIT: ICD-10-PCS | Mod: S$GLB,,, | Performed by: NURSE PRACTITIONER

## 2022-01-01 PROCEDURE — 99999 PR PBB SHADOW E&M-EST. PATIENT-LVL V: CPT | Mod: PBBFAC,,, | Performed by: INTERNAL MEDICINE

## 2022-01-01 PROCEDURE — 36590 PR REMOVAL TUNNELED CV CATH W SUBQ PORT OR PUMP: ICD-10-PCS | Mod: S$GLB,,, | Performed by: SURGERY

## 2022-01-01 PROCEDURE — 63600175 PHARM REV CODE 636 W HCPCS: Mod: JG | Performed by: INTERNAL MEDICINE

## 2022-01-01 PROCEDURE — 25000242 PHARM REV CODE 250 ALT 637 W/ HCPCS: Performed by: INTERNAL MEDICINE

## 2022-01-01 PROCEDURE — 3044F PR MOST RECENT HEMOGLOBIN A1C LEVEL <7.0%: ICD-10-PCS | Mod: CPTII,S$GLB,, | Performed by: FAMILY MEDICINE

## 2022-01-01 PROCEDURE — 1159F MED LIST DOCD IN RCRD: CPT | Mod: CPTII,S$GLB,, | Performed by: NURSE PRACTITIONER

## 2022-01-01 PROCEDURE — 99291 CRITICAL CARE FIRST HOUR: CPT | Mod: 25

## 2022-01-01 PROCEDURE — 1159F PR MEDICATION LIST DOCUMENTED IN MEDICAL RECORD: ICD-10-PCS | Mod: CPTII,S$GLB,, | Performed by: FAMILY MEDICINE

## 2022-01-01 PROCEDURE — 85730 THROMBOPLASTIN TIME PARTIAL: CPT | Mod: 91 | Performed by: INTERNAL MEDICINE

## 2022-01-01 PROCEDURE — 99499 UNLISTED E&M SERVICE: CPT | Mod: S$GLB,,, | Performed by: FAMILY MEDICINE

## 2022-01-01 PROCEDURE — 82941 ASSAY OF GASTRIN: CPT | Performed by: INTERNAL MEDICINE

## 2022-01-01 PROCEDURE — 99499 UNLISTED E&M SERVICE: CPT | Mod: S$GLB,,, | Performed by: NURSE PRACTITIONER

## 2022-01-01 PROCEDURE — 83615 LACTATE (LD) (LDH) ENZYME: CPT | Performed by: INTERNAL MEDICINE

## 2022-01-01 PROCEDURE — 76536 US EXAM OF HEAD AND NECK: CPT | Mod: 26,,, | Performed by: RADIOLOGY

## 2022-01-01 PROCEDURE — 99499 UNLISTED E&M SERVICE: CPT | Mod: S$GLB,,, | Performed by: INTERNAL MEDICINE

## 2022-01-01 PROCEDURE — 83735 ASSAY OF MAGNESIUM: CPT | Performed by: NURSE PRACTITIONER

## 2022-01-01 PROCEDURE — 3078F DIAST BP <80 MM HG: CPT | Mod: CPTII,S$GLB,, | Performed by: FAMILY MEDICINE

## 2022-01-01 PROCEDURE — 3075F PR MOST RECENT SYSTOLIC BLOOD PRESS GE 130-139MM HG: ICD-10-PCS | Mod: CPTII,S$GLB,, | Performed by: NURSE PRACTITIONER

## 2022-01-01 PROCEDURE — 3046F HEMOGLOBIN A1C LEVEL >9.0%: CPT | Mod: CPTII,S$GLB,, | Performed by: FAMILY MEDICINE

## 2022-01-01 PROCEDURE — 96365 THER/PROPH/DIAG IV INF INIT: CPT

## 2022-01-01 PROCEDURE — 99999 PR PBB SHADOW E&M-EST. PATIENT-LVL V: ICD-10-PCS | Mod: PBBFAC,,, | Performed by: INTERNAL MEDICINE

## 2022-01-01 PROCEDURE — 36590 REMOVAL TUNNELED CV CATH: CPT | Mod: S$GLB,,, | Performed by: SURGERY

## 2022-01-01 PROCEDURE — 85027 COMPLETE CBC AUTOMATED: CPT | Performed by: EMERGENCY MEDICINE

## 2022-01-01 PROCEDURE — 3008F BODY MASS INDEX DOCD: CPT | Mod: CPTII,S$GLB,, | Performed by: NURSE PRACTITIONER

## 2022-01-01 PROCEDURE — 85025 COMPLETE CBC W/AUTO DIFF WBC: CPT | Performed by: NURSE PRACTITIONER

## 2022-01-01 PROCEDURE — 3008F PR BODY MASS INDEX (BMI) DOCUMENTED: ICD-10-PCS | Mod: CPTII,S$GLB,, | Performed by: FAMILY MEDICINE

## 2022-01-01 PROCEDURE — 99999 PR PBB SHADOW E&M-EST. PATIENT-LVL IV: ICD-10-PCS | Mod: PBBFAC,,, | Performed by: FAMILY MEDICINE

## 2022-01-01 PROCEDURE — 83880 ASSAY OF NATRIURETIC PEPTIDE: CPT | Performed by: EMERGENCY MEDICINE

## 2022-01-01 PROCEDURE — 93010 ELECTROCARDIOGRAM REPORT: CPT | Mod: ,,, | Performed by: INTERNAL MEDICINE

## 2022-01-01 PROCEDURE — 85025 COMPLETE CBC W/AUTO DIFF WBC: CPT | Performed by: INTERNAL MEDICINE

## 2022-01-01 PROCEDURE — 99999 PR PBB SHADOW E&M-EST. PATIENT-LVL V: CPT | Mod: PBBFAC,,, | Performed by: NURSE PRACTITIONER

## 2022-01-01 PROCEDURE — U0002 COVID-19 LAB TEST NON-CDC: HCPCS | Performed by: EMERGENCY MEDICINE

## 2022-01-01 PROCEDURE — 80053 COMPREHEN METABOLIC PANEL: CPT | Performed by: INTERNAL MEDICINE

## 2022-01-01 PROCEDURE — 3046F HEMOGLOBIN A1C LEVEL >9.0%: CPT | Mod: CPTII,S$GLB,, | Performed by: INTERNAL MEDICINE

## 2022-01-01 PROCEDURE — 99233 SBSQ HOSP IP/OBS HIGH 50: CPT | Mod: 25,,, | Performed by: INTERNAL MEDICINE

## 2022-01-01 PROCEDURE — 3008F BODY MASS INDEX DOCD: CPT | Mod: CPTII,S$GLB,, | Performed by: INTERNAL MEDICINE

## 2022-01-01 PROCEDURE — G0009 ADMIN PNEUMOCOCCAL VACCINE: HCPCS | Mod: S$GLB,,, | Performed by: FAMILY MEDICINE

## 2022-01-01 PROCEDURE — 99233 SBSQ HOSP IP/OBS HIGH 50: CPT | Mod: ,,, | Performed by: INTERNAL MEDICINE

## 2022-01-01 PROCEDURE — 99222 PR INITIAL HOSPITAL CARE,LEVL II: ICD-10-PCS | Mod: ,,, | Performed by: INTERNAL MEDICINE

## 2022-01-01 PROCEDURE — 74177 CT ABD & PELVIS W/CONTRAST: CPT | Mod: TC

## 2022-01-01 PROCEDURE — 99233 PR SUBSEQUENT HOSPITAL CARE,LEVL III: ICD-10-PCS | Mod: ,,, | Performed by: INTERNAL MEDICINE

## 2022-01-01 PROCEDURE — 3075F PR MOST RECENT SYSTOLIC BLOOD PRESS GE 130-139MM HG: ICD-10-PCS | Mod: CPTII,S$GLB,, | Performed by: FAMILY MEDICINE

## 2022-01-01 PROCEDURE — 3078F DIAST BP <80 MM HG: CPT | Mod: CPTII,S$GLB,, | Performed by: NURSE PRACTITIONER

## 2022-01-01 PROCEDURE — 71260 CT THORAX DX C+: CPT | Mod: TC

## 2022-01-01 PROCEDURE — 3044F HG A1C LEVEL LT 7.0%: CPT | Mod: CPTII,S$GLB,, | Performed by: FAMILY MEDICINE

## 2022-01-01 PROCEDURE — 1160F PR REVIEW ALL MEDS BY PRESCRIBER/CLIN PHARMACIST DOCUMENTED: ICD-10-PCS | Mod: CPTII,S$GLB,, | Performed by: NURSE PRACTITIONER

## 2022-01-01 PROCEDURE — 1160F RVW MEDS BY RX/DR IN RCRD: CPT | Mod: CPTII,S$GLB,, | Performed by: NURSE PRACTITIONER

## 2022-01-01 PROCEDURE — 80053 COMPREHEN METABOLIC PANEL: CPT | Performed by: NURSE PRACTITIONER

## 2022-01-01 PROCEDURE — 63600175 PHARM REV CODE 636 W HCPCS: Performed by: NURSE PRACTITIONER

## 2022-01-01 PROCEDURE — 3046F PR MOST RECENT HEMOGLOBIN A1C LEVEL > 9.0%: ICD-10-PCS | Mod: CPTII,S$GLB,, | Performed by: FAMILY MEDICINE

## 2022-01-01 PROCEDURE — 99214 PR OFFICE/OUTPT VISIT, EST, LEVL IV, 30-39 MIN: ICD-10-PCS | Mod: S$GLB,,, | Performed by: INTERNAL MEDICINE

## 2022-01-01 PROCEDURE — 1159F MED LIST DOCD IN RCRD: CPT | Mod: CPTII,S$GLB,, | Performed by: FAMILY MEDICINE

## 2022-01-01 PROCEDURE — 85610 PROTHROMBIN TIME: CPT | Performed by: NURSE PRACTITIONER

## 2022-01-01 PROCEDURE — 36415 COLL VENOUS BLD VENIPUNCTURE: CPT | Mod: PO | Performed by: FAMILY MEDICINE

## 2022-01-01 PROCEDURE — 99233 PR SUBSEQUENT HOSPITAL CARE,LEVL III: ICD-10-PCS | Mod: 25,,, | Performed by: INTERNAL MEDICINE

## 2022-01-01 PROCEDURE — 1160F PR REVIEW ALL MEDS BY PRESCRIBER/CLIN PHARMACIST DOCUMENTED: ICD-10-PCS | Mod: CPTII,S$GLB,, | Performed by: INTERNAL MEDICINE

## 2022-01-01 PROCEDURE — 3008F PR BODY MASS INDEX (BMI) DOCUMENTED: ICD-10-PCS | Mod: CPTII,S$GLB,, | Performed by: INTERNAL MEDICINE

## 2022-01-01 PROCEDURE — 3077F SYST BP >= 140 MM HG: CPT | Mod: CPTII,S$GLB,, | Performed by: INTERNAL MEDICINE

## 2022-01-01 PROCEDURE — 85007 BL SMEAR W/DIFF WBC COUNT: CPT | Performed by: EMERGENCY MEDICINE

## 2022-01-01 PROCEDURE — 99499 RISK ADDL DX/OHS AUDIT: ICD-10-PCS | Mod: S$GLB,,, | Performed by: INTERNAL MEDICINE

## 2022-01-01 PROCEDURE — 83036 HEMOGLOBIN GLYCOSYLATED A1C: CPT | Performed by: NURSE PRACTITIONER

## 2022-01-01 PROCEDURE — 84484 ASSAY OF TROPONIN QUANT: CPT | Performed by: EMERGENCY MEDICINE

## 2022-01-01 PROCEDURE — 80053 COMPREHEN METABOLIC PANEL: CPT | Performed by: EMERGENCY MEDICINE

## 2022-01-01 PROCEDURE — 85730 THROMBOPLASTIN TIME PARTIAL: CPT | Performed by: INTERNAL MEDICINE

## 2022-01-01 PROCEDURE — 1159F MED LIST DOCD IN RCRD: CPT | Mod: CPTII,S$GLB,, | Performed by: INTERNAL MEDICINE

## 2022-01-01 PROCEDURE — 87040 BLOOD CULTURE FOR BACTERIA: CPT | Mod: 59 | Performed by: EMERGENCY MEDICINE

## 2022-01-01 PROCEDURE — 90732 PNEUMOCOCCAL POLYSACCHARIDE VACCINE 23-VALENT =>2YO SQ IM: ICD-10-PCS | Mod: S$GLB,,, | Performed by: FAMILY MEDICINE

## 2022-01-01 PROCEDURE — 81000 URINALYSIS NONAUTO W/SCOPE: CPT | Performed by: EMERGENCY MEDICINE

## 2022-01-01 PROCEDURE — 1159F PR MEDICATION LIST DOCUMENTED IN MEDICAL RECORD: ICD-10-PCS | Mod: CPTII,S$GLB,, | Performed by: NURSE PRACTITIONER

## 2022-01-01 PROCEDURE — 85730 THROMBOPLASTIN TIME PARTIAL: CPT | Performed by: NURSE PRACTITIONER

## 2022-01-01 PROCEDURE — 90732 PPSV23 VACC 2 YRS+ SUBQ/IM: CPT | Mod: S$GLB,,, | Performed by: FAMILY MEDICINE

## 2022-01-01 PROCEDURE — 83605 ASSAY OF LACTIC ACID: CPT | Performed by: EMERGENCY MEDICINE

## 2022-01-01 PROCEDURE — 3075F SYST BP GE 130 - 139MM HG: CPT | Mod: CPTII,S$GLB,, | Performed by: NURSE PRACTITIONER

## 2022-01-01 PROCEDURE — 71250 CT THORAX DX C-: CPT | Mod: TC

## 2022-01-01 PROCEDURE — 80048 BASIC METABOLIC PNL TOTAL CA: CPT | Performed by: NURSE PRACTITIONER

## 2022-01-01 PROCEDURE — 99999 PR PBB SHADOW E&M-EST. PATIENT-LVL V: ICD-10-PCS | Mod: PBBFAC,,, | Performed by: FAMILY MEDICINE

## 2022-01-01 PROCEDURE — G0439 PPPS, SUBSEQ VISIT: HCPCS | Mod: S$GLB,,, | Performed by: NURSE PRACTITIONER

## 2022-01-01 RX ORDER — SODIUM CHLORIDE 0.9 % (FLUSH) 0.9 %
10 SYRINGE (ML) INJECTION
Status: CANCELLED | OUTPATIENT
Start: 2022-01-01

## 2022-01-01 RX ORDER — HEPARIN 100 UNIT/ML
500 SYRINGE INTRAVENOUS
Status: CANCELLED | OUTPATIENT
Start: 2022-01-01

## 2022-01-01 RX ORDER — HEPARIN 100 UNIT/ML
500 SYRINGE INTRAVENOUS
Status: DISCONTINUED | OUTPATIENT
Start: 2022-01-01 | End: 2022-01-01 | Stop reason: HOSPADM

## 2022-01-01 RX ORDER — METOPROLOL TARTRATE 50 MG/1
50 TABLET ORAL
Status: COMPLETED | OUTPATIENT
Start: 2022-01-01 | End: 2022-01-01

## 2022-01-01 RX ORDER — SODIUM CHLORIDE 0.9 % (FLUSH) 0.9 %
10 SYRINGE (ML) INJECTION
Status: COMPLETED | OUTPATIENT
Start: 2022-01-01 | End: 2022-01-01

## 2022-01-01 RX ORDER — ADENOSINE 3 MG/ML
6 INJECTION INTRAVENOUS
Status: DISPENSED | OUTPATIENT
Start: 2022-01-01 | End: 2022-01-01

## 2022-01-01 RX ORDER — HEPARIN SODIUM,PORCINE/D5W 25000/250
0-40 INTRAVENOUS SOLUTION INTRAVENOUS CONTINUOUS
Status: DISCONTINUED | OUTPATIENT
Start: 2022-01-01 | End: 2022-01-01

## 2022-01-01 RX ORDER — IBUPROFEN 200 MG
16 TABLET ORAL
Status: DISCONTINUED | OUTPATIENT
Start: 2022-01-01 | End: 2022-01-01 | Stop reason: HOSPADM

## 2022-01-01 RX ORDER — GLUCAGON 1 MG
1 KIT INJECTION
Status: DISCONTINUED | OUTPATIENT
Start: 2022-01-01 | End: 2022-01-01 | Stop reason: HOSPADM

## 2022-01-01 RX ORDER — HEPARIN 100 UNIT/ML
500 SYRINGE INTRAVENOUS
Status: COMPLETED | OUTPATIENT
Start: 2022-01-01 | End: 2022-01-01

## 2022-01-01 RX ORDER — INSULIN GLARGINE 100 [IU]/ML
15 INJECTION, SOLUTION SUBCUTANEOUS NIGHTLY
Qty: 3 ML | Refills: 11 | Status: ON HOLD | OUTPATIENT
Start: 2022-01-01 | End: 2022-01-01 | Stop reason: HOSPADM

## 2022-01-01 RX ORDER — FLUTICASONE PROPIONATE 110 UG/1
2 AEROSOL, METERED RESPIRATORY (INHALATION) DAILY
Status: DISCONTINUED | OUTPATIENT
Start: 2022-01-01 | End: 2022-01-01 | Stop reason: CLARIF

## 2022-01-01 RX ORDER — ATORVASTATIN CALCIUM 10 MG/1
20 TABLET, FILM COATED ORAL DAILY
Status: DISCONTINUED | OUTPATIENT
Start: 2022-01-01 | End: 2022-01-01 | Stop reason: HOSPADM

## 2022-01-01 RX ORDER — AMIODARONE HYDROCHLORIDE 200 MG/1
200 TABLET ORAL 2 TIMES DAILY
Status: DISCONTINUED | OUTPATIENT
Start: 2022-01-01 | End: 2022-01-01 | Stop reason: HOSPADM

## 2022-01-01 RX ORDER — ESCITALOPRAM OXALATE 10 MG/1
10 TABLET ORAL DAILY
Status: DISCONTINUED | OUTPATIENT
Start: 2022-01-01 | End: 2022-01-01 | Stop reason: HOSPADM

## 2022-01-01 RX ORDER — GLIPIZIDE 10 MG/1
10 TABLET ORAL 2 TIMES DAILY
Qty: 180 TABLET | Refills: 3 | Status: SHIPPED | OUTPATIENT
Start: 2022-01-01

## 2022-01-01 RX ORDER — INSULIN ASPART 100 [IU]/ML
0-5 INJECTION, SOLUTION INTRAVENOUS; SUBCUTANEOUS
Status: DISCONTINUED | OUTPATIENT
Start: 2022-01-01 | End: 2022-01-01 | Stop reason: HOSPADM

## 2022-01-01 RX ORDER — METFORMIN HYDROCHLORIDE 500 MG/1
500 TABLET ORAL
Start: 2022-01-01

## 2022-01-01 RX ORDER — HYDROCODONE BITARTRATE AND ACETAMINOPHEN 10; 325 MG/1; MG/1
1 TABLET ORAL EVERY 6 HOURS PRN
Status: DISCONTINUED | OUTPATIENT
Start: 2022-01-01 | End: 2022-01-01 | Stop reason: HOSPADM

## 2022-01-01 RX ORDER — METOPROLOL TARTRATE 1 MG/ML
5 INJECTION, SOLUTION INTRAVENOUS
Status: COMPLETED | OUTPATIENT
Start: 2022-01-01 | End: 2022-01-01

## 2022-01-01 RX ORDER — LORAZEPAM 2 MG/ML
1 INJECTION INTRAMUSCULAR ONCE
Status: DISCONTINUED | OUTPATIENT
Start: 2022-01-01 | End: 2022-01-01 | Stop reason: HOSPADM

## 2022-01-01 RX ORDER — TALC
6 POWDER (GRAM) TOPICAL NIGHTLY PRN
Status: DISCONTINUED | OUTPATIENT
Start: 2022-01-01 | End: 2022-01-01 | Stop reason: HOSPADM

## 2022-01-01 RX ORDER — SODIUM CHLORIDE 0.9 % (FLUSH) 0.9 %
10 SYRINGE (ML) INJECTION EVERY 12 HOURS PRN
Status: DISCONTINUED | OUTPATIENT
Start: 2022-01-01 | End: 2022-01-01 | Stop reason: HOSPADM

## 2022-01-01 RX ORDER — ONDANSETRON 2 MG/ML
4 INJECTION INTRAMUSCULAR; INTRAVENOUS EVERY 8 HOURS PRN
Status: DISCONTINUED | OUTPATIENT
Start: 2022-01-01 | End: 2022-01-01 | Stop reason: HOSPADM

## 2022-01-01 RX ORDER — SODIUM CHLORIDE 0.9 % (FLUSH) 0.9 %
10 SYRINGE (ML) INJECTION
Status: DISCONTINUED | OUTPATIENT
Start: 2022-01-01 | End: 2022-01-01 | Stop reason: HOSPADM

## 2022-01-01 RX ORDER — PEN NEEDLE, DIABETIC 30 GX3/16"
1 NEEDLE, DISPOSABLE MISCELLANEOUS DAILY
Qty: 100 EACH | Refills: 3 | Status: SHIPPED | OUTPATIENT
Start: 2022-01-01

## 2022-01-01 RX ORDER — ZOLPIDEM TARTRATE 10 MG/1
10 TABLET ORAL NIGHTLY PRN
Qty: 30 TABLET | Refills: 2 | Status: SHIPPED | OUTPATIENT
Start: 2022-01-01

## 2022-01-01 RX ORDER — IBUPROFEN 200 MG
24 TABLET ORAL
Status: DISCONTINUED | OUTPATIENT
Start: 2022-01-01 | End: 2022-01-01 | Stop reason: HOSPADM

## 2022-01-01 RX ORDER — TRAZODONE HYDROCHLORIDE 100 MG/1
100 TABLET ORAL NIGHTLY
Qty: 30 TABLET | Refills: 11 | Status: SHIPPED | OUTPATIENT
Start: 2022-01-01 | End: 2023-06-28

## 2022-01-01 RX ORDER — BUDESONIDE 0.5 MG/2ML
0.5 INHALANT ORAL EVERY 12 HOURS
Status: DISCONTINUED | OUTPATIENT
Start: 2022-01-01 | End: 2022-01-01 | Stop reason: HOSPADM

## 2022-01-01 RX ORDER — HYDROCODONE BITARTRATE AND ACETAMINOPHEN 5; 325 MG/1; MG/1
1 TABLET ORAL EVERY 6 HOURS PRN
Status: DISCONTINUED | OUTPATIENT
Start: 2022-01-01 | End: 2022-01-01 | Stop reason: HOSPADM

## 2022-01-01 RX ORDER — FUROSEMIDE 10 MG/ML
40 INJECTION INTRAMUSCULAR; INTRAVENOUS ONCE
Status: DISCONTINUED | OUTPATIENT
Start: 2022-01-01 | End: 2022-01-01

## 2022-01-01 RX ORDER — ENOXAPARIN SODIUM 100 MG/ML
1 INJECTION SUBCUTANEOUS
Status: DISCONTINUED | OUTPATIENT
Start: 2022-01-01 | End: 2022-01-01

## 2022-01-01 RX ORDER — FUROSEMIDE 10 MG/ML
40 INJECTION INTRAMUSCULAR; INTRAVENOUS ONCE
Status: COMPLETED | OUTPATIENT
Start: 2022-01-01 | End: 2022-01-01

## 2022-01-01 RX ORDER — ZOLPIDEM TARTRATE 5 MG/1
10 TABLET ORAL NIGHTLY PRN
Status: DISCONTINUED | OUTPATIENT
Start: 2022-01-01 | End: 2022-01-01 | Stop reason: HOSPADM

## 2022-01-01 RX ORDER — INSULIN PUMP SYRINGE, 3 ML
EACH MISCELLANEOUS
Qty: 1 EACH | Refills: 0 | Status: ON HOLD | OUTPATIENT
Start: 2022-01-01 | End: 2022-01-01 | Stop reason: HOSPADM

## 2022-01-01 RX ORDER — AMIODARONE HYDROCHLORIDE 200 MG/1
200 TABLET ORAL 2 TIMES DAILY
Qty: 60 TABLET | Refills: 0 | Status: SHIPPED | OUTPATIENT
Start: 2022-01-01 | End: 2023-09-04

## 2022-01-01 RX ORDER — ZOLPIDEM TARTRATE 10 MG/1
10 TABLET ORAL NIGHTLY PRN
Qty: 30 TABLET | Refills: 2 | Status: SHIPPED | OUTPATIENT
Start: 2022-01-01 | End: 2022-01-01 | Stop reason: SDUPTHER

## 2022-01-01 RX ORDER — METOPROLOL SUCCINATE 50 MG/1
50 TABLET, EXTENDED RELEASE ORAL DAILY
Status: DISCONTINUED | OUTPATIENT
Start: 2022-01-01 | End: 2022-01-01

## 2022-01-01 RX ORDER — DILTIAZEM HYDROCHLORIDE 120 MG/1
120 CAPSULE, COATED, EXTENDED RELEASE ORAL DAILY
Status: DISCONTINUED | OUTPATIENT
Start: 2022-01-01 | End: 2022-01-01 | Stop reason: HOSPADM

## 2022-01-01 RX ORDER — SODIUM CHLORIDE 9 MG/ML
INJECTION, SOLUTION INTRAVENOUS CONTINUOUS
Status: DISCONTINUED | OUTPATIENT
Start: 2022-01-01 | End: 2022-01-01

## 2022-01-01 RX ORDER — ATORVASTATIN CALCIUM 20 MG/1
20 TABLET, FILM COATED ORAL DAILY
Qty: 90 TABLET | Refills: 3 | Status: SHIPPED | OUTPATIENT
Start: 2022-01-01 | End: 2023-03-22

## 2022-01-01 RX ORDER — NALOXONE HCL 0.4 MG/ML
0.02 VIAL (ML) INJECTION
Status: DISCONTINUED | OUTPATIENT
Start: 2022-01-01 | End: 2022-01-01 | Stop reason: HOSPADM

## 2022-01-01 RX ORDER — TRAZODONE HYDROCHLORIDE 100 MG/1
100 TABLET ORAL NIGHTLY
Status: DISCONTINUED | OUTPATIENT
Start: 2022-01-01 | End: 2022-01-01 | Stop reason: HOSPADM

## 2022-01-01 RX ORDER — METOPROLOL SUCCINATE 50 MG/1
100 TABLET, EXTENDED RELEASE ORAL 2 TIMES DAILY
Status: DISCONTINUED | OUTPATIENT
Start: 2022-01-01 | End: 2022-01-01 | Stop reason: HOSPADM

## 2022-01-01 RX ORDER — METOPROLOL TARTRATE 1 MG/ML
5 INJECTION, SOLUTION INTRAVENOUS ONCE
Status: DISCONTINUED | OUTPATIENT
Start: 2022-01-01 | End: 2022-01-01

## 2022-01-01 RX ORDER — METOPROLOL TARTRATE 25 MG/1
25 TABLET, FILM COATED ORAL 2 TIMES DAILY
Status: DISCONTINUED | OUTPATIENT
Start: 2022-01-01 | End: 2022-01-01

## 2022-01-01 RX ADMIN — AMIODARONE HYDROCHLORIDE 1 MG/MIN: 1.8 INJECTION, SOLUTION INTRAVENOUS at 03:09

## 2022-01-01 RX ADMIN — INSULIN ASPART 2 UNITS: 100 INJECTION, SOLUTION INTRAVENOUS; SUBCUTANEOUS at 06:09

## 2022-01-01 RX ADMIN — AMIODARONE HYDROCHLORIDE 1 MG/MIN: 1.8 INJECTION, SOLUTION INTRAVENOUS at 01:09

## 2022-01-01 RX ADMIN — AMIODARONE HYDROCHLORIDE 150 MG: 1.5 INJECTION, SOLUTION INTRAVENOUS at 01:09

## 2022-01-01 RX ADMIN — IOHEXOL 1000 ML: 9 SOLUTION ORAL at 10:04

## 2022-01-01 RX ADMIN — ESCITALOPRAM OXALATE 10 MG: 10 TABLET, FILM COATED ORAL at 02:09

## 2022-01-01 RX ADMIN — Medication 10 ML: at 01:01

## 2022-01-01 RX ADMIN — TRAZODONE HYDROCHLORIDE 100 MG: 100 TABLET ORAL at 08:09

## 2022-01-01 RX ADMIN — BUDESONIDE 0.5 MG: 0.5 INHALANT ORAL at 07:09

## 2022-01-01 RX ADMIN — FERRIC CARBOXYMALTOSE INJECTION 750 MG: 50 INJECTION, SOLUTION INTRAVENOUS at 11:08

## 2022-01-01 RX ADMIN — AMIODARONE HYDROCHLORIDE 1 MG/MIN: 1.8 INJECTION, SOLUTION INTRAVENOUS at 12:09

## 2022-01-01 RX ADMIN — Medication 10 ML: at 02:05

## 2022-01-01 RX ADMIN — AMIODARONE HYDROCHLORIDE 1 MG/MIN: 1.8 INJECTION, SOLUTION INTRAVENOUS at 11:09

## 2022-01-01 RX ADMIN — METOROPROLOL TARTRATE 5 MG: 5 INJECTION, SOLUTION INTRAVENOUS at 05:09

## 2022-01-01 RX ADMIN — METOPROLOL SUCCINATE 100 MG: 50 TABLET, EXTENDED RELEASE ORAL at 08:09

## 2022-01-01 RX ADMIN — ATORVASTATIN CALCIUM 20 MG: 10 TABLET, FILM COATED ORAL at 09:09

## 2022-01-01 RX ADMIN — SODIUM CHLORIDE: 9 INJECTION, SOLUTION INTRAVENOUS at 11:08

## 2022-01-01 RX ADMIN — ATORVASTATIN CALCIUM 20 MG: 10 TABLET, FILM COATED ORAL at 08:09

## 2022-01-01 RX ADMIN — HEPARIN 500 UNITS: 100 SYRINGE at 02:05

## 2022-01-01 RX ADMIN — AMIODARONE HYDROCHLORIDE 0.5 MG/MIN: 1.8 INJECTION, SOLUTION INTRAVENOUS at 08:09

## 2022-01-01 RX ADMIN — RIVAROXABAN 15 MG: 15 TABLET, FILM COATED ORAL at 05:09

## 2022-01-01 RX ADMIN — METOPROLOL SUCCINATE 50 MG: 50 TABLET, EXTENDED RELEASE ORAL at 06:09

## 2022-01-01 RX ADMIN — METOPROLOL SUCCINATE 100 MG: 50 TABLET, EXTENDED RELEASE ORAL at 12:09

## 2022-01-01 RX ADMIN — DILTIAZEM HYDROCHLORIDE 120 MG: 120 CAPSULE, COATED, EXTENDED RELEASE ORAL at 10:09

## 2022-01-01 RX ADMIN — SODIUM CHLORIDE 500 ML: 0.9 INJECTION, SOLUTION INTRAVENOUS at 04:09

## 2022-01-01 RX ADMIN — IOHEXOL 100 ML: 350 INJECTION, SOLUTION INTRAVENOUS at 10:04

## 2022-01-01 RX ADMIN — HEPARIN SODIUM AND DEXTROSE 12 UNITS/KG/HR: 10000; 5 INJECTION INTRAVENOUS at 06:09

## 2022-01-01 RX ADMIN — ESCITALOPRAM OXALATE 10 MG: 10 TABLET, FILM COATED ORAL at 08:09

## 2022-01-01 RX ADMIN — SODIUM CHLORIDE: 9 INJECTION, SOLUTION INTRAVENOUS at 01:05

## 2022-01-01 RX ADMIN — FERRIC CARBOXYMALTOSE INJECTION 750 MG: 50 INJECTION, SOLUTION INTRAVENOUS at 01:05

## 2022-01-01 RX ADMIN — Medication 500 UNITS: at 11:03

## 2022-01-01 RX ADMIN — HUMAN ALBUMIN MICROSPHERES AND PERFLUTREN 0.11 MG: 10; .22 INJECTION, SOLUTION INTRAVENOUS at 02:09

## 2022-01-01 RX ADMIN — HYDROCODONE BITARTRATE AND ACETAMINOPHEN 1 TABLET: 5; 325 TABLET ORAL at 05:09

## 2022-01-01 RX ADMIN — HEPARIN 500 UNITS: 100 SYRINGE at 01:01

## 2022-01-01 RX ADMIN — Medication 10 ML: at 11:04

## 2022-01-01 RX ADMIN — SODIUM CHLORIDE: 0.9 INJECTION, SOLUTION INTRAVENOUS at 06:09

## 2022-01-01 RX ADMIN — ZOLPIDEM TARTRATE 10 MG: 5 TABLET ORAL at 08:09

## 2022-01-01 RX ADMIN — INSULIN ASPART 1 UNITS: 100 INJECTION, SOLUTION INTRAVENOUS; SUBCUTANEOUS at 09:09

## 2022-01-01 RX ADMIN — ESCITALOPRAM OXALATE 10 MG: 10 TABLET, FILM COATED ORAL at 09:09

## 2022-01-01 RX ADMIN — Medication 10 ML: at 11:03

## 2022-01-01 RX ADMIN — FUROSEMIDE 40 MG: 10 INJECTION, SOLUTION INTRAMUSCULAR; INTRAVENOUS at 03:09

## 2022-01-01 RX ADMIN — ATORVASTATIN CALCIUM 20 MG: 10 TABLET, FILM COATED ORAL at 02:09

## 2022-01-01 RX ADMIN — HYDROCODONE BITARTRATE AND ACETAMINOPHEN 1 TABLET: 10; 325 TABLET ORAL at 09:09

## 2022-01-01 RX ADMIN — AMIODARONE HYDROCHLORIDE 1 MG/MIN: 1.8 INJECTION, SOLUTION INTRAVENOUS at 05:09

## 2022-01-01 RX ADMIN — METOPROLOL TARTRATE 50 MG: 50 TABLET, FILM COATED ORAL at 05:09

## 2022-01-01 RX ADMIN — SODIUM CHLORIDE 500 ML: 0.9 INJECTION, SOLUTION INTRAVENOUS at 09:09

## 2022-01-01 RX ADMIN — HEPARIN 500 UNITS: 100 SYRINGE at 11:04

## 2022-01-01 RX ADMIN — BUDESONIDE 0.5 MG: 0.5 INHALANT ORAL at 08:09

## 2022-01-01 RX ADMIN — IOHEXOL 100 ML: 350 INJECTION, SOLUTION INTRAVENOUS at 08:09

## 2022-01-19 NOTE — DISCHARGE INSTRUCTIONS
[facility]  [address]  [address]  [phone] phone     [phone] fax  Hours of Operation: Monday- Friday 8:00am- 5:00pm  After hours phone  [phone]  Hematology / Oncology Physicians on call      TIARRA Jimenes Dr., Dr., Dr., NP    Please call with any concerns regarding your appointment today.

## 2022-01-19 NOTE — NURSING
Patient here today for port flush. PAC accessed via sterile technique with 20g 1 in walters needle without difficulty.  Positive blood return noted.  Flushed per protocol NS and Heparin 500 units as documented on MAR. Walters needle removed and intact. Patient tolerated well. Band-Aid applied to site. Vital signs and assessment documented. Patient confirmed next appt and ambulated out of treatment room.

## 2022-01-26 NOTE — TELEPHONE ENCOUNTER
LOV 8/3/21 Mavis Stapleton from [employer] [facility] calling asking for a new order fie a Glucose meter be placed for patient with [employer]. Please advise.

## 2022-01-26 NOTE — TELEPHONE ENCOUNTER
----- Message from Keysha Mccallum sent at 1/26/2022  1:29 PM CST -----  Contact: Ms. Stapleton @ [employer] [facility] [phone] fax # [phone]  Ms. Stapleton is calling in regards to her wanting to put in an order for a new Glucose Meter for the patient please, please place the order with [employer].

## 2022-01-26 NOTE — TELEPHONE ENCOUNTER
No new care gaps identified.  Powered by [employer]. Reference number: [id number].   1/26/2022 3:23:29 PM CST

## 2022-02-15 NOTE — TELEPHONE ENCOUNTER
Care Due:                  Date            Visit Type   Department     Provider  --------------------------------------------------------------------------------                                EP -                              PRIMARY      [facility] INTERNAL  Last Visit: 08-      CARE ([facility])   ARSENIO Gamble                              EP -                              PRIMARY      [facility] INTERNAL  Next Visit: 03-      CARE ([facility])   ARSENIO Gamble                                                            Last  Test          Frequency    Reason                     Performed    Due Date  --------------------------------------------------------------------------------    HBA1C.......  6 months...  glipiZIDE, linaGLIPtin,    11-   05-                             metFORMIN................    Powered by [employer]. Reference number: [id number].   2/15/2022 12:55:02 PM CST

## 2022-02-22 PROBLEM — I70.0 ATHEROSCLEROSIS OF AORTA: Status: ACTIVE | Noted: 2022-01-01

## 2022-02-22 PROBLEM — I25.10 CORONARY ARTERY CALCIFICATION: Status: ACTIVE | Noted: 2022-01-01

## 2022-02-22 PROBLEM — E66.9 OBESITY (BMI 30.0-34.9): Status: ACTIVE | Noted: 2022-01-01

## 2022-02-22 PROBLEM — R91.1 LUNG NODULE: Status: ACTIVE | Noted: 2022-01-01

## 2022-02-22 PROBLEM — J47.9 BRONCHIECTASIS: Status: ACTIVE | Noted: 2022-01-01

## 2022-02-22 PROBLEM — I65.29 CAROTID ATHEROSCLEROSIS: Status: ACTIVE | Noted: 2022-01-01

## 2022-02-22 PROBLEM — I25.84 CORONARY ARTERY CALCIFICATION: Status: ACTIVE | Noted: 2022-01-01

## 2022-02-22 PROBLEM — K11.8 PAROTID NODULE: Status: ACTIVE | Noted: 2022-01-01

## 2022-02-22 PROBLEM — E04.2 MULTIPLE THYROID NODULES: Status: ACTIVE | Noted: 2022-01-01

## 2022-02-22 PROBLEM — G47.30 SLEEP APNEA: Status: ACTIVE | Noted: 2022-01-01

## 2022-02-22 NOTE — PROGRESS NOTES
"Janine Souza presented for a  Medicare AWV and comprehensive Health Risk Assessment today. The following components were reviewed and updated:    · Medical history  · Family History  · Social history  · Allergies and Current Medications  · Health Risk Assessment  · Health Maintenance  · Care Team         ** See Completed Assessments for Annual Wellness Visit within the encounter summary.**         The following assessments were completed:  · Living Situation  · CAGE  · Depression Screening  · Timed Get Up and Go  · Whisper Test  · Cognitive Function Screening  · Nutrition Screening  · ADL Screening  · PAQ Screening        Vitals:    02/22/22 1035 02/22/22 1046   BP: 136/72    BP Location: Left arm    Patient Position: Sitting    Pulse: 60    SpO2: (!) 94% 97%   Weight: 94.7 kg (208 lb 12.4 oz)    Height: 5' 6" (1.676 m)      Body mass index is 33.7 kg/m².  Physical Exam  Vitals and nursing note reviewed.   Constitutional:       Appearance: She is well-developed.   HENT:      Head: Normocephalic.   Cardiovascular:      Rate and Rhythm: Normal rate and regular rhythm.      Heart sounds: Normal heart sounds.   Pulmonary:      Effort: Pulmonary effort is normal. No respiratory distress.   Abdominal:      Palpations: Abdomen is soft. There is no mass.      Tenderness: There is no abdominal tenderness.   Musculoskeletal:         General: Normal range of motion.   Skin:     General: Skin is warm and dry.   Neurological:      Mental Status: She is alert and oriented to person, place, and time.      Motor: No abnormal muscle tone.   Psychiatric:         Speech: Speech normal.         Behavior: Behavior normal.               Diagnoses and health risks identified today and associated recommendations/orders:    1. Encounter for preventive health examination  Declines COVID booster at this time  Flu vaccine today at pharmacy  Eye exam scheduled this week at outside facility; will have report sent to PCP    Reports she is at her " respiratory baseline  Reports cardiac conditions are stable.     Discussed receiving Shingrix and Tetanus vaccine at pharmacy.      2. 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     3. Secondary neuroendocrine tumor of liver  See #2    4. Carotid atherosclerosis, unspecified laterality  Ct 2/20  Discussed diagnosis and risk reduction.   Advised to follow up with PCP/cardiologist for further recommendations. Patient expressed understanding.       5. Coronary artery calcification  Ct 7/21  See #4    6. Atherosclerosis of aorta  See #4    7. Chronic diastolic heart failure  Stable. Continue current treatment plan as previously prescribed with your  Cardiologist.     8. Atrial flutter, unspecified type  Stable. Continue current treatment plan as previously prescribed with your  Cardiologist.     9. Essential hypertension  Stable and controlled. Continue current treatment plan as previously prescribed with your PCP.     10. Diabetes mellitus with stage 3 chronic kidney disease  a1c 9.4  CKD-stable  Continue current treatment plan as previously prescribed with your  pcp   - Hemoglobin A1C; Future    11. Elevated parathyroid hormone  2019  Advised to follow up with PCP for further monitoring. Patient expressed understanding.      12. Bronchiectasis without complication  Ct 7/21  Advised to follow up with PCP for further evaluation and recommendations. Patient expressed understanding.      13. Lung nodule  Ct 7/21  Continue current treatment plan as previously prescribed with your  Oncologist.     14. Multiple thyroid nodules  Ct 2/20  Advised to follow up with PCP/ENT for further evaluation and recommendations. Patient expressed understanding.      15. Abnormal TSH  Advised to follow up with PCP for further evaluation and recommendations. Patient expressed understanding.      16. Parotid nodule  Ct 2/20  Continue current  treatment plan as previously prescribed with your  Oncologist.     17. Anemia, unspecified type  Continue current treatment plan as previously prescribed with your  Oncologist.     18. Pacemaker  Continue current treatment plan as previously prescribed with your  Cardiologist.     19. Anxiety  Continue current treatment plan as previously prescribed with your  pcp     20. Obesity (BMI 30.0-34.9)  Continue current treatment plan as previously prescribed with your  pcp     21. Sleep apnea, unspecified type  Continue current treatment plan as previously prescribed with your  Pulmonologist.       Provided Janine with a 5-10 year written screening schedule and personal prevention plan. Recommendations were developed using the USPSTF age appropriate recommendations. Education, counseling, and referrals were provided as needed. After Visit Summary printed and given to patient which includes a list of additional screenings\tests needed.    Follow up in about 1 year (around 2/22/2023) for awv.    Shelli Richmond, NP  I offered to discuss advanced care planning, including how to pick a person who would make decisions for you if you were unable to make them for yourself, called a health care power of , and what kind of decisions you might make such as use of life sustaining treatments such as ventilators and tube feeding when faced with a life limiting illness recorded on a living will that they will need to know. (How you want to be cared for as you near the end of your natural life)     X  Patient has advanced directives written and agrees to provide copies to the institution.

## 2022-02-22 NOTE — PATIENT INSTRUCTIONS
Counseling and Referral of Other Preventative  (Italic type indicates deductible and co-insurance are waived)    Patient Name: Janine Souza  Today's Date: 2/22/2022    Health Maintenance       Date Due Completion Date    Cervical Cancer Screening Never done ---    TETANUS VACCINE Never done ---    High Dose Statin Never done ---    Shingles Vaccine (1 of 2) Never done ---    Eye Exam 08/14/2019 8/14/2018    Colorectal Cancer Screening 11/14/2020 11/14/2019    Influenza Vaccine (1) 09/01/2021 10/15/2020    Pneumococcal Vaccines (Age 0-64) (3 of 4 - PPSV23) 09/28/2021 8/3/2021    COVID-19 Vaccine (3 - Booster for Moderna series) 01/22/2022 8/22/2021    Hemoglobin A1c 02/10/2022 11/10/2021    Mammogram 06/18/2022 6/18/2021    Lipid Panel 08/02/2022 8/2/2021    Foot Exam 08/03/2022 8/3/2021    Override on 2/14/2018: Done    Override on 12/20/2016: Done    Diabetes Urine Screening 11/10/2022 11/10/2021        Orders Placed This Encounter   Procedures    Hemoglobin A1C     The following information is provided to all patients.  This information is to help you find resources for any of the problems found today that may be affecting your health:                Living healthy guide: www.[facility].[place].gov      Understanding Diabetes: www.diabetes.org      Eating healthy: www.cdc.gov/healthyweight      CDC home safety checklist: www.cdc.gov/steadi/patient.html      Agency on Aging: www.goea.[place].[facility]      Alcoholics anonymous (AA): www.aa.org      Physical Activity: www.dimitri.nih.gov/vt8weee      Tobacco use: www.quitwithusla.org

## 2022-02-23 NOTE — TELEPHONE ENCOUNTER
1st attempt to reach Ms Mehta regarding her lab reslts. I left a voicemail for her to call the office.     A1C is very high. 13.8  She needs apt ASAP with PCP or NP.   Please assist in scheduling.

## 2022-02-23 NOTE — TELEPHONE ENCOUNTER
I explained to Ms Mehta that the A1C gives us an average of there glucose over a 3 month period, and being off her medication for one day would not affect this results. She acknowledged her understanding and will come to the appt on Friday with the NP Ibis Enrique.

## 2022-02-23 NOTE — TELEPHONE ENCOUNTER
2nd attempt to reach Ms Mehta regarding her elevated A1C results. I have left a voicemail as well as sent a [employer] message regarding her results and the appointment time that was made with internal medicine.

## 2022-02-23 NOTE — TELEPHONE ENCOUNTER
----- Message from Shelli Richmond NP sent at 2/23/2022  8:01 AM CST -----  A1C is very high. 13.8  She needs apt ASAP with PCP or NP.   Please assist in scheduling.    Advise to follow up with PCP for other AWV findings.

## 2022-03-22 NOTE — PROGRESS NOTES
Subjective:       Patient ID: Janine oSuza is a 62 y.o. female.    Chief Complaint: Follow-up     Follow-up hypertension uncontrolled diabetes chronic kidney disease thyroid nodules atrial flutter.  She is also followed by Oncology for metastatic cancer to retroperitoneum and anemia.  He ENT evaluation with thyroid nodules was done.  Condition is stable recommend she repeat thyroid ultrasound in 2-3.  She is followed by Pulmonary for sleep apnea and bronchiectasis she denies headache chest pain palpitations shortness of breath or edema.  recent A1c was 13.8.  Reports not taking medicines regularly but has resumed her medications she needs refill medications include Ambien for sleep..    Review of Systems   Constitutional: Negative for activity change, appetite change, chills and fever.   HENT: Negative for congestion.    Respiratory: Negative for cough, chest tightness, shortness of breath and wheezing.    Cardiovascular: Negative for chest pain, palpitations and leg swelling.   Neurological: Negative for dizziness, weakness, light-headedness and headaches.       Objective:      Physical Exam  Constitutional:       General: She is not in acute distress.     Appearance: She is not ill-appearing or diaphoretic.   HENT:      Nose: Nose normal.   Cardiovascular:      Rate and Rhythm: Normal rate and regular rhythm.      Heart sounds: No murmur heard.    No gallop.   Pulmonary:      Effort: Pulmonary effort is normal. No respiratory distress.      Breath sounds: Wheezing present. No rhonchi or rales.      Comments: Minimal end expiratory wheeze in general  Lymphadenopathy:      Cervical: No cervical adenopathy.   Neurological:      Mental Status: She is alert and oriented to person, place, and time.   Psychiatric:         Mood and Affect: Mood normal.         Behavior: Behavior normal.         Thought Content: Thought content normal.         Judgment: Judgment normal.         Lab Visit on 03/11/2022   Component Date  "Value Ref Range Status    Fecal Immunochemical Test (iFOBT) 03/11/2022 Negative  Negative Final     Assessment:       1. Diabetes mellitus with stage 3 chronic kidney disease    2. Essential hypertension    3. Carotid atherosclerosis, unspecified laterality    4. Atherosclerosis of aorta    5. Multiple thyroid nodules    6. Obesity (BMI 30.0-34.9)    7. Insomnia, unspecified type    8. Atrial flutter, unspecified type        Plan:   Medications reviewed lab was ordered diabetic diet discussed follow-up in 3 months.  Health maintenance reviewed.  She needs routine gyn exam and she will call her gynecologist      Diabetes mellitus with stage 3 chronic kidney disease    Essential hypertension    Carotid atherosclerosis, unspecified laterality    Atherosclerosis of aorta    Multiple thyroid nodules    Obesity (BMI 30.0-34.9)    Insomnia, unspecified type    Atrial flutter, unspecified type    Other orders  -     insulin (LANTUS SOLOSTAR U-100 INSULIN) glargine 100 units/mL (3mL) SubQ pen; Inject 15 Units into the skin every evening.  Dispense: 3 mL; Refill: 11  -     atorvastatin (LIPITOR) 20 MG tablet; Take 1 tablet (20 mg total) by mouth once daily.  Dispense: 90 tablet; Refill: 3  -     zolpidem (AMBIEN) 10 mg Tab; Take 1 tablet (10 mg total) by mouth nightly as needed (As needed).  Dispense: 30 tablet; Refill: 2  -     (In Office Administered) Pneumococcal Polysaccharide Vaccine (23 Valent) (SQ/IM)  -     pen needle, diabetic 31 gauge x 5/32" Ndle; 1 each by Misc.(Non-Drug; Combo Route) route once daily.  Dispense: 100 each; Refill: 3            "

## 2022-04-19 NOTE — PROGRESS NOTES
Subjective:       Patient ID: Janine Souza is a 62 y.o. female.    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     Family History   Problem Relation Age of Onset    Diabetes type II Mother     Prostate cancer Father     Breast cancer Paternal Aunt         breast    Breast cancer Maternal Aunt         breast    Breast cancer Sister      Social History     Socioeconomic History    Marital status:    Tobacco Use    Smoking status: Never Smoker    Smokeless tobacco: Never Used   Substance and Sexual Activity    Alcohol use: No     Alcohol/week: 0.0 standard drinks    Drug use: No    Sexual activity: Not Currently     Partners: Male     Social Determinants of Health     Financial Resource Strain: Low Risk     Difficulty of Paying Living Expenses: Not hard at all   Food Insecurity: No Food Insecurity    Worried About Running Out of Food in the Last Year: Never true    Ran Out of Food in the Last Year: Never true   Transportation Needs: No Transportation Needs    Lack of Transportation (Medical): No    Lack of Transportation (Non-Medical): No   Physical Activity: Sufficiently Active    Days of Exercise per Week: 4 days    Minutes of Exercise per Session: 60 min   Stress: No Stress Concern Present    Feeling of Stress : Not at all    Social Connections: Socially Integrated    Frequency of Communication with Friends and Family: More than three times a week    Frequency of Social Gatherings with Friends and Family: Three times a week    Attends Restoration Services: More than 4 times per year    Active Member of Clubs or Organizations: Yes    Attends Club or Organization Meetings: More than 4 times per year    Marital Status:    Housing Stability: Low Risk     Unable to Pay for Housing in the Last Year: No    Number of Places Lived in the Last Year: 1    Unstable Housing in the Last Year: No     Past Surgical History:   Procedure Laterality Date    A-V CARDIAC PACEMAKER INSERTION Left 2018    Procedure: INSERTION, CARDIAC PACEMAKER, DUAL CHAMBER;  Surgeon: Jean Carlos Onofre MD;  Location: [facility] CATH LAB;  Service: Cardiology;  Laterality: Left;  sinus pause, dPPM, BIO, DM, 323A    ADENOIDECTOMY      BREAST BIOPSY Right     benign    CARDIAC ELECTROPHYSIOLOGY MAPPING AND ABLATION      CARDIAC SURGERY      CARDIOVERSION N/A 2018    Procedure: CARDIOVERSION;  Surgeon: Jean Carlos Onofre MD;  Location: [facility] CATH LAB;  Service: Cardiology;  Laterality: N/A;     SECTION      ERCP      ERCP N/A 2018    Procedure: ERCP (ENDOSCOPIC RETROGRADE CHOLANGIOPANCREATOGRAPHY);  Surgeon: Tang Tanner MD;  Location: [facility] ([address] [place] [address]);  Service: Endoscopy;  Laterality: N/A;  2 day hold Dr Kalyan Whittaker    ERCP N/A 2019    Procedure: ERCP (ENDOSCOPIC RETROGRADE CHOLANGIOPANCREATOGRAPHY);  Surgeon: aTng Tanner MD;  [facility]  Service: Endoscopy;  Laterality: N/A;  2 day hold Dr Kalyan Whittaker    TONSILLECTOMY      uvulopalatopharygoplasty         Labs:  Lab Results   Component Value Date    WBC 5.80 2022    HGB 10.2 (L) 2022    HCT 32.6 (L) 2022    MCV 98 2022     2022     BMP  Lab Results   Component Value Date     (L) 2022     K 4.7 01/14/2022    CL 98 01/14/2022    CO2 24 01/14/2022    BUN 33 (H) 01/14/2022    CREATININE 1.5 (H) 01/14/2022    CALCIUM 9.7 01/14/2022    ANIONGAP 10 01/14/2022    ESTGFRAFRICA 43 (A) 01/14/2022    EGFRNONAA 37 (A) 01/14/2022     Lab Results   Component Value Date    ALT 11 01/14/2022    AST 14 01/14/2022    ALKPHOS 88 01/14/2022    BILITOT 0.6 01/14/2022       Lab Results   Component Value Date    IRON 52 01/14/2022    TIBC 379 01/14/2022    FERRITIN 977 (H) 01/14/2022     No results found for: AHWDCLDX79  No results found for: FOLATE  Lab Results   Component Value Date    TSH 6.573 (H) 11/10/2021         Review of Systems   Constitutional: Positive for fatigue. Negative for activity change, appetite change, chills, diaphoresis, fever and unexpected weight change.   HENT: Negative for congestion, dental problem, drooling, ear discharge, ear pain, facial swelling, hearing loss, mouth sores, nosebleeds, postnasal drip, rhinorrhea, sinus pressure, sneezing, sore throat, tinnitus, trouble swallowing and voice change.    Eyes: Negative for photophobia, pain, discharge, redness, itching and visual disturbance.   Respiratory: Negative for cough, choking, chest tightness, shortness of breath, wheezing and stridor.    Cardiovascular: Negative for chest pain, palpitations and leg swelling.   Gastrointestinal: Negative for abdominal distention, abdominal pain, anal bleeding, blood in stool, constipation, diarrhea, nausea, rectal pain and vomiting.   Endocrine: Negative for cold intolerance, heat intolerance, polydipsia, polyphagia and polyuria.   Genitourinary: Negative for decreased urine volume, difficulty urinating, dyspareunia, dysuria, enuresis, flank pain, frequency, genital sores, hematuria, menstrual problem, pelvic pain, urgency, vaginal bleeding, vaginal discharge and vaginal pain.   Musculoskeletal: Negative for arthralgias, back pain, gait problem, joint swelling, myalgias, neck pain and neck stiffness.    Skin: Negative for color change, pallor and rash.   Allergic/Immunologic: Negative for environmental allergies, food allergies and immunocompromised state.   Neurological: Positive for weakness. Negative for dizziness, tremors, seizures, syncope, facial asymmetry, speech difficulty, light-headedness, numbness and headaches.   Hematological: Negative for adenopathy. Does not bruise/bleed easily.   Psychiatric/Behavioral: Positive for dysphoric mood. Negative for agitation, behavioral problems, confusion, decreased concentration, hallucinations, self-injury, sleep disturbance and suicidal ideas. The patient is nervous/anxious. The patient is not hyperactive.        Objective:      Physical Exam  Vitals reviewed.   Constitutional:       General: She is not in acute distress.     Appearance: She is well-developed. She is not diaphoretic.   HENT:      Head: Normocephalic and atraumatic.      Right Ear: External ear normal.      Left Ear: External ear normal.      Nose: Nose normal.      Right Sinus: No maxillary sinus tenderness or frontal sinus tenderness.      Left Sinus: No maxillary sinus tenderness or frontal sinus tenderness.      Mouth/Throat:      Pharynx: No oropharyngeal exudate.   Eyes:      General: Lids are normal. No scleral icterus.        Right eye: No discharge.         Left eye: No discharge.      Conjunctiva/sclera: Conjunctivae normal.      Right eye: Right conjunctiva is not injected. No hemorrhage.     Left eye: Left conjunctiva is not injected. No hemorrhage.     Pupils: Pupils are equal, round, and reactive to light.   Neck:      Thyroid: No thyromegaly.      Vascular: No JVD.      Trachea: No tracheal deviation.   Cardiovascular:      Rate and Rhythm: Normal rate.   Pulmonary:      Effort: Pulmonary effort is normal. No respiratory distress.      Breath sounds: No stridor.   Chest:      Chest wall: No tenderness.   Breasts:      Right: No supraclavicular adenopathy.      Left: No  supraclavicular adenopathy.       Abdominal:      General: Bowel sounds are normal. There is no distension.      Palpations: Abdomen is soft. There is no hepatomegaly, splenomegaly or mass.      Tenderness: There is no abdominal tenderness. There is no rebound.   Musculoskeletal:         General: No tenderness. Normal range of motion.      Cervical back: Normal range of motion and neck supple.   Lymphadenopathy:      Cervical: No cervical adenopathy.      Upper Body:      Right upper body: No supraclavicular adenopathy.      Left upper body: No supraclavicular adenopathy.   Skin:     General: Skin is dry.      Findings: No erythema or rash.   Neurological:      Mental Status: She is alert and oriented to person, place, and time.      Cranial Nerves: No cranial nerve deficit.      Motor: Weakness present.      Coordination: Coordination normal.   Psychiatric:         Behavior: Behavior normal.         Thought Content: Thought content normal.         Judgment: Judgment normal.             Assessment:      1. 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 Iron deficiency anemia due to chronic blood loss    3. Secondary neuroendocrine tumor of liver    4. Stage 3a chronic kidney disease    5. Atherosclerosis of aorta    6. S/P placement of cardiac pacemaker    7. Essential hypertension    8. Atrial flutter, unspecified type    9. Diabetes mellitus with stage 3 chronic kidney disease           Plan:     Previous history of relapsed metastatic neuroendocrine tumor.  Patient has had amazingly no evidence of progression.  At this time will proceed with laboratory review communicate results through portal see back in 6 months with similar findings and proceed with CT chest abdomen pelvis if no evidence of active disease would recommend MediPort be removed        Kalyan Flores Jr, MD FACP

## 2022-05-04 NOTE — TELEPHONE ENCOUNTER
Refill Routing Note   Medication(s) are not appropriate for processing by [facility] for the following reason(s):      - Required laboratory values are abnormal    [facility] action(s):  Defer          Medication reconciliation completed: No     Appointments  past 12m or future 3m with PCP    Date Provider   Last Visit   3/22/2022 Mirza Gamble MD   Next Visit   6/28/2022 Mirza Gamble MD   ED visits in past 90 days: 0        Note composed:2:24 PM 05/04/2022

## 2022-05-04 NOTE — PLAN OF CARE
Problem: Adult Inpatient Plan of Care  Goal: Plan of Care Review  Outcome: Ongoing, Progressing  Flowsheets (Taken 5/4/2022 1357)  Plan of Care Reviewed With: patient  Goal: Patient-Specific Goal (Individualized)  Outcome: Ongoing, Progressing  Flowsheets (Taken 5/4/2022 1357)  Anxieties, Fears or Concerns: denies  Individualized Care Needs: elevate feet, port is positional  Patient-Specific Goals (Include Timeframe): tolerate infusion today  Goal: Optimal Comfort and Wellbeing  Outcome: Ongoing, Progressing  Intervention: Provide Person-Centered Care  Flowsheets (Taken 5/4/2022 1357)  Trust Relationship/Rapport:   care explained   choices provided   emotional support provided   empathic listening provided   questions answered   questions encouraged   reassurance provided   thoughts/feelings acknowledged     Problem: Anemia  Goal: Anemia Symptom Improvement  Outcome: Ongoing, Progressing  Intervention: Monitor and Manage Anemia  Flowsheets (Taken 5/4/2022 1357)  Safety Promotion/Fall Prevention:   assistive device/personal item within reach   Fall Risk reviewed with patient/family   in recliner, wheels locked   instructed to call staff for mobility  Fatigue Management: frequent rest breaks encouraged

## 2022-05-04 NOTE — TELEPHONE ENCOUNTER
Care Due:                  Date            Visit Type   Department     Provider  --------------------------------------------------------------------------------                                EP -                              PRIMARY      [facility] INTERNAL  Last Visit: 03-      CARE ([facility])   ARSENIO Gamble                              EP -                              PRIMARY      [facility] INTERNAL  Next Visit: 06-      CARE ([facility])   ARSENIO Gamble                                                            Last  Test          Frequency    Reason                     Performed    Due Date  --------------------------------------------------------------------------------    Lipid Panel.  12 months..  atorvastatin.............  08- 07-    [facility] Embedded Care Gaps. Reference number: [id number]. 5/04/2022   8:04:24 AM CDT

## 2022-06-08 NOTE — PROCEDURES
Removal, Portacath    Date/Time: 6/1/2022 3:40 PM  Performed by: Yadiel Pope MD  Authorized by: Yadiel Pope MD     Consent Done?:  Yes (Written)  Timeout: prior to procedure the correct patient, procedure, and site was verified    Prep: patient was prepped and draped in usual sterile fashion    Local anesthesia used?: Yes    Anesthesia:  Local infiltration  Local anesthetic:  Lidocaine 1% with epinephrine  Anesthetic total (ml):  10  Assistants?: No     I was present for the entire procedure.  Indications:  Port-a-cath  Laterality:  Left  Position:  Supine  Anesthesia:  Local infiltration  Local anesthetic:  Lidocaine 1% with epinephrine  Excision size (cm):  3  Scalpel size:  15  Incision type:  Single straight  Specimens?: Yes     Hemostasis was obtained.  Estimated blood loss (cc):  1  Wound closure:  Intermediate layered  Wound repair size (cm):  3  Sutures:  3-0 Vicryl and 4-0 Monocryl  Sterile dressings:  Gauze, Mastisol, Steri-strips and Tegaderm  Post-op diagnosis:  Same as pre-op diagnosis.   Needle, instrument, and sponge counts were correct.   Patient tolerated the procedure well with no immediate complications.   Post-operative instructions were provided for the patient.

## 2022-06-20 NOTE — PROGRESS NOTES
Working Dm Report:       Pt due for repeat A1c and Lipid. A1c scheduled as non fasting. Called pt to ask if she can come fasting to add Lipid. No anwwer, left message.

## 2022-06-28 PROBLEM — Z12.31 ENCOUNTER FOR SCREENING MAMMOGRAM FOR MALIGNANT NEOPLASM OF BREAST: Status: ACTIVE | Noted: 2022-01-01

## 2022-06-28 PROBLEM — Z12.39 BREAST SCREENING: Status: ACTIVE | Noted: 2022-01-01

## 2022-07-26 NOTE — TELEPHONE ENCOUNTER
Refill Routing Note   Medication(s) are not appropriate for processing by [facility] for the following reason(s):      - Outside of protocol    [facility] action(s):  Route Medication-related problems identified: Requires labs        Medication reconciliation completed: No     Appointments  past 12m or future 3m with PCP    Date Provider   Last Visit   6/28/2022 Mirza Gamble MD   Next Visit   Visit date not found Mirza Gamble MD   ED visits in past 90 days: 0        Note composed:9:37 AM 07/26/2022

## 2022-07-26 NOTE — TELEPHONE ENCOUNTER
Care Due:                  Date            Visit Type   Department     Provider  --------------------------------------------------------------------------------                                EP -                              PRIMARY      [facility] INTERNAL  Last Visit: 06-      CARE ([facility])   MEDICINE       Mirza Gamble  Next Visit: None Scheduled  None         None Found                                                            Last  Test          Frequency    Reason                     Performed    Due Date  --------------------------------------------------------------------------------    Lipid Panel.  12 months..  atorvastatin.............  08- 07-    [facility] Embedded Care Gaps. Reference number: [id number]. 7/26/2022   9:32:44 AM CDT

## 2022-08-05 NOTE — DISCHARGE INSTRUCTIONS
[facility]  [address] [address]  [address]  [phone] phone     [phone] fax  Hours of Operation: Monday- Friday 8:00am- 5:00pm  After hours phone  [phone]  Hematology / Oncology Physicians on call      CODY [address] Dr., NP Sydney Prescott, TIARRA Thornton FNP    Please call with any concerns regarding your appointment today.

## 2022-08-05 NOTE — PLAN OF CARE
Problem: Adult Inpatient Plan of Care  Goal: Plan of Care Review  Outcome: Ongoing, Progressing  Flowsheets (Taken 8/5/2022 1159)  Plan of Care Reviewed With: patient  Goal: Patient-Specific Goal (Individualized)  Outcome: Ongoing, Progressing  Flowsheets (Taken 8/5/2022 1159)  Anxieties, Fears or Concerns: patient reports fatigue  Individualized Care Needs: Family at side  Patient-Specific Goals (Include Timeframe): tolerate treatment without adverse reaction  Goal: Optimal Comfort and Wellbeing  Outcome: Ongoing, Progressing  Intervention: Provide Person-Centered Care  Flowsheets (Taken 8/5/2022 1159)  Trust Relationship/Rapport:   care explained   empathic listening provided   reassurance provided   choices provided   questions answered   thoughts/feelings acknowledged   emotional support provided   questions encouraged

## 2022-08-05 NOTE — NURSING
Dr. Flores made aware patient received dose #1 of injectafer today. Patient last iron studies were drawn on 4/22/22. She received two doses of injectafer 5/4 and 5/11. Dr Flores placed orders for labs on 6/22 per her request but labs were never drawn.  Patient messaged Dr. Flores on 7/24 about her labs drawn from her cardiologist :   hgb 8.7   hct 26.2.    rbc 2.69; Orders were placed by Dr. Flores for iron infusions on 7/25.  Dr. Flores responded to have labs drawn. Patient made aware. Labs drawn and delivered to lab for processing.

## 2022-08-08 NOTE — TELEPHONE ENCOUNTER
Patient made aware Dr. Flores wants patient to follow up in 6-8 weeks. Patient will view future appt in my chart.

## 2022-08-31 NOTE — PROGRESS NOTES
[facility] REPORT: Patient had CMP done on 8/5/22. Mirco order placed per standing orders. Patient scheduled for 9/20/22.

## 2022-09-02 PROBLEM — R10.9 ABDOMINAL PAIN: Status: ACTIVE | Noted: 2022-01-01

## 2022-09-02 NOTE — H&P
[facility] Medicine  History & Physical    Patient Name: Janine Souza  MRN: [id number]  Patient Class: IP- Inpatient  Admission Date: 9/2/2022  Attending Physician: Bruce Perez, *   Primary Care Provider: Mirza Gamble MD         Patient information was obtained from patient and ER records.     Subjective:     Principal Problem:Abdominal pain    Chief Complaint:   Chief Complaint   Patient presents with    Abdominal Pain        HPI: Ms. Souza is a 62 year old female patient whose current medical conditions include secondary neuroendocrine tumor of liver, PAF s/p ablation in 2006, chronic AC on Xarelto), chronic diastolic CHF, pulmonary HTN, DM type II, anemia, and SVT who presented to [facility] ED today with a chief complaint of generalized abdominal pain over the past week. Associated symptoms included cough, congestion, and SOB. No apparent associated fever, chills, mirza chest pain, near syncope, or syncope. Initial workup in ED revealed BNP of 286, negative troponin, +d-dimer. EKG showed SVT's with HR in 140 range. VQ scan negative. CT abdomen showed  new small volume ascites throughout the abdomen and pelvis. New hyperdensity in the region of the gallbladder, suspected to correlate with intraluminal gallbladder contents, though underlying mass is possible.  Finding could correlate with sludge or blood products. Increased ground-glass and linear density within the lung bases with more focal nodular opacity in the right lower lobe measuring 1.3 cm.  Findings overall progressed since the comparison exam and may reflect progression in prior disease versus acute infiltrate. Anasarca. Patient admitted for further evaluation and treatment. Cardiology consulted.               Past Medical History:   Diagnosis Date    Acute headache 1/8/2016    Anemia     Atrial fibrillation, persistent     Bronchitis     CHF (congestive heart failure)     Diabetes mellitus, type 2      Hypertension     Malignant poorly differentiated neuroendocrine carcinoma 2016    Malignant poorly differentiated neuroendocrine carcinoma 2016    Pacemaker     [place]    Secondary neuroendocrine tumor of liver 2016       Past Surgical History:   Procedure Laterality Date    A-V CARDIAC PACEMAKER INSERTION Left 2018    Procedure: INSERTION, CARDIAC PACEMAKER, DUAL CHAMBER;  Surgeon: Jean Carlos Onofre MD;  Location: [facility] CATH LAB;  Service: Cardiology;  Laterality: Left;  sinus pause, dPPM, BIO, DM, 323A    ADENOIDECTOMY      BREAST BIOPSY Right     benign    CARDIAC ELECTROPHYSIOLOGY MAPPING AND ABLATION      CARDIAC SURGERY      CARDIOVERSION N/A 2018    Procedure: CARDIOVERSION;  Surgeon: Jean Carlos Onofre MD;  Location: [facility] CATH LAB;  Service: Cardiology;  Laterality: N/A;     SECTION      ERCP      ERCP N/A 2018    Procedure: ERCP (ENDOSCOPIC RETROGRADE CHOLANGIOPANCREATOGRAPHY);  Surgeon: Tang Tanner MD;  [facility]  Service: Endoscopy;  Laterality: N/A;  2 day hold Dr Kalyan Whittaker    ERCP N/A 2019    Procedure: ERCP (ENDOSCOPIC RETROGRADE CHOLANGIOPANCREATOGRAPHY);  Surgeon: Tang Tanner MD;  [facility]  Service: Endoscopy;  Laterality: N/A;  2 day hold Dr Kalyan Whittaker    TONSILLECTOMY      uvulopalatopharygoplasty         Review of patient's allergies indicates:   Allergen Reactions    Pcn [penicillins] Other (See Comments), Hives and Swelling     As a child had an unknown reaction.  Has not taken since  As a child had an unknown reaction.  Has not taken since       Current Facility-Administered Medications on File Prior to Encounter   Medication    ferumoxytol (FERAHEME) 510 mg in dextrose 5 % 100 mL IVPB    heparin, porcine (PF) 100 unit/mL injection flush 500 Units    sodium chloride 0.9% flush 10 mL     Current Outpatient Medications on File Prior to Encounter   Medication Sig  "   atorvastatin (LIPITOR) 20 MG tablet Take 1 tablet (20 mg total) by mouth once daily.    EScitalopram oxalate (LEXAPRO) 10 MG tablet Take 1 tablet (10 mg total) by mouth once daily.    fluticasone propionate (FLOVENT HFA) 110 mcg/actuation inhaler Inhale 2 puffs into the lungs once daily.    glipiZIDE (GLUCOTROL) 10 MG tablet Take 1 tablet (10 mg total) by mouth 2 (two) times daily.    KLOR-CON M20 20 mEq tablet 20 mEq once daily.     linaGLIPtin (TRADJENTA) 5 mg Tab tablet Take 1 tablet (5 mg total) by mouth once daily.    metoprolol tartrate (LOPRESSOR) 25 MG tablet 25 mg as needed.    metoprolol tartrate (LOPRESSOR) 50 MG tablet Take 50 mg by mouth nightly.     rivaroxaban (XARELTO) 20 mg Tab Take 20 mg by mouth.    sotalol (BETAPACE) 80 MG tablet Take 80 mg by mouth once daily.     traZODone (DESYREL) 100 MG tablet Take 1 tablet (100 mg total) by mouth every evening.    zolpidem (AMBIEN) 10 mg Tab Take 1 tablet (10 mg total) by mouth nightly as needed (As needed).    blood sugar diagnostic Strp To check BG 1 times daily, to use with insurance preferred meter    blood sugar diagnostic, disc Strp USE AS DIRECTED EVERY DAY *THANK YOU*    blood-glucose meter kit by Other route. Use as instructed    blood-glucose meter kit To check BG 1 times daily, to use with insurance preferred meter    furosemide (LASIX) 40 MG tablet Take 40 mg by mouth once daily.     insulin (LANTUS SOLOSTAR U-100 INSULIN) glargine 100 units/mL (3mL) SubQ pen Inject 15 Units into the skin every evening.    lancets Misc Use as directed    lancets Misc To check BG 1 times daily, to use with insurance preferred meter    metFORMIN (GLUCOPHAGE) 500 MG tablet Take 1 tablet by mouth twice a day with meals (Patient taking differently: daily with breakfast. Take 1 tablet by mouth daily with breakfast)    pen needle, diabetic (BD ULTRA-FINE MINI PEN NEEDLE) 31 gauge x 3/16" Ndle 1 each by Misc.(Non-Drug; Combo Route) route once " daily.     Family History       Problem Relation (Age of Onset)    Breast cancer Paternal Aunt, Maternal Aunt, Sister    Diabetes type II Mother    Prostate cancer Father          Tobacco Use    Smoking status: Never    Smokeless tobacco: Never   Substance and Sexual Activity    Alcohol use: No     Alcohol/week: 0.0 standard drinks    Drug use: No    Sexual activity: Not Currently     Partners: Male     Review of Systems   Constitutional:  Positive for fatigue.   HENT: Negative.     Eyes: Negative.    Respiratory:  Positive for cough and shortness of breath.    Cardiovascular:  Positive for leg swelling.   Gastrointestinal:  Positive for abdominal distention and abdominal pain.   Endocrine: Negative.    Genitourinary: Negative.    Musculoskeletal: Negative.    Skin: Negative.    Allergic/Immunologic: Negative.    Neurological: Negative.    Hematological: Negative.    Psychiatric/Behavioral: Negative.     Objective:     Vital Signs (Most Recent):  Temp: 99 °F (37.2 °C) (09/02/22 1245)  Pulse: (!) 140 (09/02/22 1245)  Resp: 18 (09/02/22 1245)  BP: (!) 106/57 (09/02/22 1245)  SpO2: (!) 93 % (09/02/22 1245)   Vital Signs (24h Range):  Temp:  [98.9 °F (37.2 °C)-99.2 °F (37.3 °C)] 99 °F (37.2 °C)  Pulse:  [] 140  Resp:  [18-28] 18  SpO2:  [93 %-100 %] 93 %  BP: ()/(53-69) 106/57     Weight: 98.9 kg (218 lb)  Body mass index is 35.19 kg/m².    Physical Exam  Vitals and nursing note reviewed.   Constitutional:       Appearance: She is well-developed.   HENT:      Head: Normocephalic and atraumatic.   Eyes:      Conjunctiva/sclera: Conjunctivae normal.      Pupils: Pupils are equal, round, and reactive to light.   Cardiovascular:      Rate and Rhythm: Tachycardia present.      Heart sounds: Normal heart sounds.   Pulmonary:      Effort: Pulmonary effort is normal.      Breath sounds: Normal breath sounds.   Abdominal:      General: Bowel sounds are normal. There is distension.      Palpations: Abdomen is  soft.   Musculoskeletal:         General: Normal range of motion.      Cervical back: Normal range of motion and neck supple.      Right lower leg: Edema present.      Left lower leg: Edema present.   Skin:     General: Skin is warm and dry.   Neurological:      Mental Status: She is alert and oriented to person, place, and time.      Deep Tendon Reflexes: Reflexes are normal and symmetric.   Psychiatric:         Behavior: Behavior normal.         Thought Content: Thought content normal.         Judgment: Judgment normal.        Significant Labs:   Results for orders placed or performed during the hospital encounter of 09/02/22   CBC auto differential   Result Value Ref Range    WBC 8.19 3.90 - 12.70 K/uL    RBC 3.17 (L) 4.00 - 5.40 M/uL    Hemoglobin 10.3 (L) 12.0 - 16.0 g/dL    Hematocrit 32.7 (L) 37.0 - 48.5 %     (H) 82 - 98 fL    MCH 32.5 (H) 27.0 - 31.0 pg    MCHC 31.5 (L) 32.0 - 36.0 g/dL    RDW 14.3 11.5 - 14.5 %    Platelets 200 150 - 450 K/uL    MPV 10.3 9.2 - 12.9 fL    Immature Granulocytes CANCELED 0.0 - 0.5 %    Immature Grans (Abs) CANCELED 0.00 - 0.04 K/uL    Lymph # CANCELED 1.0 - 4.8 K/uL    Mono # CANCELED 0.3 - 1.0 K/uL    Eos # CANCELED 0.0 - 0.5 K/uL    Baso # CANCELED 0.00 - 0.20 K/uL    nRBC 0 0 /100 WBC    Gran % 75.0 (H) 38.0 - 73.0 %    Lymph % 3.0 (L) 18.0 - 48.0 %    Mono % 12.0 4.0 - 15.0 %    Eosinophil % 1.0 0.0 - 8.0 %    Basophil % 0.0 0.0 - 1.9 %    Bands 9.0 %    Differential Method Manual    Brain natriuretic peptide   Result Value Ref Range     (H) 0 - 99 pg/mL   D dimer, quantitative   Result Value Ref Range    D-Dimer >33.00 (H) <0.50 mg/L FEU   Lactic acid, plasma   Result Value Ref Range    Lactate (Lactic Acid) 1.9 0.5 - 2.2 mmol/L   Urinalysis, Reflex to Urine Culture Urine, Clean Catch    Specimen: Urine   Result Value Ref Range    Specimen UA Urine, Clean Catch     Color, UA Yellow Yellow, Straw, Jacquelin    Appearance, UA Clear Clear    pH, UA 6.0 5.0 - 8.0     Specific Gravity, UA 1.020 1.005 - 1.030    Protein, UA 1+ (A) Negative    Glucose, UA Negative Negative    Ketones, UA Negative Negative    Bilirubin (UA) 1+ (A) Negative    Occult Blood UA Negative Negative    Nitrite, UA Negative Negative    Urobilinogen, UA Negative <2.0 EU/dL    Leukocytes, UA Negative Negative   Urinalysis Microscopic   Result Value Ref Range    RBC, UA 0 0 - 4 /hpf    WBC, UA 2 0 - 5 /hpf    Bacteria Rare None-Occ /hpf    Squam Epithel, UA 5 /hpf    Hyaline Casts, UA 9 (A) 0-1/lpf /lpf    Microscopic Comment SEE COMMENT    Comprehensive metabolic panel   Result Value Ref Range    Sodium 133 (L) 136 - 145 mmol/L    Potassium 4.2 3.5 - 5.1 mmol/L    Chloride 102 95 - 110 mmol/L    CO2 20 (L) 23 - 29 mmol/L    Glucose 165 (H) 70 - 110 mg/dL    BUN 27 (H) 8 - 23 mg/dL    Creatinine 1.4 0.5 - 1.4 mg/dL    Calcium 9.0 8.7 - 10.5 mg/dL    Total Protein 8.5 (H) 6.0 - 8.4 g/dL    Albumin 2.7 (L) 3.5 - 5.2 g/dL    Total Bilirubin 2.3 (H) 0.1 - 1.0 mg/dL    Alkaline Phosphatase 90 55 - 135 U/L    AST 13 10 - 40 U/L    ALT 6 (L) 10 - 44 U/L    Anion Gap 11 8 - 16 mmol/L    eGFR 43 (A) >60 mL/min/1.73 m^2   Lipase   Result Value Ref Range    Lipase 7 4 - 60 U/L   Troponin I   Result Value Ref Range    Troponin I 0.020 0.000 - 0.026 ng/mL   POCT COVID-19 Rapid Screening   Result Value Ref Range    POC Rapid COVID Negative Negative     Acceptable Yes         Significant Imaging:   Imaging Results              NM Lung Scan Ventilation Perfusion (Final result)  Result time 09/02/22 11:55:29      Final result by Dinora Green MD (09/02/22 11:55:29)                   Impression:      Suboptimal ventilation images.  However, findings suggest low probability of pulmonary embolism.      Electronically signed by: Dinora Green  Date:    09/02/2022  Time:    11:55               Narrative:    EXAMINATION:  NM LUNG VENTILATION AND PERFUSION IMAGING    CLINICAL HISTORY:  Pulmonary embolism  (PE) suspected, positive D-dimer;    TECHNIQUE:  1 mCi of Tc-99m-DTPA were placed in the nebulizer. Following the inhalation Tc-99m-DTPA in aerosol and the subsequent IV administration of 5.1 mCi of Tc-99m-MAA, multiple images of the thorax were obtained in various projections.    COMPARISON:  Chest radiograph 09/02/2022, CT abdomen pelvis with contrast 09/02/2022    FINDINGS:  Ventilation images are suboptimal with tracer clumping within the airways.  There are matched defects within the bilateral posterior lower lobes, correlating with region of opacity on comparison chest radiograph.                                       CT Abdomen Pelvis With Contrast (Final result)  Result time 09/02/22 08:31:18      Final result by Dinora Green MD (09/02/22 08:31:18)                   Impression:      No small or large bowel obstruction as clinically questioned.    New small volume ascites throughout the abdomen and pelvis.    New hyperdensity in the region of the gallbladder, suspected to correlate with intraluminal gallbladder contents, though underlying mass is possible.  Finding could correlate with sludge or blood products.  Right upper quadrant ultrasound is recommended for further evaluation.    Increased ground-glass and linear density within the lung bases with more focal nodular opacity in the right lower lobe measuring 1.3 cm.  Findings overall progressed since the comparison exam and may reflect progression in prior disease versus acute infiltrate.  For a solid nodule >8 mm, Fleischner Society 2017 guidelines recommend considering CT, PET/CT or tissue sampling at 3 months.    Interval increase in compression deformity at the superior endplate of L5 with approximately 25% height loss.    Anasarca.      Electronically signed by: Dinora Green  Date:    09/02/2022  Time:    08:31               Narrative:    EXAMINATION:  CT ABDOMEN PELVIS WITH CONTRAST    CLINICAL HISTORY:  Bowel obstruction  suspected;    TECHNIQUE:  Low dose axial images, sagittal and coronal reformations were obtained from the lung bases to the pubic symphysis following the IV administration of 100 mL of Omnipaque 350.  All CT scans at this facility are performed using dose optimization techniques including the following: automated exposure control; adjustment of the mA and/or kV; use of iterative reconstruction technique.    COMPARISON:  CT chest abdomen pelvis 04/29/2022    FINDINGS:  Abdomen:    - Lower thorax:The heart is enlarged.  Defibrillator leads are partially visualized terminating in the right atrium and ventricle.  No significant pleural fluid.  There is persistent mural calcification along the posterior margin of the left atrium.    - Lung bases: There are bibasilar linear and ground-glass densities with honeycombing present in the medial right lower lobe.  There is interval development of focal associated nodular density within the posterior basal segment of the right lower lobe measuring 1.3 cm.  Findings appear overall slightly progressed since the comparison exam which may reflect progression in prior disease versus acute infiltrate.    - Liver: Liver demonstrates lobular contour, suggesting possible cirrhosis.  No focal hepatic abnormality.    - Gallbladder: Within the gallbladder fossa, there is new hyperdensity (Hounsfield units 65) measuring approximately 6 cm lying just anterior to the pylorus.  Finding suspected to correlate with the gallbladder which may contain sludge or blood products.  Further evaluation with ultrasound recommended.    - Bile Ducts: No evidence of intra or extra hepatic biliary ductal dilation.    - Spleen: Negative.    - Kidneys/ureters: No mass or hydronephrosis.  Ureters are normal in course and caliber.    - Adrenals: Unremarkable.    - Pancreas: No mass or peripancreatic fat stranding.    - Retroperitoneum:  Shotty lymph nodes present throughout the retroperitoneum.    - Vascular:  There is mild aortoiliac atherosclerosis without aneurysm.    - Abdominal wall:  There is diffuse body wall edema.    Pelvis:    No pelvic mass, adenopathy, or free fluid.  Urinary bladder is decompressed and thus not well evaluated.  Uterus is unremarkable.    Bowel/Mesentery:    Contrast extends throughout the small bowel to the colon.  No abnormal bowel dilatation.  No obstruction or significant inflammatory change.  Rare colonic diverticula are present without inflammatory change of diverticulitis.  The appendix is not seen and may be absent.  There is small volume ascites, most prevalent within the perihepatic and perisplenic regions and lower pelvis.  This is new since the comparison exam.  Multiple small mesenteric nodes are noted.    Bones:  There is mild degenerative change of the spine and postsurgical change of sternotomy.  There is interval increased compression deformity at the superior endplate of L5 with approximately 25% height loss.                                       X-Ray Chest AP Portable (Final result)  Result time 09/02/22 06:14:43      Final result by Rikki Madera MD (09/02/22 06:14:43)                   Impression:      See above.      Electronically signed by: Rikki Madera MD  Date:    09/02/2022  Time:    06:14               Narrative:    EXAMINATION:  XR CHEST AP PORTABLE    CLINICAL HISTORY:  SVT;    FINDINGS:  Single view of the chest.  Comparison 04/29/2022    Cardiac silhouette is enlarged but stable.  Left-sided pacing wires demonstrates similar configuration.  Sternotomy wires are intact.  Aorta demonstrates atherosclerotic disease..  Patchy infiltrates are seen within the lower lobes which could reflect early airspace disease..  Trace left pleural effusion.  No pneumothorax.  Bones demonstrate moderate degenerative change.                                       Assessment/Plan:     * Abdominal pain  6.7 cm heterogeneous complex structure within the gallbladder fossa  correlating with CT findings.  Finding remains nonspecific and may correlate with abnormal gallbladder containing sludge and stones versus mass.  Patient gives no prior history of cholecystectomy.  Ruptured gallbladder is an alternate possibility noting patient without sonographic Taylor sign and no definite pericholecystic fluid identified.    -NPO   -MRI/MRCP pending   -General surgery vs GI consult     Lung nodule  Increased ground-glass and linear density within the lung bases with more focal nodular opacity in the right lower lobe measuring 1.3 cm.  Findings overall progressed since the comparison exam and may reflect progression in prior disease versus acute infiltrate.  For a solid nodule >8 mm, Fleischner Society 2017 guidelines recommend considering CT, PET/CT or tissue sampling at 3 months.      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  Last A1c reviewed-   Lab Results   Component Value Date    HGBA1C 6.9 (H) 06/21/2022     Most recent fingerstick glucose reviewed-   Recent Labs   Lab 09/02/22  1405   POCTGLUCOSE 175*     Current correctional scale  Low  Decrease anti-hyperglycemic dose as follows-   Antihyperglycemics (From admission, onward)    Start     Stop Route Frequency Ordered    09/02/22 1335  insulin aspart U-100 pen 0-5 Units         -- SubQ Before meals & nightly PRN 09/02/22 1245        Hold Oral hypoglycemics while patient is in the hospital.      VTE Risk Mitigation (From  admission, onward)         Ordered     IP VTE HIGH RISK PATIENT  Once         09/02/22 1245     Place sequential compression device  Until discontinued         09/02/22 1245     Reason for No Pharmacological VTE Prophylaxis  Once        Question:  Reasons:  Answer:  Already adequately anticoagulated on oral Anticoagulants    09/02/22 1245     Place sequential compression device  Until discontinued         09/02/22 1121                   Pily Travis NP  Department of Hospital Medicine   [facility] Tele[facility] ([facility])

## 2022-09-02 NOTE — ASSESSMENT & PLAN NOTE
-Presents with SVT with HR in 140's  -Prior occurrence noted in 2018, required DCCV  -Amiodarone drip initiated  -Echo pending  -Troponin negative

## 2022-09-02 NOTE — ASSESSMENT & PLAN NOTE
Increased ground-glass and linear density within the lung bases with more focal nodular opacity in the right lower lobe measuring 1.3 cm.  Findings overall progressed since the comparison exam and may reflect progression in prior disease versus acute infiltrate.  For a solid nodule >8 mm, Fleischner Society 2017 guidelines recommend considering CT, PET/CT or tissue sampling at 3 months.

## 2022-09-02 NOTE — HPI
Ms. Souza is a 62 year old female patient whose current medical conditions include secondary neuroendocrine tumor of liver, PAF s/p ablation in 2006, chronic AC on Xarelto), chronic diastolic CHF, pulmonary HTN, DM type II, anemia, and SVT who presented to [facility] ED today with a chief complaint of generalized abdominal pain over the past week. Associated symptoms included cough, congestion, and SOB. No apparent associated fever, chills, yue chest pain, near syncope, or syncope. Initial workup in ED revealed BNP of 286, negative troponin, +d-dimer. EKG showed SVT's with HR in 140 range. VQ scan negative. CT abdomen showed  new small volume ascites throughout the abdomen and pelvis. New hyperdensity in the region of the gallbladder, suspected to correlate with intraluminal gallbladder contents, though underlying mass is possible.  Finding could correlate with sludge or blood products. Increased ground-glass and linear density within the lung bases with more focal nodular opacity in the right lower lobe measuring 1.3 cm.  Findings overall progressed since the comparison exam and may reflect progression in prior disease versus acute infiltrate. Anasarca. Patient admitted for further evaluation and treatment. Cardiology consulted.

## 2022-09-02 NOTE — ASSESSMENT & PLAN NOTE
6.7 cm heterogeneous complex structure within the gallbladder fossa correlating with CT findings.  Finding remains nonspecific and may correlate with abnormal gallbladder containing sludge and stones versus mass.  Patient gives no prior history of cholecystectomy.  Ruptured gallbladder is an alternate possibility noting patient without sonographic Taylor sign and no definite pericholecystic fluid identified.    -NPO   -MRI/MRCP pending   -General surgery vs GI consult

## 2022-09-02 NOTE — ASSESSMENT & PLAN NOTE
Patient's FSGs are uncontrolled due to hyperglycemia on current medication regimen.  Last A1c reviewed-   Lab Results   Component Value Date    HGBA1C 6.9 (H) 06/21/2022     Most recent fingerstick glucose reviewed-   Recent Labs   Lab 09/02/22  1405   POCTGLUCOSE 175*     Current correctional scale  Low  Decrease anti-hyperglycemic dose as follows-   Antihyperglycemics (From admission, onward)    Start     Stop Route Frequency Ordered    09/02/22 1335  insulin aspart U-100 pen 0-5 Units         -- SubQ Before meals & nightly PRN 09/02/22 1245        Hold Oral hypoglycemics while patient is in the hospital.

## 2022-09-02 NOTE — SUBJECTIVE & OBJECTIVE
Past Medical History:   Diagnosis Date    Acute headache 2016    Anemia     Atrial fibrillation, persistent     Bronchitis     CHF (congestive heart failure)     Diabetes mellitus, type 2     Hypertension     Malignant poorly differentiated neuroendocrine carcinoma 2016    Malignant poorly differentiated neuroendocrine carcinoma 2016    Pacemaker     Biotronik    Secondary neuroendocrine tumor of liver 2016       Past Surgical History:   Procedure Laterality Date    A-V CARDIAC PACEMAKER INSERTION Left 2018    Procedure: INSERTION, CARDIAC PACEMAKER, DUAL CHAMBER;  Surgeon: Jean Carlos Onofre MD;  Location: [facility] CATH LAB;  Service: Cardiology;  Laterality: Left;  sinus pause, dPPM, BIO, DM, 323A    ADENOIDECTOMY      BREAST BIOPSY Right     benign    CARDIAC ELECTROPHYSIOLOGY MAPPING AND ABLATION      CARDIAC SURGERY      CARDIOVERSION N/A 2018    Procedure: CARDIOVERSION;  Surgeon: Jean Carlos Onofre MD;  Location: [facility] CATH LAB;  Service: Cardiology;  Laterality: N/A;     SECTION      ERCP      ERCP N/A 2018    Procedure: ERCP (ENDOSCOPIC RETROGRADE CHOLANGIOPANCREATOGRAPHY);  Surgeon: Tang Tanner MD;  Location: [facility] ([address] [place] [address]);  Service: Endoscopy;  Laterality: N/A;  2 day hold Dr Kalyan Whittaker    ERCP N/A 2019    Procedure: ERCP (ENDOSCOPIC RETROGRADE CHOLANGIOPANCREATOGRAPHY);  Surgeon: Tang Tanner MD;  Location: [facility] ([address] [place] [address]);  Service: Endoscopy;  Laterality: N/A;  2 day hold Dr Kalyan Whittaker    TONSILLECTOMY      uvulopalatopharygoplasty         Review of patient's allergies indicates:   Allergen Reactions    Pcn [penicillins] Other (See Comments), Hives and Swelling     As a child had an unknown reaction.  Has not taken since  As a child had an unknown reaction.  Has not taken since       Current Facility-Administered Medications on File Prior to Encounter   Medication    ferumoxytol (FERAHEME) 510 mg in dextrose 5 % 100 mL IVPB     heparin, porcine (PF) 100 unit/mL injection flush 500 Units    sodium chloride 0.9% flush 10 mL     Current Outpatient Medications on File Prior to Encounter   Medication Sig    atorvastatin (LIPITOR) 20 MG tablet Take 1 tablet (20 mg total) by mouth once daily.    EScitalopram oxalate (LEXAPRO) 10 MG tablet Take 1 tablet (10 mg total) by mouth once daily.    fluticasone propionate (FLOVENT HFA) 110 mcg/actuation inhaler Inhale 2 puffs into the lungs once daily.    glipiZIDE (GLUCOTROL) 10 MG tablet Take 1 tablet (10 mg total) by mouth 2 (two) times daily.    KLOR-CON M20 20 mEq tablet 20 mEq once daily.     linaGLIPtin (TRADJENTA) 5 mg Tab tablet Take 1 tablet (5 mg total) by mouth once daily.    metoprolol tartrate (LOPRESSOR) 25 MG tablet 25 mg as needed.    metoprolol tartrate (LOPRESSOR) 50 MG tablet Take 50 mg by mouth nightly.     rivaroxaban (XARELTO) 20 mg Tab Take 20 mg by mouth.    sotalol (BETAPACE) 80 MG tablet Take 80 mg by mouth once daily.     traZODone (DESYREL) 100 MG tablet Take 1 tablet (100 mg total) by mouth every evening.    zolpidem (AMBIEN) 10 mg Tab Take 1 tablet (10 mg total) by mouth nightly as needed (As needed).    blood sugar diagnostic Strp To check BG 1 times daily, to use with insurance preferred meter    blood sugar diagnostic, disc Strp USE AS DIRECTED EVERY DAY *THANK YOU*    blood-glucose meter kit by Other route. Use as instructed    blood-glucose meter kit To check BG 1 times daily, to use with insurance preferred meter    furosemide (LASIX) 40 MG tablet Take 40 mg by mouth once daily.     insulin (LANTUS SOLOSTAR U-100 INSULIN) glargine 100 units/mL (3mL) SubQ pen Inject 15 Units into the skin every evening.    lancets Misc Use as directed    lancets Misc To check BG 1 times daily, to use with insurance preferred meter    metFORMIN (GLUCOPHAGE) 500 MG tablet Take 1 tablet by mouth twice a day with meals (Patient taking differently: daily with breakfast. Take 1 tablet by mouth  "daily with breakfast)    pen needle, diabetic (BD ULTRA-FINE MINI PEN NEEDLE) 31 gauge x 3/16" Ndle 1 each by Misc.(Non-Drug; Combo Route) route once daily.     Family History       Problem Relation (Age of Onset)    Breast cancer Paternal Aunt, Maternal Aunt, Sister    Diabetes type II Mother    Prostate cancer Father          Tobacco Use    Smoking status: Never    Smokeless tobacco: Never   Substance and Sexual Activity    Alcohol use: No     Alcohol/week: 0.0 standard drinks    Drug use: No    Sexual activity: Not Currently     Partners: Male     Review of Systems   Constitutional:  Positive for fatigue.   HENT: Negative.     Eyes: Negative.    Respiratory:  Positive for cough and shortness of breath.    Cardiovascular:  Positive for leg swelling.   Gastrointestinal:  Positive for abdominal distention and abdominal pain.   Endocrine: Negative.    Genitourinary: Negative.    Musculoskeletal: Negative.    Skin: Negative.    Allergic/Immunologic: Negative.    Neurological: Negative.    Hematological: Negative.    Psychiatric/Behavioral: Negative.     Objective:     Vital Signs (Most Recent):  Temp: 99 °F (37.2 °C) (09/02/22 1245)  Pulse: (!) 140 (09/02/22 1245)  Resp: 18 (09/02/22 1245)  BP: (!) 106/57 (09/02/22 1245)  SpO2: (!) 93 % (09/02/22 1245)   Vital Signs (24h Range):  Temp:  [98.9 °F (37.2 °C)-99.2 °F (37.3 °C)] 99 °F (37.2 °C)  Pulse:  [] 140  Resp:  [18-28] 18  SpO2:  [93 %-100 %] 93 %  BP: ()/(53-69) 106/57     Weight: 98.9 kg (218 lb)  Body mass index is 35.19 kg/m².    Physical Exam  Vitals and nursing note reviewed.   Constitutional:       Appearance: She is well-developed.   HENT:      Head: Normocephalic and atraumatic.   Eyes:      Conjunctiva/sclera: Conjunctivae normal.      Pupils: Pupils are equal, round, and reactive to light.   Cardiovascular:      Rate and Rhythm: Tachycardia present.      Heart sounds: Normal heart sounds.   Pulmonary:      Effort: Pulmonary effort is normal. "      Breath sounds: Normal breath sounds.   Abdominal:      General: Bowel sounds are normal. There is distension.      Palpations: Abdomen is soft.   Musculoskeletal:         General: Normal range of motion.      Cervical back: Normal range of motion and neck supple.      Right lower leg: Edema present.      Left lower leg: Edema present.   Skin:     General: Skin is warm and dry.   Neurological:      Mental Status: She is alert and oriented to person, place, and time.      Deep Tendon Reflexes: Reflexes are normal and symmetric.   Psychiatric:         Behavior: Behavior normal.         Thought Content: Thought content normal.         Judgment: Judgment normal.        Significant Labs:   Results for orders placed or performed during the hospital encounter of 09/02/22   CBC auto differential   Result Value Ref Range    WBC 8.19 3.90 - 12.70 K/uL    RBC 3.17 (L) 4.00 - 5.40 M/uL    Hemoglobin 10.3 (L) 12.0 - 16.0 g/dL    Hematocrit 32.7 (L) 37.0 - 48.5 %     (H) 82 - 98 fL    MCH 32.5 (H) 27.0 - 31.0 pg    MCHC 31.5 (L) 32.0 - 36.0 g/dL    RDW 14.3 11.5 - 14.5 %    Platelets 200 150 - 450 K/uL    MPV 10.3 9.2 - 12.9 fL    Immature Granulocytes CANCELED 0.0 - 0.5 %    Immature Grans (Abs) CANCELED 0.00 - 0.04 K/uL    Lymph # CANCELED 1.0 - 4.8 K/uL    Mono # CANCELED 0.3 - 1.0 K/uL    Eos # CANCELED 0.0 - 0.5 K/uL    Baso # CANCELED 0.00 - 0.20 K/uL    nRBC 0 0 /100 WBC    Gran % 75.0 (H) 38.0 - 73.0 %    Lymph % 3.0 (L) 18.0 - 48.0 %    Mono % 12.0 4.0 - 15.0 %    Eosinophil % 1.0 0.0 - 8.0 %    Basophil % 0.0 0.0 - 1.9 %    Bands 9.0 %    Differential Method Manual    Brain natriuretic peptide   Result Value Ref Range     (H) 0 - 99 pg/mL   D dimer, quantitative   Result Value Ref Range    D-Dimer >33.00 (H) <0.50 mg/L FEU   Lactic acid, plasma   Result Value Ref Range    Lactate (Lactic Acid) 1.9 0.5 - 2.2 mmol/L   Urinalysis, Reflex to Urine Culture Urine, Clean Catch    Specimen: Urine   Result  Value Ref Range    Specimen UA Urine, Clean Catch     Color, UA Yellow Yellow, Straw, Jacquelin    Appearance, UA Clear Clear    pH, UA 6.0 5.0 - 8.0    Specific Gravity, UA 1.020 1.005 - 1.030    Protein, UA 1+ (A) Negative    Glucose, UA Negative Negative    Ketones, UA Negative Negative    Bilirubin (UA) 1+ (A) Negative    Occult Blood UA Negative Negative    Nitrite, UA Negative Negative    Urobilinogen, UA Negative <2.0 EU/dL    Leukocytes, UA Negative Negative   Urinalysis Microscopic   Result Value Ref Range    RBC, UA 0 0 - 4 /hpf    WBC, UA 2 0 - 5 /hpf    Bacteria Rare None-Occ /hpf    Squam Epithel, UA 5 /hpf    Hyaline Casts, UA 9 (A) 0-1/lpf /lpf    Microscopic Comment SEE COMMENT    Comprehensive metabolic panel   Result Value Ref Range    Sodium 133 (L) 136 - 145 mmol/L    Potassium 4.2 3.5 - 5.1 mmol/L    Chloride 102 95 - 110 mmol/L    CO2 20 (L) 23 - 29 mmol/L    Glucose 165 (H) 70 - 110 mg/dL    BUN 27 (H) 8 - 23 mg/dL    Creatinine 1.4 0.5 - 1.4 mg/dL    Calcium 9.0 8.7 - 10.5 mg/dL    Total Protein 8.5 (H) 6.0 - 8.4 g/dL    Albumin 2.7 (L) 3.5 - 5.2 g/dL    Total Bilirubin 2.3 (H) 0.1 - 1.0 mg/dL    Alkaline Phosphatase 90 55 - 135 U/L    AST 13 10 - 40 U/L    ALT 6 (L) 10 - 44 U/L    Anion Gap 11 8 - 16 mmol/L    eGFR 43 (A) >60 mL/min/1.73 m^2   Lipase   Result Value Ref Range    Lipase 7 4 - 60 U/L   Troponin I   Result Value Ref Range    Troponin I 0.020 0.000 - 0.026 ng/mL   POCT COVID-19 Rapid Screening   Result Value Ref Range    POC Rapid COVID Negative Negative     Acceptable Yes         Significant Imaging:   Imaging Results              NM Lung Scan Ventilation Perfusion (Final result)  Result time 09/02/22 11:55:29      Final result by Dinora Green MD (09/02/22 11:55:29)                   Impression:      Suboptimal ventilation images.  However, findings suggest low probability of pulmonary embolism.      Electronically signed by: Dinora  Peter  Date:    09/02/2022  Time:    11:55               Narrative:    EXAMINATION:  NM LUNG VENTILATION AND PERFUSION IMAGING    CLINICAL HISTORY:  Pulmonary embolism (PE) suspected, positive D-dimer;    TECHNIQUE:  1 mCi of Tc-99m-DTPA were placed in the nebulizer. Following the inhalation Tc-99m-DTPA in aerosol and the subsequent IV administration of 5.1 mCi of Tc-99m-MAA, multiple images of the thorax were obtained in various projections.    COMPARISON:  Chest radiograph 09/02/2022, CT abdomen pelvis with contrast 09/02/2022    FINDINGS:  Ventilation images are suboptimal with tracer clumping within the airways.  There are matched defects within the bilateral posterior lower lobes, correlating with region of opacity on comparison chest radiograph.                                       CT Abdomen Pelvis With Contrast (Final result)  Result time 09/02/22 08:31:18      Final result by Dinora Green MD (09/02/22 08:31:18)                   Impression:      No small or large bowel obstruction as clinically questioned.    New small volume ascites throughout the abdomen and pelvis.    New hyperdensity in the region of the gallbladder, suspected to correlate with intraluminal gallbladder contents, though underlying mass is possible.  Finding could correlate with sludge or blood products.  Right upper quadrant ultrasound is recommended for further evaluation.    Increased ground-glass and linear density within the lung bases with more focal nodular opacity in the right lower lobe measuring 1.3 cm.  Findings overall progressed since the comparison exam and may reflect progression in prior disease versus acute infiltrate.  For a solid nodule >8 mm, Fleischner Society 2017 guidelines recommend considering CT, PET/CT or tissue sampling at 3 months.    Interval increase in compression deformity at the superior endplate of L5 with approximately 25% height loss.    Anasarca.      Electronically signed by: Dinora  Frineto  Date:    09/02/2022  Time:    08:31               Narrative:    EXAMINATION:  CT ABDOMEN PELVIS WITH CONTRAST    CLINICAL HISTORY:  Bowel obstruction suspected;    TECHNIQUE:  Low dose axial images, sagittal and coronal reformations were obtained from the lung bases to the pubic symphysis following the IV administration of 100 mL of Omnipaque 350.  All CT scans at this facility are performed using dose optimization techniques including the following: automated exposure control; adjustment of the mA and/or kV; use of iterative reconstruction technique.    COMPARISON:  CT chest abdomen pelvis 04/29/2022    FINDINGS:  Abdomen:    - Lower thorax:The heart is enlarged.  Defibrillator leads are partially visualized terminating in the right atrium and ventricle.  No significant pleural fluid.  There is persistent mural calcification along the posterior margin of the left atrium.    - Lung bases: There are bibasilar linear and ground-glass densities with honeycombing present in the medial right lower lobe.  There is interval development of focal associated nodular density within the posterior basal segment of the right lower lobe measuring 1.3 cm.  Findings appear overall slightly progressed since the comparison exam which may reflect progression in prior disease versus acute infiltrate.    - Liver: Liver demonstrates lobular contour, suggesting possible cirrhosis.  No focal hepatic abnormality.    - Gallbladder: Within the gallbladder fossa, there is new hyperdensity (Hounsfield units 65) measuring approximately 6 cm lying just anterior to the pylorus.  Finding suspected to correlate with the gallbladder which may contain sludge or blood products.  Further evaluation with ultrasound recommended.    - Bile Ducts: No evidence of intra or extra hepatic biliary ductal dilation.    - Spleen: Negative.    - Kidneys/ureters: No mass or hydronephrosis.  Ureters are normal in course and caliber.    - Adrenals:  Unremarkable.    - Pancreas: No mass or peripancreatic fat stranding.    - Retroperitoneum:  Shotty lymph nodes present throughout the retroperitoneum.    - Vascular: There is mild aortoiliac atherosclerosis without aneurysm.    - Abdominal wall:  There is diffuse body wall edema.    Pelvis:    No pelvic mass, adenopathy, or free fluid.  Urinary bladder is decompressed and thus not well evaluated.  Uterus is unremarkable.    Bowel/Mesentery:    Contrast extends throughout the small bowel to the colon.  No abnormal bowel dilatation.  No obstruction or significant inflammatory change.  Rare colonic diverticula are present without inflammatory change of diverticulitis.  The appendix is not seen and may be absent.  There is small volume ascites, most prevalent within the perihepatic and perisplenic regions and lower pelvis.  This is new since the comparison exam.  Multiple small mesenteric nodes are noted.    Bones:  There is mild degenerative change of the spine and postsurgical change of sternotomy.  There is interval increased compression deformity at the superior endplate of L5 with approximately 25% height loss.                                       X-Ray Chest AP Portable (Final result)  Result time 09/02/22 06:14:43      Final result by Rikki Madera MD (09/02/22 06:14:43)                   Impression:      See above.      Electronically signed by: Rikki Madera MD  Date:    09/02/2022  Time:    06:14               Narrative:    EXAMINATION:  XR CHEST AP PORTABLE    CLINICAL HISTORY:  SVT;    FINDINGS:  Single view of the chest.  Comparison 04/29/2022    Cardiac silhouette is enlarged but stable.  Left-sided pacing wires demonstrates similar configuration.  Sternotomy wires are intact.  Aorta demonstrates atherosclerotic disease..  Patchy infiltrates are seen within the lower lobes which could reflect early airspace disease..  Trace left pleural effusion.  No pneumothorax.  Bones demonstrate moderate  degenerative change.

## 2022-09-02 NOTE — ED PROVIDER NOTES
SCRIBE #1 NOTE: I, Ron Pate, am scribing for, and in the presence of, Alexey Lucio MD. I have scribed the HPI, ROS, and PEx.    SCRIBE #2 NOTE: I, Rachael Travis, am scribing for, and in the presence of,  Sarthak Wade Jr., MD. I have scribed the remaining portions of the note not scribed by Scribe #1.    History     Chief Complaint   Patient presents with    Abdominal Pain     Review of patient's allergies indicates:   Allergen Reactions    Pcn [penicillins] Other (See Comments), Hives and Swelling     As a child had an unknown reaction.  Has not taken since  As a child had an unknown reaction.  Has not taken since         History of Present Illness     HPI    9/2/2022, 4:50 AM  History obtained from the patient      History of Present Illness: Janine Souza is a 62 y.o. female patient with a PMHx of anemia, a-fib, CHF, DM, and HTN who presents to the Emergency Department for evaluation of abdominal pain. Pt c/o generalized abdominal pain with abdominal distention. Associated sxs include a cough, congestion, and SOB for 1 week. Symptoms are constant and moderate in severity. No mitigating or exacerbating factors reported. Patient denies any fever, chills, CP, nausea, vomiting, constipation, diarrhea, and all other sxs at this time. No further complaints or concerns at this time.       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       Past Surgical History:  Past Surgical History:   Procedure Laterality Date    A-V CARDIAC PACEMAKER INSERTION Left  2018    Procedure: INSERTION, CARDIAC PACEMAKER, DUAL CHAMBER;  Surgeon: Jean Carlos Onofre MD;  Location: [facility] CATH LAB;  Service: Cardiology;  Laterality: Left;  sinus pause, dPPM, BIO, DM, 323A    ADENOIDECTOMY      BREAST BIOPSY Right     benign    CARDIAC ELECTROPHYSIOLOGY MAPPING AND ABLATION      CARDIAC SURGERY      CARDIOVERSION N/A 2018    Procedure: CARDIOVERSION;  Surgeon: Jean Carlos Onofre MD;  Location: [facility] CATH LAB;  Service: Cardiology;  Laterality: N/A;     SECTION      ERCP      ERCP N/A 2018    Procedure: ERCP (ENDOSCOPIC RETROGRADE CHOLANGIOPANCREATOGRAPHY);  Surgeon: Tang Tanner MD;  Location: [facility] ENDO (2ND FLR);  Service: Endoscopy;  Laterality: N/A;  2 day hold Dr Kalyan Whittaker    ERCP N/A 2019    Procedure: ERCP (ENDOSCOPIC RETROGRADE CHOLANGIOPANCREATOGRAPHY);  Surgeon: Tang Tanner MD;  Location: [facility] ENDO (2ND FLR);  Service: Endoscopy;  Laterality: N/A;  2 day hold Dr Kalyan Whittaker    TONSILLECTOMY      uvulopalatopharygoplasty           Family History:  Family History   Problem Relation Age of Onset    Diabetes type II Mother     Prostate cancer Father     Breast cancer Paternal Aunt         breast    Breast cancer Maternal Aunt         breast    Breast cancer Sister        Social History:  Social History     Tobacco Use    Smoking status: Never    Smokeless tobacco: Never   Substance and Sexual Activity    Alcohol use: No     Alcohol/week: 0.0 standard drinks    Drug use: No    Sexual activity: Not Currently     Partners: Male        Review of Systems     Review of Systems   Constitutional:  Negative for chills and fever.   HENT:  Positive for congestion. Negative for sore throat.    Respiratory:  Positive for cough and shortness of breath.    Cardiovascular:  Negative for chest pain.   Gastrointestinal:  Positive for abdominal distention and abdominal pain. Negative for constipation, diarrhea, nausea and vomiting.   Genitourinary:   Negative for dysuria.   Musculoskeletal:  Negative for back pain.   Skin:  Negative for rash.   Neurological:  Negative for weakness.   Hematological:  Does not bruise/bleed easily.   All other systems reviewed and are negative.     Physical Exam     Initial Vitals [09/02/22 0410]   BP Pulse Resp Temp SpO2   122/64 (!) 142 (!) 22 99.2 °F (37.3 °C) (!) 93 %      MAP       --          Physical Exam  Nursing Notes and Vital Signs Reviewed.  Constitutional: Patient is in no acute distress. Well-developed and well-nourished.  Head: Atraumatic. Normocephalic.  Eyes: PERRL. EOM intact. Conjunctivae are not pale. No scleral icterus.  ENT: Mucous membranes are moist. Oropharynx is clear and symmetric.    Neck: Supple. Full ROM. No lymphadenopathy.  Cardiovascular: Tachycardic. Irregular rhythm.  No murmurs, rubs, or gallops. Distal pulses are 2+ and symmetric. Pacemaker palpated. No tenderness.   Pulmonary/Chest: No respiratory distress. Clear to auscultation bilaterally. No wheezing or rales.  Abdominal: Distended and mildly firm. Generalized tenderness. No focal tenderness. No ascites.  Musculoskeletal: Moves all extremities. No obvious deformities. No edema. No calf tenderness.  Skin: Warm and dry.  Neurological:  Alert, awake, and appropriate.  Normal speech.  No acute focal neurological deficits are appreciated.  Psychiatric: Normal affect. Good eye contact. Appropriate in content.     ED Course   Critical Care    Date/Time: 9/2/2022 10:16 AM  Performed by: Sarthak Wade Jr., MD  Authorized by: Sarthak Wade Jr., MD   Direct patient critical care time: 10 minutes  Additional history critical care time: 5 minutes  Ordering / reviewing critical care time: 5 minutes  Documentation critical care time: 5 minutes  Consulting other physicians critical care time: 10 minutes  Total critical care time (exclusive of procedural time) : 35 minutes  Critical care time was exclusive of separately billable procedures and treating other  "patients and teaching time.  Critical care was necessary to treat or prevent imminent or life-threatening deterioration of the following conditions: hypoxia.  Critical care was time spent personally by me on the following activities: blood draw for specimens, development of treatment plan with patient or surrogate, discussions with consultants, interpretation of cardiac output measurements, evaluation of patient's response to treatment, obtaining history from patient or surrogate, examination of patient, ordering and performing treatments and interventions, ordering and review of laboratory studies, ordering and review of radiographic studies, pulse oximetry, re-evaluation of patient's condition and review of old charts.      ED Vital Signs:  Vitals:    09/02/22 0915 09/02/22 0945 09/02/22 1000 09/02/22 1017   BP: (!) 89/53 (!) 96/53 (!) 97/54 (!) 96/55   Pulse: 60 (!) 59 60 61   Resp: 20 20 20 (!) 22   Temp:       TempSrc:       SpO2: 99% 99% 99% 100%   Weight:       Height:        09/02/22 1120 09/02/22 1245 09/02/22 1343 09/02/22 1347   BP: 125/63 (!) 106/57 123/79 115/77   Pulse: 60 (!) 140 (!) 139 (!) 136   Resp: 20 18 18 16   Temp: 98.9 °F (37.2 °C) 99 °F (37.2 °C)     TempSrc: Oral Oral     SpO2: 98% (!) 93% 97% 96%   Weight:   98.9 kg (218 lb)    Height:   5' 6" (1.676 m)     09/02/22 1354 09/02/22 1401 09/02/22 1411 09/02/22 1417   BP: 114/64 110/62 103/63 123/61   Pulse: (!) 59 60 60 60   Resp: 16 16     Temp:       TempSrc:       SpO2: 95%      Weight:       Height:        09/02/22 1431 09/02/22 1445 09/02/22 1519   BP: 117/62 124/68 120/67   Pulse: 60 (!) 122 60   Resp:      Temp:      TempSrc:      SpO2:      Weight:      Height:          Abnormal Lab Results:  Labs Reviewed   CBC W/ AUTO DIFFERENTIAL - Abnormal; Notable for the following components:       Result Value    RBC 3.17 (*)     Hemoglobin 10.3 (*)     Hematocrit 32.7 (*)      (*)     MCH 32.5 (*)     MCHC 31.5 (*)     Gran % 75.0 (*)  "    Lymph % 3.0 (*)     All other components within normal limits   B-TYPE NATRIURETIC PEPTIDE - Abnormal; Notable for the following components:     (*)     All other components within normal limits   D DIMER, QUANTITATIVE - Abnormal; Notable for the following components:    D-Dimer >33.00 (*)     All other components within normal limits   URINALYSIS, REFLEX TO URINE CULTURE - Abnormal; Notable for the following components:    Protein, UA 1+ (*)     Bilirubin (UA) 1+ (*)     All other components within normal limits    Narrative:     Specimen Source->Urine   URINALYSIS MICROSCOPIC - Abnormal; Notable for the following components:    Hyaline Casts, UA 9 (*)     All other components within normal limits    Narrative:     Specimen Source->Urine   COMPREHENSIVE METABOLIC PANEL - Abnormal; Notable for the following components:    Sodium 133 (*)     CO2 20 (*)     Glucose 165 (*)     BUN 27 (*)     Total Protein 8.5 (*)     Albumin 2.7 (*)     Total Bilirubin 2.3 (*)     ALT 6 (*)     eGFR 43 (*)     All other components within normal limits   CULTURE, BLOOD   CULTURE, BLOOD   LACTIC ACID, PLASMA   LIPASE   TROPONIN I   SARS-COV-2 RDRP GENE    Narrative:     This test utilizes isothermal nucleic acid amplification   technology to detect the SARS-CoV-2 RdRp nucleic acid segment.   The analytical sensitivity (limit of detection) is 125 genome   equivalents/mL.   A POSITIVE result implies infection with the SARS-CoV-2 virus;   the patient is presumed to be contagious.     A NEGATIVE result means that SARS-CoV-2 nucleic acids are not   present above the limit of detection. A NEGATIVE result should be   treated as presumptive. It does not rule out the possibility of   COVID-19 and should not be the sole basis for treatment decisions.   If COVID-19 is strongly suspected based on clinical and exposure   history, re-testing using an alternate molecular assay should be   considered.   This test is only for use under the  Food and Drug   Administration s Emergency Use Authorization (EUA).   Commercial kits are provided by [employer].   Performance characteristics of the EUA have been independently   verified by [facility] Department of   Pathology and Laboratory Medicine.   _________________________________________________________________   The authorized Fact Sheet for Healthcare Providers and the authorized Fact   Sheet for Patients of the ID NOW COVID-19 are available on the FDA   website:     https://www.fda.gov/media/179719/download  https://www.fda.gov/media/457362/download                All Lab Results:  Results for orders placed or performed during the hospital encounter of 09/02/22   CBC auto differential   Result Value Ref Range    WBC 8.19 3.90 - 12.70 K/uL    RBC 3.17 (L) 4.00 - 5.40 M/uL    Hemoglobin 10.3 (L) 12.0 - 16.0 g/dL    Hematocrit 32.7 (L) 37.0 - 48.5 %     (H) 82 - 98 fL    MCH 32.5 (H) 27.0 - 31.0 pg    MCHC 31.5 (L) 32.0 - 36.0 g/dL    RDW 14.3 11.5 - 14.5 %    Platelets 200 150 - 450 K/uL    MPV 10.3 9.2 - 12.9 fL    Immature Granulocytes CANCELED 0.0 - 0.5 %    Immature Grans (Abs) CANCELED 0.00 - 0.04 K/uL    Lymph # CANCELED 1.0 - 4.8 K/uL    Mono # CANCELED 0.3 - 1.0 K/uL    Eos # CANCELED 0.0 - 0.5 K/uL    Baso # CANCELED 0.00 - 0.20 K/uL    nRBC 0 0 /100 WBC    Gran % 75.0 (H) 38.0 - 73.0 %    Lymph % 3.0 (L) 18.0 - 48.0 %    Mono % 12.0 4.0 - 15.0 %    Eosinophil % 1.0 0.0 - 8.0 %    Basophil % 0.0 0.0 - 1.9 %    Bands 9.0 %    Differential Method Manual    Brain natriuretic peptide   Result Value Ref Range     (H) 0 - 99 pg/mL   D dimer, quantitative   Result Value Ref Range    D-Dimer >33.00 (H) <0.50 mg/L FEU   Lactic acid, plasma   Result Value Ref Range    Lactate (Lactic Acid) 1.9 0.5 - 2.2 mmol/L   Urinalysis, Reflex to Urine Culture Urine, Clean Catch    Specimen: Urine   Result Value Ref Range    Specimen UA Urine, Clean Catch     Color, UA Yellow Yellow,  Straw, Jacquelin    Appearance, UA Clear Clear    pH, UA 6.0 5.0 - 8.0    Specific Gravity, UA 1.020 1.005 - 1.030    Protein, UA 1+ (A) Negative    Glucose, UA Negative Negative    Ketones, UA Negative Negative    Bilirubin (UA) 1+ (A) Negative    Occult Blood UA Negative Negative    Nitrite, UA Negative Negative    Urobilinogen, UA Negative <2.0 EU/dL    Leukocytes, UA Negative Negative   Urinalysis Microscopic   Result Value Ref Range    RBC, UA 0 0 - 4 /hpf    WBC, UA 2 0 - 5 /hpf    Bacteria Rare None-Occ /hpf    Squam Epithel, UA 5 /hpf    Hyaline Casts, UA 9 (A) 0-1/lpf /lpf    Microscopic Comment SEE COMMENT    Comprehensive metabolic panel   Result Value Ref Range    Sodium 133 (L) 136 - 145 mmol/L    Potassium 4.2 3.5 - 5.1 mmol/L    Chloride 102 95 - 110 mmol/L    CO2 20 (L) 23 - 29 mmol/L    Glucose 165 (H) 70 - 110 mg/dL    BUN 27 (H) 8 - 23 mg/dL    Creatinine 1.4 0.5 - 1.4 mg/dL    Calcium 9.0 8.7 - 10.5 mg/dL    Total Protein 8.5 (H) 6.0 - 8.4 g/dL    Albumin 2.7 (L) 3.5 - 5.2 g/dL    Total Bilirubin 2.3 (H) 0.1 - 1.0 mg/dL    Alkaline Phosphatase 90 55 - 135 U/L    AST 13 10 - 40 U/L    ALT 6 (L) 10 - 44 U/L    Anion Gap 11 8 - 16 mmol/L    eGFR 43 (A) >60 mL/min/1.73 m^2   Lipase   Result Value Ref Range    Lipase 7 4 - 60 U/L   Troponin I   Result Value Ref Range    Troponin I 0.020 0.000 - 0.026 ng/mL   Magnesium   Result Value Ref Range    Magnesium 1.6 1.6 - 2.6 mg/dL   POCT COVID-19 Rapid Screening   Result Value Ref Range    POC Rapid COVID Negative Negative     Acceptable Yes    Echo   Result Value Ref Range    BSA 2.15 m2    IVRT 32.35 msec    Left Ventricular Outflow Tract Mean Gradient 4.13 mmHg    Left Ventricular Outflow Tract Mean Velocity 0.97 cm/s    MV stenosis pressure 1/2 time 36.32 ms    Mr max armin 4.18 m/s    TV mean gradient 44 mmHg    TR Max Armin 3.86 m/s    IVC diameter 2.74 cm    Ao root annulus 2.98 cm    Ao peak armin 1.80 m/s    Posterior Wall 1.48 (A) 0.6 - 1.1  cm    LVOT diameter 1.69 cm    LVOT peak armin 1.28 m/s    LVOT peak VTI 21.70 cm    E wave deceleration time 125.25 msec    MV Peak A Armin 0.42 m/s    MV Peak E Armin 1.16 m/s    RA Major Axis 4.85 cm    PV peak gradient 1.67 mmHg    PV mean gradient 1.28 mmHg    RVOT peak armin 0.65 m/s    RVOT peak VTI 12.8 cm    IVS 1.28 (A) 0.6 - 1.1 cm    Ao VTI 27.8 cm    AV mean gradient 8 mmHg    LVIDd 5.21 3.5 - 6.0 cm    LVIDs 4.03 (A) 2.1 - 4.0 cm    Left Atrium Major Axis 4.93 cm    Left Atrium Minor Axis 4.63 cm    STJ 2.66 cm    Ascending aorta 3.01 cm    LV Systolic Volume 71.39 mL    LV Diastolic Volume 130.12 mL    RVDD 3.48 cm    LA size 3.94 cm    FS 23 %    LV mass 304.15 g    Left Ventricle Relative Wall Thickness 0.57 cm    AV valve area 1.75 cm2    AV Velocity Ratio 0.71     AV index (prosthetic) 0.78     MV valve area p 1/2 method 6.06 cm2    E/A ratio 2.76     LVOT area 2.2 cm2    LVOT stroke volume 48.65 cm3    AV peak gradient 13 mmHg    LV Systolic Volume Index 34.5 mL/m2    LV Diastolic Volume Index 62.86 mL/m2    LV Mass Index 147 g/m2    Triscuspid Valve Regurgitation Peak Gradient 60 mmHg   POCT glucose   Result Value Ref Range    POCT Glucose 175 (H) 70 - 110 mg/dL         Imaging Results:  Imaging Results              NM Lung Scan Ventilation Perfusion (Final result)  Result time 09/02/22 11:55:29      Final result by Dinora Green MD (09/02/22 11:55:29)                   Impression:      Suboptimal ventilation images.  However, findings suggest low probability of pulmonary embolism.      Electronically signed by: Dinora Green  Date:    09/02/2022  Time:    11:55               Narrative:    EXAMINATION:  NM LUNG VENTILATION AND PERFUSION IMAGING    CLINICAL HISTORY:  Pulmonary embolism (PE) suspected, positive D-dimer;    TECHNIQUE:  1 mCi of Tc-99m-DTPA were placed in the nebulizer. Following the inhalation Tc-99m-DTPA in aerosol and the subsequent IV administration of 5.1 mCi of Tc-99m-MAA,  multiple images of the thorax were obtained in various projections.    COMPARISON:  Chest radiograph 09/02/2022, CT abdomen pelvis with contrast 09/02/2022    FINDINGS:  Ventilation images are suboptimal with tracer clumping within the airways.  There are matched defects within the bilateral posterior lower lobes, correlating with region of opacity on comparison chest radiograph.                                       CT Abdomen Pelvis With Contrast (Final result)  Result time 09/02/22 08:31:18      Final result by Dinora Green MD (09/02/22 08:31:18)                   Impression:      No small or large bowel obstruction as clinically questioned.    New small volume ascites throughout the abdomen and pelvis.    New hyperdensity in the region of the gallbladder, suspected to correlate with intraluminal gallbladder contents, though underlying mass is possible.  Finding could correlate with sludge or blood products.  Right upper quadrant ultrasound is recommended for further evaluation.    Increased ground-glass and linear density within the lung bases with more focal nodular opacity in the right lower lobe measuring 1.3 cm.  Findings overall progressed since the comparison exam and may reflect progression in prior disease versus acute infiltrate.  For a solid nodule >8 mm, Fleischner Society 2017 guidelines recommend considering CT, PET/CT or tissue sampling at 3 months.    Interval increase in compression deformity at the superior endplate of L5 with approximately 25% height loss.    Anasarca.      Electronically signed by: Dinora Green  Date:    09/02/2022  Time:    08:31               Narrative:    EXAMINATION:  CT ABDOMEN PELVIS WITH CONTRAST    CLINICAL HISTORY:  Bowel obstruction suspected;    TECHNIQUE:  Low dose axial images, sagittal and coronal reformations were obtained from the lung bases to the pubic symphysis following the IV administration of 100 mL of Omnipaque 350.  All CT scans at this  facility are performed using dose optimization techniques including the following: automated exposure control; adjustment of the mA and/or kV; use of iterative reconstruction technique.    COMPARISON:  CT chest abdomen pelvis 04/29/2022    FINDINGS:  Abdomen:    - Lower thorax:The heart is enlarged.  Defibrillator leads are partially visualized terminating in the right atrium and ventricle.  No significant pleural fluid.  There is persistent mural calcification along the posterior margin of the left atrium.    - Lung bases: There are bibasilar linear and ground-glass densities with honeycombing present in the medial right lower lobe.  There is interval development of focal associated nodular density within the posterior basal segment of the right lower lobe measuring 1.3 cm.  Findings appear overall slightly progressed since the comparison exam which may reflect progression in prior disease versus acute infiltrate.    - Liver: Liver demonstrates lobular contour, suggesting possible cirrhosis.  No focal hepatic abnormality.    - Gallbladder: Within the gallbladder fossa, there is new hyperdensity (Hounsfield units 65) measuring approximately 6 cm lying just anterior to the pylorus.  Finding suspected to correlate with the gallbladder which may contain sludge or blood products.  Further evaluation with ultrasound recommended.    - Bile Ducts: No evidence of intra or extra hepatic biliary ductal dilation.    - Spleen: Negative.    - Kidneys/ureters: No mass or hydronephrosis.  Ureters are normal in course and caliber.    - Adrenals: Unremarkable.    - Pancreas: No mass or peripancreatic fat stranding.    - Retroperitoneum:  Shotty lymph nodes present throughout the retroperitoneum.    - Vascular: There is mild aortoiliac atherosclerosis without aneurysm.    - Abdominal wall:  There is diffuse body wall edema.    Pelvis:    No pelvic mass, adenopathy, or free fluid.  Urinary bladder is decompressed and thus not well  evaluated.  Uterus is unremarkable.    Bowel/Mesentery:    Contrast extends throughout the small bowel to the colon.  No abnormal bowel dilatation.  No obstruction or significant inflammatory change.  Rare colonic diverticula are present without inflammatory change of diverticulitis.  The appendix is not seen and may be absent.  There is small volume ascites, most prevalent within the perihepatic and perisplenic regions and lower pelvis.  This is new since the comparison exam.  Multiple small mesenteric nodes are noted.    Bones:  There is mild degenerative change of the spine and postsurgical change of sternotomy.  There is interval increased compression deformity at the superior endplate of L5 with approximately 25% height loss.                                       X-Ray Chest AP Portable (Final result)  Result time 09/02/22 06:14:43      Final result by Rikki Madera MD (09/02/22 06:14:43)                   Impression:      See above.      Electronically signed by: Rikki Madera MD  Date:    09/02/2022  Time:    06:14               Narrative:    EXAMINATION:  XR CHEST AP PORTABLE    CLINICAL HISTORY:  SVT;    FINDINGS:  Single view of the chest.  Comparison 04/29/2022    Cardiac silhouette is enlarged but stable.  Left-sided pacing wires demonstrates similar configuration.  Sternotomy wires are intact.  Aorta demonstrates atherosclerotic disease..  Patchy infiltrates are seen within the lower lobes which could reflect early airspace disease..  Trace left pleural effusion.  No pneumothorax.  Bones demonstrate moderate degenerative change.                                       The EKG was ordered, reviewed, and independently interpreted by the ED provider.  Interpretation time: 04:33  Rate: 139 BPM  Rhythm: supraventricular tachycardia (SVT)  Interpretation: Incomplete right bundle branch block. Left posterior fascicular block.   ST and T wave abnormality, consider inferior ischemia. No STEMI.    The EKG was  ordered, reviewed, and independently interpreted by the ED provider.  Interpretation time: 05:00  Rate: 141 BPM  Rhythm: supraventricular tachycardia (SVT)  Interpretation: Incomplete right bundle branch block. Right ventricular hypertrophy. ST and T wave abnormality, consider inferolateral ischemia. No STEMI.    The EKG was ordered, reviewed, and independently interpreted by the ED provider.  Interpretation time: 05:06  Rate: 60 BPM  Rhythm:  Atrial paced rhythm with prolonged AV condution  Interpretation: Incomplete right bundle branch block. Right ventricular hypertrophy. ST and T wave abnormality, consider inferior ischemia. ST and T wave abnormality, consider anterolateral ischemia. No STEMI.                 The Emergency Provider reviewed the vital signs and test results, which are outlined above.     ED Discussion       5:19 AM: Discussed pt's case with Dr. Charlton (Cardiology) who states that low dose metoprolol is fine and recommends giving home oral medication.    6:00 AM: Dr. Lucio transfers care of patient to Dr. Wade pending results.    6:52 AM: Evaluated pt. Pt is resting comfortably and is in no acute distress.  Pt states that she is feeling better.  D/w pt all pertinent results. D/w pt any concerns expressed at this time. Answered all questions. Pt expresses understanding at this time.    8:54 AM: Re-evaluated pt. Informed pt of why a VQ scan needs to be performed instead of a CT scan.  D/w pt all pertinent results. D/w pt any concerns expressed at this time. Answered all questions. Pt expresses understanding at this time.    9:53 AM: Discussed case with Pily Travis NP (Hospital Medicine). Dr. Peacock agrees with current care and management of pt and accepts admission.   Admitting Service: Hospital Medicine  Admitting Physician: Dr. Peacock  Admit to: Obs Tele    9:54 AM: Re-evaluated pt. I have discussed test results, shared treatment plan, and the need for admission with patient and family at  bedside. Pt and family express understanding at this time and agree with all information. All questions answered. Pt and family have no further questions or concerns at this time. Pt is ready for admit.           Medical Decision Making:   Clinical Tests:   Lab Tests: Ordered and Reviewed  Radiological Study: Ordered and Reviewed  Medical Tests: Ordered and Reviewed         ED Medication(s):  Medications   adenosine injection 6 mg (6 mg Intravenous Not Given 9/2/22 1695)   sodium chloride 0.9% flush 10 mL (has no administration in time range)   melatonin tablet 6 mg (has no administration in time range)   atorvastatin tablet 20 mg (20 mg Oral Given 9/2/22 1415)   EScitalopram oxalate tablet 10 mg (10 mg Oral Given 9/2/22 1415)   traZODone tablet 100 mg (has no administration in time range)   zolpidem tablet 10 mg (has no administration in time range)   sodium chloride 0.9% flush 10 mL (has no administration in time range)   naloxone 0.4 mg/mL injection 0.02 mg (has no administration in time range)   glucose chewable tablet 16 g (has no administration in time range)   glucose chewable tablet 24 g (has no administration in time range)   glucagon (human recombinant) injection 1 mg (has no administration in time range)   HYDROcodone-acetaminophen 5-325 mg per tablet 1 tablet (has no administration in time range)   HYDROcodone-acetaminophen  mg per tablet 1 tablet (has no administration in time range)   ondansetron injection 4 mg (has no administration in time range)   insulin aspart U-100 pen 0-5 Units (has no administration in time range)   dextrose 10% bolus 125 mL (has no administration in time range)   dextrose 10% bolus 250 mL (has no administration in time range)   budesonide nebulizer solution 0.5 mg (has no administration in time range)   amiodarone 360 mg/200 mL (1.8 mg/mL) infusion (1 mg/min Intravenous New Bag 9/2/22 1357)   amiodarone 360 mg/200 mL (1.8 mg/mL) infusion (has no administration in time  range)   metoprolol injection 5 mg (5 mg Intravenous Given 9/2/22 0533)   metoprolol tartrate (LOPRESSOR) tablet 50 mg (50 mg Oral Given 9/2/22 0507)   iohexoL (OMNIPAQUE 350) injection 100 mL (100 mLs Intravenous Given 9/2/22 0813)   sodium chloride 0.9% bolus 500 mL (0 mLs Intravenous Stopped 9/2/22 1130)   amiodarone in dextrose 150 mg/100 mL (1.5 mg/mL) loading dose 150 mg (0 mg Intravenous Stopped 9/2/22 1352)   perflutren protein-A microsphr 0.22 mg/mL IV susp (0.11 mg Intravenous Given 9/2/22 1400)       Current Discharge Medication List                  Scribe Attestation:   Scribe #1: I performed the above scribed service and the documentation accurately describes the services I performed. I attest to the accuracy of the note.     Attending:   Physician Attestation Statement for Scribe #1: I, Alexey Lucio MD, personally performed the services described in this documentation, as scribed by Ron Pate, in my presence, and it is both accurate and complete.       Scribe Attestation:   Scribe #2: I performed the above scribed service and the documentation accurately describes the services I performed. I attest to the accuracy of the note.    Attending Attestation:           Physician Attestation for Scribe:    Physician Attestation Statement for Scribe #2: I, Sarthak Wade Jr., MD, reviewed documentation, as scribed by Rachael Travis in my presence, and it is both accurate and complete. I also acknowledge and confirm the content of the note done by Scribe #1.         Clinical Impression       ICD-10-CM ICD-9-CM   1. Hypoxia  R09.02 799.02   2. SVT (supraventricular tachycardia)  I47.1 427.89   3. Chest pain, unspecified type  R07.9 786.50   4. Abdominal pain, unspecified abdominal location  R10.9 789.00   5. Atrial fibrillation  I48.91 427.31       Disposition:   Disposition: Placed in Observation  Condition: Fair       Sarthak Wdae Jr., MD  09/02/22 4359

## 2022-09-02 NOTE — CONSULTS
[facility] ([facility])  Cardiology  Consult Note    Patient Name: Janine Souza  MRN: [id number]  Admission Date: 9/2/2022  Hospital Length of Stay: 0 days  Code Status: Full Code   Attending Provider: Bruce Perez, *   Consulting Provider: Ruby Osman PA-C  Primary Care Physician: Yue Gamble MD  Principal Problem:<principal problem not specified>    Patient information was obtained from patient, past medical records and ER records.     Inpatient consult to Cardiology  Consult performed by: Ruby Osman PA-C  Consult ordered by: Pily Travis NP        Subjective:     Chief Complaint:  Abdominal pain    HPI:   Ms. Souza is a 62 year old female patient whose current medical conditions include secondary neuroendocrine tumor of liver, PAF s/p ablation in 2006, chronic AC on Xarelto), chronic diastolic CHF, pulmonary HTN, DM type II, anemia, and SVT who presented to [facility] ED today with a chief complaint of generalized abdominal pain over the past week. Associated symptoms included cough, congestion, and SOB. No apparent associated fever, chills, yue chest pain, near syncope, or syncope. Initial workup in ED revealed BNP of 286, negative troponin, +d-dimer. EKG showed SVT's with HR in 140 range and patient was subsequently admitted for further evaluation and treatment. Cardiology consulted to assist with management. Patient seen and examined today, resting in bed. Still feels ill/fatigued/SOB. Remains chest pain free. She reports compliance with her medications as OP. Followed by outside cardiologist, Dr. Palomares. Echo pending.          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    Malignant poorly differentiated neuroendocrine carcinoma  2016    Pacemaker     Biotronik    Secondary neuroendocrine tumor of liver 2016       Past Surgical History:   Procedure Laterality Date    A-V CARDIAC PACEMAKER INSERTION Left 2018    Procedure: INSERTION, CARDIAC PACEMAKER, DUAL CHAMBER;  Surgeon: Jean Carlos Onofre MD;  Location: [facility] CATH LAB;  Service: Cardiology;  Laterality: Left;  sinus pause, dPPM, BIO, DM, 323A    ADENOIDECTOMY      BREAST BIOPSY Right     benign    CARDIAC ELECTROPHYSIOLOGY MAPPING AND ABLATION      CARDIAC SURGERY      CARDIOVERSION N/A 2018    Procedure: CARDIOVERSION;  Surgeon: Jean Carlos Onofre MD;  Location: [facility] CATH LAB;  Service: Cardiology;  Laterality: N/A;     SECTION      ERCP      ERCP N/A 2018    Procedure: ERCP (ENDOSCOPIC RETROGRADE CHOLANGIOPANCREATOGRAPHY);  Surgeon: Tang Tanner MD;  Location: [facility] ([address] [place] [address]);  Service: Endoscopy;  Laterality: N/A;  2 day hold Dr Kalyan Whittaker    ERCP N/A 2019    Procedure: ERCP (ENDOSCOPIC RETROGRADE CHOLANGIOPANCREATOGRAPHY);  Surgeon: Tang Tanner MD;  Location: [facility] ([address] [place] [address]);  Service: Endoscopy;  Laterality: N/A;  2 day hold Dr Kalyan Whittaker    TONSILLECTOMY      uvulopalatopharygoplasty         Review of patient's allergies indicates:   Allergen Reactions    Pcn [penicillins] Other (See Comments), Hives and Swelling     As a child had an unknown reaction.  Has not taken since  As a child had an unknown reaction.  Has not taken since       Current Facility-Administered Medications on File Prior to Encounter   Medication    ferumoxytol (FERAHEME) 510 mg in dextrose 5 % 100 mL IVPB    heparin, porcine (PF) 100 unit/mL injection flush 500 Units    sodium chloride 0.9% flush 10 mL     Current Outpatient Medications on File Prior to Encounter   Medication Sig    atorvastatin (LIPITOR) 20 MG tablet Take 1 tablet (20 mg total) by mouth once daily.    EScitalopram oxalate (LEXAPRO) 10 MG tablet Take 1  "tablet (10 mg total) by mouth once daily.    fluticasone propionate (FLOVENT HFA) 110 mcg/actuation inhaler Inhale 2 puffs into the lungs once daily.    glipiZIDE (GLUCOTROL) 10 MG tablet Take 1 tablet (10 mg total) by mouth 2 (two) times daily.    KLOR-CON M20 20 mEq tablet 20 mEq once daily.     linaGLIPtin (TRADJENTA) 5 mg Tab tablet Take 1 tablet (5 mg total) by mouth once daily.    metoprolol tartrate (LOPRESSOR) 25 MG tablet 25 mg as needed.    metoprolol tartrate (LOPRESSOR) 50 MG tablet Take 50 mg by mouth nightly.     rivaroxaban (XARELTO) 20 mg Tab Take 20 mg by mouth.    sotalol (BETAPACE) 80 MG tablet Take 80 mg by mouth once daily.     traZODone (DESYREL) 100 MG tablet Take 1 tablet (100 mg total) by mouth every evening.    zolpidem (AMBIEN) 10 mg Tab Take 1 tablet (10 mg total) by mouth nightly as needed (As needed).    blood sugar diagnostic Strp To check BG 1 times daily, to use with insurance preferred meter    blood sugar diagnostic, disc Strp USE AS DIRECTED EVERY DAY *THANK YOU*    blood-glucose meter kit by Other route. Use as instructed    blood-glucose meter kit To check BG 1 times daily, to use with insurance preferred meter    furosemide (LASIX) 40 MG tablet Take 40 mg by mouth once daily.     insulin (LANTUS SOLOSTAR U-100 INSULIN) glargine 100 units/mL (3mL) SubQ pen Inject 15 Units into the skin every evening.    lancets Misc Use as directed    lancets Misc To check BG 1 times daily, to use with insurance preferred meter    metFORMIN (GLUCOPHAGE) 500 MG tablet Take 1 tablet by mouth twice a day with meals (Patient taking differently: daily with breakfast. Take 1 tablet by mouth daily with breakfast)    pen needle, diabetic (BD ULTRA-FINE MINI PEN NEEDLE) 31 gauge x 3/16" Ndle 1 each by Misc.(Non-Drug; Combo Route) route once daily.     Family History       Problem Relation (Age of Onset)    Breast cancer Paternal Aunt, Maternal Aunt, Sister    Diabetes type II Mother "    Prostate cancer Father          Tobacco Use    Smoking status: Never    Smokeless tobacco: Never   Substance and Sexual Activity    Alcohol use: No     Alcohol/week: 0.0 standard drinks    Drug use: No    Sexual activity: Not Currently     Partners: Male     Review of Systems   Constitutional: Positive for malaise/fatigue.   Cardiovascular:  Positive for dyspnea on exertion.   Respiratory:  Positive for shortness of breath.    Endocrine: Negative.    Skin: Negative.    Musculoskeletal: Negative.    Gastrointestinal:  Positive for bloating.   Genitourinary: Negative.    Neurological: Negative.    Psychiatric/Behavioral: Negative.     Allergic/Immunologic: Negative.    Objective:     Vital Signs (Most Recent):  Temp: 99 °F (37.2 °C) (09/02/22 1245)  Pulse: (!) 136 (09/02/22 1347)  Resp: 16 (09/02/22 1347)  BP: 115/77 (09/02/22 1347)  SpO2: 96 % (09/02/22 1347)   Vital Signs (24h Range):  Temp:  [98.9 °F (37.2 °C)-99.2 °F (37.3 °C)] 99 °F (37.2 °C)  Pulse:  [] 136  Resp:  [16-28] 16  SpO2:  [93 %-100 %] 96 %  BP: ()/(53-79) 115/77     Weight: 98.9 kg (218 lb)  Body mass index is 35.19 kg/m².    SpO2: 96 %  O2 Device (Oxygen Therapy): nasal cannula    No intake or output data in the 24 hours ending 09/02/22 1351    Lines/Drains/Airways       Peripheral Intravenous Line  Duration                  Peripheral IV - Single Lumen 09/02/22 0437 20 G Anterior;Right Forearm <1 day         Peripheral IV - Single Lumen 09/02/22 0507 20 G Anterior;Left Forearm <1 day                    Physical Exam  Vitals and nursing note reviewed.   Constitutional:       General: She is not in acute distress.     Appearance: She is well-developed. She is ill-appearing. She is not diaphoretic.   HENT:      Head: Normocephalic and atraumatic.   Eyes:      General:         Right eye: No discharge.         Left eye: No discharge.      Pupils: Pupils are equal, round, and reactive to light.   Neck:      Thyroid: No thyromegaly.       Vascular: No JVD.      Trachea: No tracheal deviation.   Cardiovascular:      Rate and Rhythm: Regular rhythm. Tachycardia present.      Heart sounds: Normal heart sounds, S1 normal and S2 normal. No murmur heard.  Pulmonary:      Effort: Pulmonary effort is normal. No respiratory distress.      Breath sounds: No wheezing.      Comments: Diminished BS at bases  Abdominal:      General: There is distension.      Tenderness: There is no abdominal tenderness. There is no rebound.   Musculoskeletal:      Cervical back: Neck supple.      Right lower leg: No edema.      Left lower leg: No edema.   Skin:     General: Skin is warm and dry.      Findings: No erythema.   Neurological:      Mental Status: She is alert and oriented to person, place, and time.   Psychiatric:         Behavior: Behavior normal.       Significant Labs: CMP   Recent Labs   Lab 09/02/22  0558   *   K 4.2      CO2 20*   *   BUN 27*   CREATININE 1.4   CALCIUM 9.0   PROT 8.5*   ALBUMIN 2.7*   BILITOT 2.3*   ALKPHOS 90   AST 13   ALT 6*   ANIONGAP 11   , CBC   Recent Labs   Lab 09/02/22  0514   WBC 8.19   HGB 10.3*   HCT 32.7*      , Troponin   Recent Labs   Lab 09/02/22  0558   TROPONINI 0.020   , and All pertinent lab results from the last 24 hours have been reviewed.    Significant Imaging: Echocardiogram: Transthoracic echo (TTE) complete (Cupid Only):   Results for orders placed or performed during the hospital encounter of 09/02/22   Echo   Result Value Ref Range    BSA 2.15 m2    IVRT 32.35 msec    Left Ventricular Outflow Tract Mean Gradient 4.13 mmHg    Left Ventricular Outflow Tract Mean Velocity 0.97 cm/s    MV stenosis pressure 1/2 time 36.32 ms    Mr max armin 4.18 m/s    TV mean gradient 44 mmHg    TR Max Armin 3.86 m/s    IVC diameter 2.74 cm    Ao root annulus 2.98 cm    Ao peak armin 1.80 m/s    Posterior Wall 1.48 (A) 0.6 - 1.1 cm    LVOT diameter 1.69 cm    LVOT peak ramin 1.28 m/s    LVOT peak VTI 21.70 cm    E  wave deceleration time 125.25 msec    MV Peak A Armin 0.42 m/s    MV Peak E Armin 1.16 m/s    RA Major Axis 4.85 cm    PV peak gradient 1.67 mmHg    PV mean gradient 1.28 mmHg    RVOT peak armin 0.65 m/s    RVOT peak VTI 12.8 cm    IVS 1.28 (A) 0.6 - 1.1 cm    Ao VTI 27.8 cm    AV mean gradient 8 mmHg    LVIDd 5.21 3.5 - 6.0 cm    LVIDs 4.03 (A) 2.1 - 4.0 cm    Left Atrium Major Axis 4.93 cm    Left Atrium Minor Axis 4.63 cm    STJ 2.66 cm    Ascending aorta 3.01 cm    LV Systolic Volume 71.39 mL    LV Diastolic Volume 130.12 mL    RVDD 3.48 cm    LA size 3.94 cm    FS 23 %    LV mass 304.15 g    Left Ventricle Relative Wall Thickness 0.57 cm    AV valve area 1.75 cm2    AV Velocity Ratio 0.71     AV index (prosthetic) 0.78     MV valve area p 1/2 method 6.06 cm2    E/A ratio 2.76     LVOT area 2.2 cm2    LVOT stroke volume 48.65 cm3    AV peak gradient 13 mmHg    LV Systolic Volume Index 34.5 mL/m2    LV Diastolic Volume Index 62.86 mL/m2    LV Mass Index 147 g/m2    Triscuspid Valve Regurgitation Peak Gradient 60 mmHg   , EKG: reviewed, and X-Ray: CXR: X-Ray Chest 1 View (CXR): No results found for this visit on 09/02/22. and X-Ray Chest PA and Lateral (CXR): No results found for this visit on 09/02/22.    Assessment and Plan:   Patient who presents with SVT, HR uncontrolled, amiodarone drip initiated. Check echo. Needs diuresis when BP permits.    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  -Mgmt as per heme/onc    Uncontrolled type 2 diabetes mellitus with hyperglycemia  -Mgmt as per hospital medicine        VTE Risk Mitigation (From admission, onward)          Ordered     rivaroxaban tablet 20 mg  with dinner         09/02/22 1245     IP VTE HIGH RISK PATIENT  Once         09/02/22 1245     Place sequential compression device  Until discontinued         09/02/22 1245     Reason for No Pharmacological VTE Prophylaxis  Once        Question:  Reasons:  Answer:  Already adequately anticoagulated on oral Anticoagulants    09/02/22 1245     Place sequential compression device  Until discontinued         09/02/22 1121                Thank you for your consult. I will follow-up with patient. Please contact us if you have any additional questions.    Ruby Osman PA-C  Cardiology   [facility] - Telemetry ([facility])

## 2022-09-02 NOTE — ASSESSMENT & PLAN NOTE
-BNP elevated, appears slightly overloaded on exam  -Diurese as BP permits  -Check echo  -Cardiology consult

## 2022-09-02 NOTE — HPI
Ms. Souza is a 62 year old female patient whose current medical conditions include secondary neuroendocrine tumor of liver, PAF s/p ablation in 2006, chronic AC on Xarelto), chronic diastolic CHF, pulmonary HTN, DM type II, anemia, and SVT who presented to [facility] ED today with a chief complaint of generalized abdominal pain over the past week. Associated symptoms included cough, congestion, and SOB. No apparent associated fever, chills, yue chest pain, near syncope, or syncope. Initial workup in ED revealed BNP of 286, negative troponin, +d-dimer. EKG showed SVT's with HR in 140 range and patient was subsequently admitted for further evaluation and treatment. Cardiology consulted to assist with management. Patient seen and examined today, resting in bed. Still feels ill/fatigued/SOB. Remains chest pain free. She reports compliance with her medications as OP. Followed by outside cardiologist, Dr. Palomares. Echo pending.

## 2022-09-02 NOTE — SUBJECTIVE & OBJECTIVE
Past Medical History:   Diagnosis Date    Acute headache 2016    Anemia     Atrial fibrillation, persistent     Bronchitis     CHF (congestive heart failure)     Diabetes mellitus, type 2     Hypertension     Malignant poorly differentiated neuroendocrine carcinoma 2016    Malignant poorly differentiated neuroendocrine carcinoma 2016    Pacemaker     Biotronik    Secondary neuroendocrine tumor of liver 2016       Past Surgical History:   Procedure Laterality Date    A-V CARDIAC PACEMAKER INSERTION Left 2018    Procedure: INSERTION, CARDIAC PACEMAKER, DUAL CHAMBER;  Surgeon: Jean Carlos Onofre MD;  Location: [facility] CATH LAB;  Service: Cardiology;  Laterality: Left;  sinus pause, dPPM, BIO, DM, 323A    ADENOIDECTOMY      BREAST BIOPSY Right     benign    CARDIAC ELECTROPHYSIOLOGY MAPPING AND ABLATION      CARDIAC SURGERY      CARDIOVERSION N/A 2018    Procedure: CARDIOVERSION;  Surgeon: Jean Carlos Onofre MD;  Location: [facility] CATH LAB;  Service: Cardiology;  Laterality: N/A;     SECTION      ERCP      ERCP N/A 2018    Procedure: ERCP (ENDOSCOPIC RETROGRADE CHOLANGIOPANCREATOGRAPHY);  Surgeon: Tang Tanner MD;  Location: [facility] ([address] [place] [address]);  Service: Endoscopy;  Laterality: N/A;  2 day hold Dr Kalyan Whittaker    ERCP N/A 2019    Procedure: ERCP (ENDOSCOPIC RETROGRADE CHOLANGIOPANCREATOGRAPHY);  Surgeon: Tang Tanner MD;  Location: [facility] ([address] [place] [address]);  Service: Endoscopy;  Laterality: N/A;  2 day hold Dr Kalyan Whittaker    TONSILLECTOMY      uvulopalatopharygoplasty         Review of patient's allergies indicates:   Allergen Reactions    Pcn [penicillins] Other (See Comments), Hives and Swelling     As a child had an unknown reaction.  Has not taken since  As a child had an unknown reaction.  Has not taken since       Current Facility-Administered Medications on File Prior to Encounter   Medication    ferumoxytol (FERAHEME) 510 mg in dextrose 5 % 100 mL IVPB     heparin, porcine (PF) 100 unit/mL injection flush 500 Units    sodium chloride 0.9% flush 10 mL     Current Outpatient Medications on File Prior to Encounter   Medication Sig    atorvastatin (LIPITOR) 20 MG tablet Take 1 tablet (20 mg total) by mouth once daily.    EScitalopram oxalate (LEXAPRO) 10 MG tablet Take 1 tablet (10 mg total) by mouth once daily.    fluticasone propionate (FLOVENT HFA) 110 mcg/actuation inhaler Inhale 2 puffs into the lungs once daily.    glipiZIDE (GLUCOTROL) 10 MG tablet Take 1 tablet (10 mg total) by mouth 2 (two) times daily.    KLOR-CON M20 20 mEq tablet 20 mEq once daily.     linaGLIPtin (TRADJENTA) 5 mg Tab tablet Take 1 tablet (5 mg total) by mouth once daily.    metoprolol tartrate (LOPRESSOR) 25 MG tablet 25 mg as needed.    metoprolol tartrate (LOPRESSOR) 50 MG tablet Take 50 mg by mouth nightly.     rivaroxaban (XARELTO) 20 mg Tab Take 20 mg by mouth.    sotalol (BETAPACE) 80 MG tablet Take 80 mg by mouth once daily.     traZODone (DESYREL) 100 MG tablet Take 1 tablet (100 mg total) by mouth every evening.    zolpidem (AMBIEN) 10 mg Tab Take 1 tablet (10 mg total) by mouth nightly as needed (As needed).    blood sugar diagnostic Strp To check BG 1 times daily, to use with insurance preferred meter    blood sugar diagnostic, disc Strp USE AS DIRECTED EVERY DAY *THANK YOU*    blood-glucose meter kit by Other route. Use as instructed    blood-glucose meter kit To check BG 1 times daily, to use with insurance preferred meter    furosemide (LASIX) 40 MG tablet Take 40 mg by mouth once daily.     insulin (LANTUS SOLOSTAR U-100 INSULIN) glargine 100 units/mL (3mL) SubQ pen Inject 15 Units into the skin every evening.    lancets Misc Use as directed    lancets Misc To check BG 1 times daily, to use with insurance preferred meter    metFORMIN (GLUCOPHAGE) 500 MG tablet Take 1 tablet by mouth twice a day with meals (Patient taking differently: daily with breakfast. Take 1 tablet by mouth  "daily with breakfast)    pen needle, diabetic (BD ULTRA-FINE MINI PEN NEEDLE) 31 gauge x 3/16" Ndle 1 each by Misc.(Non-Drug; Combo Route) route once daily.     Family History       Problem Relation (Age of Onset)    Breast cancer Paternal Aunt, Maternal Aunt, Sister    Diabetes type II Mother    Prostate cancer Father          Tobacco Use    Smoking status: Never    Smokeless tobacco: Never   Substance and Sexual Activity    Alcohol use: No     Alcohol/week: 0.0 standard drinks    Drug use: No    Sexual activity: Not Currently     Partners: Male     Review of Systems   Constitutional: Positive for malaise/fatigue.   Cardiovascular:  Positive for dyspnea on exertion.   Respiratory:  Positive for shortness of breath.    Endocrine: Negative.    Skin: Negative.    Musculoskeletal: Negative.    Gastrointestinal:  Positive for bloating.   Genitourinary: Negative.    Neurological: Negative.    Psychiatric/Behavioral: Negative.     Allergic/Immunologic: Negative.    Objective:     Vital Signs (Most Recent):  Temp: 99 °F (37.2 °C) (09/02/22 1245)  Pulse: (!) 136 (09/02/22 1347)  Resp: 16 (09/02/22 1347)  BP: 115/77 (09/02/22 1347)  SpO2: 96 % (09/02/22 1347)   Vital Signs (24h Range):  Temp:  [98.9 °F (37.2 °C)-99.2 °F (37.3 °C)] 99 °F (37.2 °C)  Pulse:  [] 136  Resp:  [16-28] 16  SpO2:  [93 %-100 %] 96 %  BP: ()/(53-79) 115/77     Weight: 98.9 kg (218 lb)  Body mass index is 35.19 kg/m².    SpO2: 96 %  O2 Device (Oxygen Therapy): nasal cannula    No intake or output data in the 24 hours ending 09/02/22 1351    Lines/Drains/Airways       Peripheral Intravenous Line  Duration                  Peripheral IV - Single Lumen 09/02/22 0437 20 G Anterior;Right Forearm <1 day         Peripheral IV - Single Lumen 09/02/22 0507 20 G Anterior;Left Forearm <1 day                    Physical Exam  Vitals and nursing note reviewed.   Constitutional:       General: She is not in acute distress.     Appearance: She is " well-developed. She is ill-appearing. She is not diaphoretic.   HENT:      Head: Normocephalic and atraumatic.   Eyes:      General:         Right eye: No discharge.         Left eye: No discharge.      Pupils: Pupils are equal, round, and reactive to light.   Neck:      Thyroid: No thyromegaly.      Vascular: No JVD.      Trachea: No tracheal deviation.   Cardiovascular:      Rate and Rhythm: Regular rhythm. Tachycardia present.      Heart sounds: Normal heart sounds, S1 normal and S2 normal. No murmur heard.  Pulmonary:      Effort: Pulmonary effort is normal. No respiratory distress.      Breath sounds: No wheezing.      Comments: Diminished BS at bases  Abdominal:      General: There is distension.      Tenderness: There is no abdominal tenderness. There is no rebound.   Musculoskeletal:      Cervical back: Neck supple.      Right lower leg: No edema.      Left lower leg: No edema.   Skin:     General: Skin is warm and dry.      Findings: No erythema.   Neurological:      Mental Status: She is alert and oriented to person, place, and time.   Psychiatric:         Behavior: Behavior normal.       Significant Labs: CMP   Recent Labs   Lab 09/02/22  0558   *   K 4.2      CO2 20*   *   BUN 27*   CREATININE 1.4   CALCIUM 9.0   PROT 8.5*   ALBUMIN 2.7*   BILITOT 2.3*   ALKPHOS 90   AST 13   ALT 6*   ANIONGAP 11   , CBC   Recent Labs   Lab 09/02/22  0514   WBC 8.19   HGB 10.3*   HCT 32.7*      , Troponin   Recent Labs   Lab 09/02/22  0558   TROPONINI 0.020   , and All pertinent lab results from the last 24 hours have been reviewed.    Significant Imaging: Echocardiogram: Transthoracic echo (TTE) complete (Cupid Only):   Results for orders placed or performed during the hospital encounter of 09/02/22   Echo   Result Value Ref Range    BSA 2.15 m2    IVRT 32.35 msec    Left Ventricular Outflow Tract Mean Gradient 4.13 mmHg    Left Ventricular Outflow Tract Mean Velocity 0.97 cm/s    MV stenosis  pressure 1/2 time 36.32 ms    Mr max armin 4.18 m/s    TV mean gradient 44 mmHg    TR Max Armin 3.86 m/s    IVC diameter 2.74 cm    Ao root annulus 2.98 cm    Ao peak armin 1.80 m/s    Posterior Wall 1.48 (A) 0.6 - 1.1 cm    LVOT diameter 1.69 cm    LVOT peak armin 1.28 m/s    LVOT peak VTI 21.70 cm    E wave deceleration time 125.25 msec    MV Peak A Armin 0.42 m/s    MV Peak E Armin 1.16 m/s    RA Major Axis 4.85 cm    PV peak gradient 1.67 mmHg    PV mean gradient 1.28 mmHg    RVOT peak armin 0.65 m/s    RVOT peak VTI 12.8 cm    IVS 1.28 (A) 0.6 - 1.1 cm    Ao VTI 27.8 cm    AV mean gradient 8 mmHg    LVIDd 5.21 3.5 - 6.0 cm    LVIDs 4.03 (A) 2.1 - 4.0 cm    Left Atrium Major Axis 4.93 cm    Left Atrium Minor Axis 4.63 cm    STJ 2.66 cm    Ascending aorta 3.01 cm    LV Systolic Volume 71.39 mL    LV Diastolic Volume 130.12 mL    RVDD 3.48 cm    LA size 3.94 cm    FS 23 %    LV mass 304.15 g    Left Ventricle Relative Wall Thickness 0.57 cm    AV valve area 1.75 cm2    AV Velocity Ratio 0.71     AV index (prosthetic) 0.78     MV valve area p 1/2 method 6.06 cm2    E/A ratio 2.76     LVOT area 2.2 cm2    LVOT stroke volume 48.65 cm3    AV peak gradient 13 mmHg    LV Systolic Volume Index 34.5 mL/m2    LV Diastolic Volume Index 62.86 mL/m2    LV Mass Index 147 g/m2    Triscuspid Valve Regurgitation Peak Gradient 60 mmHg   , EKG: reviewed, and X-Ray: CXR: X-Ray Chest 1 View (CXR): No results found for this visit on 09/02/22. and X-Ray Chest PA and Lateral (CXR): No results found for this visit on 09/02/22.

## 2022-09-02 NOTE — PLAN OF CARE
Problem: Adult Inpatient Plan of Care  Goal: Plan of Care Review  Outcome: Ongoing, Progressing  Goal: Optimal Comfort and Wellbeing  Outcome: Ongoing, Progressing     Problem: Diabetes Comorbidity  Goal: Blood Glucose Level Within Targeted Range  Outcome: Ongoing, Progressing     POC reviewed with pt. Pt verbalizes understanding of POC. No questions at this time.  AAOx4. NAD.  Amiodarone drip infusing as ordered, pt is now in a paced rhythm on cardiac monitor. In and out of afib 120s-130s, Dr. Monterroso aware.   Pt remains free of falls.  No complaints at this time.  Safety measures in place.   Informed pt to call for assistance before getting up. Pt verbalizes understanding.

## 2022-09-03 PROBLEM — N17.9 ACUTE RENAL FAILURE SUPERIMPOSED ON STAGE 3 CHRONIC KIDNEY DISEASE: Status: ACTIVE | Noted: 2022-01-01

## 2022-09-03 PROBLEM — J96.20 ACUTE ON CHRONIC RESPIRATORY FAILURE: Status: ACTIVE | Noted: 2022-01-01

## 2022-09-03 PROBLEM — J96.20 ACUTE ON CHRONIC RESPIRATORY FAILURE: Status: RESOLVED | Noted: 2022-01-01 | Resolved: 2022-01-01

## 2022-09-03 PROBLEM — N18.30 ACUTE RENAL FAILURE SUPERIMPOSED ON STAGE 3 CHRONIC KIDNEY DISEASE: Status: ACTIVE | Noted: 2022-01-01

## 2022-09-03 NOTE — PLAN OF CARE
Pt resting with HOB elevated, O2 2 liters NC, Heart rate in the 120s. Side rails upX2, call light within reach and bed alarm set. Will continue to monitor  Problem: Adult Inpatient Plan of Care  Goal: Plan of Care Review  Outcome: Ongoing, Progressing  Goal: Patient-Specific Goal (Individualized)  Outcome: Ongoing, Progressing  Goal: Absence of Hospital-Acquired Illness or Injury  Outcome: Ongoing, Progressing  Goal: Optimal Comfort and Wellbeing  Outcome: Ongoing, Progressing  Goal: Readiness for Transition of Care  Outcome: Ongoing, Progressing     Problem: Diabetes Comorbidity  Goal: Blood Glucose Level Within Targeted Range  Outcome: Ongoing, Progressing

## 2022-09-03 NOTE — ASSESSMENT & PLAN NOTE
6.7 cm heterogeneous complex structure within the gallbladder fossa correlating with CT findings.  Finding remains nonspecific and may correlate with abnormal gallbladder containing sludge and stones versus mass.  Patient gives no prior history of cholecystectomy.  Ruptured gallbladder is an alternate possibility noting patient without sonographic Taylor sign and no definite pericholecystic fluid identified.    -NPO   -MRI/MRCP pending   -General surgery vs GI consult     9/3/22  -MRCP not completed due to pacemaker non-compatible. Case discussed with Dr. Arce (G)I; recommends outpatient EUS and possible ERCP, if patient is stable and pain is controlled.   -Reports abdominal pain has nearly resolved and is requesting a diet.

## 2022-09-03 NOTE — SUBJECTIVE & OBJECTIVE
Past Medical History:   Diagnosis Date    Acute headache 2016    Anemia     Atrial fibrillation, persistent     Bronchitis     CHF (congestive heart failure)     Diabetes mellitus, type 2     Hypertension     Malignant poorly differentiated neuroendocrine carcinoma 2016    Malignant poorly differentiated neuroendocrine carcinoma 2016    Pacemaker     Biotronik    Secondary neuroendocrine tumor of liver 2016       Past Surgical History:   Procedure Laterality Date    A-V CARDIAC PACEMAKER INSERTION Left 2018    Procedure: INSERTION, CARDIAC PACEMAKER, DUAL CHAMBER;  Surgeon: Jean Carlos Onofre MD;  Location: [facility] CATH LAB;  Service: Cardiology;  Laterality: Left;  sinus pause, dPPM, BIO, DM, 323A    ADENOIDECTOMY      BREAST BIOPSY Right     benign    CARDIAC ELECTROPHYSIOLOGY MAPPING AND ABLATION      CARDIAC SURGERY      CARDIOVERSION N/A 2018    Procedure: CARDIOVERSION;  Surgeon: Jean Carlos Onofre MD;  Location: [facility] CATH LAB;  Service: Cardiology;  Laterality: N/A;     SECTION      ERCP      ERCP N/A 2018    Procedure: ERCP (ENDOSCOPIC RETROGRADE CHOLANGIOPANCREATOGRAPHY);  Surgeon: Tang Tanner MD;  Location: [facility] ([address] [place] [address]);  Service: Endoscopy;  Laterality: N/A;  2 day hold Dr Kalyan Whittaker    ERCP N/A 2019    Procedure: ERCP (ENDOSCOPIC RETROGRADE CHOLANGIOPANCREATOGRAPHY);  Surgeon: Tang Tanner MD;  Location: [facility] ([address] [place] [address]);  Service: Endoscopy;  Laterality: N/A;  2 day hold Dr Kalyan Whittaker    TONSILLECTOMY      uvulopalatopharygoplasty         Review of patient's allergies indicates:   Allergen Reactions    Pcn [penicillins] Other (See Comments), Hives and Swelling     As a child had an unknown reaction.  Has not taken since  As a child had an unknown reaction.  Has not taken since       Current Facility-Administered Medications on File Prior to Encounter   Medication    ferumoxytol (FERAHEME) 510 mg in dextrose 5 % 100 mL IVPB     heparin, porcine (PF) 100 unit/mL injection flush 500 Units    sodium chloride 0.9% flush 10 mL     Current Outpatient Medications on File Prior to Encounter   Medication Sig    atorvastatin (LIPITOR) 20 MG tablet Take 1 tablet (20 mg total) by mouth once daily.    EScitalopram oxalate (LEXAPRO) 10 MG tablet Take 1 tablet (10 mg total) by mouth once daily.    fluticasone propionate (FLOVENT HFA) 110 mcg/actuation inhaler Inhale 2 puffs into the lungs once daily.    glipiZIDE (GLUCOTROL) 10 MG tablet Take 1 tablet (10 mg total) by mouth 2 (two) times daily.    KLOR-CON M20 20 mEq tablet 20 mEq once daily.     linaGLIPtin (TRADJENTA) 5 mg Tab tablet Take 1 tablet (5 mg total) by mouth once daily.    metoprolol tartrate (LOPRESSOR) 25 MG tablet 25 mg as needed.    metoprolol tartrate (LOPRESSOR) 50 MG tablet Take 50 mg by mouth nightly.     rivaroxaban (XARELTO) 20 mg Tab Take 20 mg by mouth.    sotalol (BETAPACE) 80 MG tablet Take 80 mg by mouth once daily.     traZODone (DESYREL) 100 MG tablet Take 1 tablet (100 mg total) by mouth every evening.    zolpidem (AMBIEN) 10 mg Tab Take 1 tablet (10 mg total) by mouth nightly as needed (As needed).    blood sugar diagnostic Strp To check BG 1 times daily, to use with insurance preferred meter    blood sugar diagnostic, disc Strp USE AS DIRECTED EVERY DAY *THANK YOU*    blood-glucose meter kit by Other route. Use as instructed    blood-glucose meter kit To check BG 1 times daily, to use with insurance preferred meter    furosemide (LASIX) 40 MG tablet Take 40 mg by mouth once daily.     insulin (LANTUS SOLOSTAR U-100 INSULIN) glargine 100 units/mL (3mL) SubQ pen Inject 15 Units into the skin every evening.    lancets Misc Use as directed    lancets Misc To check BG 1 times daily, to use with insurance preferred meter    metFORMIN (GLUCOPHAGE) 500 MG tablet Take 1 tablet by mouth twice a day with meals (Patient taking differently: daily with breakfast. Take 1 tablet by mouth  "daily with breakfast)    pen needle, diabetic (BD ULTRA-FINE MINI PEN NEEDLE) 31 gauge x 3/16" Ndle 1 each by Misc.(Non-Drug; Combo Route) route once daily.     Family History       Problem Relation (Age of Onset)    Breast cancer Paternal Aunt, Maternal Aunt, Sister    Diabetes type II Mother    Prostate cancer Father          Tobacco Use    Smoking status: Never    Smokeless tobacco: Never   Substance and Sexual Activity    Alcohol use: No     Alcohol/week: 0.0 standard drinks    Drug use: No    Sexual activity: Not Currently     Partners: Male     Review of Systems   Constitutional: Positive for malaise/fatigue.   Cardiovascular:  Positive for dyspnea on exertion.   Respiratory:  Positive for shortness of breath.    Endocrine: Negative.    Skin: Negative.    Musculoskeletal: Negative.    Gastrointestinal:  Positive for bloating.   Genitourinary: Negative.    Neurological: Negative.    Psychiatric/Behavioral: Negative.     Allergic/Immunologic: Negative.    Objective:     Vital Signs (Most Recent):  Temp: 98.4 °F (36.9 °C) (09/03/22 1112)  Pulse: 60 (09/03/22 1112)  Resp: 18 (09/03/22 1112)  BP: (!) 108/58 (09/03/22 1112)  SpO2: 97 % (09/03/22 1112)   Vital Signs (24h Range):  Temp:  [98 °F (36.7 °C)-99 °F (37.2 °C)] 98.4 °F (36.9 °C)  Pulse:  [] 60  Resp:  [18-20] 18  SpO2:  [91 %-98 %] 97 %  BP: (108-128)/(55-89) 108/58     Weight: 107.3 kg (236 lb 8.9 oz)  Body mass index is 38.18 kg/m².    SpO2: 97 %  O2 Device (Oxygen Therapy): nasal cannula w/ humidification      Intake/Output Summary (Last 24 hours) at 9/3/2022 1433  Last data filed at 9/2/2022 1958  Gross per 24 hour   Intake 105.91 ml   Output --   Net 105.91 ml       Lines/Drains/Airways       Peripheral Intravenous Line  Duration                  Peripheral IV - Single Lumen 09/02/22 0437 20 G Anterior;Right Forearm 1 day         Peripheral IV - Single Lumen 09/02/22 0507 20 G Anterior;Left Forearm 1 day                    Physical Exam  Vitals " and nursing note reviewed.   Constitutional:       General: She is not in acute distress.     Appearance: She is well-developed. She is ill-appearing. She is not diaphoretic.   HENT:      Head: Normocephalic and atraumatic.   Eyes:      General:         Right eye: No discharge.         Left eye: No discharge.      Pupils: Pupils are equal, round, and reactive to light.   Neck:      Thyroid: No thyromegaly.      Vascular: No JVD.      Trachea: No tracheal deviation.   Cardiovascular:      Rate and Rhythm: Regular rhythm. Tachycardia present.      Heart sounds: Normal heart sounds, S1 normal and S2 normal. No murmur heard.  Pulmonary:      Effort: Pulmonary effort is normal. No respiratory distress.      Breath sounds: No wheezing.      Comments: Diminished BS at bases  Abdominal:      General: There is distension.      Tenderness: There is no abdominal tenderness. There is no rebound.   Musculoskeletal:      Cervical back: Neck supple.      Right lower leg: No edema.      Left lower leg: No edema.   Skin:     General: Skin is warm and dry.      Findings: No erythema.   Neurological:      Mental Status: She is alert and oriented to person, place, and time.   Psychiatric:         Behavior: Behavior normal.       Significant Labs: CMP   Recent Labs   Lab 09/02/22  0558 09/03/22  0501   * 131*   K 4.2 4.0    99   CO2 20* 21*   * 163*   BUN 27* 39*   CREATININE 1.4 1.8*   CALCIUM 9.0 8.2*   PROT 8.5* 7.7   ALBUMIN 2.7* 2.6*   BILITOT 2.3* 1.5*   ALKPHOS 90 82   AST 13 16   ALT 6* 8*   ANIONGAP 11 11     , CBC   Recent Labs   Lab 09/02/22  0514 09/03/22  0501   WBC 8.19 8.08  8.08   HGB 10.3* 9.8*  9.8*   HCT 32.7* 29.4*  29.4*    199  199     , Troponin   Recent Labs   Lab 09/02/22  0558 09/02/22  1456 09/02/22  1911   TROPONINI 0.020 0.018 0.031*     , and All pertinent lab results from the last 24 hours have been reviewed.    Significant Imaging: Echocardiogram: Transthoracic echo (TTE)  complete (Cupid Only):   Results for orders placed or performed during the hospital encounter of 09/02/22   Echo   Result Value Ref Range    BSA 2.15 m2    IVRT 32.35 msec    Left Ventricular Outflow Tract Mean Gradient 4.13 mmHg    Left Ventricular Outflow Tract Mean Velocity 0.97 cm/s    MV stenosis pressure 1/2 time 36.32 ms    Mr max armin 4.18 m/s    TV mean gradient 44 mmHg    TR Max Armin 3.86 m/s    IVC diameter 2.74 cm    Ao root annulus 2.98 cm    Ao peak armin 1.80 m/s    Posterior Wall 1.48 (A) 0.6 - 1.1 cm    LVOT diameter 1.69 cm    LVOT peak armin 1.28 m/s    LVOT peak VTI 21.70 cm    E wave deceleration time 125.25 msec    MV Peak A Armin 0.42 m/s    MV Peak E Armin 1.16 m/s    RA Major Axis 4.85 cm    PV mean gradient 1.28 mmHg    RVOT peak armin 0.65 m/s    RVOT peak VTI 12.8 cm    IVS 1.28 (A) 0.6 - 1.1 cm    Ao VTI 27.8 cm    AV mean gradient 8 mmHg    LVIDd 5.21 3.5 - 6.0 cm    LVIDs 4.03 (A) 2.1 - 4.0 cm    Left Atrium Major Axis 4.93 cm    Left Atrium Minor Axis 4.63 cm    STJ 2.66 cm    Ascending aorta 3.01 cm    LV Systolic Volume 71.39 mL    LV Diastolic Volume 130.12 mL    RVDD 3.48 cm    LA size 3.94 cm    FS 23 %    LV mass 304.15 g    Left Ventricle Relative Wall Thickness 0.57 cm    AV valve area 1.75 cm2    AV Velocity Ratio 0.71     AV index (prosthetic) 0.78     MV valve area p 1/2 method 6.06 cm2    E/A ratio 2.76     LVOT area 2.2 cm2    LVOT stroke volume 48.65 cm3    AV peak gradient 13 mmHg    LV Systolic Volume Index 34.5 mL/m2    LV Diastolic Volume Index 62.86 mL/m2    LV Mass Index 147 g/m2    Triscuspid Valve Regurgitation Peak Gradient 60 mmHg    Right Atrial Pressure (from IVC) 15 mmHg    EF 45 %    TV rest pulmonary artery pressure 75 mmHg    Narrative    · The left ventricle is mildly enlarged with moderate concentric   hypertrophy and mildly decreased systolic function.  · The estimated ejection fraction is 45%.  · Grade III left ventricular diastolic dysfunction.  · Mild right  ventricular enlargement with mildly reduced right ventricular   systolic function.  · Mild mitral regurgitation.  · Mild to moderate tricuspid regurgitation.  · Elevated central venous pressure (15 mmHg).  · The estimated PA systolic pressure is 75 mmHg.  · There is pulmonary hypertension.      , EKG: reviewed, and X-Ray: CXR: X-Ray Chest 1 View (CXR): No results found for this visit on 09/02/22. and X-Ray Chest PA and Lateral (CXR): No results found for this visit on 09/02/22.

## 2022-09-03 NOTE — PROGRESS NOTES
[facility] ([facility])  Cardiology  Progress Note    Patient Name: Janine Souza  MRN: [id number]  Admission Date: 9/2/2022  Hospital Length of Stay: 1 days  Code Status: Full Code   Attending Physician: Bruce Perez, *   Primary Care Physician: Mirza Gamble MD  Expected Discharge Date:   Principal Problem:Abdominal pain    Subjective:     Hospital Course:   9/3/22 - pt had further episodes of SVT overnight/this AM - increased amio drip rate, now in NSR/paced; added dilt 120mg; ECHO with mild dec EF with increase CVP and pulm HTN; renal function worse likely due to cardiorenal sx vs sec to SVT/hypotension. Will have outpt GI workup    Results for orders placed during the hospital encounter of 09/02/22    Echo    Interpretation Summary  · The left ventricle is mildly enlarged with moderate concentric hypertrophy and mildly decreased systolic function.  · The estimated ejection fraction is 45%.  · Grade III left ventricular diastolic dysfunction.  · Mild right ventricular enlargement with mildly reduced right ventricular systolic function.  · Mild mitral regurgitation.  · Mild to moderate tricuspid regurgitation.  · Elevated central venous pressure (15 mmHg).  · The estimated PA systolic pressure is 75 mmHg.  · There is pulmonary hypertension.        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       Past Surgical History:   Procedure Laterality Date    A-V CARDIAC PACEMAKER INSERTION Left 6/7/2018    Procedure: INSERTION, CARDIAC PACEMAKER, DUAL CHAMBER;  Surgeon: Jean Carlos Onofre MD;  Location: [facility] CATH LAB;   Service: Cardiology;  Laterality: Left;  sinus pause, dPPM, BIO, DM, 323A    ADENOIDECTOMY      BREAST BIOPSY Right     benign    CARDIAC ELECTROPHYSIOLOGY MAPPING AND ABLATION      CARDIAC SURGERY      CARDIOVERSION N/A 2018    Procedure: CARDIOVERSION;  Surgeon: Jean Carlos Onofre MD;  Location: [facility] CATH LAB;  Service: Cardiology;  Laterality: N/A;     SECTION      ERCP      ERCP N/A 2018    Procedure: ERCP (ENDOSCOPIC RETROGRADE CHOLANGIOPANCREATOGRAPHY);  Surgeon: Tang Tanner MD;  [facility]: [facility] (2ND FLR);  Service: Endoscopy;  Laterality: N/A;  2 day hold Dr Kalyan Whittaker    ERCP N/A 2019    Procedure: ERCP (ENDOSCOPIC RETROGRADE CHOLANGIOPANCREATOGRAPHY);  Surgeon: Tang Tanner MD;  Location: [facility] (2ND FLR);  Service: Endoscopy;  Laterality: N/A;  2 day hold Dr Kalyan Whittaker    TONSILLECTOMY      uvulopalatopharygoplasty         Review of patient's allergies indicates:   Allergen Reactions    Pcn [penicillins] Other (See Comments), Hives and Swelling     As a child had an unknown reaction.  Has not taken since  As a child had an unknown reaction.  Has not taken since       Current Facility-Administered Medications on File Prior to Encounter   Medication    ferumoxytol (FERAHEME) 510 mg in dextrose 5 % 100 mL IVPB    heparin, porcine (PF) 100 unit/mL injection flush 500 Units    sodium chloride 0.9% flush 10 mL     Current Outpatient Medications on File Prior to Encounter   Medication Sig    atorvastatin (LIPITOR) 20 MG tablet Take 1 tablet (20 mg total) by mouth once daily.    EScitalopram oxalate (LEXAPRO) 10 MG tablet Take 1 tablet (10 mg total) by mouth once daily.    fluticasone propionate (FLOVENT HFA) 110 mcg/actuation inhaler Inhale 2 puffs into the lungs once daily.    glipiZIDE (GLUCOTROL) 10 MG tablet Take 1 tablet (10 mg total) by mouth 2 (two) times daily.    KLOR-CON M20 20 mEq tablet 20 mEq once daily.     linaGLIPtin  "(TRADJENTA) 5 mg Tab tablet Take 1 tablet (5 mg total) by mouth once daily.    metoprolol tartrate (LOPRESSOR) 25 MG tablet 25 mg as needed.    metoprolol tartrate (LOPRESSOR) 50 MG tablet Take 50 mg by mouth nightly.     rivaroxaban (XARELTO) 20 mg Tab Take 20 mg by mouth.    sotalol (BETAPACE) 80 MG tablet Take 80 mg by mouth once daily.     traZODone (DESYREL) 100 MG tablet Take 1 tablet (100 mg total) by mouth every evening.    zolpidem (AMBIEN) 10 mg Tab Take 1 tablet (10 mg total) by mouth nightly as needed (As needed).    blood sugar diagnostic Strp To check BG 1 times daily, to use with insurance preferred meter    blood sugar diagnostic, disc Strp USE AS DIRECTED EVERY DAY *THANK YOU*    blood-glucose meter kit by Other route. Use as instructed    blood-glucose meter kit To check BG 1 times daily, to use with insurance preferred meter    furosemide (LASIX) 40 MG tablet Take 40 mg by mouth once daily.     insulin (LANTUS SOLOSTAR U-100 INSULIN) glargine 100 units/mL (3mL) SubQ pen Inject 15 Units into the skin every evening.    lancets Misc Use as directed    lancets Misc To check BG 1 times daily, to use with insurance preferred meter    metFORMIN (GLUCOPHAGE) 500 MG tablet Take 1 tablet by mouth twice a day with meals (Patient taking differently: daily with breakfast. Take 1 tablet by mouth daily with breakfast)    pen needle, diabetic (BD ULTRA-FINE MINI PEN NEEDLE) 31 gauge x 3/16" Ndle 1 each by Misc.(Non-Drug; Combo Route) route once daily.     Family History       Problem Relation (Age of Onset)    Breast cancer Paternal Aunt, Maternal Aunt, Sister    Diabetes type II Mother    Prostate cancer Father          Tobacco Use    Smoking status: Never    Smokeless tobacco: Never   Substance and Sexual Activity    Alcohol use: No     Alcohol/week: 0.0 standard drinks    Drug use: No    Sexual activity: Not Currently     Partners: Male     Review of Systems   Constitutional: Positive for " malaise/fatigue.   Cardiovascular:  Positive for dyspnea on exertion.   Respiratory:  Positive for shortness of breath.    Endocrine: Negative.    Skin: Negative.    Musculoskeletal: Negative.    Gastrointestinal:  Positive for bloating.   Genitourinary: Negative.    Neurological: Negative.    Psychiatric/Behavioral: Negative.     Allergic/Immunologic: Negative.    Objective:     Vital Signs (Most Recent):  Temp: 98.4 °F (36.9 °C) (09/03/22 1112)  Pulse: 60 (09/03/22 1112)  Resp: 18 (09/03/22 1112)  BP: (!) 108/58 (09/03/22 1112)  SpO2: 97 % (09/03/22 1112)   Vital Signs (24h Range):  Temp:  [98 °F (36.7 °C)-99 °F (37.2 °C)] 98.4 °F (36.9 °C)  Pulse:  [] 60  Resp:  [18-20] 18  SpO2:  [91 %-98 %] 97 %  BP: (108-128)/(55-89) 108/58     Weight: 107.3 kg (236 lb 8.9 oz)  Body mass index is 38.18 kg/m².    SpO2: 97 %  O2 Device (Oxygen Therapy): nasal cannula w/ humidification      Intake/Output Summary (Last 24 hours) at 9/3/2022 1433  Last data filed at 9/2/2022 1958  Gross per 24 hour   Intake 105.91 ml   Output --   Net 105.91 ml       Lines/Drains/Airways       Peripheral Intravenous Line  Duration                  Peripheral IV - Single Lumen 09/02/22 0437 20 G Anterior;Right Forearm 1 day         Peripheral IV - Single Lumen 09/02/22 0507 20 G Anterior;Left Forearm 1 day                    Physical Exam  Vitals and nursing note reviewed.   Constitutional:       General: She is not in acute distress.     Appearance: She is well-developed. She is ill-appearing. She is not diaphoretic.   HENT:      Head: Normocephalic and atraumatic.   Eyes:      General:         Right eye: No discharge.         Left eye: No discharge.      Pupils: Pupils are equal, round, and reactive to light.   Neck:      Thyroid: No thyromegaly.      Vascular: No JVD.      Trachea: No tracheal deviation.   Cardiovascular:      Rate and Rhythm: Regular rhythm. Tachycardia present.      Heart sounds: Normal heart sounds, S1 normal and S2  normal. No murmur heard.  Pulmonary:      Effort: Pulmonary effort is normal. No respiratory distress.      Breath sounds: No wheezing.      Comments: Diminished BS at bases  Abdominal:      General: There is distension.      Tenderness: There is no abdominal tenderness. There is no rebound.   Musculoskeletal:      Cervical back: Neck supple.      Right lower leg: No edema.      Left lower leg: No edema.   Skin:     General: Skin is warm and dry.      Findings: No erythema.   Neurological:      Mental Status: She is alert and oriented to person, place, and time.   Psychiatric:         Behavior: Behavior normal.       Significant Labs: CMP   Recent Labs   Lab 09/02/22  0558 09/03/22  0501   * 131*   K 4.2 4.0    99   CO2 20* 21*   * 163*   BUN 27* 39*   CREATININE 1.4 1.8*   CALCIUM 9.0 8.2*   PROT 8.5* 7.7   ALBUMIN 2.7* 2.6*   BILITOT 2.3* 1.5*   ALKPHOS 90 82   AST 13 16   ALT 6* 8*   ANIONGAP 11 11     , CBC   Recent Labs   Lab 09/02/22  0514 09/03/22  0501   WBC 8.19 8.08  8.08   HGB 10.3* 9.8*  9.8*   HCT 32.7* 29.4*  29.4*    199  199     , Troponin   Recent Labs   Lab 09/02/22  0558 09/02/22  1456 09/02/22  1911   TROPONINI 0.020 0.018 0.031*     , and All pertinent lab results from the last 24 hours have been reviewed.    Significant Imaging: Echocardiogram: Transthoracic echo (TTE) complete (Cupid Only):   Results for orders placed or performed during the hospital encounter of 09/02/22   Echo   Result Value Ref Range    BSA 2.15 m2    IVRT 32.35 msec    Left Ventricular Outflow Tract Mean Gradient 4.13 mmHg    Left Ventricular Outflow Tract Mean Velocity 0.97 cm/s    MV stenosis pressure 1/2 time 36.32 ms    Mr max armin 4.18 m/s    TV mean gradient 44 mmHg    TR Max Armin 3.86 m/s    IVC diameter 2.74 cm    Ao root annulus 2.98 cm    Ao peak armin 1.80 m/s    Posterior Wall 1.48 (A) 0.6 - 1.1 cm    LVOT diameter 1.69 cm    LVOT peak armin 1.28 m/s    LVOT peak VTI 21.70 cm    E wave  deceleration time 125.25 msec    MV Peak A Armin 0.42 m/s    MV Peak E Armin 1.16 m/s    RA Major Axis 4.85 cm    PV mean gradient 1.28 mmHg    RVOT peak armin 0.65 m/s    RVOT peak VTI 12.8 cm    IVS 1.28 (A) 0.6 - 1.1 cm    Ao VTI 27.8 cm    AV mean gradient 8 mmHg    LVIDd 5.21 3.5 - 6.0 cm    LVIDs 4.03 (A) 2.1 - 4.0 cm    Left Atrium Major Axis 4.93 cm    Left Atrium Minor Axis 4.63 cm    STJ 2.66 cm    Ascending aorta 3.01 cm    LV Systolic Volume 71.39 mL    LV Diastolic Volume 130.12 mL    RVDD 3.48 cm    LA size 3.94 cm    FS 23 %    LV mass 304.15 g    Left Ventricle Relative Wall Thickness 0.57 cm    AV valve area 1.75 cm2    AV Velocity Ratio 0.71     AV index (prosthetic) 0.78     MV valve area p 1/2 method 6.06 cm2    E/A ratio 2.76     LVOT area 2.2 cm2    LVOT stroke volume 48.65 cm3    AV peak gradient 13 mmHg    LV Systolic Volume Index 34.5 mL/m2    LV Diastolic Volume Index 62.86 mL/m2    LV Mass Index 147 g/m2    Triscuspid Valve Regurgitation Peak Gradient 60 mmHg    Right Atrial Pressure (from IVC) 15 mmHg    EF 45 %    TV rest pulmonary artery pressure 75 mmHg    Narrative    · The left ventricle is mildly enlarged with moderate concentric   hypertrophy and mildly decreased systolic function.  · The estimated ejection fraction is 45%.  · Grade III left ventricular diastolic dysfunction.  · Mild right ventricular enlargement with mildly reduced right ventricular   systolic function.  · Mild mitral regurgitation.  · Mild to moderate tricuspid regurgitation.  · Elevated central venous pressure (15 mmHg).  · The estimated PA systolic pressure is 75 mmHg.  · There is pulmonary hypertension.      , EKG: reviewed, and X-Ray: CXR: X-Ray Chest 1 View (CXR): No results found for this visit on 09/02/22. and X-Ray Chest PA and Lateral (CXR): No results found for this visit on 09/02/22.    Assessment and Plan:     Brief HPI: SVT overnight, remain on Amio drip    * Abdominal pain  -Mgmt as per hospital  medicine    Acute renal failure superimposed on stage 3 chronic kidney disease  Likely sec to cardiorenal vs rapid rates with SVT/hypotension  monitor renal function, if worsens rec nephro eval    Paroxysmal SVT (supraventricular tachycardia)  -Presents with SVT with HR in 140's  -Prior occurrence noted in 2018, required DCCV  -Amiodarone drip initiated  -Echo pending  -Troponin negative    9/3/22 - cont IV amio drip, if stable rhythm x 24 hours will change to oral amio  - cont BB and CCB    Chronic diastolic heart failure  -BNP elevated, appears slightly overloaded on exam  -Diurese as BP permits      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  -Mgmt as per heme/onc    Uncontrolled type 2 diabetes mellitus with hyperglycemia  -Mgmt as per hospital medicine        VTE Risk Mitigation (From admission, onward)         Ordered     rivaroxaban tablet 15 mg  with dinner         09/03/22 1232     IP VTE HIGH RISK PATIENT  Once         09/02/22 1245     Place sequential compression device  Until discontinued         09/02/22 1245     Reason for No Pharmacological VTE Prophylaxis  Once        Question:  Reasons:  Answer:  Already adequately anticoagulated on oral Anticoagulants    09/02/22 1245     Place sequential compression device  Until discontinued         09/02/22 1121                Stew Monterroso Md, MD  Cardiology  [facility] - Tele[facility] (Hospital)

## 2022-09-03 NOTE — HOSPITAL COURSE
9/3/22 - pt had further episodes of SVT overnight/this AM - increased amio drip rate, now in NSR/paced; added dilt 120mg; ECHO with mild dec EF with increase CVP and pulm HTN; renal function worse likely due to cardiorenal sx vs sec to SVT/hypotension. Will have outpt GI workup    Results for orders placed during the hospital encounter of 09/02/22    Echo    Interpretation Summary  · The left ventricle is mildly enlarged with moderate concentric hypertrophy and mildly decreased systolic function.  · The estimated ejection fraction is 45%.  · Grade III left ventricular diastolic dysfunction.  · Mild right ventricular enlargement with mildly reduced right ventricular systolic function.  · Mild mitral regurgitation.  · Mild to moderate tricuspid regurgitation.  · Elevated central venous pressure (15 mmHg).  · The estimated PA systolic pressure is 75 mmHg.  · There is pulmonary hypertension.

## 2022-09-03 NOTE — CONSULTS
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  Imaging studies revealed diffuse intra-abdominal ascites with new hypodensity noted in the region of the gallbladder.  EKG revealed SVT with heart rate in the 140s range.  Given history of neuroendocrine carcinoma, Oncology was consulted.    She denies fever, nausea or vomiting.  She noted shortness of breath in bilateral lower extremity edema.  She was last seen by her primary oncologist on 04/19/2022.    Continuous Infusions:   amiodarone in dextrose 5% 1 mg/min (09/03/22 0357)    heparin (porcine) in D5W Stopped (09/03/22 0603)     Scheduled Meds:   atorvastatin  20 mg Oral Daily    budesonide  0.5 mg Nebulization Q12H    EScitalopram oxalate  10 mg Oral Daily    lorazepam  1 mg Intravenous Once    metoprolol succinate  100 mg Oral BID    traZODone  100 mg Oral QHS     PRN Meds:dextrose 10%, dextrose 10%, glucagon (human recombinant), glucose, glucose, heparin (PORCINE), heparin (PORCINE), HYDROcodone-acetaminophen, HYDROcodone-acetaminophen, insulin aspart U-100, melatonin, naloxone, ondansetron, sodium chloride 0.9%, sodium chloride 0.9%, zolpidem    Past Medical History:   Diagnosis Date    Acute headache 1/8/2016    Anemia     Atrial fibrillation, persistent     Bronchitis     CHF (congestive heart failure)     Diabetes mellitus, type 2     Hypertension     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    Pacemaker     Biotronik    Secondary neuroendocrine tumor of liver  2016     Past Surgical History:   Procedure Laterality Date    A-V CARDIAC PACEMAKER INSERTION Left 2018    Procedure: INSERTION, CARDIAC PACEMAKER, DUAL CHAMBER;  Surgeon: Jean Carlos Onofre MD;  Location: [facility] CATH LAB;  Service: Cardiology;  Laterality: Left;  sinus pause, dPPM, BIO, DM, 323A    ADENOIDECTOMY      BREAST BIOPSY Right     benign    CARDIAC ELECTROPHYSIOLOGY MAPPING AND ABLATION      CARDIAC SURGERY      CARDIOVERSION N/A 2018    Procedure: CARDIOVERSION;  Surgeon: Jean Carlos Onofre MD;  Location: [facility] CATH LAB;  Service: Cardiology;  Laterality: N/A;     SECTION      ERCP      ERCP N/A 2018    Procedure: ERCP (ENDOSCOPIC RETROGRADE CHOLANGIOPANCREATOGRAPHY);  Surgeon: Tang Tanner MD;  Location: [facility] ([address] [place] [address]);  Service: Endoscopy;  Laterality: N/A;  2 day hold Dr Kalyan Whittaker    ERCP N/A 2019    Procedure: ERCP (ENDOSCOPIC RETROGRADE CHOLANGIOPANCREATOGRAPHY);  Surgeon: Tang Tanner MD;  Location: [facility] ([address] [place] [address]);  Service: Endoscopy;  Laterality: N/A;  2 day hold Dr Kalyan Whittaker    TONSILLECTOMY      uvulopalatopharygoplasty       Family History   Problem Relation Age of Onset    Diabetes type II Mother     Prostate cancer Father     Breast cancer Paternal Aunt         breast    Breast cancer Maternal Aunt         breast    Breast cancer Sister      Social History     Tobacco Use    Smoking status: Never    Smokeless tobacco: Never   Substance Use Topics    Alcohol use: No     Alcohol/week: 0.0 standard drinks    Drug use: No       Review of patient's allergies indicates:   Allergen Reactions    Pcn [penicillins] Other (See Comments), Hives and Swelling     As a child had an unknown reaction.  Has not taken since  As a child had an unknown reaction.  Has not taken since          Review of Systems    Constitutional:  Positive for fatigue.   HENT: Negative.     Eyes: Negative.    Respiratory:  Positive for cough and shortness of  breath.    Cardiovascular:  Positive for leg swelling.   Gastrointestinal:  Positive for abdominal distention and abdominal pain.   Endocrine: Negative.    Genitourinary: Negative.    Musculoskeletal: Negative.    Skin: Negative.    Allergic/Immunologic: Negative.    Neurological: Negative.    Hematological: Negative.    Psychiatric/Behavioral: Negative.     OBJECTIVE:     Vital Signs (Most Recent)  Temp: 98.6 °F (37 °C) (09/03/22 0520)  Pulse: (!) 127 (09/03/22 0520)  Resp: 20 (09/03/22 0520)  BP: 118/67 (09/03/22 0520)  SpO2: (!) 93 % (09/03/22 0520)    Pain Assessment: No pain reported at this time    Vital Signs Range (Last 24H):  Temp:  [98.6 °F (37 °C)-99 °F (37.2 °C)]   Pulse:  []   Resp:  [16-22]   BP: ()/(53-89)   SpO2:  [91 %-100 %]       Physical Exam    Vitals and nursing note reviewed.   Constitutional:       Appearance: She is well-developed.   HENT:      Head: Normocephalic and atraumatic.   Eyes:      Conjunctiva/sclera: Conjunctivae normal.      Pupils: Pupils are equal, round, and reactive to light.   Cardiovascular:      Rate and Rhythm: Tachycardia present.      Heart sounds: Normal heart sounds.   Pulmonary:      Effort: Pulmonary effort is normal.      Breath sounds: Normal breath sounds.   Abdominal:      General: Bowel sounds are normal. There is distension.      Palpations: Abdomen is soft.   Musculoskeletal:         General: Normal range of motion.      Cervical back: Normal range of motion and neck supple.      Right lower leg: Edema present.      Left lower leg: Edema present.   Skin:     General: Skin is warm and dry.   Neurological:      Mental Status: She is alert and oriented to person, place, and time.      Deep Tendon Reflexes: Reflexes are normal and symmetric.   Psychiatric:         Behavior: Behavior normal.         Thought Content: Thought content normal.         Judgment: Judgment normal.    Laboratory:  CBC with Differential:  Recent Labs   Lab 09/03/22  0501   WBC  8.08  8.08   LYMPH 5.4*  5.4*  0.4*  0.4*   BASOPHIL 0.4  0.4   RBC 2.96*  2.96*   HCT 29.4*  29.4*   HGB 9.8*  9.8*   MCV 99*  99*   MCH 33.1*  33.1*   RDW 14.1  14.1     199   MPV 10.0  10.0     CMP:  Recent Labs   Lab 09/03/22  0501   *   CALCIUM 8.2*   ALBUMIN 2.6*   PROT 7.7   *   K 4.0   CO2 21*   CL 99   BUN 39*   CREATININE 1.8*   ALKPHOS 82   ALT 8*   AST 16   BILITOT 1.5*     BMP:   Recent Labs   Lab 09/03/22  0501   *   CALCIUM 8.2*   *   K 4.0   CO2 21*   CL 99   BUN 39*   CREATININE 1.8*     LFTs:   Recent Labs   Lab 09/03/22  0501   ALT 8*   AST 16   ALKPHOS 82   BILITOT 1.5*   PROT 7.7   ALBUMIN 2.6*     Haptoglobin: No results for input(s): HAPTOGLOBIN in the last 24 hours.  Tumor Markers: No results for input(s): PSA, CEA, , AFPTM, [id number],  in the last 24 hours.    Invalid input(s): ALGTM  Immunology: No results for input(s): SPEP, VENU, SUNSHINE, FREELAMBDALI in the last 24 hours.  Coagulation:   Recent Labs   Lab 09/02/22  1652 09/03/22  0033 09/03/22  0624   INR 1.4*  --   --    APTT 36.7*   < > 46.1*    < > = values in this interval not displayed.     Specimen (24h ago, onward)      None          Microbiology Results (last 7 days)       Procedure Component Value Units Date/Time    Blood culture #1 **CANNOT BE ORDERED STAT** [[id number]] Collected: 09/02/22 0515    Order Status: Completed Specimen: Blood from Peripheral, Antecubital, Left Updated: 09/02/22 1715     Blood Culture, Routine No Growth to date    Blood culture #2 **CANNOT BE ORDERED STAT** [[id number]] Collected: 09/02/22 0500    Order Status: Completed Specimen: Blood from Peripheral, Forearm, Left Updated: 09/02/22 1715     Blood Culture, Routine No Growth to date            Diagnostic Results:      ASSESSMENT/PLAN:     Patient Active Problem List   Diagnosis    Diabetes mellitus with stage 3 chronic kidney disease    Essential hypertension    Controlled type 2 diabetes mellitus  without complication, with long-term current use of insulin    Uncontrolled type 2 diabetes mellitus with hyperglycemia    Secondary neuroendocrine tumor of liver    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    Chronic diastolic heart failure    Iron deficiency anemia due to chronic blood loss    Paroxysmal SVT (supraventricular tachycardia)    Atrial flutter    S/P placement of cardiac pacemaker    Anxiety    Stage 3 chronic kidney disease    Immunization deficiency    COVID-19 virus infection    Insomnia    Multiple thyroid nodules    Carotid atherosclerosis    Coronary artery calcification    Lung nodule    Bronchiectasis    Atherosclerosis of aorta    Parotid nodule    Obesity (BMI 30.0-34.9)    Sleep apnea    Breast screening    Abdominal pain         Plan:    # metastatic neuroendocrine carcinoma:   -probable progression based on CT scan (right lower lobe lung mass measuring 1.3 cm, hypodense mass in the area of gallbladder).  MRI of abdomen has been ordered.    -continue conservative management for pain per primary team.    -will obtain chromogranin a levels, gastrin levels.      # abdominal pain:   -likely multifactorial.  CT scan noted a hypodense mass in the gallbladder region which may represent malignancy versus gallstones.    -surgery service consulted.  Currently NPO per primary team.    #Macrocytic anemia:   -unknown etiology.  Will check B12, folate and thyroid levels.    -noted hemoglobin above 9 grams/deciliters.  Continue to monitor with threshold to transfuse for hemoglobin less than 7 grams/deciliters.        Thank you for allowing us to participate in the care of this patient.  Contact us with any question or concern.      Phil Pinto MD

## 2022-09-03 NOTE — PROGRESS NOTES
[facility] Medicine  Progress Note    Patient Name: Janine Souza  MRN: [id number]  Patient Class: IP- Inpatient   Admission Date: 9/2/2022  Length of Stay: 1 days  Attending Physician: Bruce Perez, *  Primary Care Provider: Mirza Gamble MD        Subjective:     Principal Problem:Abdominal pain        HPI:  Ms. Souza is a 62 year old female patient whose current medical conditions include secondary neuroendocrine tumor of liver, PAF s/p ablation in 2006, chronic AC on Xarelto), chronic diastolic CHF, pulmonary HTN, DM type II, anemia, and SVT who presented to [facility] ED today with a chief complaint of generalized abdominal pain over the past week. Associated symptoms included cough, congestion, and SOB. No apparent associated fever, chills, mirza chest pain, near syncope, or syncope. Initial workup in ED revealed BNP of 286, negative troponin, +d-dimer. EKG showed SVT's with HR in 140 range. VQ scan negative. CT abdomen showed  new small volume ascites throughout the abdomen and pelvis. New hyperdensity in the region of the gallbladder, suspected to correlate with intraluminal gallbladder contents, though underlying mass is possible.  Finding could correlate with sludge or blood products. Increased ground-glass and linear density within the lung bases with more focal nodular opacity in the right lower lobe measuring 1.3 cm.  Findings overall progressed since the comparison exam and may reflect progression in prior disease versus acute infiltrate. Anasarca. Patient admitted for further evaluation and treatment. Cardiology consulted.               Overview/Hospital Course:  MRCP not completed due to pacemaker non-compatible. Case discussed with Dr. Arce (G)I; recommends outpatient EUS and possible ERCP, if patient is stable and pain is controlled. Patient reports abdominal pain has nearly resolved and is requesting a diet. Renal function worsening, Discussed with Dr. Monterroso  will give 1x dose of IV lasix. Patient had another episode of SVT this morning. Oral Cardizem added. Heparin infusion stopped and oral anticoagulation restarted.             Review of Systems   Constitutional:  Positive for fatigue.   HENT: Negative.     Eyes: Negative.    Respiratory:  Positive for cough and shortness of breath.    Cardiovascular:  Positive for leg swelling.   Gastrointestinal:  Positive for abdominal distention and abdominal pain.   Endocrine: Negative.    Genitourinary: Negative.    Musculoskeletal: Negative.    Skin: Negative.    Allergic/Immunologic: Negative.    Neurological: Negative.    Hematological: Negative.    Psychiatric/Behavioral: Negative.     Objective:     Vital Signs (Most Recent):  Temp: 98.4 °F (36.9 °C) (09/03/22 1112)  Pulse: 60 (09/03/22 1112)  Resp: 18 (09/03/22 1112)  BP: (!) 108/58 (09/03/22 1112)  SpO2: 97 % (09/03/22 1112)   Vital Signs (24h Range):  Temp:  [98 °F (36.7 °C)-99 °F (37.2 °C)] 98.4 °F (36.9 °C)  Pulse:  [] 60  Resp:  [16-20] 18  SpO2:  [91 %-98 %] 97 %  BP: (103-128)/(55-89) 108/58     Weight: 107.3 kg (236 lb 8.9 oz)  Body mass index is 38.18 kg/m².    Intake/Output Summary (Last 24 hours) at 9/3/2022 1359  Last data filed at 9/2/2022 1958  Gross per 24 hour   Intake 105.91 ml   Output --   Net 105.91 ml      Physical Exam  Vitals and nursing note reviewed.   Constitutional:       Appearance: She is well-developed.   HENT:      Head: Normocephalic and atraumatic.   Eyes:      Conjunctiva/sclera: Conjunctivae normal.      Pupils: Pupils are equal, round, and reactive to light.   Cardiovascular:      Rate and Rhythm: Normal rate.      Heart sounds: Normal heart sounds.   Pulmonary:      Effort: Pulmonary effort is normal.      Breath sounds: Normal breath sounds.   Abdominal:      General: Bowel sounds are normal. There is distension.      Palpations: Abdomen is soft.   Musculoskeletal:         General: Normal range of motion.      Cervical back:  Normal range of motion and neck supple.      Right lower leg: Edema present.      Left lower leg: Edema present.   Skin:     General: Skin is warm and dry.   Neurological:      Mental Status: She is alert and oriented to person, place, and time.      Deep Tendon Reflexes: Reflexes are normal and symmetric.   Psychiatric:         Behavior: Behavior normal.         Thought Content: Thought content normal.         Judgment: Judgment normal.       Significant Labs: All pertinent labs within the past 24 hours have been reviewed.  BMP:   Recent Labs   Lab 09/02/22  1319 09/03/22  0501   GLU  --  163*   NA  --  131*   K  --  4.0   CL  --  99   CO2  --  21*   BUN  --  39*   CREATININE  --  1.8*   CALCIUM  --  8.2*   MG 1.6  --      CBC:   Recent Labs   Lab 09/02/22  0514 09/03/22  0501   WBC 8.19 8.08  8.08   HGB 10.3* 9.8*  9.8*   HCT 32.7* 29.4*  29.4*    199  199     CMP:   Recent Labs   Lab 09/02/22  0558 09/03/22  0501   * 131*   K 4.2 4.0    99   CO2 20* 21*   * 163*   BUN 27* 39*   CREATININE 1.4 1.8*   CALCIUM 9.0 8.2*   PROT 8.5* 7.7   ALBUMIN 2.7* 2.6*   BILITOT 2.3* 1.5*   ALKPHOS 90 82   AST 13 16   ALT 6* 8*   ANIONGAP 11 11     Troponin:   Recent Labs   Lab 09/02/22  0558 09/02/22  1456 09/02/22  1911   TROPONINI 0.020 0.018 0.031*       Significant Imaging: I have reviewed all pertinent imaging results/findings within the past 24 hours.      Assessment/Plan:      * Abdominal pain  6.7 cm heterogeneous complex structure within the gallbladder fossa correlating with CT findings.  Finding remains nonspecific and may correlate with abnormal gallbladder containing sludge and stones versus mass.  Patient gives no prior history of cholecystectomy.  Ruptured gallbladder is an alternate possibility noting patient without sonographic Taylor sign and no definite pericholecystic fluid identified.    -NPO   -MRI/MRCP pending   -General surgery vs GI consult     9/3/22  -MRCP not completed due  to pacemaker non-compatible. Case discussed with Dr. Arce (G)I; recommends outpatient EUS and possible ERCP, if patient is stable and pain is controlled.   -Reports abdominal pain has nearly resolved and is requesting a diet.       Acute renal failure superimposed on stage 3 chronic kidney disease  Renal function worsening  Will give 1x dose of IV lasix.   Follow BMP         Lung nodule  Increased ground-glass and linear density within the lung bases with more focal nodular opacity in the right lower lobe measuring 1.3 cm.  Findings overall progressed since the comparison exam and may reflect progression in prior disease versus acute infiltrate.  For a solid nodule >8 mm, Fleischner Society 2017 guidelines recommend considering CT, PET/CT or tissue sampling at 3 months.      Paroxysmal SVT (supraventricular tachycardia)  -Presents with SVT with HR in 140's  -Prior occurrence noted in 2018, required DCCV  -Amiodarone drip initiated  -Echo pending  -Troponin negative     Patient had another episode of SVT this morning. Oral Cardizem added. Heparin infusion stopped and oral anticoagulation restarted.         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  Last A1c reviewed-   Lab Results   Component Value Date    HGBA1C 6.9 (H) 06/21/2022     Most recent fingerstick glucose reviewed-   Recent Labs   Lab 09/02/22 2007 09/03/22  0058 09/03/22  0513 09/03/22  1114   POCTGLUCOSE 152* 165* 154* 176*     Current correctional scale  Low  Decrease anti-hyperglycemic dose as follows-   Antihyperglycemics (From admission, onward)    Start     Stop Route Frequency Ordered    09/02/22  1335  insulin aspart U-100 pen 0-5 Units         -- SubQ Before meals & nightly PRN 09/02/22 1245        Hold Oral hypoglycemics while patient is in the hospital.      VTE Risk Mitigation (From admission, onward)         Ordered     rivaroxaban tablet 15 mg  with dinner         09/03/22 1232     IP VTE HIGH RISK PATIENT  Once         09/02/22 1245     Place sequential compression device  Until discontinued         09/02/22 1245     Reason for No Pharmacological VTE Prophylaxis  Once        Question:  Reasons:  Answer:  Already adequately anticoagulated on oral Anticoagulants    09/02/22 1245     Place sequential compression device  Until discontinued         09/02/22 1121                Discharge Planning   DOUGLAS:      Code Status: Full Code   Is the patient medically ready for discharge?:     Reason for patient still in hospital (select all that apply): Patient trending condition, Laboratory test and Treatment                     Pily Travis NP  Department of Hospital Medicine   '[facility] - Telemetry ([facility])

## 2022-09-03 NOTE — ASSESSMENT & PLAN NOTE
Patient's FSGs are uncontrolled due to hyperglycemia on current medication regimen.  Last A1c reviewed-   Lab Results   Component Value Date    HGBA1C 6.9 (H) 06/21/2022     Most recent fingerstick glucose reviewed-   Recent Labs   Lab 09/02/22 2007 09/03/22  0058 09/03/22  0513 09/03/22  1114   POCTGLUCOSE 152* 165* 154* 176*     Current correctional scale  Low  Decrease anti-hyperglycemic dose as follows-   Antihyperglycemics (From admission, onward)    Start     Stop Route Frequency Ordered    09/02/22 1335  insulin aspart U-100 pen 0-5 Units         -- SubQ Before meals & nightly PRN 09/02/22 1245        Hold Oral hypoglycemics while patient is in the hospital.

## 2022-09-03 NOTE — ASSESSMENT & PLAN NOTE
-Presents with SVT with HR in 140's  -Prior occurrence noted in 2018, required DCCV  -Amiodarone drip initiated  -Echo pending  -Troponin negative    9/3/22 - cont IV amio drip, if stable rhythm x 24 hours will change to oral amio  - cont BB and CCB

## 2022-09-03 NOTE — NURSING
"POC reviewed with patient. Pt verbalized understanding  C/o pain.... Moderately controlled by PRN meds and relaxation techniques.   AAO x4. VSS  NR on tele monitor.   PIV saline locked, clean, dry, intact  No other c/o at this time.  Pt remains free of injuries and falls; fall precaution in place.   Bed low, side rails x2, call light in reach, personal belongings at bedside.  Reminded to call for assistance.  Repositioned independently.  Hourly rounding complete. Will continue to monitor.            /67 (BP Location: Left arm, Patient Position: Lying)   Pulse 60   Temp 98 °F (36.7 °C) (Oral)   Resp 16   Ht 5' 6" (1.676 m)   Wt 107.3 kg (236 lb 8.9 oz)   LMP  (LMP Unknown)   SpO2 98%   BMI 38.18 kg/m²             "

## 2022-09-03 NOTE — ASSESSMENT & PLAN NOTE
-Presents with SVT with HR in 140's  -Prior occurrence noted in 2018, required DCCV  -Amiodarone drip initiated  -Echo pending  -Troponin negative     Patient had another episode of SVT this morning. Oral Cardizem added. Heparin infusion stopped and oral anticoagulation restarted.

## 2022-09-03 NOTE — ASSESSMENT & PLAN NOTE
-BNP elevated, appears slightly overloaded on exam  -Diurese as BP permits  -Check echo  -Cardiology consult     Will give 1x dose of lasix

## 2022-09-03 NOTE — SUBJECTIVE & OBJECTIVE
Review of Systems   Constitutional:  Positive for fatigue.   HENT: Negative.     Eyes: Negative.    Respiratory:  Positive for cough and shortness of breath.    Cardiovascular:  Positive for leg swelling.   Gastrointestinal:  Positive for abdominal distention and abdominal pain.   Endocrine: Negative.    Genitourinary: Negative.    Musculoskeletal: Negative.    Skin: Negative.    Allergic/Immunologic: Negative.    Neurological: Negative.    Hematological: Negative.    Psychiatric/Behavioral: Negative.     Objective:     Vital Signs (Most Recent):  Temp: 98.4 °F (36.9 °C) (09/03/22 1112)  Pulse: 60 (09/03/22 1112)  Resp: 18 (09/03/22 1112)  BP: (!) 108/58 (09/03/22 1112)  SpO2: 97 % (09/03/22 1112)   Vital Signs (24h Range):  Temp:  [98 °F (36.7 °C)-99 °F (37.2 °C)] 98.4 °F (36.9 °C)  Pulse:  [] 60  Resp:  [16-20] 18  SpO2:  [91 %-98 %] 97 %  BP: (103-128)/(55-89) 108/58     Weight: 107.3 kg (236 lb 8.9 oz)  Body mass index is 38.18 kg/m².    Intake/Output Summary (Last 24 hours) at 9/3/2022 1359  Last data filed at 9/2/2022 1958  Gross per 24 hour   Intake 105.91 ml   Output --   Net 105.91 ml      Physical Exam  Vitals and nursing note reviewed.   Constitutional:       Appearance: She is well-developed.   HENT:      Head: Normocephalic and atraumatic.   Eyes:      Conjunctiva/sclera: Conjunctivae normal.      Pupils: Pupils are equal, round, and reactive to light.   Cardiovascular:      Rate and Rhythm: Normal rate.      Heart sounds: Normal heart sounds.   Pulmonary:      Effort: Pulmonary effort is normal.      Breath sounds: Normal breath sounds.   Abdominal:      General: Bowel sounds are normal. There is distension.      Palpations: Abdomen is soft.   Musculoskeletal:         General: Normal range of motion.      Cervical back: Normal range of motion and neck supple.      Right lower leg: Edema present.      Left lower leg: Edema present.   Skin:     General: Skin is warm and dry.   Neurological:       Mental Status: She is alert and oriented to person, place, and time.      Deep Tendon Reflexes: Reflexes are normal and symmetric.   Psychiatric:         Behavior: Behavior normal.         Thought Content: Thought content normal.         Judgment: Judgment normal.       Significant Labs: All pertinent labs within the past 24 hours have been reviewed.  BMP:   Recent Labs   Lab 09/02/22  1319 09/03/22  0501   GLU  --  163*   NA  --  131*   K  --  4.0   CL  --  99   CO2  --  21*   BUN  --  39*   CREATININE  --  1.8*   CALCIUM  --  8.2*   MG 1.6  --      CBC:   Recent Labs   Lab 09/02/22  0514 09/03/22  0501   WBC 8.19 8.08  8.08   HGB 10.3* 9.8*  9.8*   HCT 32.7* 29.4*  29.4*    199  199     CMP:   Recent Labs   Lab 09/02/22  0558 09/03/22  0501   * 131*   K 4.2 4.0    99   CO2 20* 21*   * 163*   BUN 27* 39*   CREATININE 1.4 1.8*   CALCIUM 9.0 8.2*   PROT 8.5* 7.7   ALBUMIN 2.7* 2.6*   BILITOT 2.3* 1.5*   ALKPHOS 90 82   AST 13 16   ALT 6* 8*   ANIONGAP 11 11     Troponin:   Recent Labs   Lab 09/02/22  0558 09/02/22  1456 09/02/22  1911   TROPONINI 0.020 0.018 0.031*       Significant Imaging: I have reviewed all pertinent imaging results/findings within the past 24 hours.

## 2022-09-03 NOTE — HOSPITAL COURSE
MRCP not completed due to pacemaker non-compatible. Case discussed with Dr. Arce (ROSA ISELA)I; recommends outpatient EUS and possible ERCP, if patient is stable and pain is controlled. Patient reports abdominal pain has nearly resolved and is requesting a diet. Renal function worsening, Discussed with Dr. Monterroso will give 1x dose of IV lasix. Patient had another episode of SVT this morning. Oral Cardizem added. Heparin infusion stopped and oral anticoagulation restarted. 9/4/22-The patient is over the age of 18 years, exhibits no evidence of an altered level of consciousness, and possesses appropriate decision-making capacity based on their ability to understand and communicate rationally.  Patient was advised, that, for an optimal recovery, they should be responsible for complying with the individualized treatment plan provided today by the medical professionals at [facility].    I informed the patient that failing to take responsibility for their health and safety and not complying with the recommendations provided to today is deemed to be against our medical advice. The patient was provided clear and verbal details regarding alternatives, benefits, risks, and complications related to their condition.  I have personally  discussed at great length that without further evaluation and monitoring there may be unforeseen circumstances and/or deterioration; including being made aware of the serious complications that may be associated with their condition, including but not limited to: stroke, permanent disability, paralysis, loss of limb(s), and death. The patient verbalized these risks back to me in laymans terms.  The patient was able to discuss the treatment that was recommended and, was aware of specific risks associated with the refusal of care relating to their specific condition.  Patient was instructed to return to the emergency department if their condition changes or they wish to comply with our treatment  recommendations.

## 2022-09-03 NOTE — ASSESSMENT & PLAN NOTE
Likely sec to cardiorenal vs rapid rates with SVT/hypotension  monitor renal function, if worsens rec nephro eval

## 2022-09-04 NOTE — PROGRESS NOTES
09/04/22 0422   Vital Signs   BP (!) 80/48   BP Method Manual   Hospital medicine and cardiology notified of current BP. Patient asymptomatic. Cardiology ordered to stop amiodarone gtt and hospital medicine ordered 500cc bolus. Will continue to monitor.

## 2022-09-04 NOTE — PLAN OF CARE
Identified pt with two pt identifiers(name and ). Chief Complaint   Patient presents with    Anxiety     3 months follow up        Health Maintenance Due   Topic    Hepatitis C Screening     Shingrix Vaccine Age 50> (1 of 2)    FOBT Q 1 YEAR AGE 50-75        Wt Readings from Last 3 Encounters:   19 170 lb (77.1 kg)   19 173 lb (78.5 kg)   19 173 lb (78.5 kg)     Temp Readings from Last 3 Encounters:   19 99.5 °F (37.5 °C) (Oral)   19 98.1 °F (36.7 °C)   19 97.8 °F (36.6 °C)     BP Readings from Last 3 Encounters:   19 124/80   19 111/77   19 109/74     Pulse Readings from Last 3 Encounters:   19 96   19 77   19 78         Learning Assessment:  :     Learning Assessment 2019   PRIMARY LEARNER Patient   PRIMARY LANGUAGE ENGLISH   LEARNER PREFERENCE PRIMARY DEMONSTRATION   ANSWERED BY self   RELATIONSHIP SELF       Depression Screening:  :     3 most recent PHQ Screens 2019   PHQ Not Done Active Diagnosis of Depression or Bipolar Disorder       Fall Risk Assessment:  :     No flowsheet data found. Abuse Screening:  :     Abuse Screening Questionnaire 2019   Do you ever feel afraid of your partner? N   Are you in a relationship with someone who physically or mentally threatens you? N   Is it safe for you to go home? Y       Coordination of Care Questionnaire:  :     1) Have you been to an emergency room, urgent care clinic since your last visit? no   Hospitalized since your last visit? no             2) Have you seen or consulted any other health care providers outside of [address] [place] [address] since your last visit? no  (Include any pap smears or colon screenings in this section.)    3) Do you have an Advance Directive on file? no  Are you interested in receiving information about Advance Directives? no    Reviewed record in preparation for visit and have obtained necessary documentation.   Medication reconciliation up to [facility] - Telemetry (Hospital)  Initial Discharge Assessment       Primary Care Provider: Mirza Gamble MD    Admission Diagnosis: SVT (supraventricular tachycardia) [I47.1]  Hypoxia [R09.02]  Abdominal pain, unspecified abdominal location [R10.9]  Chest pain, unspecified type [R07.9]    Admission Date: 9/2/2022  Expected Discharge Date: 9/4/2022    Discharge Barriers Identified: None    Payor: [employer]ARE / Plan: [employer] 65 / Product Type: Medicare Advantage /     Extended Emergency Contact Information  Primary Emergency Contact: Rigoberto Souza  Address: [address]           [place] [address] [facility]  Home Phone: [phone]  Relation: Spouse  Secondary Emergency Contact: Rigoberto Souza Mirtha   [place]  Mobile Phone: [phone]  Relation: Son    Discharge Plan A: Home  Discharge Plan B: Home      [facility]  [address] [place]  [facility] [facility] [address]  Phone: [phone] Fax: [phone]      Initial Assessment (most recent)       Adult Discharge Assessment - 09/04/22 1048          Discharge Assessment    Assessment Type Discharge Planning Assessment     Confirmed/corrected address, phone number and insurance Yes     Confirmed Demographics Correct on Facesheet     Source of Information patient     When was your last doctors appointment? --   June 2022    Does patient/caregiver understand observation status Yes     Reason For Admission Stomach pain     Lives With spouse     Do you expect to return to your current living situation? Yes     Do you have help at home or someone to help you manage your care at home? Yes     Who are your caregiver(s) and their phone number(s)? Libby Franklin Spouse [phone]     Prior to hospitilization cognitive status: Alert/Oriented     Current cognitive status: Alert/Oriented     Walking or Climbing Stairs Difficulty none     Dressing/Bathing Difficulty none     Home Layout Able to  date and corrected with patient at this time. live on 1st floor     Equipment Currently Used at Home oxygen     Readmission within 30 days? No     Patient currently being followed by outpatient case management? No     Do you currently have service(s) that help you manage your care at home? No     Do you take prescription medications? Yes     Do you have prescription coverage? Yes     Coverage [employer]     Do you have any problems affording any of your prescribed medications? No     Is the patient taking medications as prescribed? yes     Who is going to help you get home at discharge? Spouse naty Souza     How do you get to doctors appointments? family or friend will provide     Are you on dialysis? No     Do you take coumadin? No     Discharge Plan A Home     Discharge Plan B Home     DME Needed Upon Discharge  none     Discharge Plan discussed with: Patient     Discharge Barriers Identified None        Relationship/Environment    Name(s) of Who Lives With Patient Lan Souza, Spouse                   Jnoah met with pt at bedside to complete discharge assessment. Pt was preparing to leave AMA but reports no needs and spouse will help when discharged.  CM provided a transitional care folder, information on advanced directives, information on pharmacy bedside delivery, and discharge planning begins on admission with contact information for any needs/questions.

## 2022-09-04 NOTE — PLAN OF CARE
Patient remained free from falls throughout shift, call bell within reach. Amiodarone gtt continued. BP soft overnight, /50s. Complained of abdominal pain once, prn norco given. SOB on exertion. Vitals stable, will continue to monitor.

## 2022-09-04 NOTE — NURSING
Pt verbalized she wanted to leave hospital, even after NP advisement not to.Pt states she can not sleep.  Pt signed refusal of treatment form.  Pt discharge instructions, follow up appts, and pharmacy medication pick-up discussed, pt understood without any questions. IV removed and intact.

## 2022-09-04 NOTE — PLAN OF CARE
Problem: Adult Inpatient Plan of Care  Goal: Plan of Care Review  Outcome: Unable to Meet, Plan Revised  Goal: Patient-Specific Goal (Individualized)  Outcome: Unable to Meet, Plan Revised  Goal: Absence of Hospital-Acquired Illness or Injury  Outcome: Unable to Meet, Plan Revised  Goal: Optimal Comfort and Wellbeing  Outcome: Unable to Meet, Plan Revised  Goal: Readiness for Transition of Care  Outcome: Unable to Meet, Plan Revised

## 2022-09-04 NOTE — DISCHARGE SUMMARY
[facility] ([facility])  [facility] Medicine  Discharge Summary      Patient Name: Janine Souza  MRN: [id number]  Patient Class: IP- Inpatient  Admission Date: 9/2/2022  Hospital Length of Stay: 2 days  Discharge Date and Time:  09/04/2022 4:47 PM  Attending Physician: No att. providers found   Discharging Provider: Pily Travis NP  Primary Care Provider: Yue Gamble MD      HPI:   Ms. Souza is a 62 year old female patient whose current medical conditions include secondary neuroendocrine tumor of liver, PAF s/p ablation in 2006, chronic AC on Xarelto), chronic diastolic CHF, pulmonary HTN, DM type II, anemia, and SVT who presented to [facility] ED today with a chief complaint of generalized abdominal pain over the past week. Associated symptoms included cough, congestion, and SOB. No apparent associated fever, chills, yue chest pain, near syncope, or syncope. Initial workup in ED revealed BNP of 286, negative troponin, +d-dimer. EKG showed SVT's with HR in 140 range. VQ scan negative. CT abdomen showed  new small volume ascites throughout the abdomen and pelvis. New hyperdensity in the region of the gallbladder, suspected to correlate with intraluminal gallbladder contents, though underlying mass is possible.  Finding could correlate with sludge or blood products. Increased ground-glass and linear density within the lung bases with more focal nodular opacity in the right lower lobe measuring 1.3 cm.  Findings overall progressed since the comparison exam and may reflect progression in prior disease versus acute infiltrate. Anasarca. Patient admitted for further evaluation and treatment. Cardiology consulted.               * No surgery found *      Hospital Course:   MRCP not completed due to pacemaker non-compatible. Case discussed with Dr. Arce (G)I; recommends outpatient EUS and possible ERCP, if patient is stable and pain is controlled. Patient reports abdominal pain has nearly resolved and is  requesting a diet. Renal function worsening, Discussed with Dr. Monterroso will give 1x dose of IV lasix. Patient had another episode of SVT this morning. Oral Cardizem added. Heparin infusion stopped and oral anticoagulation restarted. 9/4/22-The patient is over the age of 18 years, exhibits no evidence of an altered level of consciousness, and possesses appropriate decision-making capacity based on their ability to understand and communicate rationally.  Patient was advised, that, for an optimal recovery, they should be responsible for complying with the individualized treatment plan provided today by the medical professionals at [facility].    I informed the patient that failing to take responsibility for their health and safety and not complying with the recommendations provided to today is deemed to be against our medical advice. The patient was provided clear and verbal details regarding alternatives, benefits, risks, and complications related to their condition.  I have personally  discussed at great length that without further evaluation and monitoring there may be unforeseen circumstances and/or deterioration; including being made aware of the serious complications that may be associated with their condition, including but not limited to: stroke, permanent disability, paralysis, loss of limb(s), and death. The patient verbalized these risks back to me in laymans terms.  The patient was able to discuss the treatment that was recommended and, was aware of specific risks associated with the refusal of care relating to their specific condition.  Patient was instructed to return to the emergency department if their condition changes or they wish to comply with our treatment recommendations.         Goals of Care Treatment Preferences:  Code Status: Full Code      Consults:   Consults (From admission, onward)        Status Ordering Provider     Inpatient consult to Hematology/Oncology  Once        Provider:  Phil SAINZ  MD Blair    Completed JAYRO CHERRY     Inpatient consult to Cardiology  Once        Provider:  Stew Monterroso MD    Completed JAYRO CHERRY.          No new Assessment & Plan notes have been filed under this hospital service since the last note was generated.  Service: Hospital Medicine    Final Active Diagnoses:    Diagnosis Date Noted POA    PRINCIPAL PROBLEM:  Abdominal pain [R10.9] 09/02/2022 Yes    Acute renal failure superimposed on stage 3 chronic kidney disease [N17.9, N18.30] 09/03/2022 Yes    Lung nodule [R91.1] 02/22/2022 Yes    Paroxysmal SVT (supraventricular tachycardia) [I47.1] 06/06/2018 Yes    Chronic diastolic heart failure [I50.32] 01/16/2016 Yes    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 [C78.6] 01/13/2016 Yes    Uncontrolled type 2 diabetes mellitus with hyperglycemia [E11.65] 09/26/2013 Yes      Problems Resolved During this Admission:    Diagnosis Date Noted Date Resolved POA    Acute on chronic respiratory failure [J96.20] 09/03/2022 09/03/2022 Yes       Discharged Condition: against medical advice    Disposition: Left Against Medical Adv*    Follow Up:   Follow-up Information     Mirza Gamble MD Follow up in 3 day(s).    Specialty: Family Medicine  Why: for hospital follow-up and repeat BMP  Contact information:  [address] [place] [address] [address] [place] [address]  [phone]             Patricia Yeager MD Follow up.    Specialty: Gastroenterology  Why: GI to arrange outpatietn EUS and possible ERCP  Contact information:  [address] [place] [address] [address] [place] [address]  [phone]             Benedicto Sánchez MD Follow up in 1 week(s).    Specialty: Cardiology  Why: for hospital follow-up  Contact information:  [address] [place] [address] [phone]                       Patient Instructions:   No discharge procedures on file.    Significant Diagnostic Studies: Labs:   BMP:   Recent Labs   Lab 09/03/22  0501 09/04/22  0827   * 201*   *  127*   K 4.0 4.2   CL 99 94*   CO2 21* 17*   BUN 39* 57*   CREATININE 1.8* 3.1*   CALCIUM 8.2* 8.2*   , CMP   Recent Labs   Lab 09/03/22  0501 09/04/22  0827   * 127*   K 4.0 4.2   CL 99 94*   CO2 21* 17*   * 201*   BUN 39* 57*   CREATININE 1.8* 3.1*   CALCIUM 8.2* 8.2*   PROT 7.7  --    ALBUMIN 2.6*  --    BILITOT 1.5*  --    ALKPHOS 82  --    AST 16  --    ALT 8*  --    ANIONGAP 11 16    and CBC   Recent Labs   Lab 09/03/22  0501 09/04/22  0835   WBC 8.08  8.08 8.31   HGB 9.8*  9.8* 9.8*   HCT 29.4*  29.4* 30.1*     199 231     Microbiology:   Blood Culture   Lab Results   Component Value Date    LABBLOO No Growth to date 09/02/2022    LABBLOO No Growth to date 09/02/2022    LABBLOO No Growth to date 09/02/2022       Pending Diagnostic Studies:     Procedure Component Value Units Date/Time    Chromogranin A [[id number]] Collected: 09/03/22 1158    Order Status: Sent Lab Status: In process Updated: 09/04/22 0200    Specimen: Blood     Gastrin [[id number]] Collected: 09/03/22 1158    Order Status: Sent Lab Status: In process Updated: 09/04/22 0200    Specimen: Blood          Medications:  Reconciled Home Medications:      Medication List      START taking these medications    amiodarone 200 MG Tab  Commonly known as: PACERONE  Take 1 tablet (200 mg total) by mouth 2 (two) times daily.        CHANGE how you take these medications    lancets Misc  Use as directed  What changed: Another medication with the same name was removed. Continue taking this medication, and follow the directions you see here.     metFORMIN 500 MG tablet  Commonly known as: GLUCOPHAGE  Take 1 tablet (500 mg total) by mouth daily with breakfast. Take 1 tablet by mouth daily with breakfast  What changed:   · how much to take  · how to take this  · when to take this  · additional instructions        CONTINUE taking these medications    atorvastatin 20 MG tablet  Commonly known as: LIPITOR  Take 1 tablet (20 mg total) by mouth  "once daily.     BD ULTRA-FINE MINI PEN NEEDLE 31 gauge x 3/16" Ndle  Generic drug: pen needle, diabetic  1 each by Misc.(Non-Drug; Combo Route) route once daily.     blood sugar diagnostic Strp  To check BG 1 times daily, to use with insurance preferred meter     EScitalopram oxalate 10 MG tablet  Commonly known as: LEXAPRO  Take 1 tablet (10 mg total) by mouth once daily.     FLOVENT  mcg/actuation inhaler  Generic drug: fluticasone propionate  Inhale 2 puffs into the lungs once daily.     furosemide 40 MG tablet  Commonly known as: LASIX  Take 40 mg by mouth once daily.     glipiZIDE 10 MG tablet  Commonly known as: GLUCOTROL  Take 1 tablet (10 mg total) by mouth 2 (two) times daily.     KLOR-CON M20 20 MEQ tablet  Generic drug: potassium chloride SA  20 mEq once daily.     * metoprolol tartrate 50 MG tablet  Commonly known as: LOPRESSOR  Take 50 mg by mouth nightly.     * metoprolol tartrate 25 MG tablet  Commonly known as: LOPRESSOR  25 mg as needed.     rivaroxaban 20 mg Tab  Commonly known as: XARELTO  Take 20 mg by mouth.     TRADJENTA 5 mg Tab tablet  Generic drug: linaGLIPtin  Take 1 tablet (5 mg total) by mouth once daily.     traZODone 100 MG tablet  Commonly known as: DESYREL  Take 1 tablet (100 mg total) by mouth every evening.     zolpidem 10 mg Tab  Commonly known as: AMBIEN  Take 1 tablet (10 mg total) by mouth nightly as needed (As needed).         * This list has 2 medication(s) that are the same as other medications prescribed for you. Read the directions carefully, and ask your doctor or other care provider to review them with you.            STOP taking these medications    blood sugar diagnostic, disc Strp     blood-glucose meter kit     LANTUS SOLOSTAR U-100 INSULIN glargine 100 units/mL SubQ pen  Generic drug: insulin     sotaloL 80 MG tablet  Commonly known as: BETAPACE            Indwelling Lines/Drains at time of discharge:   Lines/Drains/Airways     None                 Time spent " on the discharge of patient: 45 minutes         Pily Travis NP  Department of Hospital Medicine  [facility] Tele[facility] ([facility])

## 2022-09-04 NOTE — PROGRESS NOTES
09/04/22 0550   Vital Signs   BP (!) 90/54   MD notified of post bolus BP, he ordered NS @100cc/hr. Will continue to monitor.

## 2022-09-04 NOTE — PLAN OF CARE
[facility] - Tele[facility] (Hospital)  Discharge Final Note    Primary Care Provider: Mirza Gamble MD    Expected Discharge Date: 9/4/2022    Final Discharge Note (most recent)       Final Note - 09/04/22 1051          Final Note    Assessment Type Final Discharge Note     Anticipated Discharge Disposition Home or Self Care        Post-Acute Status    Discharge Delays None known at this time                   No needs at time of chart review. KM,MSW    Important Message from Medicare             Contact Info       Mirza Gamble MD   Specialty: Family Medicine   Relationship: PCP - General    [address] [place] [address] [place] [address]   Phone: [phone]       Next Steps: Follow up in 3 day(s)    Instructions: for hospital follow-up and repeat BMP    Patricia Yeager MD   Specialty: Gastroenterology    [address] [place] [address] [address] [place] [address]   Phone: [phone]       Next Steps: Follow up    Instructions: GI to arrange outpatietn EUS and possible ERCP    Benedicto Sánchez MD   Specialty: Cardiology   Relationship: Consulting Physician    [address] [place] [address] [address] [place] [address]   Phone: [phone]       Next Steps: Follow up in 1 week(s)    Instructions: for hospital follow-up

## 2022-09-07 NOTE — TELEPHONE ENCOUNTER
9/5/22   Spoke with Mr. Franklin patient's spouse and advised of positive blood culture results. Patient is currently admitted at [facility].

## 2022-09-08 LAB
BACTERIA BLD CULT: ABNORMAL
GASTRIN SERPL-MCNC: 29 PG/ML

## 2022-10-04 ENCOUNTER — PATIENT MESSAGE (OUTPATIENT)
Dept: ADMINISTRATIVE | Facility: HOSPITAL | Age: 62
End: 2022-10-04
Payer: MEDICARE

## 2022-12-01 ENCOUNTER — PATIENT OUTREACH (OUTPATIENT)
Dept: ADMINISTRATIVE | Facility: HOSPITAL | Age: 62
End: 2022-12-01
Payer: MEDICARE

## 2022-12-01 NOTE — PROGRESS NOTES
Working  Report:     Added date of death the demographics.        [email or web address]~:[email or web address]%[email or web address]%20the%20age%[email or web address]    Janine was a kind, loving, selfless person who loved to cook for family and friends. Her favorite thing to do was spend time with her children and grandchildren. Janine loved the outdoors, especially bream fishing and riding around to check the cattle with her loving , Rigoberto. She was an adamant member of [employer] where she was apart of the food committee, which she thoroughly enjoyed. She loved hosting gatherings with her family and friends for the holidays and special occasions. No matter the person, you could always depend on Janine to lend a helping hand. She loved to receive holiday cards and enjoyed sending cards for every occasion to her very long mailing list of children of family and friends. Janine was known for her sense of humor, her silly stories, and her corny jokes for the children. She was loved and cherished by many and will be deeply missed.